# Patient Record
Sex: MALE | Race: WHITE | NOT HISPANIC OR LATINO | Employment: OTHER | URBAN - METROPOLITAN AREA
[De-identification: names, ages, dates, MRNs, and addresses within clinical notes are randomized per-mention and may not be internally consistent; named-entity substitution may affect disease eponyms.]

---

## 2017-01-03 ENCOUNTER — OFFICE VISIT (OUTPATIENT)
Dept: PLASTIC SURGERY | Facility: IMAGING CENTER | Age: 72
End: 2017-01-03
Payer: COMMERCIAL

## 2017-01-03 ENCOUNTER — TELEPHONE (OUTPATIENT)
Dept: HEMATOLOGY ONCOLOGY | Facility: MEDICAL CENTER | Age: 72
End: 2017-01-03

## 2017-01-03 DIAGNOSIS — L57.0 ACTINIC KERATOSIS: ICD-10-CM

## 2017-01-03 RX ORDER — FLUOROURACIL 50 MG/G
CREAM TOPICAL
Qty: 1 TUBE | Refills: 1 | Status: SHIPPED | OUTPATIENT
Start: 2017-01-03 | End: 2017-09-28

## 2017-01-03 NOTE — TELEPHONE ENCOUNTER
Called patient regarding missed appointment. Left voicemail to give us a call back to reschedule.

## 2017-01-03 NOTE — MR AVS SNAPSHOT
Rohit Snow   1/3/2017 3:30 PM   Office Visit   MRN: [id number]    Department:  [facility]   Dept Phone:  [phone]    Description:  Male : 1945   Provider:  DUY Hoff           Reason for Visit     Suture / Staple Removal           Allergies as of 1/3/2017     No Known Allergies      You were diagnosed with     Actinic keratosis   [702.0.ICD-9-CM]   efudex will be applied on weekdays at hs in small amounts after biopsy site heals for three weeks. it will be refilled if lesion hasn't resolved       Vital Signs     Smoking Status                   Former Smoker           Basic Information     Date Of Birth Sex Race Ethnicity Preferred Language    1945 Male White Non- English      Your appointments     2017  3:30 PM   Established Patient with Hamida Bryant PA-C   [facility]    Proficiency  [facility] [id number] [phone]           You will be receiving a confirmation call a few days before your appointment from our automated call confirmation system.              Problem List              ICD-10-CM Priority Class Noted - Resolved    KYA (generalized anxiety disorder) F41.1 Medium  Unknown - Present    ETOH abuse F10.10 High  Unknown - Present    Osteopenia M85.80 Medium  Unknown - Present    Hypoalbuminemia E88.09 Medium  11/10/2013 - Present    Alcoholism (HCC) F10.20 High  11/10/2013 - Present    Aortic insufficiency (Chronic) I35.1 Medium  11/10/2013 - Present    Major depressive disorder, single episode, severe without psychotic features (HCC) F32.2 High  2013 - Present    CHF (congestive heart failure) (HCC) I50.9 High  2013 - Present    History of atrial fibrillation Z86.79 Medium  2014 - Present    H/O: upper GI bleed Z87.19 Medium  2014 - Present    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) K70.30, I85.10 High  2014 - Present    Alcoholic hepatitis (Chronic) K70.10  High  11/21/2014 - Present    History of cervical spine trauma Z87.828 Low  4/24/2015 - Present    Cirrhosis (HCC) K74.60 High  4/24/2015 - Present    Thrombocytopenia (HCC) D69.6 Medium  4/24/2015 - Present    Insomnia G47.00 Low  8/21/2015 - Present    Left wrist pain M25.532 Low  9/17/2015 - Present    Macrocytic anemia D53.9 Low  9/20/2015 - Present    DDD (degenerative disc disease), cervical M50.30 Low  9/28/2015 - Present    Cervical spondylosis M47.812 Low  9/28/2015 - Present    Chronic neck pain M54.2, G89.29 Low  9/28/2015 - Present    Cervical fusion syndrome Q76.1 Low  9/28/2015 - Present    Cervical radiculopathy M54.12 Low  2/4/2016 - Present    Lymphocytosis D72.820 High  2/16/2016 - Present    Controlled substance agreement signed Z79.899 Low  4/28/2016 - Present    Continuous opioid dependence (HCC) F11.20   5/26/2016 - Present    Neoplasm of uncertain behavior of skin D48.5   12/28/2016 - Present    Actinic keratosis L57.0   1/3/2017 - Present      Health Maintenance        Date Due Completion Dates    IMM DTaP/Tdap/Td Vaccine (1 - Tdap) 7/17/1963 ---    IMM ZOSTER VACCINE 7/17/2004 ---    COLONOSCOPY 12/23/2019 12/23/2009            Current Immunizations     13-VALENT PCV PREVNAR 10/24/2016  3:20 PM    Influenza Vaccine Adult HD 10/24/2016  3:20 PM, 10/16/2015 11:25 AM    Influenza Vaccine Pediatric 11/10/2013  9:00 AM    Pneumococcal polysaccharide vaccine (PPSV-23) 4/24/2015  9:05 PM      Below and/or attached are the medications your provider expects you to take. Review all of your home medications and newly ordered medications with your provider and/or pharmacist. Follow medication instructions as directed by your provider and/or pharmacist. Please keep your medication list with you and share with your provider. Update the information when medications are discontinued, doses are changed, or new medications (including over-the-counter products) are added; and carry medication information at  all times in the event of emergency situations     Allergies:  No Known Allergies          Medications  Valid as of: January 03, 2017 -  4:22 PM    Generic Name Brand Name Tablet Size Instructions for use    B Complex Vitamins   Take 1 Tab by mouth every day.        Calcium Carbonate Antacid (Chew Tab) TUMS 500 MG Take 500 mg by mouth every four hours as needed.        Fluorouracil (Cream) EFUDEX 5 % Apply .5 grams qd at HS to affected area for three weeks during weekdays then stop.        Furosemide (Tab) LASIX 20 MG Take 1 Tab by mouth every day.        Multiple Vitamins-Minerals (Tab) THERAGRAN-M  Take 1 Tab by mouth every day.        Spironolactone (Tab) ALDACTONE 100 MG Take 1 Tab by mouth every day.        .                 Medicines prescribed today were sent to:     [facility] [address] [place] [address] [address] [facility] [address]    Phone: [phone] Fax: [phone]    Open 24 Hours?: No      Medication refill instructions:       If your prescription bottle indicates you have medication refills left, it is not necessary to call your provider’s office. Please contact your pharmacy and they will refill your medication.    If your prescription bottle indicates you do not have any refills left, you may request refills at any time through one of the following ways: The online [employer] system (except Urgent Care), by calling your provider’s office, or by asking your pharmacy to contact your provider’s office with a refill request. Medication refills are processed only during regular business hours and may not be available until the next business day. Your provider may request additional information or to have a follow-up visit with you prior to refilling your medication.   *Please Note: Medication refills are assigned a new Rx number when refilled electronically. Your pharmacy may indicate that no refills were authorized even though a new prescription for the same medication is available at the  pharmacy. Please request the medicine by name with the pharmacy before contacting your provider for a refill.           Live Gamer Access Code: [email or web address]  Expires: 1/6/2017  8:56 AM    Live Gamer  A secure, online tool to manage your health information     [employer]’s Live Gamer® is a secure, online tool that connects you to your personalized health information from the privacy of your home -- day or night - making it very easy for you to manage your healthcare. Once the activation process is completed, you can even access your medical information using the Live Gamer itz, which is available for free in the Apple Itz store or Google Play store.     Live Gamer provides the following levels of access (as shown below):   My Chart Features   [facility] Doctor [facility]  Specialists [facility]  Urgent  Care Non-[facility]  Primary Care  Doctor   Email your healthcare team securely and privately 24/7 X X X    Manage appointments: schedule your next appointment; view details of past/upcoming appointments X      Request prescription refills. X      View recent personal medical records, including lab and immunizations X X X X   View health record, including health history, allergies, medications X X X X   Read reports about your outpatient visits, procedures, consult and ER notes X X X X   See your discharge summary, which is a recap of your hospital and/or ER visit that includes your diagnosis, lab results, and care plan. X X       How to register for Live Gamer:  1. Go to  [email or web address].  2. Click on the Sign Up Now box, which takes you to the New Member Sign Up page. You will need to provide the following information:  a. Enter your Live Gamer Access Code exactly as it appears at the top of this page. (You will not need to use this code after you’ve completed the sign-up process. If you do not sign up before the expiration date, you must request a new code.)   b. Enter your date of birth.   c. Enter your home email  address.   d. Click Submit, and follow the next screen’s instructions.  3. Create a [employer] ID. This will be your [employer] login ID and cannot be changed, so think of one that is secure and easy to remember.  4. Create a [employer] password. You can change your password at any time.  5. Enter your Password Reset Question and Answer. This can be used at a later time if you forget your password.   6. Enter your e-mail address. This allows you to receive e-mail notifications when new information is available in [employer].  7. Click Sign Up. You can now view your health information.    For assistance activating your [employer] account, call [phone]

## 2017-01-03 NOTE — PROGRESS NOTES
Chief Complaint   Patient presents with   • Suture / Staple Removal       HISTORY OF PRESENT ILLNESS: Patient is a 71 y.o. male established patient who presents today suture removal after biopsy.       Actinic keratosis  Pathology from recent biopsy shows actinic keratosis after discussion patient chooses efudex treatment so I will prescribe this to use after healing is complete.       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) 11/21/2014     Priority: High   • Alcoholic hepatitis 11/21/2014     Priority: High   • Major depressive disorder, single episode, severe without psychotic features (HCC) 12/06/2013     Priority: High   • CHF (congestive heart failure) (HCC) 12/06/2013     Priority: High   • Alcoholism (HCC) 11/10/2013     Priority: High   • ETOH abuse      Priority: High   • Thrombocytopenia (HCC) 04/24/2015     Priority: Medium   • History of atrial fibrillation 11/19/2014     Priority: Medium   • H/O: upper GI bleed 11/19/2014     Priority: Medium   • Hypoalbuminemia 11/10/2013     Priority: Medium   • Aortic insufficiency 11/10/2013     Priority: Medium   • KYA (generalized anxiety disorder)      Priority: Medium   • Osteopenia      Priority: Medium   • Controlled substance agreement signed 04/28/2016     Priority: Low   • Cervical radiculopathy 02/04/2016     Priority: Low   • DDD (degenerative disc disease), cervical 09/28/2015     Priority: Low   • Cervical spondylosis 09/28/2015     Priority: Low   • Chronic neck pain 09/28/2015     Priority: Low   • Cervical fusion syndrome 09/28/2015     Priority: Low   • Macrocytic anemia 09/20/2015     Priority: Low   • Left wrist pain 09/17/2015     Priority: Low   • Insomnia 08/21/2015     Priority: Low   • History of cervical spine trauma 04/24/2015     Priority: Low   • Actinic keratosis 01/03/2017   •  Neoplasm of uncertain behavior of skin 2016   • Continuous opioid dependence (HCC) 2016       Allergies:Review of patient's allergies indicates no known allergies.    Current Outpatient Prescriptions   Medication Sig Dispense Refill   • furosemide (LASIX) 20 MG Tab Take 1 Tab by mouth every day. 90 Tab 1   • spironolactone (ALDACTONE) 100 MG Tab Take 1 Tab by mouth every day. 90 Tab 1   • calcium carbonate (TUMS) 500 MG Chew Tab Take 500 mg by mouth every four hours as needed.     • B Complex Vitamins (VITAMIN B COMPLEX PO) Take 1 Tab by mouth every day.     • therapeutic multivitamin-minerals (THERAGRAN-M) Tab Take 1 Tab by mouth every day.       No current facility-administered medications for this visit.       Social History   Substance Use Topics   • Smoking status: Former Smoker -- 0.25 packs/day for 4 years     Types: Cigarettes     Quit date: 1965   • Smokeless tobacco: Never Used   • Alcohol Use: 30.0 oz/week     50 Standard drinks or equivalent per week      Comment: 1/2 drinks daily (no more than a pint a day)       Family Status   Relation Status Death Age   • Mother     • Father       Family History   Problem Relation Age of Onset   • Other Mother      GERD   • Heart Disease Mother      CAD   • Diabetes Mother      DMII   • Cancer Father      liver   • Cancer Maternal Grandmother      Unknown type of cancer       ROS:  Review of Systems   Constitutional: Negative for fever, chills, weight loss and malaise/fatigue.     Skin:skin lesion left temple      Exam:  There were no vitals taken for this visit.  General:  Well nourished, well developed male in NAD  Left temple two sutures removed, area is healing well   Please note that this dictation was created using voice recognition software. I have made every reasonable attempt to correct obvious errors, but I expect that there are errors of grammar and possibly content that I did not discover before finalizing the  note.    Assessment/Plan:

## 2017-01-03 NOTE — ASSESSMENT & PLAN NOTE
Pathology from recent biopsy shows actinic keratosis after discussion patient chooses efudex treatment so I will prescribe this to use after healing is complete.

## 2017-01-23 ENCOUNTER — APPOINTMENT (OUTPATIENT)
Dept: MEDICAL GROUP | Age: 72
End: 2017-01-23
Payer: COMMERCIAL

## 2017-01-23 ENCOUNTER — APPOINTMENT (OUTPATIENT)
Dept: RADIOLOGY | Facility: MEDICAL CENTER | Age: 72
End: 2017-01-23
Attending: INTERNAL MEDICINE
Payer: COMMERCIAL

## 2017-02-06 ENCOUNTER — HOSPITAL ENCOUNTER (OUTPATIENT)
Dept: LAB | Facility: MEDICAL CENTER | Age: 72
End: 2017-02-06
Attending: INTERNAL MEDICINE
Payer: COMMERCIAL

## 2017-02-06 LAB
ALBUMIN SERPL BCP-MCNC: 4.2 G/DL (ref 3.2–4.9)
ALBUMIN/GLOB SERPL: 1.3 G/DL
ALP SERPL-CCNC: 87 U/L (ref 30–99)
ALT SERPL-CCNC: 8 U/L (ref 2–50)
ANION GAP SERPL CALC-SCNC: 8 MMOL/L (ref 0–11.9)
APTT PPP: 29.1 SEC (ref 24.7–36)
AST SERPL-CCNC: 20 U/L (ref 12–45)
BILIRUB SERPL-MCNC: 1.2 MG/DL (ref 0.1–1.5)
BUN SERPL-MCNC: 31 MG/DL (ref 8–22)
CALCIUM SERPL-MCNC: 10 MG/DL (ref 8.5–10.5)
CHLORIDE SERPL-SCNC: 100 MMOL/L (ref 96–112)
CO2 SERPL-SCNC: 27 MMOL/L (ref 20–33)
CREAT SERPL-MCNC: 1.37 MG/DL (ref 0.5–1.4)
GLOBULIN SER CALC-MCNC: 3.2 G/DL (ref 1.9–3.5)
GLUCOSE SERPL-MCNC: 109 MG/DL (ref 65–99)
INR PPP: 0.96 (ref 0.87–1.13)
POTASSIUM SERPL-SCNC: 4.7 MMOL/L (ref 3.6–5.5)
PROT SERPL-MCNC: 7.4 G/DL (ref 6–8.2)
PROTHROMBIN TIME: 13.1 SEC (ref 12–14.6)
SODIUM SERPL-SCNC: 135 MMOL/L (ref 135–145)

## 2017-02-06 PROCEDURE — 36415 COLL VENOUS BLD VENIPUNCTURE: CPT

## 2017-02-06 PROCEDURE — 85610 PROTHROMBIN TIME: CPT

## 2017-02-06 PROCEDURE — 80053 COMPREHEN METABOLIC PANEL: CPT

## 2017-02-06 PROCEDURE — 82105 ALPHA-FETOPROTEIN SERUM: CPT

## 2017-02-06 PROCEDURE — 85730 THROMBOPLASTIN TIME PARTIAL: CPT

## 2017-02-08 LAB — AFP-TM SERPL-MCNC: 4 NG/ML (ref 0–9)

## 2017-03-09 ENCOUNTER — TELEPHONE (OUTPATIENT)
Dept: HEMATOLOGY ONCOLOGY | Facility: MEDICAL CENTER | Age: 72
End: 2017-03-09

## 2017-03-09 NOTE — TELEPHONE ENCOUNTER
2nd attempt:     Left message regarding patient's missed hematology appointment with Dr. Flores. 2mo follow up.

## 2017-03-23 ENCOUNTER — OFFICE VISIT (OUTPATIENT)
Dept: HEMATOLOGY ONCOLOGY | Facility: MEDICAL CENTER | Age: 72
End: 2017-03-23
Payer: COMMERCIAL

## 2017-03-23 ENCOUNTER — NON-PROVIDER VISIT (OUTPATIENT)
Dept: HEMATOLOGY ONCOLOGY | Facility: MEDICAL CENTER | Age: 72
End: 2017-03-23
Payer: COMMERCIAL

## 2017-03-23 ENCOUNTER — HOSPITAL ENCOUNTER (OUTPATIENT)
Facility: MEDICAL CENTER | Age: 72
End: 2017-03-23
Attending: INTERNAL MEDICINE
Payer: COMMERCIAL

## 2017-03-23 VITALS
OXYGEN SATURATION: 97 % | SYSTOLIC BLOOD PRESSURE: 100 MMHG | WEIGHT: 120 LBS | HEART RATE: 67 BPM | DIASTOLIC BLOOD PRESSURE: 70 MMHG | RESPIRATION RATE: 16 BRPM | HEIGHT: 64 IN | TEMPERATURE: 99.3 F | BODY MASS INDEX: 20.49 KG/M2

## 2017-03-23 VITALS
OXYGEN SATURATION: 97 % | BODY MASS INDEX: 20.49 KG/M2 | RESPIRATION RATE: 16 BRPM | DIASTOLIC BLOOD PRESSURE: 70 MMHG | TEMPERATURE: 99.3 F | SYSTOLIC BLOOD PRESSURE: 100 MMHG | WEIGHT: 120 LBS | HEART RATE: 67 BPM | HEIGHT: 64 IN

## 2017-03-23 DIAGNOSIS — C91.10 CLL (CHRONIC LYMPHOCYTIC LEUKEMIA) (HCC): ICD-10-CM

## 2017-03-23 LAB
ANISOCYTOSIS BLD QL SMEAR: ABNORMAL
BASOPHILS # BLD AUTO: 0 K/UL (ref 0–0.12)
BASOPHILS NFR BLD AUTO: 0 % (ref 0–1.8)
EOSINOPHIL # BLD: 0 K/UL (ref 0–0.51)
EOSINOPHIL NFR BLD AUTO: 0 % (ref 0–6.9)
ERYTHROCYTE [DISTWIDTH] IN BLOOD BY AUTOMATED COUNT: 50.6 FL (ref 35.9–50)
HCT VFR BLD AUTO: 34 % (ref 42–52)
HGB BLD-MCNC: 11.8 G/DL (ref 14–18)
LYMPHOCYTES # BLD: 8.64 K/UL (ref 1–4.8)
LYMPHOCYTES NFR BLD AUTO: 80 % (ref 22–41)
MACROCYTES BLD QL SMEAR: ABNORMAL
MANUAL DIFF BLD: NORMAL
MCH RBC QN AUTO: 36.2 PG (ref 27–33)
MCHC RBC AUTO-ENTMCNC: 34.7 G/DL (ref 33.7–35.3)
MCV RBC AUTO: 104.3 FL (ref 81.4–97.8)
MONOCYTES # BLD: 0.1 K/UL (ref 0–0.85)
MONOCYTES NFR BLD AUTO: 0.9 % (ref 0–13.4)
MORPHOLOGY BLD-IMP: NORMAL
NEUTROPHILS # BLD: 2.06 K/UL (ref 1.82–7.42)
NEUTROPHILS NFR BLD AUTO: 19.1 % (ref 44–72)
NRBC # BLD AUTO: 0 K/UL
NRBC BLD-RTO: 0 /100 WBC
PLATELET # BLD AUTO: 89 K/UL (ref 164–446)
PLATELET BLD QL SMEAR: NORMAL
PMV BLD AUTO: 9.1 FL (ref 9–12.9)
RBC # BLD AUTO: 3.26 M/UL (ref 4.7–6.1)
RBC BLD AUTO: PRESENT
SMUDGE CELLS BLD QL SMEAR: NORMAL
WBC # BLD AUTO: 10.8 K/UL (ref 4.8–10.8)

## 2017-03-23 PROCEDURE — 0518F FALL PLAN OF CARE DOCD: CPT | Mod: 8P | Performed by: INTERNAL MEDICINE

## 2017-03-23 PROCEDURE — 85027 COMPLETE CBC AUTOMATED: CPT

## 2017-03-23 PROCEDURE — G8420 CALC BMI NORM PARAMETERS: HCPCS | Performed by: INTERNAL MEDICINE

## 2017-03-23 PROCEDURE — 1036F TOBACCO NON-USER: CPT | Performed by: INTERNAL MEDICINE

## 2017-03-23 PROCEDURE — G8432 DEP SCR NOT DOC, RNG: HCPCS | Performed by: INTERNAL MEDICINE

## 2017-03-23 PROCEDURE — 88291 CYTO/MOLECULAR REPORT: CPT

## 2017-03-23 PROCEDURE — 3288F FALL RISK ASSESSMENT DOCD: CPT | Performed by: INTERNAL MEDICINE

## 2017-03-23 PROCEDURE — 88275 CYTOGENETICS 100-300: CPT

## 2017-03-23 PROCEDURE — 99213 OFFICE O/P EST LOW 20 MIN: CPT | Performed by: INTERNAL MEDICINE

## 2017-03-23 PROCEDURE — 36415 COLL VENOUS BLD VENIPUNCTURE: CPT | Performed by: INTERNAL MEDICINE

## 2017-03-23 PROCEDURE — G8482 FLU IMMUNIZE ORDER/ADMIN: HCPCS | Performed by: INTERNAL MEDICINE

## 2017-03-23 PROCEDURE — 3017F COLORECTAL CA SCREEN DOC REV: CPT | Performed by: INTERNAL MEDICINE

## 2017-03-23 PROCEDURE — 1100F PTFALLS ASSESS-DOCD GE2>/YR: CPT | Performed by: INTERNAL MEDICINE

## 2017-03-23 PROCEDURE — 88271 CYTOGENETICS DNA PROBE: CPT

## 2017-03-23 PROCEDURE — 85007 BL SMEAR W/DIFF WBC COUNT: CPT

## 2017-03-23 PROCEDURE — 4040F PNEUMOC VAC/ADMIN/RCVD: CPT | Performed by: INTERNAL MEDICINE

## 2017-03-23 ASSESSMENT — PAIN SCALES - GENERAL
PAINLEVEL: 7=MODERATE-SEVERE PAIN
PAINLEVEL: NO PAIN

## 2017-03-23 NOTE — MR AVS SNAPSHOT
Rohit Snow   3/23/2017 2:30 PM   Appointment   MRN: [id number]    Department:  [facility]   Dept Phone:  [phone]    Description:  Male : 1945   Provider:  [facility] MA 1           Allergies as of 3/23/2017     No Known Allergies      Vital Signs     Smoking Status                   Former Smoker           Basic Information     Date Of Birth Sex Race Ethnicity Preferred Language    1945 Male White Non- English      Your appointments     Sep 26, 2017  1:40 PM   ONCOLOGY EST PATIENT 30 MIN with Jostin Flores M.D.   [facility] (--)    [address], [address]  [facility] [id number] [phone]              Problem List              ICD-10-CM Priority Class Noted - Resolved    KYA (generalized anxiety disorder) F41.1 Medium  Unknown - Present    ETOH abuse F10.10 High  Unknown - Present    Osteopenia M85.80 Medium  Unknown - Present    Hypoalbuminemia E88.09 Medium  11/10/2013 - Present    Alcoholism (CMS-HCC) F10.20 High  11/10/2013 - Present    Aortic insufficiency (Chronic) I35.1 Medium  11/10/2013 - Present    Major depressive disorder, single episode, severe without psychotic features (CMS-HCC) F32.2 High  2013 - Present    CHF (congestive heart failure) (CMS-HCC) I50.9 High  2013 - Present    History of atrial fibrillation Z86.79 Medium  2014 - Present    H/O: upper GI bleed Z87.19 Medium  2014 - Present    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 (CMS-HCC) K70.30, I85.10 High  2014 - Present    Alcoholic hepatitis (Chronic) K70.10 High  2014 - Present    History of cervical spine trauma Z87.828 Low  2015 - Present    Cirrhosis (CMS-HCC) K74.60 High  2015 - Present    Thrombocytopenia (CMS-HCC) D69.6 Medium  2015 - Present    Insomnia G47.00 Low  2015 - Present    Left wrist pain M25.532 Low  2015 - Present    Macrocytic anemia D53.9 Low  2015 - Present    DDD (degenerative disc disease),  cervical M50.30 Low  9/28/2015 - Present    Cervical spondylosis M47.812 Low  9/28/2015 - Present    Chronic neck pain M54.2, G89.29 Low  9/28/2015 - Present    Cervical fusion syndrome Q76.1 Low  9/28/2015 - Present    Cervical radiculopathy M54.12 Low  2/4/2016 - Present    Lymphocytosis D72.820 High  2/16/2016 - Present    Controlled substance agreement signed Z79.899 Low  4/28/2016 - Present    Continuous opioid dependence (CMS-HCC) F11.20   5/26/2016 - Present    Neoplasm of uncertain behavior of skin D48.5   12/28/2016 - Present    Actinic keratosis L57.0   1/3/2017 - Present    CLL (chronic lymphocytic leukemia) (CMS-HCC) C91.10   3/23/2017 - Present      Health Maintenance        Date Due Completion Dates    IMM DTaP/Tdap/Td Vaccine (1 - Tdap) 7/17/1963 ---    IMM ZOSTER VACCINE 7/17/2004 ---    COLONOSCOPY 12/23/2019 12/23/2009            Current Immunizations     13-VALENT PCV PREVNAR 10/24/2016  3:20 PM    Influenza Vaccine Adult HD 10/24/2016  3:20 PM, 10/16/2015 11:25 AM    Influenza Vaccine Pediatric 11/10/2013  9:00 AM    Pneumococcal polysaccharide vaccine (PPSV-23) 4/24/2015  9:05 PM      Below and/or attached are the medications your provider expects you to take. Review all of your home medications and newly ordered medications with your provider and/or pharmacist. Follow medication instructions as directed by your provider and/or pharmacist. Please keep your medication list with you and share with your provider. Update the information when medications are discontinued, doses are changed, or new medications (including over-the-counter products) are added; and carry medication information at all times in the event of emergency situations     Allergies:  No Known Allergies          Medications  Valid as of: March 23, 2017 -  2:37 PM    Generic Name Brand Name Tablet Size Instructions for use    B Complex Vitamins   Take 1 Tab by mouth every day.        Calcium Carbonate Antacid (Chew Tab) TUMS 500 MG  Take 500 mg by mouth every four hours as needed.        Fluorouracil (Cream) EFUDEX 5 % Apply .5 grams qd at HS to affected area for three weeks during weekdays then stop.        Furosemide (Tab) LASIX 20 MG Take 1 Tab by mouth every day.        Multiple Vitamins-Minerals (Tab) THERAGRAN-M  Take 1 Tab by mouth every day.        Spironolactone (Tab) ALDACTONE 100 MG Take 1 Tab by mouth every day.        .                 Medicines prescribed today were sent to:     [facility] [address] [place] [address], [place] - [address] [place] [address]    Phone: [phone] Fax: [phone]    Open 24 Hours?: No      Medication refill instructions:       If your prescription bottle indicates you have medication refills left, it is not necessary to call your provider’s office. Please contact your pharmacy and they will refill your medication.    If your prescription bottle indicates you do not have any refills left, you may request refills at any time through one of the following ways: The online TravelCLICK system (except Urgent Care), by calling your provider’s office, or by asking your pharmacy to contact your provider’s office with a refill request. Medication refills are processed only during regular business hours and may not be available until the next business day. Your provider may request additional information or to have a follow-up visit with you prior to refilling your medication.   *Please Note: Medication refills are assigned a new Rx number when refilled electronically. Your pharmacy may indicate that no refills were authorized even though a new prescription for the same medication is available at the pharmacy. Please request the medicine by name with the pharmacy before contacting your provider for a refill.           TravelCLICK Access Code: [email or web address]  Expires: 4/9/2017  2:48 PM    TravelCLICK  A secure, online tool to manage your health information     [employer]’s TravelCLICK® is a secure, online tool that  connects you to your personalized health information from the privacy of your home -- day or night - making it very easy for you to manage your healthcare. Once the activation process is completed, you can even access your medical information using the [employer] itz, which is available for free in the Apple Itz store or Google Play store.     [employer] provides the following levels of access (as shown below):   My Chart Features   [facility] Doctor [facility]  Specialists [facility]  Urgent  Care Non-[facility]  Primary Care  Doctor   Email your healthcare team securely and privately 24/7 X X X    Manage appointments: schedule your next appointment; view details of past/upcoming appointments X      Request prescription refills. X      View recent personal medical records, including lab and immunizations X X X X   View health record, including health history, allergies, medications X X X X   Read reports about your outpatient visits, procedures, consult and ER notes X X X X   See your discharge summary, which is a recap of your hospital and/or ER visit that includes your diagnosis, lab results, and care plan. X X       How to register for [employer]:  1. Go to  [email or web address].  2. Click on the Sign Up Now box, which takes you to the New Member Sign Up page. You will need to provide the following information:  a. Enter your [employer] Access Code exactly as it appears at the top of this page. (You will not need to use this code after you’ve completed the sign-up process. If you do not sign up before the expiration date, you must request a new code.)   b. Enter your date of birth.   c. Enter your home email address.   d. Click Submit, and follow the next screen’s instructions.  3. Create a [employer] ID. This will be your [employer] login ID and cannot be changed, so think of one that is secure and easy to remember.  4. Create a [employer] password. You can change your password at any time.  5. Enter your Password Reset Question  and Answer. This can be used at a later time if you forget your password.   6. Enter your e-mail address. This allows you to receive e-mail notifications when new information is available in [employer].  7. Click Sign Up. You can now view your health information.    For assistance activating your [employer] account, call [phone]

## 2017-03-23 NOTE — MR AVS SNAPSHOT
"        Rohit Snow   3/23/2017 2:00 PM   Office Visit   MRN: [id number]    Department:  [facility]   Dept Phone:  [phone]    Description:  Male : 1945   Provider:  Jostin Flores M.D.           Reason for Visit     Follow-Up           Allergies as of 3/23/2017     No Known Allergies      You were diagnosed with     CLL (chronic lymphocytic leukemia) (CMS-HCC)   [303202]         Vital Signs     Blood Pressure Pulse Temperature Respirations Height Weight    100/70 mmHg 67 37.4 °C (99.3 °F) 16 1.626 m (5' 4.02\") 54.432 kg (120 lb)    Body Mass Index Oxygen Saturation Smoking Status             20.59 kg/m2 97% Former Smoker         Basic Information     Date Of Birth Sex Race Ethnicity Preferred Language    1945 Male White Non- English      Your appointments     Mar 23, 2017  2:30 PM   Non Provider 1 with ONC MA 1   [facility] (--)    [address] [place] [address], [facility] [address]  [facility] [address] [phone]           You will be receiving a confirmation call a few days before your appointment from our automated call confirmation system.              Problem List              ICD-10-CM Priority Class Noted - Resolved    KYA (generalized anxiety disorder) F41.1 Medium  Unknown - Present    ETOH abuse F10.10 High  Unknown - Present    Osteopenia M85.80 Medium  Unknown - Present    Hypoalbuminemia E88.09 Medium  11/10/2013 - Present    Alcoholism (CMS-HCC) F10.20 High  11/10/2013 - Present    Aortic insufficiency (Chronic) I35.1 Medium  11/10/2013 - Present    Major depressive disorder, single episode, severe without psychotic features (CMS-HCC) F32.2 High  2013 - Present    CHF (congestive heart failure) (CMS-HCC) I50.9 High  2013 - Present    History of atrial fibrillation Z86.79 Medium  2014 - Present    H/O: upper GI bleed Z87.19 Medium  2014 - Present    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 (CMS-HCC) K70.30, I85.10 High  2014 - Present   " Alcoholic hepatitis (Chronic) K70.10 High  11/21/2014 - Present    History of cervical spine trauma Z87.828 Low  4/24/2015 - Present    Cirrhosis (CMS-HCC) K74.60 High  4/24/2015 - Present    Thrombocytopenia (CMS-HCC) D69.6 Medium  4/24/2015 - Present    Insomnia G47.00 Low  8/21/2015 - Present    Left wrist pain M25.532 Low  9/17/2015 - Present    Macrocytic anemia D53.9 Low  9/20/2015 - Present    DDD (degenerative disc disease), cervical M50.30 Low  9/28/2015 - Present    Cervical spondylosis M47.812 Low  9/28/2015 - Present    Chronic neck pain M54.2, G89.29 Low  9/28/2015 - Present    Cervical fusion syndrome Q76.1 Low  9/28/2015 - Present    Cervical radiculopathy M54.12 Low  2/4/2016 - Present    Lymphocytosis D72.820 High  2/16/2016 - Present    Controlled substance agreement signed Z79.899 Low  4/28/2016 - Present    Continuous opioid dependence (CMS-HCC) F11.20   5/26/2016 - Present    Neoplasm of uncertain behavior of skin D48.5   12/28/2016 - Present    Actinic keratosis L57.0   1/3/2017 - Present    CLL (chronic lymphocytic leukemia) (CMS-HCC) C91.10   3/23/2017 - Present      Health Maintenance        Date Due Completion Dates    IMM DTaP/Tdap/Td Vaccine (1 - Tdap) 7/17/1963 ---    IMM ZOSTER VACCINE 7/17/2004 ---    COLONOSCOPY 12/23/2019 12/23/2009            Current Immunizations     13-VALENT PCV PREVNAR 10/24/2016  3:20 PM    Influenza Vaccine Adult HD 10/24/2016  3:20 PM, 10/16/2015 11:25 AM    Influenza Vaccine Pediatric 11/10/2013  9:00 AM    Pneumococcal polysaccharide vaccine (PPSV-23) 4/24/2015  9:05 PM      Below and/or attached are the medications your provider expects you to take. Review all of your home medications and newly ordered medications with your provider and/or pharmacist. Follow medication instructions as directed by your provider and/or pharmacist. Please keep your medication list with you and share with your provider. Update the information when medications are discontinued,  doses are changed, or new medications (including over-the-counter products) are added; and carry medication information at all times in the event of emergency situations     Allergies:  No Known Allergies          Medications  Valid as of: March 23, 2017 -  2:25 PM    Generic Name Brand Name Tablet Size Instructions for use    B Complex Vitamins   Take 1 Tab by mouth every day.        Calcium Carbonate Antacid (Chew Tab) TUMS 500 MG Take 500 mg by mouth every four hours as needed.        Fluorouracil (Cream) EFUDEX 5 % Apply .5 grams qd at HS to affected area for three weeks during weekdays then stop.        Furosemide (Tab) LASIX 20 MG Take 1 Tab by mouth every day.        Multiple Vitamins-Minerals (Tab) THERAGRAN-M  Take 1 Tab by mouth every day.        Spironolactone (Tab) ALDACTONE 100 MG Take 1 Tab by mouth every day.        .                 Medicines prescribed today were sent to:     [facility] [address] [place] [address] - [address]     [address] [facility] [address]    Phone: [phone] Fax: [phone]    Open 24 Hours?: No      Medication refill instructions:       If your prescription bottle indicates you have medication refills left, it is not necessary to call your provider’s office. Please contact your pharmacy and they will refill your medication.    If your prescription bottle indicates you do not have any refills left, you may request refills at any time through one of the following ways: The online [employer] system (except Urgent Care), by calling your provider’s office, or by asking your pharmacy to contact your provider’s office with a refill request. Medication refills are processed only during regular business hours and may not be available until the next business day. Your provider may request additional information or to have a follow-up visit with you prior to refilling your medication.   *Please Note: Medication refills are assigned a new Rx number when refilled electronically. Your pharmacy  may indicate that no refills were authorized even though a new prescription for the same medication is available at the pharmacy. Please request the medicine by name with the pharmacy before contacting your provider for a refill.        Your To Do List     Future Labs/Procedures Complete By Expires    CBC WITH DIFFERENTIAL  As directed 3/23/2018    FISH, ONCOLOGY  As directed 3/23/2018    Comments:    CLL FISH panel    Standing Orders Interval Expires    CBC WITH DIFFERENTIAL  6 mon until 3/23/2018 3/23/2018         MiFi Access Code: [email or web address]  Expires: 4/9/2017  2:48 PM    MiFi  A secure, online tool to manage your health information     [employer]’s MiFi® is a secure, online tool that connects you to your personalized health information from the privacy of your home -- day or night - making it very easy for you to manage your healthcare. Once the activation process is completed, you can even access your medical information using the MiFi itz, which is available for free in the Apple Itz store or Google Play store.     MiFi provides the following levels of access (as shown below):   My Chart Features   [facility] Primary [facility] Doctor [facility]  Specialists [facility]  Urgent  Care Non-[facility]  Primary Care  Doctor   Email your healthcare team securely and privately 24/7 X X X    Manage appointments: schedule your next appointment; view details of past/upcoming appointments X      Request prescription refills. X      View recent personal medical records, including lab and immunizations X X X X   View health record, including health history, allergies, medications X X X X   Read reports about your outpatient visits, procedures, consult and ER notes X X X X   See your discharge summary, which is a recap of your hospital and/or ER visit that includes your diagnosis, lab results, and care plan. X X       How to register for MiFi:  1. Go to  [email or web address].  2. Click on the Sign Up Now box,  which takes you to the New Member Sign Up page. You will need to provide the following information:  a. Enter your Book[employer] Access Code exactly as it appears at the top of this page. (You will not need to use this code after you’ve completed the sign-up process. If you do not sign up before the expiration date, you must request a new code.)   b. Enter your date of birth.   c. Enter your home email address.   d. Click Submit, and follow the next screen’s instructions.  3. Create a Book[employer] ID. This will be your Booktrope login ID and cannot be changed, so think of one that is secure and easy to remember.  4. Create a Book[employer]pe password. You can change your password at any time.  5. Enter your Password Reset Question and Answer. This can be used at a later time if you forget your password.   6. Enter your e-mail address. This allows you to receive e-mail notifications when new information is available in Booktrope.  7. Click Sign Up. You can now view your health information.    For assistance activating your Book[employer] account, call [phone]

## 2017-03-24 NOTE — PROGRESS NOTES
Date of visit: 3/23/2017  5:55 PM      Chief Complaint-       History of presenting illness: Rohit Snow  is a 71 y.o. year old male with a history of alcoholic cirrhosis and ascites who is here for evaluation of his leukocytosis with lymphocytosis. The most recent CBC done on 9/1/2016 showed WBC of 24 with lymphocytes of 68%. Hemoglobin was 13, and platelet count was 156. 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 He has not noticed any lymphadenopathy. He has been requiring paracentesis. He is a significant component of depression when I saw him 4 months ago. He is here for follow-up of CLL which was confirmed in flow. The B cells did not express  CD38 indicating favorable prognosis. He is following up with GI. His liver related symptoms have improved and he has not required paracentesis.Still drinking.     LDH low( from cirrhosis), B-2 MG elevated ( also have CKD).    Past Medical History:      Past Medical History   Diagnosis Date   • Health care maintenance    • ESOPHAGITIS      distal   • ETOH abuse      x 5   • Osteopenia      per Dexa Scan   • Hypertension 02/2004   • Helicobacter pylori (H. pylori) 01/2010     Postive, Ab Tx   • Neuropathy (CMS-HCC) 05/2010     feet   • Vitamin D deficiency 09/2011   • Fall    • GERD (gastroesophageal reflux disease)    • Hepatitis    • KYA (generalized anxiety disorder)    • Back pain    • Neck pain    • Esophagitis    • Gilbert's disease    • ETOH abuse      daily drinker   • Osteopenia    • Hypertension    • Helicobacter pylori (H. pylori)    • Neuropathy (CMS-HCC)    • Vitamin D deficiency    • Ringworm 5 years old   • Neoplasm of uncertain behavior of skin 12/28/2016   • Actinic keratosis 1/3/2017   • CLL (chronic lymphocytic leukemia) (CMS-HCC) 3/23/2017       Past surgical history:       Past Surgical History   Procedure Laterality Date   • Egd w/esophageal dil. balloon  1998, 2004   • Appendectomy       small bowel obstruction,  carcinoid tumor   • Colonoscopy  1998, 2009     negative   • Egd esophagus with endoscopic us  2009     positive, stricture and distal esophagitis   • Endoscopy procedure  11/19/2014     Performed by Aurelio Delacruz M.D. at SURGERY [facility]   • Tonsillectomy     • Egd w/esophageal dil. balloon     • Other abdominal surgery       appendectomy   • Colonoscopy     • Egd esophagus with endoscopic us     • Cervical fusion posterior  5/1/2015     Performed by Andrea Thorpe III, M.D. at SURGERY [facility]   • Cervical laminectomy posterior  5/1/2015     Performed by Andrea Thorpe III, M.D. at SURGERY [facility]   • Wrist orif Left 5/7/2015     Procedure: WRIST ORIF [79.83] DISTAL RADIUS;  Surgeon: Gurvinder Baez M.D.;  Location: SURGERY [facility];  Service:        Allergies:       Review of patient's allergies indicates no known allergies.    Medications:         Current Outpatient Prescriptions   Medication Sig Dispense Refill   • fluorouracil (EFUDEX) 5 % cream Apply .5 grams qd at HS to affected area for three weeks during weekdays then stop. 1 Tube 1   • furosemide (LASIX) 20 MG Tab Take 1 Tab by mouth every day. 90 Tab 1   • spironolactone (ALDACTONE) 100 MG Tab Take 1 Tab by mouth every day. 90 Tab 1   • calcium carbonate (TUMS) 500 MG Chew Tab Take 500 mg by mouth every four hours as needed.     • B Complex Vitamins (VITAMIN B COMPLEX PO) Take 1 Tab by mouth every day.     • therapeutic multivitamin-minerals (THERAGRAN-M) Tab Take 1 Tab by mouth every day.       No current facility-administered medications for this visit.         Social History:     Social History     Social History   • Marital Status:      Spouse Name: N/A   • Number of Children: N/A   • Years of Education: N/A     Occupational History   • Not on file.     Social History Main Topics   • Smoking status: Former Smoker -- 0.25 packs/day for 4 years     Types: Cigarettes     Quit date: 01/01/1965   • Smokeless tobacco:  "Never Used   • Alcohol Use: 30.0 oz/week     50 Standard drinks or equivalent per week      Comment: 1/2 drinks daily (no more than a pint a day)   • Drug Use: Yes     Special: Marijuana, Inhaled      Comment: Marijuana once a week, helps with sleep and pain   • Sexual Activity: Not Currently     Other Topics Concern   • Not on file     Social History Narrative    ** Merged History Encounter **    No known exposure to asbestos, dyes, chemicals, or pesticides. 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 Wife  recently of cancer in . 2 children, but has no relationship with them.             Family History:      Family History   Problem Relation Age of Onset   • Other Mother      GERD   • Heart Disease Mother      CAD   • Diabetes Mother      DMII   • Cancer Father      liver   • Cancer Maternal Grandmother      Unknown type of cancer       Review of Systems:  All other review of systems are negative except what was mentioned above in the HPI.    Constitutional: Negative for fever, chills, weight loss and malaise/fatigue.    HEENT: No new auditory or visual complaints. No sore throat and neck pain.     Respiratory: Negative for cough, sputum production, shortness of breath and wheezing.    Cardiovascular: Negative for chest pain, palpitations, orthopnea and leg swelling.    Gastrointestinal: Negative for heartburn, nausea, vomiting and abdominal pain.    Genitourinary: Negative for dysuria, hematuria    Musculoskeletal: No new arthralgias or myalgias   Skin: Negative for rash and itching.    Neurological: Negative for focal weakness and headaches.    Endo/Heme/Allergies: No abnormal bleed/bruise.    Psychiatric/Behavioral: No new depression/anxiety.    Physical Exam:  Vitals: /70 mmHg  Pulse 67  Temp(Src) 37.4 °C (99.3 °F)  Resp 16  Ht 1.626 m (5' 4.02\")  Wt 54.432 kg (120 lb)  BMI 20.59 kg/m2  SpO2 97%    General: Not in acute distress, alert and oriented x 3  HEENT: No pallor, icterus. Oropharynx clear. "   Neck: Supple, no palpable masses.  Lymph nodes: No palpable cervical, supraclavicular, axillary or inguinal lymphadenopathy.    CVS: regular rate and rhythm, no rubs or gallops  RESP: Clear to auscultate bilaterally, no wheezing or crackles.   ABD: Soft, non tender, non distended, positive bowel sounds, no palpable organomegaly  EXT: No edema or cyanosis  CNS: Alert and oriented x3, No focal deficits.  Skin- No rash      Labs:   Hospital Outpatient Visit on 03/23/2017   Component Date Value Ref Range Status   • WBC 03/23/2017 10.8  4.8 - 10.8 K/uL Final   • RBC 03/23/2017 3.26* 4.70 - 6.10 M/uL Final   • Hemoglobin 03/23/2017 11.8* 14.0 - 18.0 g/dL Final   • Hematocrit 03/23/2017 34.0* 42.0 - 52.0 % Final   • MCV 03/23/2017 104.3* 81.4 - 97.8 fL Final   • MCH 03/23/2017 36.2* 27.0 - 33.0 pg Final   • MCHC 03/23/2017 34.7  33.7 - 35.3 g/dL Final   • RDW 03/23/2017 50.6* 35.9 - 50.0 fL Final   • Platelet Count 03/23/2017 89* 164 - 446 K/uL Final   • MPV 03/23/2017 9.1  9.0 - 12.9 fL Final   • Nucleated RBC 03/23/2017 0.00   Final   • NRBC (Absolute) 03/23/2017 0.00   Final   • Neutrophils-Polys 03/23/2017 19.10* 44.00 - 72.00 % Final   • Lymphocytes 03/23/2017 80.00* 22.00 - 41.00 % Final   • Monocytes 03/23/2017 0.90  0.00 - 13.40 % Final   • Eosinophils 03/23/2017 0.00  0.00 - 6.90 % Final   • Basophils 03/23/2017 0.00  0.00 - 1.80 % Final   • Neutrophils (Absolute) 03/23/2017 2.06  1.82 - 7.42 K/uL Final    Includes immature neutrophils, if present.   • Lymphs (Absolute) 03/23/2017 8.64* 1.00 - 4.80 K/uL Final   • Monos (Absolute) 03/23/2017 0.10  0.00 - 0.85 K/uL Final   • Eos (Absolute) 03/23/2017 0.00  0.00 - 0.51 K/uL Final   • Baso (Absolute) 03/23/2017 0.00  0.00 - 0.12 K/uL Final   • Anisocytosis 03/23/2017 1+   Final   • Macrocytosis 03/23/2017 1+   Final   • Manual Diff Status 03/23/2017 PERFORMED   Final   • Peripheral Smear Review 03/23/2017 see below   Final    Comment: Due to instrument suspect  flags, further review of peripheral smear is  indicated on this patient sample. This review may or may not result in  abnormal findings.     • Plt Estimation 03/23/2017 Decreased   Final   • RBC Morphology 03/23/2017 Present   Final   • Smudge Cells 03/23/2017 Few   Final   ]          Assessment and Plan:  [place] Stage 0 CLL- Favorable risk with no CD 38 expression. WBC stable to improved. Mild anemia and thrombocytopenia sec to cirrhosis. No need for intervention. Will check baseline FISH. RTC 6 mo.    He agreed and verbalized his agreement and understanding with the current plan.  I answered all questions and concerns he has at this time         Please note that this dictation was created using voice recognition software. I have made every reasonable attempt to correct obvious errors, but I expect that there are errors of grammar and possibly content that I did not discover before finalizing the note.      SIGNATURES:  Jostin Flores    CC:  Hamida Bryant PA-C  No ref. provider found

## 2017-04-01 ENCOUNTER — HOSPITAL ENCOUNTER (EMERGENCY)
Facility: MEDICAL CENTER | Age: 72
End: 2017-04-02
Attending: EMERGENCY MEDICINE
Payer: COMMERCIAL

## 2017-04-01 VITALS — HEIGHT: 64 IN | BODY MASS INDEX: 20.49 KG/M2 | WEIGHT: 120 LBS

## 2017-04-01 DIAGNOSIS — G25.71 AKATHISIA: ICD-10-CM

## 2017-04-01 PROCEDURE — 96375 TX/PRO/DX INJ NEW DRUG ADDON: CPT

## 2017-04-01 PROCEDURE — 700111 HCHG RX REV CODE 636 W/ 250 OVERRIDE (IP): Performed by: EMERGENCY MEDICINE

## 2017-04-01 PROCEDURE — 96376 TX/PRO/DX INJ SAME DRUG ADON: CPT

## 2017-04-01 PROCEDURE — 96374 THER/PROPH/DIAG INJ IV PUSH: CPT

## 2017-04-01 PROCEDURE — 99284 EMERGENCY DEPT VISIT MOD MDM: CPT

## 2017-04-01 RX ORDER — DIPHENHYDRAMINE HYDROCHLORIDE 50 MG/ML
25 INJECTION INTRAMUSCULAR; INTRAVENOUS ONCE
Status: COMPLETED | OUTPATIENT
Start: 2017-04-01 | End: 2017-04-01

## 2017-04-01 RX ORDER — LORAZEPAM 2 MG/ML
1 INJECTION INTRAMUSCULAR ONCE
Status: COMPLETED | OUTPATIENT
Start: 2017-04-01 | End: 2017-04-01

## 2017-04-01 RX ORDER — LORAZEPAM 2 MG/ML
1 INJECTION INTRAMUSCULAR ONCE
Status: COMPLETED | OUTPATIENT
Start: 2017-04-02 | End: 2017-04-01

## 2017-04-01 RX ADMIN — LORAZEPAM 1 MG: 2 INJECTION INTRAMUSCULAR at 23:30

## 2017-04-01 RX ADMIN — LORAZEPAM 1 MG: 2 INJECTION INTRAMUSCULAR at 23:59

## 2017-04-01 RX ADMIN — DIPHENHYDRAMINE HYDROCHLORIDE 25 MG: 50 INJECTION, SOLUTION INTRAMUSCULAR; INTRAVENOUS at 23:30

## 2017-04-01 ASSESSMENT — LIFESTYLE VARIABLES: DO YOU DRINK ALCOHOL: NO

## 2017-04-01 NOTE — ED AVS SNAPSHOT
COINPLUS Access Code: [email or web address]  Expires: 4/9/2017  2:48 PM    Your email address is not on file at [facility].  Email Addresses are required for you to sign up for COINPLUS, please contact [phone] to verify your personal information and to provide your email address prior to attempting to register for COINPLUS.    Rohit Snow  [address] [id number]  [place] [address]    COINPLUS  A secure, online tool to manage your health information     [facility]’s COINPLUS® is a secure, online tool that connects you to your personalized health information from the privacy of your home -- day or night - making it very easy for you to manage your healthcare. Once the activation process is completed, you can even access your medical information using the COINPLUS itz, which is available for free in the Apple Itz store or Google Play store.     To learn more about COINPLUS, visit [email or web address]    There are two levels of access available (as shown below):   My Chart Features  [facility] Primary Care Doctor [facility]  Specialists [facility]  Urgent  Care Non-[facility] Primary Care Doctor   Email your healthcare team securely and privately 24/7 X X X    Manage appointments: schedule your next appointment; view details of past/upcoming appointments X      Request prescription refills. X      View recent personal medical records, including lab and immunizations X X X X   View health record, including health history, allergies, medications X X X X   Read reports about your outpatient visits, procedures, consult and ER notes X X X X   See your discharge summary, which is a recap of your hospital and/or ER visit that includes your diagnosis, lab results, and care plan X X  X     How to register for [employer]:  Once your e-mail address has been verified, follow the following steps to sign up for COINPLUS.     1. Go to  [email or web address]  2. Click on the Sign Up Now box, which takes you to the New Member Sign Up page. You will need  to provide the following information:  a. Enter your [employer] Access Code exactly as it appears at the top of this page. (You will not need to use this code after you’ve completed the sign-up process. If you do not sign up before the expiration date, you must request a new code.)   b. Enter your date of birth.   c. Enter your home email address.   d. Click Submit, and follow the next screen’s instructions.  3. Create a [employer] ID. This will be your [employer] login ID and cannot be changed, so think of one that is secure and easy to remember.  4. Create a [employer] password. You can change your password at any time.  5. Enter your Password Reset Question and Answer. This can be used at a later time if you forget your password.   6. Enter your e-mail address. This allows you to receive e-mail notifications when new information is available in [employer].  7. Click Sign Up. You can now view your health information.    For assistance activating your [employer] account, call [phone]

## 2017-04-01 NOTE — ED AVS SNAPSHOT
Home Care Instructions                                                                                                                Rohit Snow   MRN: [id number]    Department:  [facility], Emergency Dept   Date of Visit:  4/1/2017            [facility], Emergency Dept    [address] [place] [address]    Phone:  [phone]      You were seen by     Hill Ambrocio M.D.      Your Diagnosis Was     Akathisia     G25.71       These are the medications you received during your hospitalization from 04/01/2017 2257 to 04/02/2017 0117     Date/Time Order Dose Route Action    04/01/2017 2330 diphenhydrAMINE (BENADRYL) injection 25 mg 25 mg Intravenous Given    04/01/2017 2330 lorazepam (ATIVAN) injection 1 mg 1 mg Intravenous Given    04/01/2017 2359 lorazepam (ATIVAN) injection 1 mg 1 mg Intravenous Given      Follow-up Information     1. Follow up with Hamida Bryant PA-C In 2 days.    Specialty:  Family Medicine    Contact information    [address]  [facility] [address] [phone]          2. Follow up with [facility], Emergency Dept.    Specialty:  Emergency Medicine    Why:  As needed, If symptoms worsen    Contact information    [address] [place] [address] [phone]      Medication Information     Review all of your home medications and newly ordered medications with your primary doctor and/or pharmacist as soon as possible. Follow medication instructions as directed by your doctor and/or pharmacist.     Please keep your complete medication list with you and share with your physician. Update the information when medications are discontinued, doses are changed, or new medications (including over-the-counter products) are added; and carry medication information at all times in the event of emergency situations.               Medication List      ASK your doctor about these medications        Instructions    Morning Afternoon  Evening Bedtime    calcium carbonate 500 MG Chew   Commonly known as:  TUMS        Take 500 mg by mouth every four hours as needed.   Dose:  500 mg                        fluorouracil 5 % cream   Commonly known as:  EFUDEX        Apply .5 grams qd at HS to affected area for three weeks during weekdays then stop.                        furosemide 20 MG Tabs   Commonly known as:  LASIX        Take 1 Tab by mouth every day.   Dose:  20 mg                        spironolactone 100 MG Tabs   Commonly known as:  ALDACTONE        Take 1 Tab by mouth every day.   Dose:  100 mg                        therapeutic multivitamin-minerals Tabs        Take 1 Tab by mouth every day.   Dose:  1 Tab                        VITAMIN B COMPLEX PO        Take 1 Tab by mouth every day.   Dose:  1 Tab                                  Discharge Instructions       Dystonia  Dystonia is a condition that makes your muscles contract without warning (muscle spasms). It can make doing everyday tasks hard. There are different forms of dystonia. The condition can affect just one part of your body, or it can affect larger areas of your body. Dystonia affects people in different ways. In some people, it is mild and goes away over time, while others may need treatment. Although there is no cure for dystonia, you can manage the condition with treatment.  CAUSES   Dystonia may be caused by:  · Genetics. This means you inherited the genes that cause you to be at risk for dystonia.  · An abnormality in the part of your brain that controls movement (basal ganglia).  Dystonia may also be acquired. If you have acquired dystonia, you developed the condition after:  · Brain injury.  · Infection.  · Drug reaction.  The cause of dystonia may also not be known (idiopathic dystonia).   SIGNS AND SYMPTOMS  Signs and symptoms of dystonia can depend on which type of the condition you have. Common signs and symptoms include:  · Muscle twitches or spasms around your  eyes (blepharospasm).  · Foot cramping or dragging.  · Pulling of your neck to one side (torticollis).  · Muscles spasms of the face.  · Spasms of the voice box.  · Tremors.  · Awkward and painful positions.  · Muscle cramping after muscle use.  DIAGNOSIS   Your health care provider can diagnose dystonia based on your symptoms and medical history. Your health care provider will also do a physical exam. You may also have:   · A blood test to check for genes that cause dystonia.  · Brain imaging tests to rule out other causes of your symptoms.  There are no tests that can diagnose other causes of dystonia.  TREATMENT   There are no treatments that can cure or prevent dystonia. Treatment to manage dystonia may include:   · Injecting the affected muscles with a chemical (botulinum) that blocks muscle spasms. This treatment can block spasms for a few days to a few months.  · Medicines to relax muscles.  HOME CARE INSTRUCTIONS  · Physical therapy to improve muscle strength and movement may be suggested by your health care provider. Continue your physical therapy exercises at home as instructed by your physical therapist.  · Make sure you have a good support system. Let your health care provider know if you are struggling with stress or anxiety.  · Keep all follow-up visits as directed by your health care provider. This is important.  · Take medicines only as directed by your health care provider.  SEEK MEDICAL CARE IF:  · Your condition is changing or getting worse.  · You need more support at home.     This information is not intended to replace advice given to you by your health care provider. Make sure you discuss any questions you have with your health care provider.     Document Released: 12/08/2003 Document Revised: 01/08/2016 Document Reviewed: 02/11/2015  Elsevier Interactive Patient Education ©2016 [employer] Inc.            Patient Information     Patient Information    Following emergency treatment: all patient  requiring follow-up care must return either to a private physician or a clinic if your condition worsens before you are able to obtain further medical attention, please return to the emergency room.     Billing Information    At [facility], we work to make the billing process streamlined for our patients.  Our Representatives are here to answer any questions you may have regarding your hospital bill.  If you have insurance coverage and have supplied your insurance information to us, we will submit a claim to your insurer on your behalf.  Should you have any questions regarding your bill, we can be reached online or by phone as follows:  Online: You are able pay your bills online or live chat with our representatives about any billing questions you may have. We are here to help Monday - Friday from 8:00am to 7:30pm and 9:00am - 12:00pm on Saturdays.  Please visit [email or web address]  for more information.   Phone:  [phone] or [phone]    Please note that your emergency physician, surgeon, pathologist, radiologist, anesthesiologist, and other specialists are not employed by [facility] and will therefore bill separately for their services.  Please contact them directly for any questions concerning their bills at the numbers below:     Emergency Physician Services:  [phone]  [facility]:  [phone]  [facility]:  [phone]  [facility]:  [phone]    1. Your final bill may vary from the amount quoted upon discharge if all procedures are not complete at that time, or if your doctor has additional procedures of which we are not aware. You will receive an additional bill if you return to the Emergency Department at [facility] for suture removal regardless of the facility of which the sutures were placed.     2. Please arrange for settlement of this account at the emergency registration.    3. All self-pay accounts  are due in full at the time of treatment.  If you are unable to meet this obligation then payment is expected within 4-5 days.     4. If you have had radiology studies (CT, X-ray, Ultrasound, MRI), you have received a preliminary result during your emergency department visit. Please contact the radiology department [phone] to receive a copy of your final result. Please discuss the Final result with your primary physician or with the follow up physician provided.     Crisis Hotline:  [place] Crisis Hotline:  5-963-WGKLBZB or 1-952.157.7926  [place] Crisis Hotline:    1-525.352.3891 or 022-625-8435         ED Discharge Follow Up Questions    1. In order to provide you with very good care, we would like to follow up with a phone call in the next few days.  May we have your permission to contact you?     YES /  NO    2. What is the best phone number to call you? (       )_____-__________    3. What is the best time to call you?      Morning  /  Afternoon  /  Evening                   Patient Signature:  ____________________________________________________________    Date:  ____________________________________________________________      Your appointments     Sep 26, 2017  1:40 PM   ONCOLOGY EST PATIENT 30 MIN with Jostin Flores M.D.   [facility] (--)    [address] [place] [address], [address]  [facility] [address] [phone]

## 2017-04-01 NOTE — ED AVS SNAPSHOT
4/2/2017          Rohit Snow  [address] [address]  [place] [address]    Dear Rohit:    [facility] wants to ensure your discharge home is safe and you or your loved ones have had all your questions answered regarding your care after you leave the hospital.    You may receive a telephone call within two days of your discharge.  This call is to make certain you understand your discharge instructions as well as ensure we provided you with the best care possible during your stay with us.     The call will only last approximately 3-5 minutes and will be done by a nurse.    Once again, we want to ensure your discharge home is safe and that you have a clear understanding of any next steps in your care.  If you have any questions or concerns, please do not hesitate to contact us, we are here for you.  Thank you for choosing [facility] for your healthcare needs.    Sincerely,    Jadiel Nielsen    [facility]

## 2017-04-02 NOTE — ED NOTES
"Pt. Medicated per MAR. Pt. Is no longer visibly shaking when walking past pt's room. Upon entering pt's room, pt. Will visibly throw himself around in the gurney saying \"I am in so much pain, I can't stop shaking.\"  "

## 2017-04-02 NOTE — DISCHARGE INSTRUCTIONS
Dystonia  Dystonia is a condition that makes your muscles contract without warning (muscle spasms). It can make doing everyday tasks hard. There are different forms of dystonia. The condition can affect just one part of your body, or it can affect larger areas of your body. Dystonia affects people in different ways. In some people, it is mild and goes away over time, while others may need treatment. Although there is no cure for dystonia, you can manage the condition with treatment.  CAUSES   Dystonia may be caused by:  · Genetics. This means you inherited the genes that cause you to be at risk for dystonia.  · An abnormality in the part of your brain that controls movement (basal ganglia).  Dystonia may also be acquired. If you have acquired dystonia, you developed the condition after:  · Brain injury.  · Infection.  · Drug reaction.  The cause of dystonia may also not be known (idiopathic dystonia).   SIGNS AND SYMPTOMS  Signs and symptoms of dystonia can depend on which type of the condition you have. Common signs and symptoms include:  · Muscle twitches or spasms around your eyes (blepharospasm).  · Foot cramping or dragging.  · Pulling of your neck to one side (torticollis).  · Muscles spasms of the face.  · Spasms of the voice box.  · Tremors.  · Awkward and painful positions.  · Muscle cramping after muscle use.  DIAGNOSIS   Your health care provider can diagnose dystonia based on your symptoms and medical history. Your health care provider will also do a physical exam. You may also have:   · A blood test to check for genes that cause dystonia.  · Brain imaging tests to rule out other causes of your symptoms.  There are no tests that can diagnose other causes of dystonia.  TREATMENT   There are no treatments that can cure or prevent dystonia. Treatment to manage dystonia may include:   · Injecting the affected muscles with a chemical (botulinum) that blocks muscle spasms. This treatment can block spasms for a  few days to a few months.  · Medicines to relax muscles.  HOME CARE INSTRUCTIONS  · Physical therapy to improve muscle strength and movement may be suggested by your health care provider. Continue your physical therapy exercises at home as instructed by your physical therapist.  · Make sure you have a good support system. Let your health care provider know if you are struggling with stress or anxiety.  · Keep all follow-up visits as directed by your health care provider. This is important.  · Take medicines only as directed by your health care provider.  SEEK MEDICAL CARE IF:  · Your condition is changing or getting worse.  · You need more support at home.     This information is not intended to replace advice given to you by your health care provider. Make sure you discuss any questions you have with your health care provider.     Document Released: 12/08/2003 Document Revised: 01/08/2016 Document Reviewed: 02/11/2015  "[employer]" Interactive Patient Education ©2016 "[employer]" Inc.

## 2017-04-02 NOTE — ED NOTES
Rohit Snow  71 y.o.  Chief Complaint   Patient presents with   • Tremors     Pt. states he took 4 oxycodone tonight for lower back pain and smoked some marajuana and then proceeded to become increasingly shaky and thus called EMS.

## 2017-04-02 NOTE — ED NOTES
Discharge instructions given to patient, a verbal understanding of all instructions was stated. IV removed, cathlon intact, site without s/s of infection. Pt preferred to walk out. VSS, all belongings accounted for.

## 2017-04-02 NOTE — ED PROVIDER NOTES
"CHIEF COMPLAINT  Chief Complaint   Patient presents with   • Tremors     Pt. states he took 4 oxycodone tonight for lower back pain and smoked some marajuana and then proceeded to become increasingly shaky and thus called EMS.       HPI  Rohit Snow is a 71 y.o. male who presents With concerns that he might have \"overdosed on oxycodone\". He reports chronic back pain for which he has been on opioid medications for years. He is also smokes marijuana to help control his symptoms. He reports smoking marijuana today as well. Reports that this is the same marijuana he typically smokes.    His major complaint is that he feels like he needs to keep moving and cannot sit still. No prior history of seizures. No headache. No numbness or weakness. No nausea or vomiting. No recent illness. No other recent new medications or exposures. Denies prior history of the same.    REVIEW OF SYSTEMS  See HPI for further details. All other systems are negative.     PAST MEDICAL HISTORY   has a past medical history of Health care maintenance; ESOPHAGITIS; ETOH abuse; Osteopenia; Hypertension (02/2004); Helicobacter pylori (H. pylori) (01/2010); Neuropathy (CMS-HCC) (05/2010); Vitamin D deficiency (09/2011); Fall; GERD (gastroesophageal reflux disease); Hepatitis; KYA (generalized anxiety disorder); Back pain; Neck pain; Esophagitis; Gilbert's disease; ETOH abuse; Osteopenia; Hypertension; Helicobacter pylori (H. 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    SOCIAL HISTORY  Social History     Social History Main Topics   • Smoking status: Former Smoker -- 0.25 packs/day for 4 years     Types: Cigarettes     Quit date: 01/01/1965   • Smokeless tobacco: Never Used   • Alcohol Use: 30.0 oz/week     50 Standard drinks or equivalent per week      Comment: 1/2 " "drinks daily (no more than a pint a day)   • Drug Use: Yes     Special: Marijuana, Inhaled      Comment: Marijuana once a week, helps with sleep and pain   • Sexual Activity: Not Currently       SURGICAL HISTORY   has past surgical history that includes egd w/esophageal dil. balloon (1998, 2004); appendectomy; colonoscopy (1998, 2009); egd esophagus with endoscopic us (2009); endoscopy procedure (11/19/2014); tonsillectomy; egd w/esophageal dil. balloon; other abdominal surgery; colonoscopy; egd esophagus with endoscopic us; cervical fusion posterior (5/1/2015); cervical laminectomy posterior (5/1/2015); and wrist orif (Left, 5/7/2015).    CURRENT MEDICATIONS  Home Medications     **Home medications have not yet been reviewed for this encounter**          ALLERGIES  No Known Allergies    PHYSICAL EXAM  VITAL SIGNS: Ht 1.626 m (5' 4\")  Wt 54.432 kg (120 lb)  BMI 20.59 kg/m2  Pulse ox interpretation: 96% on RA, I interpret this pulse ox as normal.  Constitutional:  Anxious in appearance  HENT: No signs of trauma, Bilateral external ears normal, Nose normal.   Eyes: Pupils are equal and reactive, Conjunctiva normal, Non-icteric.   Neck: Normal range of motion, No tenderness, Supple, No stridor.   Cardiovascular: Regular rate and rhythm, no murmurs.   Thorax & Lungs: Normal breath sounds, No respiratory distress, No wheezing, No chest tenderness.   Abdomen: Bowel sounds normal, Soft, No tenderness, No masses, No pulsatile masses. No peritoneal signs.  Skin: Warm, Dry, No erythema, No rash.   Back: No bony tenderness, No CVA tenderness.   Extremities: Intact distal pulses, No edema, No tenderness, No cyanosis  Musculoskeletal: Good range of motion in all major joints. No tenderness to palpation or major deformities noted.   Neurologic: Alert,  Diffuse repetitive motion of his upper and lower extremities however greater involvement of the lower extremities. Normal motor function and gait, Normal sensory function, No " focal deficits noted.       DIAGNOSTIC STUDIES / PROCEDURES      COURSE & MEDICAL DECISION MAKING  Pertinent Labs & Imaging studies reviewed. (See chart for details)  71 y.o.  Male presenting with a sensation that he needs to keep moving. Has constant movement of his upper and lower extremities. This was shortly after taking more oxycodone than usual and marijuana. He appears anxious. Symptoms do appear to be consistent with active PCF. He was given Benadryl and benzodiazepine treatment. He had improvement in his tremors. No longer writhing in bed. He reports feeling improved and agrees to discharge.  Movements do not appear consistent with a seizure. No focal neurologic deficits. No indications of a stroke or a first time seizure.    The patient will not drink alcohol nor drive with prescribed medications.   The patient will return for worsening symptoms or failure of improvement and is stable at the time of discharge. The patient verbalizes understanding in their own words.    [address] [id number] [phone]    In 2 days      [facility], Emergency Dept  [address] [place] [address] [phone]    As needed, If symptoms worsen      FINAL IMPRESSION  1. Akathisia    2.      Anxiety        Electronically signed by: Hill Ambrocio, 4/1/2017 11:03 PM

## 2017-04-06 LAB — TEST NAME 95000: NORMAL

## 2017-05-15 ENCOUNTER — HOSPITAL ENCOUNTER (OUTPATIENT)
Dept: LAB | Facility: MEDICAL CENTER | Age: 72
End: 2017-05-15
Attending: PHYSICIAN ASSISTANT
Payer: COMMERCIAL

## 2017-05-15 ENCOUNTER — HOSPITAL ENCOUNTER (OUTPATIENT)
Dept: LAB | Facility: MEDICAL CENTER | Age: 72
End: 2017-05-15
Attending: INTERNAL MEDICINE
Payer: COMMERCIAL

## 2017-05-15 LAB
ALBUMIN SERPL BCP-MCNC: 4 G/DL (ref 3.2–4.9)
ALBUMIN SERPL BCP-MCNC: 4 G/DL (ref 3.2–4.9)
ALBUMIN/GLOB SERPL: 1 G/DL
ALBUMIN/GLOB SERPL: 1 G/DL
ALP SERPL-CCNC: 79 U/L (ref 30–99)
ALP SERPL-CCNC: 85 U/L (ref 30–99)
ALT SERPL-CCNC: 6 U/L (ref 2–50)
ALT SERPL-CCNC: 7 U/L (ref 2–50)
ANION GAP SERPL CALC-SCNC: 10 MMOL/L (ref 0–11.9)
ANION GAP SERPL CALC-SCNC: 11 MMOL/L (ref 0–11.9)
ANISOCYTOSIS BLD QL SMEAR: ABNORMAL
AST SERPL-CCNC: 15 U/L (ref 12–45)
AST SERPL-CCNC: 15 U/L (ref 12–45)
BASOPHILS # BLD AUTO: 0 % (ref 0–1.8)
BASOPHILS # BLD: 0 K/UL (ref 0–0.12)
BILIRUB SERPL-MCNC: 1.5 MG/DL (ref 0.1–1.5)
BILIRUB SERPL-MCNC: 1.5 MG/DL (ref 0.1–1.5)
BUN SERPL-MCNC: 27 MG/DL (ref 8–22)
BUN SERPL-MCNC: 29 MG/DL (ref 8–22)
CALCIUM SERPL-MCNC: 10.3 MG/DL (ref 8.5–10.5)
CALCIUM SERPL-MCNC: 10.5 MG/DL (ref 8.5–10.5)
CHLORIDE SERPL-SCNC: 95 MMOL/L (ref 96–112)
CHLORIDE SERPL-SCNC: 96 MMOL/L (ref 96–112)
CHOLEST SERPL-MCNC: 136 MG/DL (ref 100–199)
CO2 SERPL-SCNC: 27 MMOL/L (ref 20–33)
CO2 SERPL-SCNC: 27 MMOL/L (ref 20–33)
CREAT SERPL-MCNC: 1.6 MG/DL (ref 0.5–1.4)
CREAT SERPL-MCNC: 1.66 MG/DL (ref 0.5–1.4)
EOSINOPHIL # BLD AUTO: 0 K/UL (ref 0–0.51)
EOSINOPHIL NFR BLD: 0 % (ref 0–6.9)
ERYTHROCYTE [DISTWIDTH] IN BLOOD BY AUTOMATED COUNT: 46.1 FL (ref 35.9–50)
GFR SERPL CREATININE-BSD FRML MDRD: 41 ML/MIN/1.73 M 2
GFR SERPL CREATININE-BSD FRML MDRD: 43 ML/MIN/1.73 M 2
GLOBULIN SER CALC-MCNC: 3.9 G/DL (ref 1.9–3.5)
GLOBULIN SER CALC-MCNC: 4 G/DL (ref 1.9–3.5)
GLUCOSE SERPL-MCNC: 106 MG/DL (ref 65–99)
GLUCOSE SERPL-MCNC: 110 MG/DL (ref 65–99)
HCT VFR BLD AUTO: 38.5 % (ref 42–52)
HDLC SERPL-MCNC: 71 MG/DL
HGB BLD-MCNC: 13.9 G/DL (ref 14–18)
LDLC SERPL CALC-MCNC: 54 MG/DL
LYMPHOCYTES # BLD AUTO: 9.09 K/UL (ref 1–4.8)
LYMPHOCYTES NFR BLD: 78.4 % (ref 22–41)
MACROCYTES BLD QL SMEAR: ABNORMAL
MANUAL DIFF BLD: NORMAL
MCH RBC QN AUTO: 36.2 PG (ref 27–33)
MCHC RBC AUTO-ENTMCNC: 36.1 G/DL (ref 33.7–35.3)
MCV RBC AUTO: 100.3 FL (ref 81.4–97.8)
MONOCYTES # BLD AUTO: 0.3 K/UL (ref 0–0.85)
MONOCYTES NFR BLD AUTO: 2.6 % (ref 0–13.4)
MORPHOLOGY BLD-IMP: NORMAL
MYELOCYTES NFR BLD MANUAL: 0.9 %
NEUTROPHILS # BLD AUTO: 2.1 K/UL (ref 1.82–7.42)
NEUTROPHILS NFR BLD: 18.1 % (ref 44–72)
NRBC # BLD AUTO: 0.02 K/UL
NRBC BLD AUTO-RTO: 0.2 /100 WBC
PLATELET # BLD AUTO: 94 K/UL (ref 164–446)
PLATELET BLD QL SMEAR: NORMAL
PMV BLD AUTO: 9.3 FL (ref 9–12.9)
POTASSIUM SERPL-SCNC: 4.1 MMOL/L (ref 3.6–5.5)
POTASSIUM SERPL-SCNC: 4.1 MMOL/L (ref 3.6–5.5)
PROT SERPL-MCNC: 7.9 G/DL (ref 6–8.2)
PROT SERPL-MCNC: 8 G/DL (ref 6–8.2)
PSA SERPL-MCNC: 0.05 NG/ML (ref 0–4)
RBC # BLD AUTO: 3.84 M/UL (ref 4.7–6.1)
RBC BLD AUTO: PRESENT
SMUDGE CELLS BLD QL SMEAR: NORMAL
SODIUM SERPL-SCNC: 132 MMOL/L (ref 135–145)
SODIUM SERPL-SCNC: 134 MMOL/L (ref 135–145)
T4 FREE SERPL-MCNC: 1.22 NG/DL (ref 0.53–1.43)
TRIGL SERPL-MCNC: 56 MG/DL (ref 0–149)
TSH SERPL DL<=0.005 MIU/L-ACNC: 3.71 UIU/ML (ref 0.3–3.7)
WBC # BLD AUTO: 11.6 K/UL (ref 4.8–10.8)

## 2017-05-15 PROCEDURE — 84153 ASSAY OF PSA TOTAL: CPT

## 2017-05-15 PROCEDURE — 84439 ASSAY OF FREE THYROXINE: CPT

## 2017-05-15 PROCEDURE — 85027 COMPLETE CBC AUTOMATED: CPT

## 2017-05-15 PROCEDURE — 80053 COMPREHEN METABOLIC PANEL: CPT

## 2017-05-15 PROCEDURE — 80061 LIPID PANEL: CPT

## 2017-05-15 PROCEDURE — 80053 COMPREHEN METABOLIC PANEL: CPT | Mod: 91

## 2017-05-15 PROCEDURE — 84443 ASSAY THYROID STIM HORMONE: CPT

## 2017-05-15 PROCEDURE — 85007 BL SMEAR W/DIFF WBC COUNT: CPT

## 2017-05-15 PROCEDURE — 36415 COLL VENOUS BLD VENIPUNCTURE: CPT

## 2017-05-26 ENCOUNTER — HOSPITAL ENCOUNTER (OUTPATIENT)
Dept: LAB | Facility: MEDICAL CENTER | Age: 72
End: 2017-05-26
Attending: INTERNAL MEDICINE
Payer: COMMERCIAL

## 2017-05-26 LAB
ALBUMIN SERPL BCP-MCNC: 3.6 G/DL (ref 3.2–4.9)
ALBUMIN/GLOB SERPL: 1 G/DL
ALP SERPL-CCNC: 81 U/L (ref 30–99)
ALT SERPL-CCNC: 7 U/L (ref 2–50)
ANION GAP SERPL CALC-SCNC: 8 MMOL/L (ref 0–11.9)
AST SERPL-CCNC: 17 U/L (ref 12–45)
BILIRUB SERPL-MCNC: 1 MG/DL (ref 0.1–1.5)
BUN SERPL-MCNC: 22 MG/DL (ref 8–22)
CALCIUM SERPL-MCNC: 9.8 MG/DL (ref 8.5–10.5)
CHLORIDE SERPL-SCNC: 98 MMOL/L (ref 96–112)
CO2 SERPL-SCNC: 26 MMOL/L (ref 20–33)
CREAT SERPL-MCNC: 1.34 MG/DL (ref 0.5–1.4)
GFR SERPL CREATININE-BSD FRML MDRD: 52 ML/MIN/1.73 M 2
GLOBULIN SER CALC-MCNC: 3.7 G/DL (ref 1.9–3.5)
GLUCOSE SERPL-MCNC: 95 MG/DL (ref 65–99)
POTASSIUM SERPL-SCNC: 4.3 MMOL/L (ref 3.6–5.5)
PROT SERPL-MCNC: 7.3 G/DL (ref 6–8.2)
SODIUM SERPL-SCNC: 132 MMOL/L (ref 135–145)

## 2017-05-26 PROCEDURE — 36415 COLL VENOUS BLD VENIPUNCTURE: CPT

## 2017-05-26 PROCEDURE — 80053 COMPREHEN METABOLIC PANEL: CPT

## 2017-09-09 ENCOUNTER — APPOINTMENT (OUTPATIENT)
Dept: RADIOLOGY | Facility: MEDICAL CENTER | Age: 72
End: 2017-09-09
Attending: EMERGENCY MEDICINE
Payer: COMMERCIAL

## 2017-09-09 ENCOUNTER — HOSPITAL ENCOUNTER (EMERGENCY)
Facility: MEDICAL CENTER | Age: 72
End: 2017-09-09
Attending: EMERGENCY MEDICINE
Payer: COMMERCIAL

## 2017-09-09 VITALS
OXYGEN SATURATION: 100 % | WEIGHT: 120 LBS | SYSTOLIC BLOOD PRESSURE: 111 MMHG | RESPIRATION RATE: 18 BRPM | HEIGHT: 64 IN | BODY MASS INDEX: 20.49 KG/M2 | HEART RATE: 86 BPM | TEMPERATURE: 97.2 F | DIASTOLIC BLOOD PRESSURE: 65 MMHG

## 2017-09-09 DIAGNOSIS — S29.019A STRAIN OF THORACIC SPINE, INITIAL ENCOUNTER: ICD-10-CM

## 2017-09-09 DIAGNOSIS — S39.012A STRAIN OF LUMBAR PARASPINAL MUSCLE, INITIAL ENCOUNTER: ICD-10-CM
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
## 2017-09-09 PROCEDURE — 73110 X-RAY EXAM OF WRIST: CPT | Mod: LT

## 2017-09-09 PROCEDURE — 72100 X-RAY EXAM L-S SPINE 2/3 VWS: CPT

## 2017-09-09 PROCEDURE — 700102 HCHG RX REV CODE 250 W/ 637 OVERRIDE(OP): Performed by: EMERGENCY MEDICINE

## 2017-09-09 PROCEDURE — A9270 NON-COVERED ITEM OR SERVICE: HCPCS | Performed by: EMERGENCY MEDICINE

## 2017-09-09 PROCEDURE — 99284 EMERGENCY DEPT VISIT MOD MDM: CPT

## 2017-09-09 PROCEDURE — 72070 X-RAY EXAM THORAC SPINE 2VWS: CPT

## 2017-09-09 RX ORDER — HYDROCODONE BITARTRATE AND ACETAMINOPHEN 5; 325 MG/1; MG/1
2 TABLET ORAL ONCE
Status: COMPLETED | OUTPATIENT
Start: 2017-09-09 | End: 2017-09-09

## 2017-09-09 RX ORDER — HYDROCODONE BITARTRATE AND ACETAMINOPHEN 5; 325 MG/1; MG/1
1-2 TABLET ORAL EVERY 4 HOURS PRN
Qty: 21 TAB | Refills: 0 | Status: SHIPPED | OUTPATIENT
Start: 2017-09-09 | End: 2017-09-09

## 2017-09-09 RX ADMIN — HYDROCODONE BITARTRATE AND ACETAMINOPHEN 2 TABLET: 5; 325 TABLET ORAL at 03:00

## 2017-09-09 ASSESSMENT — LIFESTYLE VARIABLES
HAVE YOU EVER FELT YOU SHOULD CUT DOWN ON YOUR DRINKING: NO
TOTAL SCORE: 0
ON A TYPICAL DAY WHEN YOU DRINK ALCOHOL HOW MANY DRINKS DO YOU HAVE: 5
EVER FELT BAD OR GUILTY ABOUT YOUR DRINKING: NO
HAVE PEOPLE ANNOYED YOU BY CRITICIZING YOUR DRINKING: NO
DO YOU DRINK ALCOHOL: YES
EVER HAD A DRINK FIRST THING IN THE MORNING TO STEADY YOUR NERVES TO GET RID OF A HANGOVER: NO
TOTAL SCORE: 0
CONSUMPTION TOTAL: POSITIVE
AVERAGE NUMBER OF DAYS PER WEEK YOU HAVE A DRINK CONTAINING ALCOHOL: 7
TOTAL SCORE: 0
HOW MANY TIMES IN THE PAST YEAR HAVE YOU HAD 5 OR MORE DRINKS IN A DAY: 365

## 2017-09-09 NOTE — DISCHARGE INSTRUCTIONS
Thoracic Strain  You have injured the muscles or tendons that attach to the upper part of your back behind your chest. This injury is called a thoracic strain, thoracic sprain, or mid-back strain.   CAUSES   The cause of thoracic strain varies. A less severe injury involves pulling a muscle or tendon without tearing it. A more severe injury involves tearing (rupturing) a muscle or tendon. With less severe injuries, there may be little loss of strength. Sometimes, there are breaks (fractures) in the bones to which the muscles are attached. These fractures are rare, unless there was a direct hit (trauma) or you have weak bones due to osteoporosis or age. Longstanding strains may be caused by overuse or improper form during certain movements. Obesity can also increase your risk for back injuries. Sudden strains may occur due to injury or not warming up properly before exercise. Often, there is no obvious cause for a thoracic strain.  SYMPTOMS   The main symptom is pain, especially with movement, such as during exercise.  DIAGNOSIS   Your caregiver can usually tell what is wrong by taking an X-ray and doing a physical exam.  TREATMENT   · Physical therapy may be helpful for recovery. Your caregiver can give you exercises to do or refer you to a physical therapist after your pain improves.  · After your pain improves, strengthening and conditioning programs appropriate for your sport or occupation may be helpful.  · Always warm up before physical activities or athletics. Stretching after physical activity may also help.  · Certain over-the-counter medicines may also help. Ask your caregiver if there are medicines that would help you.  If this is your first thoracic strain injury, proper care and proper healing time before starting activities should prevent long-term problems. Torn ligaments and tendons require as long to heal as broken bones. Average healing times may be only 1 week for a mild strain. For torn muscles  and tendons, healing time may be up to 6 weeks to 2 months.  HOME CARE INSTRUCTIONS   · Apply ice to the injured area. Ice massages may also be used as directed.  ¨ Put ice in a plastic bag.  ¨ Place a towel between your skin and the bag.  ¨ Leave the ice on for 15-20 minutes, 03-04 times a day, for the first 2 days.  · Only take over-the-counter or prescription medicines for pain, discomfort, or fever as directed by your caregiver.  · Keep your appointments for physical therapy if this was prescribed.  · Use wraps and back braces as instructed.  SEEK IMMEDIATE MEDICAL CARE IF:   · You have an increase in bruising, swelling, or pain.  · Your pain has not improved with medicines.  · You develop new shortness of breath, chest pain, or fever.  · Problems seem to be getting worse rather than better.  MAKE SURE YOU:   · Understand these instructions.  · Will watch your condition.  · Will get help right away if you are not doing well or get worse.     This information is not intended to replace advice given to you by your health care provider. Make sure you discuss any questions you have with your health care provider.     Document Released: 03/09/2005 Document Revised: 03/11/2013 Document Reviewed: 02/11/2016  [employer] Interactive Patient Education ©2016 [employer]  Back Pain, Adult  Back pain is very common in adults. The cause of back pain is rarely dangerous and the pain often gets better over time. The cause of your back pain may not be known. Some common causes of back pain include:  · Strain of the muscles or ligaments supporting the spine.  · Wear and tear (degeneration) of the spinal disks.  · Arthritis.  · Direct injury to the back.  For many people, back pain may return. Since back pain is rarely dangerous, most people can learn to manage this condition on their own.  HOME CARE INSTRUCTIONS  Watch your back pain for any changes. The following actions may help to lessen any discomfort you are  feeling:  · Remain active. It is stressful on your back to sit or  one place for long periods of time. Do not sit, drive, or  one place for more than 30 minutes at a time. Take short walks on even surfaces as soon as you are able. Try to increase the length of time you walk each day.  · Exercise regularly as directed by your health care provider. Exercise helps your back heal faster. It also helps avoid future injury by keeping your muscles strong and flexible.  · Do not stay in bed. Resting more than 1-2 days can delay your recovery.  · Pay attention to your body when you bend and lift. The most comfortable positions are those that put less stress on your recovering back. Always use proper lifting techniques, including:  ¨ Bending your knees.  ¨ Keeping the load close to your body.  ¨ Avoiding twisting.  · Find a comfortable position to sleep. Use a firm mattress and lie on your side with your knees slightly bent. If you lie on your back, put a pillow under your knees.  · Avoid feeling anxious or stressed. Stress increases muscle tension and can worsen back pain. It is important to recognize when you are anxious or stressed and learn ways to manage it, such as with exercise.  · Take medicines only as directed by your health care provider. Over-the-counter medicines to reduce pain and inflammation are often the most helpful. Your health care provider may prescribe muscle relaxant drugs. These medicines help dull your pain so you can more quickly return to your normal activities and healthy exercise.  · Apply ice to the injured area:  ¨ Put ice in a plastic bag.  ¨ Place a towel between your skin and the bag.  ¨ Leave the ice on for 20 minutes, 2-3 times a day for the first 2-3 days. After that, ice and heat may be alternated to reduce pain and spasms.  · Maintain a healthy weight. Excess weight puts extra stress on your back and makes it difficult to maintain good posture.  SEEK MEDICAL CARE  IF:  · You have pain that is not relieved with rest or medicine.  · You have increasing pain going down into the legs or buttocks.  · You have pain that does not improve in one week.  · You have night pain.  · You lose weight.  · You have a fever or chills.  SEEK IMMEDIATE MEDICAL CARE IF:   · You develop new bowel or bladder control problems.  · You have unusual weakness or numbness in your arms or legs.  · You develop nausea or vomiting.  · You develop abdominal pain.  · You feel faint.     This information is not intended to replace advice given to you by your health care provider. Make sure you discuss any questions you have with your health care provider.     Document Released: 12/18/2006 Document Revised: 01/08/2016 Document Reviewed: 04/21/2015  [employer] Interactive Patient Education ©2016 [employer]

## 2017-09-09 NOTE — ED PROVIDER NOTES
ED Provider Note    ED Provider Note      Primary care provider: KRISTIN Otero.    CHIEF COMPLAINT  Chief Complaint   Patient presents with   • GLF     pt was getting his laundry when he had GLF and was down for about an hour before EMS called. pt has intermittent tremers pt reports from withdrawl of oxycodone.  negative LOC.        HPI  Rohit Snow is a 72 y.o. male who presents to the Emergency Department Chief complaint ground-level fall. Patient was getting his laundry when he tripped and fell approximately an hour ago. He stated that he fell onto his left upper extremity and then onto his back. Denies hitting his head denies loss of consciousness has moderate pain in his upper and lower back as well as pain in his left upper extremity at the wrist. Patient states previous history of cervical fracture as well as left wrist fracture. Rates his pain as 10 out of 10 at this time worse with any movement better with rest. Patient stated that he did have difficulty getting up after the event EMS was called and transported here. No altered mental status no chest abdominal or pelvic pain. He's been otherwise well recently. States last tetanus was within the last year.    REVIEW OF SYSTEMS  10 systems reviewed and otherwise negative, pertinent positives and negatives listed in the history of present illness.    PAST MEDICAL HISTORY   has a past medical history of Actinic keratosis (1/3/2017); Back pain; CLL (chronic lymphocytic leukemia) (CMS-HCC) (3/23/2017); ESOPHAGITIS; Esophagitis; ETOH abuse; ETOH abuse; Fall; KYA (generalized anxiety disorder); GERD (gastroesophageal reflux disease); Gilbert's disease; Health care maintenance; Helicobacter pylori (H. pylori) (01/2010); Helicobacter pylori (H. pylori); Hepatitis; Hypertension (02/2004); Hypertension; Neck pain; Neoplasm of uncertain behavior of skin (12/28/2016); Neuropathy (CMS-HCC) (05/2010); Neuropathy (CMS-HCC); Osteopenia; Osteopenia; Ringworm (5  "years old); Vitamin D deficiency (09/2011); and Vitamin D deficiency.    SURGICAL HISTORY   has a past surgical history that includes egd w/esophageal dil. balloon (1998, 2004); appendectomy; colonoscopy (1998, 2009); egd esophagus with endoscopic us (2009); endoscopy procedure (11/19/2014); tonsillectomy; egd w/esophageal dil. balloon; other abdominal surgery; colonoscopy; egd esophagus with endoscopic us; cervical fusion posterior (5/1/2015); cervical laminectomy posterior (5/1/2015); and wrist orif (Left, 5/7/2015).    SOCIAL HISTORY  Social History   Substance Use Topics   • Smoking status: Former Smoker     Packs/day: 0.25     Years: 4.00     Types: Cigarettes     Quit date: 1/1/1965   • Smokeless tobacco: Never Used   • Alcohol use 30.0 oz/week     50 Standard drinks or equivalent per week      Comment: 1/2 drinks daily (no more than a pint a day)      History   Drug Use   • Types: Marijuana, Inhaled     Comment: Marijuana once a week, helps with sleep and pain       FAMILY HISTORY  Non-Contributory      ALLERGIES  No Known Allergies    PHYSICAL EXAM  VITAL SIGNS: /75   Pulse (!) 101   Temp 36.2 °C (97.2 °F)   Resp 18   Ht 1.626 m (5' 4\")   Wt 54.4 kg (120 lb)   BMI 20.60 kg/m²   Pulse ox interpretation: I interpret this pulse ox as normal.  Constitutional: Alert and oriented x 3, minimalDistress  HEENT: Atraumatic normocephalic, pupils are equal round reactive to light extraocular movements are intact. The nares is clear, external ears are normal, mouth shows moist mucous membranes  Neck: Supple, no JVD no tracheal deviation  Cardiovascular: Regular rate and rhythm no murmur rub or gallop 2+ pulses peripherally x4  Thorax & Lungs: No respiratory distress, no wheezes rales or rhonchi, No chest tenderness.   GI: Soft nontender nondistended positive bowel sounds, no peritoneal signs    Skin:Minor abrasion over the left scapula no laceration amenable to repair  Musculoskeletal: No step-offs throughout " thoracic and lumbar distribution minimal tenderness at the mid thoracic as well as the lower lumbar area, the bilateral lower extremities right upper extremity are unremarkable moving full range strengthening. Patient has some tenderness at the left wrist at previous surgical site there is no anatomic snuffbox tenderness there is no pain with axial loading or abduction of thumb normal  strength and sensation in the hand no pain at the elbow or shoulder moving elbow and shoulder full range and strength.  Neurologic: Cranial nerves III through XII are grossly intact, no sensory deficit, no cerebellar dysfunction   Psychiatric: Appropriate affect for situation at this time          RADIOLOGY  DX-WRIST-COMPLETE 3+ LEFT   Final Result      1.  No radiographic evidence of acute traumatic injury.   2.  Healed surgically transfixed fracture of the distal radius   3.  Osteopenia      DX-LUMBAR SPINE-2 OR 3 VIEWS   Final Result      1.  No evidence of acute fracture.   2.  Multilevel multifactorial degenerative changes      DX-THORACIC SPINE-2 VIEWS   Final Result      1.  Lucency through the superior endplate of L2 on the second image, discordant with the appearance on the dedicated lumbar spine radiographs. I suspect this is artifactual however a fracture is not excluded. Further assessment is recommended with CT of    the lumbar spine.   2.  Thoracic spondylosis        The radiologist's interpretation of all radiological studies have been reviewed by me.    COURSE & MEDICAL DECISION MAKING  Pertinent Labs & Imaging studies reviewed. (See chart for details)    2:52 AM - Patient seen and examined at bedside.     Prescription monitoring program queried and Very concerning for large amount of oxycodone prescriptions..    Patient noted to have slightly elevated blood pressure likely circumstantial secondary to presenting complaint. Referred to primary care physician for further evaluation.      Medical Decision Making:  "Patient was given Norco on presentation with resolution of symptoms. Images as above are fairly unremarkable there was a slight lucency at the superior endplate of L2 isolated one image and not demonstrated on dedicated lumbar film. Patient reexamined to particular vertebral levels and has no tenderness at the lower thoracic upper lumbar region his pain is now only focus in the upper thoracic area. I don't feel appropriate to further evaluate with CAT scan at this point as this is likely artifactual. Prescription monitoring program was created concerning for large amounts of opiate prescriptions patient be given no further opiate pain medicine here can take his home oxycodone for pain relief. Return for any worsening back pain any numbness tingling weakness in extremities any other acute symptoms of concern otherwise follow-up with primary care.  /65   Pulse 86   Temp 36.2 °C (97.2 °F)   Resp 18   Ht 1.626 m (5' 4\")   Wt 54.4 kg (120 lb)   SpO2 100%   BMI 20.60 kg/m²     Kellen Galindo, P.A.  [address] [place] [id number]  [phone]            FINAL IMPRESSION  1. Fall, initial encounter    2. Strain of lumbar paraspinal muscle, initial encounter    3. Strain of thoracic spine, initial encounter     4. Alcohol abuse  5. Marijuana abuse      This dictation has been created using voice recognition software and/or scribes. The accuracy of the dictation is limited by the abilities of the software and the expertise of the scribes. I expect there may be some errors of grammar and possibly content. I made every attempt to manually correct the errors within my dictation. However, errors related to voice recognition software and/or scribes may still exist and should be interpreted within the appropriate context.            "

## 2017-09-09 NOTE — ED NOTES
BIB EMS    Chief Complaint   Patient presents with   • GLF     pt was getting his laundry when he had GLF and was down for about an hour before EMS called. pt has intermittent tremers pt reports from withdrawl of oxycodone.  negative LOC.        Pt in gown, on monitor, chart up for ERP.

## 2017-09-10 ENCOUNTER — PATIENT OUTREACH (OUTPATIENT)
Dept: HEALTH INFORMATION MANAGEMENT | Facility: OTHER | Age: 72
End: 2017-09-10

## 2017-09-10 NOTE — PROGRESS NOTES
Placed discharge outreach phone call to patient s/p ER discharge 9/9/17.  Left voicemail providing my contact information and instructions to call with any questions or concerns.

## 2017-09-26 ENCOUNTER — APPOINTMENT (OUTPATIENT)
Dept: HEMATOLOGY ONCOLOGY | Facility: MEDICAL CENTER | Age: 72
End: 2017-09-26
Payer: COMMERCIAL

## 2017-09-28 ENCOUNTER — HOSPITAL ENCOUNTER (INPATIENT)
Facility: MEDICAL CENTER | Age: 72
LOS: 21 days | DRG: 871 | End: 2017-10-19
Attending: EMERGENCY MEDICINE | Admitting: HOSPITALIST
Payer: COMMERCIAL

## 2017-09-28 ENCOUNTER — APPOINTMENT (OUTPATIENT)
Dept: RADIOLOGY | Facility: MEDICAL CENTER | Age: 72
DRG: 871 | End: 2017-09-28
Attending: EMERGENCY MEDICINE
Payer: COMMERCIAL

## 2017-09-28 ENCOUNTER — RESOLUTE PROFESSIONAL BILLING HOSPITAL PROF FEE (OUTPATIENT)
Dept: HOSPITALIST | Facility: MEDICAL CENTER | Age: 72
End: 2017-09-28
Payer: COMMERCIAL

## 2017-09-28 DIAGNOSIS — L03.116 CELLULITIS OF LEFT LOWER EXTREMITY: ICD-10-CM

## 2017-09-28 DIAGNOSIS — F10.930 ALCOHOL WITHDRAWAL SYNDROME WITHOUT COMPLICATION (HCC): ICD-10-CM

## 2017-09-28 PROBLEM — L03.90 CELLULITIS: Status: ACTIVE | Noted: 2017-09-28

## 2017-09-28 PROBLEM — E87.1 HYPONATREMIA: Status: ACTIVE | Noted: 2017-09-28

## 2017-09-28 LAB
ALBUMIN SERPL BCP-MCNC: 3 G/DL (ref 3.2–4.9)
ALBUMIN/GLOB SERPL: 0.8 G/DL
ALP SERPL-CCNC: 99 U/L (ref 30–99)
ALT SERPL-CCNC: 6 U/L (ref 2–50)
ANION GAP SERPL CALC-SCNC: 14 MMOL/L (ref 0–11.9)
ANISOCYTOSIS BLD QL SMEAR: ABNORMAL
APPEARANCE UR: CLEAR
APTT PPP: 38.9 SEC (ref 24.7–36)
AST SERPL-CCNC: 17 U/L (ref 12–45)
BASOPHILS # BLD AUTO: 0 % (ref 0–1.8)
BASOPHILS # BLD: 0 K/UL (ref 0–0.12)
BILIRUB SERPL-MCNC: 0.9 MG/DL (ref 0.1–1.5)
BILIRUB UR QL STRIP.AUTO: NEGATIVE
BNP SERPL-MCNC: 409 PG/ML (ref 0–100)
BUN SERPL-MCNC: 33 MG/DL (ref 8–22)
CALCIUM SERPL-MCNC: 9 MG/DL (ref 8.5–10.5)
CHLORIDE SERPL-SCNC: 90 MMOL/L (ref 96–112)
CO2 SERPL-SCNC: 20 MMOL/L (ref 20–33)
COLOR UR: YELLOW
CREAT SERPL-MCNC: 1.31 MG/DL (ref 0.5–1.4)
EOSINOPHIL # BLD AUTO: 0 K/UL (ref 0–0.51)
EOSINOPHIL NFR BLD: 0 % (ref 0–6.9)
ERYTHROCYTE [DISTWIDTH] IN BLOOD BY AUTOMATED COUNT: 49.4 FL (ref 35.9–50)
GFR SERPL CREATININE-BSD FRML MDRD: 54 ML/MIN/1.73 M 2
GLOBULIN SER CALC-MCNC: 3.7 G/DL (ref 1.9–3.5)
GLUCOSE SERPL-MCNC: 82 MG/DL (ref 65–99)
GLUCOSE UR STRIP.AUTO-MCNC: NEGATIVE MG/DL
HCT VFR BLD AUTO: 31.7 % (ref 42–52)
HGB BLD-MCNC: 11.2 G/DL (ref 14–18)
KETONES UR STRIP.AUTO-MCNC: ABNORMAL MG/DL
LACTATE BLD-SCNC: 1.2 MMOL/L (ref 0.5–2)
LACTATE BLD-SCNC: 2.4 MMOL/L (ref 0.5–2)
LEUKOCYTE ESTERASE UR QL STRIP.AUTO: NEGATIVE
LYMPHOCYTES # BLD AUTO: 13.98 K/UL (ref 1–4.8)
LYMPHOCYTES NFR BLD: 48.7 % (ref 22–41)
MACROCYTES BLD QL SMEAR: ABNORMAL
MANUAL DIFF BLD: NORMAL
MCH RBC QN AUTO: 37 PG (ref 27–33)
MCHC RBC AUTO-ENTMCNC: 35.3 G/DL (ref 33.7–35.3)
MCV RBC AUTO: 104.6 FL (ref 81.4–97.8)
MICRO URNS: ABNORMAL
MONOCYTES # BLD AUTO: 0.26 K/UL (ref 0–0.85)
MONOCYTES NFR BLD AUTO: 0.9 % (ref 0–13.4)
MORPHOLOGY BLD-IMP: NORMAL
NEUTROPHILS # BLD AUTO: 14.46 K/UL (ref 1.82–7.42)
NEUTROPHILS NFR BLD: 46.1 % (ref 44–72)
NEUTS BAND NFR BLD MANUAL: 4.3 % (ref 0–10)
NITRITE UR QL STRIP.AUTO: NEGATIVE
NRBC # BLD AUTO: 0 K/UL
NRBC BLD AUTO-RTO: 0 /100 WBC
PH UR STRIP.AUTO: 5 [PH]
PLATELET # BLD AUTO: 91 K/UL (ref 164–446)
PLATELET BLD QL SMEAR: NORMAL
PMV BLD AUTO: 9.2 FL (ref 9–12.9)
POTASSIUM SERPL-SCNC: 4.9 MMOL/L (ref 3.6–5.5)
PROT SERPL-MCNC: 6.7 G/DL (ref 6–8.2)
PROT UR QL STRIP: NEGATIVE MG/DL
RBC # BLD AUTO: 3.03 M/UL (ref 4.7–6.1)
RBC BLD AUTO: PRESENT
RBC UR QL AUTO: NEGATIVE
SMUDGE CELLS BLD QL SMEAR: NORMAL
SODIUM SERPL-SCNC: 124 MMOL/L (ref 135–145)
SP GR UR STRIP.AUTO: 1.02
TOXIC GRANULES BLD QL SMEAR: SLIGHT
TROPONIN I SERPL-MCNC: <0.01 NG/ML (ref 0–0.04)
UROBILINOGEN UR STRIP.AUTO-MCNC: 0.2 MG/DL
WBC # BLD AUTO: 28.7 K/UL (ref 4.8–10.8)

## 2017-09-28 PROCEDURE — 700105 HCHG RX REV CODE 258: Performed by: PHARMACIST

## 2017-09-28 PROCEDURE — 87077 CULTURE AEROBIC IDENTIFY: CPT | Mod: 91

## 2017-09-28 PROCEDURE — 87070 CULTURE OTHR SPECIMN AEROBIC: CPT

## 2017-09-28 PROCEDURE — 87040 BLOOD CULTURE FOR BACTERIA: CPT | Mod: 91

## 2017-09-28 PROCEDURE — 71010 DX-CHEST-PORTABLE (1 VIEW): CPT

## 2017-09-28 PROCEDURE — 87205 SMEAR GRAM STAIN: CPT

## 2017-09-28 PROCEDURE — 83880 ASSAY OF NATRIURETIC PEPTIDE: CPT

## 2017-09-28 PROCEDURE — 81003 URINALYSIS AUTO W/O SCOPE: CPT

## 2017-09-28 PROCEDURE — 700111 HCHG RX REV CODE 636 W/ 250 OVERRIDE (IP): Performed by: EMERGENCY MEDICINE

## 2017-09-28 PROCEDURE — 700105 HCHG RX REV CODE 258: Performed by: HOSPITALIST

## 2017-09-28 PROCEDURE — 93971 EXTREMITY STUDY: CPT

## 2017-09-28 PROCEDURE — 84484 ASSAY OF TROPONIN QUANT: CPT

## 2017-09-28 PROCEDURE — 36415 COLL VENOUS BLD VENIPUNCTURE: CPT

## 2017-09-28 PROCEDURE — 87086 URINE CULTURE/COLONY COUNT: CPT

## 2017-09-28 PROCEDURE — 700105 HCHG RX REV CODE 258: Performed by: EMERGENCY MEDICINE

## 2017-09-28 PROCEDURE — 96375 TX/PRO/DX INJ NEW DRUG ADDON: CPT

## 2017-09-28 PROCEDURE — 770006 HCHG ROOM/CARE - MED/SURG/GYN SEMI*

## 2017-09-28 PROCEDURE — 73590 X-RAY EXAM OF LOWER LEG: CPT | Mod: LT

## 2017-09-28 PROCEDURE — 83605 ASSAY OF LACTIC ACID: CPT | Mod: 91

## 2017-09-28 PROCEDURE — 700111 HCHG RX REV CODE 636 W/ 250 OVERRIDE (IP): Performed by: HOSPITALIST

## 2017-09-28 PROCEDURE — 96376 TX/PRO/DX INJ SAME DRUG ADON: CPT

## 2017-09-28 PROCEDURE — 99291 CRITICAL CARE FIRST HOUR: CPT

## 2017-09-28 PROCEDURE — 96365 THER/PROPH/DIAG IV INF INIT: CPT

## 2017-09-28 PROCEDURE — 99223 1ST HOSP IP/OBS HIGH 75: CPT | Mod: AI | Performed by: HOSPITALIST

## 2017-09-28 PROCEDURE — 87186 SC STD MICRODIL/AGAR DIL: CPT

## 2017-09-28 PROCEDURE — 700111 HCHG RX REV CODE 636 W/ 250 OVERRIDE (IP): Performed by: PHARMACIST

## 2017-09-28 PROCEDURE — 96361 HYDRATE IV INFUSION ADD-ON: CPT

## 2017-09-28 PROCEDURE — 85730 THROMBOPLASTIN TIME PARTIAL: CPT

## 2017-09-28 PROCEDURE — 85007 BL SMEAR W/DIFF WBC COUNT: CPT

## 2017-09-28 PROCEDURE — 85027 COMPLETE CBC AUTOMATED: CPT

## 2017-09-28 PROCEDURE — 80053 COMPREHEN METABOLIC PANEL: CPT

## 2017-09-28 RX ORDER — DULOXETIN HYDROCHLORIDE 30 MG/1
60 CAPSULE, DELAYED RELEASE ORAL DAILY
Status: DISCONTINUED | OUTPATIENT
Start: 2017-09-29 | End: 2017-10-19 | Stop reason: HOSPADM

## 2017-09-28 RX ORDER — SODIUM CHLORIDE 9 MG/ML
1000 INJECTION, SOLUTION INTRAVENOUS ONCE
Status: COMPLETED | OUTPATIENT
Start: 2017-09-28 | End: 2017-09-28

## 2017-09-28 RX ORDER — DULOXETIN HYDROCHLORIDE 30 MG/1
60 CAPSULE, DELAYED RELEASE ORAL DAILY
COMMUNITY
End: 2021-03-12

## 2017-09-28 RX ORDER — ONDANSETRON 2 MG/ML
4 INJECTION INTRAMUSCULAR; INTRAVENOUS ONCE
Status: COMPLETED | OUTPATIENT
Start: 2017-09-28 | End: 2017-09-28

## 2017-09-28 RX ORDER — ONDANSETRON 4 MG/1
4 TABLET, ORALLY DISINTEGRATING ORAL EVERY 4 HOURS PRN
Status: DISCONTINUED | OUTPATIENT
Start: 2017-09-28 | End: 2017-10-19 | Stop reason: HOSPADM

## 2017-09-28 RX ORDER — LEVOTHYROXINE SODIUM 0.03 MG/1
25 TABLET ORAL
Status: DISCONTINUED | OUTPATIENT
Start: 2017-09-29 | End: 2017-10-19 | Stop reason: HOSPADM

## 2017-09-28 RX ORDER — LEVOTHYROXINE SODIUM 0.03 MG/1
25 TABLET ORAL
COMMUNITY
End: 2021-03-12

## 2017-09-28 RX ORDER — POLYETHYLENE GLYCOL 3350 17 G/17G
1 POWDER, FOR SOLUTION ORAL
Status: DISCONTINUED | OUTPATIENT
Start: 2017-09-28 | End: 2017-10-05

## 2017-09-28 RX ORDER — OXYCODONE HYDROCHLORIDE 5 MG/1
5 TABLET ORAL
Status: DISCONTINUED | OUTPATIENT
Start: 2017-09-28 | End: 2017-10-05

## 2017-09-28 RX ORDER — LORAZEPAM 2 MG/ML
0.5 INJECTION INTRAMUSCULAR EVERY 6 HOURS PRN
Status: DISCONTINUED | OUTPATIENT
Start: 2017-09-28 | End: 2017-10-01

## 2017-09-28 RX ORDER — SPIRONOLACTONE 25 MG/1
100 TABLET ORAL DAILY
Status: DISCONTINUED | OUTPATIENT
Start: 2017-09-29 | End: 2017-09-30

## 2017-09-28 RX ORDER — BISACODYL 10 MG
10 SUPPOSITORY, RECTAL RECTAL
Status: DISCONTINUED | OUTPATIENT
Start: 2017-09-28 | End: 2017-10-05

## 2017-09-28 RX ORDER — LORAZEPAM 1 MG/1
0.5 TABLET ORAL EVERY 6 HOURS PRN
Status: DISCONTINUED | OUTPATIENT
Start: 2017-09-28 | End: 2017-10-01

## 2017-09-28 RX ORDER — ONDANSETRON 2 MG/ML
4 INJECTION INTRAMUSCULAR; INTRAVENOUS EVERY 4 HOURS PRN
Status: DISCONTINUED | OUTPATIENT
Start: 2017-09-28 | End: 2017-10-19 | Stop reason: HOSPADM

## 2017-09-28 RX ORDER — OXYCODONE HYDROCHLORIDE 10 MG/1
10 TABLET ORAL
Status: DISCONTINUED | OUTPATIENT
Start: 2017-09-28 | End: 2017-10-05

## 2017-09-28 RX ORDER — SODIUM CHLORIDE 9 MG/ML
30 INJECTION, SOLUTION INTRAVENOUS
Status: COMPLETED | OUTPATIENT
Start: 2017-09-28 | End: 2017-09-29

## 2017-09-28 RX ORDER — TRAZODONE HYDROCHLORIDE 100 MG/1
100 TABLET ORAL NIGHTLY
COMMUNITY
End: 2021-03-12

## 2017-09-28 RX ORDER — TRAZODONE HYDROCHLORIDE 50 MG/1
100 TABLET ORAL NIGHTLY
Status: DISCONTINUED | OUTPATIENT
Start: 2017-09-28 | End: 2017-10-19 | Stop reason: HOSPADM

## 2017-09-28 RX ORDER — AMOXICILLIN 250 MG
2 CAPSULE ORAL 2 TIMES DAILY
Status: DISCONTINUED | OUTPATIENT
Start: 2017-09-28 | End: 2017-10-05

## 2017-09-28 RX ORDER — SODIUM CHLORIDE 9 MG/ML
500 INJECTION, SOLUTION INTRAVENOUS
Status: COMPLETED | OUTPATIENT
Start: 2017-09-28 | End: 2017-09-30

## 2017-09-28 RX ORDER — HEPARIN SODIUM 5000 [USP'U]/ML
5000 INJECTION, SOLUTION INTRAVENOUS; SUBCUTANEOUS EVERY 8 HOURS
Status: DISCONTINUED | OUTPATIENT
Start: 2017-09-28 | End: 2017-10-02

## 2017-09-28 RX ORDER — OXYCODONE HYDROCHLORIDE 10 MG/1
10 TABLET ORAL 4 TIMES DAILY PRN
Status: ON HOLD | COMMUNITY
End: 2017-10-16

## 2017-09-28 RX ADMIN — SODIUM CHLORIDE 1000 ML: 9 INJECTION, SOLUTION INTRAVENOUS at 17:21

## 2017-09-28 RX ADMIN — HYDROMORPHONE HYDROCHLORIDE 0.5 MG: 1 INJECTION, SOLUTION INTRAMUSCULAR; INTRAVENOUS; SUBCUTANEOUS at 22:52

## 2017-09-28 RX ADMIN — VANCOMYCIN HYDROCHLORIDE 1500 MG: 100 INJECTION, POWDER, LYOPHILIZED, FOR SOLUTION INTRAVENOUS at 21:18

## 2017-09-28 RX ADMIN — HYDROMORPHONE HYDROCHLORIDE 0.5 MG: 1 INJECTION, SOLUTION INTRAMUSCULAR; INTRAVENOUS; SUBCUTANEOUS at 17:21

## 2017-09-28 RX ADMIN — AMPICILLIN SODIUM AND SULBACTAM SODIUM 3 G: 2; 1 INJECTION, POWDER, FOR SOLUTION INTRAMUSCULAR; INTRAVENOUS at 20:52

## 2017-09-28 RX ADMIN — HYDROMORPHONE HYDROCHLORIDE 0.5 MG: 1 INJECTION, SOLUTION INTRAMUSCULAR; INTRAVENOUS; SUBCUTANEOUS at 19:43

## 2017-09-28 RX ADMIN — ONDANSETRON 4 MG: 2 INJECTION INTRAMUSCULAR; INTRAVENOUS at 17:20

## 2017-09-28 ASSESSMENT — ENCOUNTER SYMPTOMS
FALLS: 0
NEUROLOGICAL NEGATIVE: 1
GASTROINTESTINAL NEGATIVE: 1
PND: 0
DEPRESSION: 0
CHILLS: 0
BRUISES/BLEEDS EASILY: 0
FEVER: 0
COUGH: 0
NAUSEA: 0
BACK PAIN: 1
RESPIRATORY NEGATIVE: 1
NECK PAIN: 1
FLANK PAIN: 0
HEADACHES: 0
CLAUDICATION: 0
VOMITING: 0
WEAKNESS: 0
ABDOMINAL PAIN: 0
LOSS OF CONSCIOUSNESS: 0
PALPITATIONS: 0
WEIGHT LOSS: 0
ORTHOPNEA: 0
SHORTNESS OF BREATH: 0
PSYCHIATRIC NEGATIVE: 1
NERVOUS/ANXIOUS: 0
MYALGIAS: 0
DIZZINESS: 0

## 2017-09-28 ASSESSMENT — PAIN SCALES - GENERAL: PAINLEVEL_OUTOF10: 10

## 2017-09-28 NOTE — ED PROVIDER NOTES
"ED Provider Note    Scribed for Itz Vasquez M.D. by Hugh Ogden. 9/28/2017  4:59 PM    Primary care provider: Pcp Pt States None  Means of arrival: walk-in  History obtained from: patient  History limited by: none    CHIEF COMPLAINT  Chief Complaint   Patient presents with   • Leg Swelling     pt reports that he went to bed last night and woke up with a sore red leg. left leg red, tight, edematous. wound noted and ankle. pt reports that it has \"been there for years.\"        HPI  Rohit Snow is a 72 y.o. male who presents to the Emergency Department for evaluation of severe redness located to his left leg, which begins at the base of his knee cap and extends down to his ankle, with associated swelling onset last night. Patient denies any falls. His last drink was 7:00PM last night.     Patient also complains of what he believes to be a spider bite located to his right inner thigh. He denies fever.     REVIEW OF SYSTEMS  Pertinent positives include left leg erythema and swelling, bite to his right inner thigh. Pertinent negatives include no fever.  All other systems reviewed and negative. C.     PAST MEDICAL HISTORY   has a past medical history of Actinic keratosis (1/3/2017); Back pain; CLL (chronic lymphocytic leukemia) (CMS-HCC) (3/23/2017); ESOPHAGITIS; Esophagitis; ETOH abuse; ETOH abuse; Fall; KYA (generalized anxiety disorder); GERD (gastroesophageal reflux disease); Gilbert's disease; Health care maintenance; Helicobacter pylori (H. pylori) (01/2010); Helicobacter pylori (H. pylori); Hepatitis; Hypertension (02/2004); Hypertension; Neck pain; Neoplasm of uncertain behavior of skin (12/28/2016); Neuropathy (CMS-HCC) (05/2010); Neuropathy (CMS-HCC); Osteopenia; Osteopenia; Ringworm (5 years old); Vitamin D deficiency (09/2011); and Vitamin D deficiency.    SURGICAL HISTORY   has a past surgical history that includes egd w/esophageal dil. balloon (1998, 2004); appendectomy; colonoscopy (1998, " "2009); egd esophagus with endoscopic us (2009); endoscopy procedure (11/19/2014); tonsillectomy; egd w/esophageal dil. balloon; other abdominal surgery; colonoscopy; egd esophagus with endoscopic us; cervical fusion posterior (5/1/2015); cervical laminectomy posterior (5/1/2015); and wrist orif (Left, 5/7/2015).    SOCIAL HISTORY  Social History   Substance Use Topics   • Smoking status: Former Smoker     Packs/day: 0.25     Years: 4.00     Types: Cigarettes     Quit date: 1/1/1965   • Smokeless tobacco: Never Used   • Alcohol use 30.0 oz/week     50 Standard drinks or equivalent per week      Comment: 1/2 drinks daily (no more than a pint a day)      History   Drug Use   • Types: Marijuana, Inhaled     Comment: Marijuana once a week, helps with sleep and pain       FAMILY HISTORY  Family History   Problem Relation Age of Onset   • Other Mother      GERD   • Heart Disease Mother      CAD   • Diabetes Mother      DMII   • Cancer Father      liver   • Cancer Maternal Grandmother      Unknown type of cancer       CURRENT MEDICATIONS  Home Medications    **Home medications have not yet been reviewed for this encounter**         ALLERGIES  No Known Allergies    PHYSICAL EXAM  VITAL SIGNS: /49   Pulse (!) 113   Temp 37.2 °C (99 °F)   Resp 16   Ht 1.651 m (5' 5\")   SpO2 98%     Constitutional: Disheveled. mild distress,   HENT: Normocephalic, Atraumatic, Bilateral external ears normal, Oropharynx moist, No oral exudates.   Eyes: PERRLA, EOMI, Conjunctiva normal, No discharge.   Neck: No tenderness, Supple, No stridor.   Lymphatic: No lymphadenopathy noted.   Cardiovascular: Normal heart rate, Normal rhythm.   Thorax & Lungs: Clear to auscultation bilaterally, No respiratory distress, No wheezing, No crackles.   Abdomen: Soft, No tenderness, No masses, No pulsatile masses.   Skin: Extensive cellulitis throughout left lower leg from the groin to the ankle most extensivly from the knee down. Diffuse edema. Erythema " in posterior thigh region. No crepitance, but erythematous and weeping. Warm, Dry, No rash.   Extremities:, See skin section above. No cyanosis.   Musculoskeletal: No tenderness to palpation or major deformities noted.  Intact distal pulses  Neurologic: Awake, alert. Moves all extremities spontaneously.  Psychiatric: Affect normal, Judgment normal, Mood normal.     LABS  Labs Reviewed   LACTIC ACID - Abnormal; Notable for the following:        Result Value    Lactic Acid 2.4 (*)     All other components within normal limits    Narrative:     Indicate which anticoagulants the patient is on:->NONE   CBC WITH DIFFERENTIAL - Abnormal; Notable for the following:     WBC 28.7 (*)     RBC 3.03 (*)     Hemoglobin 11.2 (*)     Hematocrit 31.7 (*)     .6 (*)     MCH 37.0 (*)     Platelet Count 91 (*)     Lymphocytes 48.70 (*)     Neutrophils (Absolute) 14.46 (*)     Lymphs (Absolute) 13.98 (*)     All other components within normal limits    Narrative:     Indicate which anticoagulants the patient is on:->NONE   COMP METABOLIC PANEL - Abnormal; Notable for the following:     Sodium 124 (*)     Chloride 90 (*)     Anion Gap 14.0 (*)     Bun 33 (*)     Albumin 3.0 (*)     Globulin 3.7 (*)     All other components within normal limits    Narrative:     Indicate which anticoagulants the patient is on:->NONE   BTYPE NATRIURETIC PEPTIDE - Abnormal; Notable for the following:     B Natriuretic Peptide 409 (*)     All other components within normal limits    Narrative:     Indicate which anticoagulants the patient is on:->NONE   APTT - Abnormal; Notable for the following:     APTT 38.9 (*)     All other components within normal limits    Narrative:     Indicate which anticoagulants the patient is on:->NONE   ESTIMATED GFR - Abnormal; Notable for the following:     GFR If Non  54 (*)     All other components within normal limits    Narrative:     Indicate which anticoagulants the patient is on:->NONE   BLOOD  "CULTURE    Narrative:     Per Hospital Policy: Only change Specimen Src: to \"Line\" if  specified by physician order.   BLOOD CULTURE    Narrative:     Per Hospital Policy: Only change Specimen Src: to \"Line\" if  specified by physician order.   TROPONIN    Narrative:     Indicate which anticoagulants the patient is on:->NONE   DIFFERENTIAL MANUAL    Narrative:     Indicate which anticoagulants the patient is on:->NONE   PERIPHERAL SMEAR REVIEW    Narrative:     Indicate which anticoagulants the patient is on:->NONE   PLATELET ESTIMATE    Narrative:     Indicate which anticoagulants the patient is on:->NONE   MORPHOLOGY    Narrative:     Indicate which anticoagulants the patient is on:->NONE   LACTIC ACID   URINALYSIS   URINE CULTURE(NEW)     All labs reviewed by me.      RADIOLOGY  LE VENOUS DUPLEX (Specify in Comments Left, Right Or Bilateral)         DX-TIBIA AND FIBULA LEFT   Final Result      1.  There is mild diffuse swelling in the soft tissues of the distal left lower leg and ankle. There is no gas in the soft tissues or foreign body.   2.  There is no plain film evidence of osteomyelitis.      DX-CHEST-PORTABLE (1 VIEW)   Final Result      1.  There is no acute cardiopulmonary process.        The radiologist's interpretation of all radiological studies have been reviewed by me.    COURSE & MEDICAL DECISION MAKING  Pertinent Labs & Imaging studies reviewed. (See chart for details)    I reviewed the patient's medical records which showed history of alcohol abuse.     4:59 PM - Patient seen and examined at bedside. Patient will be treated with Iv fluids forPossible sepsis and tachycardia. Ordered Dx tibia, Le Venous Duplex, Lactic acid, DX chest and other labs to evaluate his symptoms. The differential diagnoses include but are not limited to: Cellulitis, necrotizing fasciitis    Decision Making:  Patient with extensive left leg cellulitis, given the patient IV antibiotics, x-ray does not show any free gas, " we'll monitor the patient closely, discussed the case with the hospitalist for admission to hospital.    DISPOSITION:  Patient will be admitted to Dr. Blackwell in guarded condition.      FINAL IMPRESSION  1. Cellulitis of left lower extremity          IHugh (Scribe), am scribing for, and in the presence of, Itz Vasquez M.D..    Electronically signed by: Hugh Ogden (Scribriley), 9/28/2017    IItz M.D. personally performed the services described in this documentation, as scribed by Hugh Ogden in my presence, and it is both accurate and complete.    The note accurately reflects work and decisions made by me.  Itz Vasquez  9/28/2017  6:40 PM

## 2017-09-28 NOTE — ED NOTES
"Chief Complaint   Patient presents with   • Leg Swelling     pt reports that he went to bed last night and woke up with a sore red leg. left leg red, tight, edematous. wound noted and ankle. pt reports that it has \"been there for years.\"      Pt rates leg pain 10/10. Wheeled to triage, unable to bare weight.  Blood pressure 104/49, pulse (!) 113, temperature 37.2 °C (99 °F), resp. rate 16, height 1.651 m (5' 5\"), SpO2 98 %.    Pt informed of wait times. Educated on triage process.  Asked to return to triage RN for any new or worsening of symptoms. Thanked for patience.        "

## 2017-09-29 ENCOUNTER — APPOINTMENT (OUTPATIENT)
Dept: RADIOLOGY | Facility: MEDICAL CENTER | Age: 72
DRG: 871 | End: 2017-09-29
Attending: HOSPITALIST
Payer: COMMERCIAL

## 2017-09-29 ENCOUNTER — APPOINTMENT (OUTPATIENT)
Dept: RADIOLOGY | Facility: MEDICAL CENTER | Age: 72
DRG: 871 | End: 2017-09-29
Attending: INTERNAL MEDICINE
Payer: COMMERCIAL

## 2017-09-29 PROBLEM — C91.10 CLL (CHRONIC LYMPHOCYTIC LEUKEMIA) (HCC): Chronic | Status: ACTIVE | Noted: 2017-03-23

## 2017-09-29 PROBLEM — R00.0 SINUS TACHYCARDIA: Status: ACTIVE | Noted: 2017-09-29

## 2017-09-29 LAB
ALBUMIN SERPL BCP-MCNC: 2.3 G/DL (ref 3.2–4.9)
ALBUMIN SERPL BCP-MCNC: 2.6 G/DL (ref 3.2–4.9)
ALBUMIN/GLOB SERPL: 0.8 G/DL
ALBUMIN/GLOB SERPL: 0.8 G/DL
ALP SERPL-CCNC: 115 U/L (ref 30–99)
ALP SERPL-CCNC: 71 U/L (ref 30–99)
ALT SERPL-CCNC: 6 U/L (ref 2–50)
ALT SERPL-CCNC: <5 U/L (ref 2–50)
ANION GAP SERPL CALC-SCNC: 11 MMOL/L (ref 0–11.9)
ANION GAP SERPL CALC-SCNC: 8 MMOL/L (ref 0–11.9)
ANISOCYTOSIS BLD QL SMEAR: ABNORMAL
APTT PPP: 44.5 SEC (ref 24.7–36)
AST SERPL-CCNC: 17 U/L (ref 12–45)
AST SERPL-CCNC: 17 U/L (ref 12–45)
BASOPHILS # BLD AUTO: 0 % (ref 0–1.8)
BASOPHILS # BLD: 0 K/UL (ref 0–0.12)
BILIRUB SERPL-MCNC: 0.9 MG/DL (ref 0.1–1.5)
BILIRUB SERPL-MCNC: 0.9 MG/DL (ref 0.1–1.5)
BUN SERPL-MCNC: 25 MG/DL (ref 8–22)
BUN SERPL-MCNC: 26 MG/DL (ref 8–22)
CA-I SERPL-SCNC: 1.1 MMOL/L (ref 1.1–1.3)
CALCIUM SERPL-MCNC: 7.9 MG/DL (ref 8.5–10.5)
CALCIUM SERPL-MCNC: 8.6 MG/DL (ref 8.5–10.5)
CHLORIDE SERPL-SCNC: 100 MMOL/L (ref 96–112)
CHLORIDE SERPL-SCNC: 95 MMOL/L (ref 96–112)
CO2 SERPL-SCNC: 22 MMOL/L (ref 20–33)
CO2 SERPL-SCNC: 23 MMOL/L (ref 20–33)
CORTIS SERPL-MCNC: 30.1 UG/DL (ref 0–23)
CREAT SERPL-MCNC: 1.14 MG/DL (ref 0.5–1.4)
CREAT SERPL-MCNC: 1.29 MG/DL (ref 0.5–1.4)
EKG IMPRESSION: NORMAL
EOSINOPHIL # BLD AUTO: 0 K/UL (ref 0–0.51)
EOSINOPHIL NFR BLD: 0 % (ref 0–6.9)
ERYTHROCYTE [DISTWIDTH] IN BLOOD BY AUTOMATED COUNT: 50.4 FL (ref 35.9–50)
ERYTHROCYTE [DISTWIDTH] IN BLOOD BY AUTOMATED COUNT: 51.6 FL (ref 35.9–50)
GFR SERPL CREATININE-BSD FRML MDRD: 55 ML/MIN/1.73 M 2
GFR SERPL CREATININE-BSD FRML MDRD: >60 ML/MIN/1.73 M 2
GLOBULIN SER CALC-MCNC: 2.8 G/DL (ref 1.9–3.5)
GLOBULIN SER CALC-MCNC: 3.1 G/DL (ref 1.9–3.5)
GLUCOSE SERPL-MCNC: 67 MG/DL (ref 65–99)
GLUCOSE SERPL-MCNC: 72 MG/DL (ref 65–99)
GRAM STN SPEC: NORMAL
HCT VFR BLD AUTO: 27.9 % (ref 42–52)
HCT VFR BLD AUTO: 29.9 % (ref 42–52)
HGB BLD-MCNC: 10.2 G/DL (ref 14–18)
HGB BLD-MCNC: 9.6 G/DL (ref 14–18)
INR PPP: 1.2 (ref 0.87–1.13)
LACTATE BLD-SCNC: 1.2 MMOL/L (ref 0.5–2)
LACTATE BLD-SCNC: 1.5 MMOL/L (ref 0.5–2)
LV EJECT FRACT  99904: 60
LV EJECT FRACT MOD 2C 99903: 58.38
LV EJECT FRACT MOD 4C 99902: 58.4
LV EJECT FRACT MOD BP 99901: 60.82
LYMPHOCYTES # BLD AUTO: 7.91 K/UL (ref 1–4.8)
LYMPHOCYTES NFR BLD: 41.4 % (ref 22–41)
MACROCYTES BLD QL SMEAR: ABNORMAL
MAGNESIUM SERPL-MCNC: 2 MG/DL (ref 1.5–2.5)
MANUAL DIFF BLD: ABNORMAL
MCH RBC QN AUTO: 35.9 PG (ref 27–33)
MCH RBC QN AUTO: 36 PG (ref 27–33)
MCHC RBC AUTO-ENTMCNC: 34.1 G/DL (ref 33.7–35.3)
MCHC RBC AUTO-ENTMCNC: 34.4 G/DL (ref 33.7–35.3)
MCV RBC AUTO: 104.5 FL (ref 81.4–97.8)
MCV RBC AUTO: 105.3 FL (ref 81.4–97.8)
MONOCYTES # BLD AUTO: 0.15 K/UL (ref 0–0.85)
MONOCYTES NFR BLD AUTO: 0.8 % (ref 0–13.4)
MORPHOLOGY BLD-IMP: NORMAL
NEUTROPHILS # BLD AUTO: 11.04 K/UL (ref 1.82–7.42)
NEUTROPHILS NFR BLD: 41.4 % (ref 44–72)
NEUTS BAND NFR BLD MANUAL: 16.4 % (ref 0–10)
NRBC # BLD AUTO: 0 K/UL
NRBC BLD AUTO-RTO: 0 /100 WBC
PHOSPHATE SERPL-MCNC: 2.7 MG/DL (ref 2.5–4.5)
PLATELET # BLD AUTO: 70 K/UL (ref 164–446)
PLATELET # BLD AUTO: 76 K/UL (ref 164–446)
PLATELET BLD QL SMEAR: NORMAL
PMV BLD AUTO: 9.1 FL (ref 9–12.9)
PMV BLD AUTO: 9.4 FL (ref 9–12.9)
POTASSIUM SERPL-SCNC: 4 MMOL/L (ref 3.6–5.5)
POTASSIUM SERPL-SCNC: 4.3 MMOL/L (ref 3.6–5.5)
PROT SERPL-MCNC: 5.1 G/DL (ref 6–8.2)
PROT SERPL-MCNC: 5.7 G/DL (ref 6–8.2)
PROTHROMBIN TIME: 15.6 SEC (ref 12–14.6)
RBC # BLD AUTO: 2.67 M/UL (ref 4.7–6.1)
RBC # BLD AUTO: 2.84 M/UL (ref 4.7–6.1)
RBC BLD AUTO: PRESENT
SIGNIFICANT IND 70042: NORMAL
SITE SITE: NORMAL
SODIUM SERPL-SCNC: 129 MMOL/L (ref 135–145)
SODIUM SERPL-SCNC: 130 MMOL/L (ref 135–145)
SOURCE SOURCE: NORMAL
TOXIC GRANULES BLD QL SMEAR: NORMAL
TROPONIN I SERPL-MCNC: <0.01 NG/ML (ref 0–0.04)
TSH SERPL DL<=0.005 MIU/L-ACNC: 1.58 UIU/ML (ref 0.3–3.7)
WBC # BLD AUTO: 18.8 K/UL (ref 4.8–10.8)
WBC # BLD AUTO: 19.1 K/UL (ref 4.8–10.8)

## 2017-09-29 PROCEDURE — 99291 CRITICAL CARE FIRST HOUR: CPT | Mod: 25 | Performed by: HOSPITALIST

## 2017-09-29 PROCEDURE — 93005 ELECTROCARDIOGRAM TRACING: CPT | Performed by: INTERNAL MEDICINE

## 2017-09-29 PROCEDURE — 83605 ASSAY OF LACTIC ACID: CPT

## 2017-09-29 PROCEDURE — 93005 ELECTROCARDIOGRAM TRACING: CPT | Performed by: HOSPITALIST

## 2017-09-29 PROCEDURE — 36556 INSERT NON-TUNNEL CV CATH: CPT

## 2017-09-29 PROCEDURE — 700105 HCHG RX REV CODE 258: Performed by: HOSPITALIST

## 2017-09-29 PROCEDURE — A9270 NON-COVERED ITEM OR SERVICE: HCPCS | Performed by: NURSE PRACTITIONER

## 2017-09-29 PROCEDURE — 700105 HCHG RX REV CODE 258: Performed by: PHARMACIST

## 2017-09-29 PROCEDURE — B543ZZA ULTRASONOGRAPHY OF RIGHT JUGULAR VEINS, GUIDANCE: ICD-10-PCS | Performed by: HOSPITALIST

## 2017-09-29 PROCEDURE — 85007 BL SMEAR W/DIFF WBC COUNT: CPT

## 2017-09-29 PROCEDURE — 93005 ELECTROCARDIOGRAM TRACING: CPT | Performed by: NURSE PRACTITIONER

## 2017-09-29 PROCEDURE — 700111 HCHG RX REV CODE 636 W/ 250 OVERRIDE (IP): Performed by: INTERNAL MEDICINE

## 2017-09-29 PROCEDURE — 99233 SBSQ HOSP IP/OBS HIGH 50: CPT | Performed by: HOSPITALIST

## 2017-09-29 PROCEDURE — 84100 ASSAY OF PHOSPHORUS: CPT

## 2017-09-29 PROCEDURE — 85730 THROMBOPLASTIN TIME PARTIAL: CPT

## 2017-09-29 PROCEDURE — 93306 TTE W/DOPPLER COMPLETE: CPT | Mod: 26 | Performed by: INTERNAL MEDICINE

## 2017-09-29 PROCEDURE — 85027 COMPLETE CBC AUTOMATED: CPT

## 2017-09-29 PROCEDURE — 700101 HCHG RX REV CODE 250: Performed by: HOSPITALIST

## 2017-09-29 PROCEDURE — 93306 TTE W/DOPPLER COMPLETE: CPT

## 2017-09-29 PROCEDURE — 84484 ASSAY OF TROPONIN QUANT: CPT

## 2017-09-29 PROCEDURE — 700111 HCHG RX REV CODE 636 W/ 250 OVERRIDE (IP): Performed by: PHARMACIST

## 2017-09-29 PROCEDURE — 71010 DX-CHEST-PORTABLE (1 VIEW): CPT

## 2017-09-29 PROCEDURE — C1751 CATH, INF, PER/CENT/MIDLINE: HCPCS

## 2017-09-29 PROCEDURE — 700105 HCHG RX REV CODE 258: Performed by: NURSE PRACTITIONER

## 2017-09-29 PROCEDURE — 36556 INSERT NON-TUNNEL CV CATH: CPT | Performed by: HOSPITALIST

## 2017-09-29 PROCEDURE — 700111 HCHG RX REV CODE 636 W/ 250 OVERRIDE (IP): Performed by: HOSPITALIST

## 2017-09-29 PROCEDURE — 85610 PROTHROMBIN TIME: CPT

## 2017-09-29 PROCEDURE — 82330 ASSAY OF CALCIUM: CPT

## 2017-09-29 PROCEDURE — 700111 HCHG RX REV CODE 636 W/ 250 OVERRIDE (IP)

## 2017-09-29 PROCEDURE — 84443 ASSAY THYROID STIM HORMONE: CPT

## 2017-09-29 PROCEDURE — 73620 X-RAY EXAM OF FOOT: CPT | Mod: LT

## 2017-09-29 PROCEDURE — 700102 HCHG RX REV CODE 250 W/ 637 OVERRIDE(OP): Performed by: HOSPITALIST

## 2017-09-29 PROCEDURE — 80053 COMPREHEN METABOLIC PANEL: CPT

## 2017-09-29 PROCEDURE — 83735 ASSAY OF MAGNESIUM: CPT

## 2017-09-29 PROCEDURE — A9270 NON-COVERED ITEM OR SERVICE: HCPCS | Performed by: HOSPITALIST

## 2017-09-29 PROCEDURE — 82533 TOTAL CORTISOL: CPT

## 2017-09-29 PROCEDURE — 700102 HCHG RX REV CODE 250 W/ 637 OVERRIDE(OP): Performed by: NURSE PRACTITIONER

## 2017-09-29 PROCEDURE — 700105 HCHG RX REV CODE 258: Performed by: INTERNAL MEDICINE

## 2017-09-29 PROCEDURE — 02HV33Z INSERTION OF INFUSION DEVICE INTO SUPERIOR VENA CAVA, PERCUTANEOUS APPROACH: ICD-10-PCS | Performed by: HOSPITALIST

## 2017-09-29 PROCEDURE — 93010 ELECTROCARDIOGRAM REPORT: CPT | Mod: 76 | Performed by: INTERNAL MEDICINE

## 2017-09-29 PROCEDURE — 770022 HCHG ROOM/CARE - ICU (200)

## 2017-09-29 RX ORDER — SODIUM CHLORIDE 9 MG/ML
INJECTION, SOLUTION INTRAVENOUS
Status: ACTIVE
Start: 2017-09-29 | End: 2017-09-29

## 2017-09-29 RX ORDER — SODIUM CHLORIDE 9 MG/ML
500 INJECTION, SOLUTION INTRAVENOUS ONCE
Status: COMPLETED | OUTPATIENT
Start: 2017-09-29 | End: 2017-09-29

## 2017-09-29 RX ORDER — MORPHINE SULFATE 4 MG/ML
4 INJECTION, SOLUTION INTRAMUSCULAR; INTRAVENOUS ONCE
Status: COMPLETED | OUTPATIENT
Start: 2017-09-29 | End: 2017-09-29

## 2017-09-29 RX ORDER — SODIUM CHLORIDE 9 MG/ML
30 INJECTION, SOLUTION INTRAVENOUS ONCE
Status: COMPLETED | OUTPATIENT
Start: 2017-09-29 | End: 2017-09-29

## 2017-09-29 RX ORDER — ADENOSINE 3 MG/ML
INJECTION, SOLUTION INTRAVENOUS
Status: COMPLETED
Start: 2017-09-29 | End: 2017-09-29

## 2017-09-29 RX ORDER — M-VIT,TX,IRON,MINS/CALC/FOLIC 27MG-0.4MG
1 TABLET ORAL DAILY
Status: DISCONTINUED | OUTPATIENT
Start: 2017-09-29 | End: 2017-10-19 | Stop reason: HOSPADM

## 2017-09-29 RX ORDER — SODIUM CHLORIDE 9 MG/ML
INJECTION, SOLUTION INTRAVENOUS CONTINUOUS
Status: DISCONTINUED | OUTPATIENT
Start: 2017-09-29 | End: 2017-10-07

## 2017-09-29 RX ORDER — FOLIC ACID 1 MG/1
1 TABLET ORAL DAILY
Status: DISCONTINUED | OUTPATIENT
Start: 2017-09-29 | End: 2017-10-19 | Stop reason: HOSPADM

## 2017-09-29 RX ORDER — DILTIAZEM HYDROCHLORIDE 5 MG/ML
5 INJECTION INTRAVENOUS ONCE
Status: COMPLETED | OUTPATIENT
Start: 2017-09-29 | End: 2017-09-29

## 2017-09-29 RX ORDER — THIAMINE MONONITRATE (VIT B1) 100 MG
100 TABLET ORAL DAILY
Status: DISCONTINUED | OUTPATIENT
Start: 2017-09-29 | End: 2017-10-19 | Stop reason: HOSPADM

## 2017-09-29 RX ORDER — ADENOSINE 3 MG/ML
INJECTION, SOLUTION INTRAVENOUS
Status: ACTIVE
Start: 2017-09-29 | End: 2017-09-29

## 2017-09-29 RX ORDER — ADENOSINE 3 MG/ML
6 INJECTION, SOLUTION INTRAVENOUS ONCE
Status: COMPLETED | OUTPATIENT
Start: 2017-09-29 | End: 2017-09-29

## 2017-09-29 RX ORDER — DILTIAZEM HYDROCHLORIDE 5 MG/ML
INJECTION INTRAVENOUS
Status: COMPLETED
Start: 2017-09-29 | End: 2017-09-29

## 2017-09-29 RX ADMIN — MORPHINE SULFATE 4 MG: 4 INJECTION INTRAVENOUS at 09:59

## 2017-09-29 RX ADMIN — AMPICILLIN SODIUM AND SULBACTAM SODIUM 3 G: 2; 1 INJECTION, POWDER, FOR SOLUTION INTRAMUSCULAR; INTRAVENOUS at 23:32

## 2017-09-29 RX ADMIN — DULOXETINE HYDROCHLORIDE 60 MG: 60 CAPSULE, DELAYED RELEASE ORAL at 08:46

## 2017-09-29 RX ADMIN — OXYCODONE HYDROCHLORIDE 10 MG: 10 TABLET ORAL at 20:10

## 2017-09-29 RX ADMIN — THIAMINE HCL TAB 100 MG 100 MG: 100 TAB at 14:15

## 2017-09-29 RX ADMIN — OXYCODONE HYDROCHLORIDE 10 MG: 10 TABLET ORAL at 08:50

## 2017-09-29 RX ADMIN — FOLIC ACID 1 MG: 1 TABLET ORAL at 14:14

## 2017-09-29 RX ADMIN — AMPICILLIN SODIUM AND SULBACTAM SODIUM 3 G: 2; 1 INJECTION, POWDER, FOR SOLUTION INTRAMUSCULAR; INTRAVENOUS at 02:00

## 2017-09-29 RX ADMIN — STANDARDIZED SENNA CONCENTRATE AND DOCUSATE SODIUM 2 TABLET: 8.6; 5 TABLET, FILM COATED ORAL at 20:11

## 2017-09-29 RX ADMIN — VANCOMYCIN HYDROCHLORIDE 1000 MG: 100 INJECTION, POWDER, LYOPHILIZED, FOR SOLUTION INTRAVENOUS at 20:12

## 2017-09-29 RX ADMIN — DILTIAZEM HYDROCHLORIDE 5 MG: 5 INJECTION INTRAVENOUS at 10:15

## 2017-09-29 RX ADMIN — SODIUM CHLORIDE 2000 ML: 9 INJECTION, SOLUTION INTRAVENOUS at 09:00

## 2017-09-29 RX ADMIN — AMPICILLIN SODIUM AND SULBACTAM SODIUM 3 G: 2; 1 INJECTION, POWDER, FOR SOLUTION INTRAMUSCULAR; INTRAVENOUS at 05:32

## 2017-09-29 RX ADMIN — VANCOMYCIN HYDROCHLORIDE 1000 MG: 100 INJECTION, POWDER, LYOPHILIZED, FOR SOLUTION INTRAVENOUS at 10:48

## 2017-09-29 RX ADMIN — HEPARIN SODIUM 5000 UNITS: 5000 INJECTION, SOLUTION INTRAVENOUS; SUBCUTANEOUS at 14:14

## 2017-09-29 RX ADMIN — TRAZODONE HYDROCHLORIDE 100 MG: 50 TABLET ORAL at 20:11

## 2017-09-29 RX ADMIN — OXYCODONE HYDROCHLORIDE 10 MG: 10 TABLET ORAL at 23:31

## 2017-09-29 RX ADMIN — HEPARIN SODIUM 5000 UNITS: 5000 INJECTION, SOLUTION INTRAVENOUS; SUBCUTANEOUS at 20:10

## 2017-09-29 RX ADMIN — SPIRONOLACTONE 100 MG: 100 TABLET, FILM COATED ORAL at 08:47

## 2017-09-29 RX ADMIN — NOREPINEPHRINE BITARTRATE 0.5 MCG/MIN: 1 INJECTION INTRAVENOUS at 13:52

## 2017-09-29 RX ADMIN — AMPICILLIN SODIUM AND SULBACTAM SODIUM 3 G: 2; 1 INJECTION, POWDER, FOR SOLUTION INTRAMUSCULAR; INTRAVENOUS at 11:51

## 2017-09-29 RX ADMIN — DILTIAZEM HYDROCHLORIDE 5 MG/HR: 5 INJECTION INTRAVENOUS at 10:22

## 2017-09-29 RX ADMIN — MULTIPLE VITAMINS W/ MINERALS TAB 1 TABLET: TAB at 10:56

## 2017-09-29 RX ADMIN — SODIUM CHLORIDE: 9 INJECTION, SOLUTION INTRAVENOUS at 17:05

## 2017-09-29 RX ADMIN — AMPICILLIN SODIUM AND SULBACTAM SODIUM 3 G: 2; 1 INJECTION, POWDER, FOR SOLUTION INTRAMUSCULAR; INTRAVENOUS at 20:12

## 2017-09-29 RX ADMIN — OXYCODONE HYDROCHLORIDE 10 MG: 10 TABLET ORAL at 02:05

## 2017-09-29 RX ADMIN — SODIUM CHLORIDE 500 ML: 9 INJECTION, SOLUTION INTRAVENOUS at 11:52

## 2017-09-29 RX ADMIN — STANDARDIZED SENNA CONCENTRATE AND DOCUSATE SODIUM 2 TABLET: 8.6; 5 TABLET, FILM COATED ORAL at 08:47

## 2017-09-29 RX ADMIN — OXYCODONE HYDROCHLORIDE 10 MG: 10 TABLET ORAL at 13:45

## 2017-09-29 RX ADMIN — ADENOSINE 6 MG: 3 INJECTION, SOLUTION INTRAVENOUS at 09:20

## 2017-09-29 RX ADMIN — SODIUM CHLORIDE: 9 INJECTION, SOLUTION INTRAVENOUS at 10:38

## 2017-09-29 RX ADMIN — ADENOSINE 12 MG: 3 INJECTION, SOLUTION INTRAVENOUS at 09:35

## 2017-09-29 RX ADMIN — HEPARIN SODIUM 5000 UNITS: 5000 INJECTION, SOLUTION INTRAVENOUS; SUBCUTANEOUS at 05:33

## 2017-09-29 RX ADMIN — LEVOTHYROXINE SODIUM 25 MCG: 50 TABLET ORAL at 05:32

## 2017-09-29 RX ADMIN — SODIUM CHLORIDE 1977 ML: 9 INJECTION, SOLUTION INTRAVENOUS at 08:12

## 2017-09-29 RX ADMIN — OXYCODONE HYDROCHLORIDE 10 MG: 10 TABLET ORAL at 05:32

## 2017-09-29 ASSESSMENT — ENCOUNTER SYMPTOMS
ABDOMINAL PAIN: 0
DIZZINESS: 0
DIARRHEA: 0
BACK PAIN: 0
CONSTIPATION: 0
SHORTNESS OF BREATH: 0
FEVER: 0
SPUTUM PRODUCTION: 0
WEAKNESS: 0
FOCAL WEAKNESS: 0
HEADACHES: 0
NAUSEA: 0
COUGH: 0
BACK PAIN: 1
LOSS OF CONSCIOUSNESS: 0
CHILLS: 0
VOMITING: 0
NECK PAIN: 1
PALPITATIONS: 0

## 2017-09-29 ASSESSMENT — LIFESTYLE VARIABLES
EVER HAD A DRINK FIRST THING IN THE MORNING TO STEADY YOUR NERVES TO GET RID OF A HANGOVER: YES
CONSUMPTION TOTAL: POSITIVE
TOTAL SCORE: 2
HAVE YOU EVER FELT YOU SHOULD CUT DOWN ON YOUR DRINKING: NO
DOES PATIENT WANT TO STOP DRINKING: NO
HOW MANY TIMES IN THE PAST YEAR HAVE YOU HAD 5 OR MORE DRINKS IN A DAY: 365
TOTAL SCORE: 2
HAVE PEOPLE ANNOYED YOU BY CRITICIZING YOUR DRINKING: YES
ALCOHOL_USE: YES
TOTAL SCORE: 2
EVER FELT BAD OR GUILTY ABOUT YOUR DRINKING: NO
AVERAGE NUMBER OF DAYS PER WEEK YOU HAVE A DRINK CONTAINING ALCOHOL: 7
ON A TYPICAL DAY WHEN YOU DRINK ALCOHOL HOW MANY DRINKS DO YOU HAVE: 6

## 2017-09-29 ASSESSMENT — PAIN SCALES - GENERAL
PAINLEVEL_OUTOF10: 7
PAINLEVEL_OUTOF10: 7
PAINLEVEL_OUTOF10: 8
PAINLEVEL_OUTOF10: 0
PAINLEVEL_OUTOF10: 8
PAINLEVEL_OUTOF10: 10
PAINLEVEL_OUTOF10: 8
PAINLEVEL_OUTOF10: 9
PAINLEVEL_OUTOF10: 7
PAINLEVEL_OUTOF10: 8

## 2017-09-29 ASSESSMENT — PATIENT HEALTH QUESTIONNAIRE - PHQ9
9. THOUGHTS THAT YOU WOULD BE BETTER OFF DEAD, OR OF HURTING YOURSELF: NEARLY EVERY DAY
3. TROUBLE FALLING OR STAYING ASLEEP OR SLEEPING TOO MUCH: NEARLY EVERY DAY
4. FEELING TIRED OR HAVING LITTLE ENERGY: MORE THAN HALF THE DAYS
1. LITTLE INTEREST OR PLEASURE IN DOING THINGS: NEARLY EVERY DAY
2. FEELING DOWN, DEPRESSED, IRRITABLE, OR HOPELESS: NEARLY EVERY DAY
6. FEELING BAD ABOUT YOURSELF - OR THAT YOU ARE A FAILURE OR HAVE LET YOURSELF OR YOUR FAMILY DOWN: SEVERAL DAYS
8. MOVING OR SPEAKING SO SLOWLY THAT OTHER PEOPLE COULD HAVE NOTICED. OR THE OPPOSITE, BEING SO FIGETY OR RESTLESS THAT YOU HAVE BEEN MOVING AROUND A LOT MORE THAN USUAL: MORE THAN HALF THE DAYS
SUM OF ALL RESPONSES TO PHQ9 QUESTIONS 1 AND 2: 6
SUM OF ALL RESPONSES TO PHQ QUESTIONS 1-9: 21
5. POOR APPETITE OR OVEREATING: NEARLY EVERY DAY
7. TROUBLE CONCENTRATING ON THINGS, SUCH AS READING THE NEWSPAPER OR WATCHING TELEVISION: SEVERAL DAYS

## 2017-09-29 NOTE — PROGRESS NOTES
2 RN skin check done with Penny MIXON.     Patient skin is generally intact, fragile, dry.    Scab on left temple noted.  Scattered bruising on chest noted.  Scattered scabs and bruising on both arms.  Redness noted on inner left groin area and thigh.  Redness noted on sacral area, blanching.  Scab noted on right inner knee area.  Redness, swelling, tightness, scab on left lower leg noted.

## 2017-09-29 NOTE — H&P
" Hospital Medicine History and Physical    Date of Service  9/28/2017    Chief Complaint  Chief Complaint   Patient presents with   • Leg Swelling     pt reports that he went to bed last night and woke up with a sore red leg. left leg red, tight, edematous. wound noted and ankle. pt reports that it has \"been there for years.\"        History of Presenting Illness  72 y.o. male who presented 9/28/2017 with pain and swelling of the left leg for the past four days. He thinks he got a \"spider bite\" on his left ankle followed by worsening pain, redness and swelling of the leg. He didn't come in until today because he had to go to court for some reason today. After his court appointment he came to the hospital for evaluation. He is weak and tired, experiencing severe pain in his left leg, but not fever or chills. He has had chronic swelling in the leg before but never to this degree nor with redness.    Primary Care Physician  Pcp Pt States None    Consultants  None.    Code Status  Full code.    Review of Systems  Review of Systems   Constitutional: Positive for malaise/fatigue. Negative for chills, fever and weight loss.   HENT: Negative.    Respiratory: Negative.  Negative for cough and shortness of breath.    Cardiovascular: Positive for leg swelling. Negative for chest pain, palpitations, orthopnea, claudication and PND.   Gastrointestinal: Negative.  Negative for abdominal pain, nausea and vomiting.   Genitourinary: Negative.  Negative for dysuria and flank pain.   Musculoskeletal: Positive for back pain, joint pain and neck pain. Negative for falls and myalgias.   Neurological: Negative.  Negative for dizziness, loss of consciousness, weakness and headaches.   Endo/Heme/Allergies: Negative.  Does not bruise/bleed easily.   Psychiatric/Behavioral: Negative.  Negative for depression. The patient is not nervous/anxious.    All other systems reviewed and are negative.       Past Medical History  Past Medical History: "   Diagnosis Date   • CLL (chronic lymphocytic leukemia) (CMS-HCC) 3/23/2017   • Actinic keratosis 1/3/2017   • Neoplasm of uncertain behavior of skin 12/28/2016   • Vitamin D deficiency 09/2011   • Neuropathy (CMS-HCC) 05/2010    feet   • Helicobacter pylori (H. pylori) 01/2010    Postive, Ab Tx   • Hypertension 02/2004   • Back pain    • ESOPHAGITIS     distal   • Esophagitis    • ETOH abuse     x 5   • ETOH abuse     daily drinker   • Fall    • KYA (generalized anxiety disorder)    • GERD (gastroesophageal reflux disease)    • Gilbert's disease    • Health care maintenance    • Helicobacter pylori (H. pylori)    • Hepatitis    • Hypertension    • Neck pain    • Neuropathy (CMS-HCC)    • Osteopenia     per Dexa Scan   • Osteopenia    • Ringworm 5 years old   • Vitamin D deficiency        Surgical History  Past Surgical History:   Procedure Laterality Date   • WRIST ORIF Left 5/7/2015    Procedure: WRIST ORIF [79.83] DISTAL RADIUS;  Surgeon: Gurvinder Baez M.D.;  Location: SURGERY [facility];  Service:    • CERVICAL FUSION POSTERIOR  5/1/2015    Performed by Andrea Thorpe III, M.D. at SURGERY [facility]   • CERVICAL LAMINECTOMY POSTERIOR  5/1/2015    Performed by Andrea Thorpe III, M.D. at SURGERY [facility]   • ENDOSCOPY PROCEDURE  11/19/2014    Performed by Aurelio Delacruz M.D. at SURGERY [facility] ORS   • EGD ESOPHAGUS WITH ENDOSCOPIC US  2009    positive, stricture and distal esophagitis   • APPENDECTOMY      small bowel obstruction, carcinoid tumor   • COLONOSCOPY  1998, 2009    negative   • COLONOSCOPY     • EGD ESOPHAGUS WITH ENDOSCOPIC US     • EGD W/ESOPHAGEAL DIL. BALLOON  1998, 2004   • EGD W/ESOPHAGEAL DIL. BALLOON     • OTHER ABDOMINAL SURGERY      appendectomy   • TONSILLECTOMY         Medications  No current facility-administered medications on file prior to encounter.      Current Outpatient Prescriptions on File Prior to Encounter   Medication Sig Dispense Refill   •  spironolactone (ALDACTONE) 100 MG Tab Take 1 Tab by mouth every day. 90 Tab 1       Family History  Family History   Problem Relation Age of Onset   • Other Mother      GERD   • Heart Disease Mother      CAD   • Diabetes Mother      DMII   • Cancer Father      liver   • Cancer Maternal Grandmother      Unknown type of cancer       Social History  Social History   Substance Use Topics   • Smoking status: Former Smoker     Packs/day: 0.25     Years: 4.00     Types: Cigarettes     Quit date: 1965   • Smokeless tobacco: Never Used   • Alcohol use 30.0 oz/week     50 Standard drinks or equivalent per week      Comment: 1/2 drinks daily (no more than a pint a day)       Allergies  No Known Allergies     Physical Exam  Laboratory   Hemodynamics  Temp (24hrs), Av.2 °C (99 °F), Min:37.2 °C (99 °F), Max:37.2 °C (99 °F)   Temperature: 37.2 °C (99 °F)  Pulse  Av.3  Min: 97  Max: 113 Heart Rate (Monitored): (!) 106  Blood Pressure : 104/49, NIBP: 107/50      Respiratory      Respiration: 18, Pulse Oximetry: 92 %             Physical Exam   Constitutional: He appears well-developed and well-nourished. No distress.   HENT:   Nose: Nose normal.   Mouth/Throat: Oropharynx is clear and moist. No oropharyngeal exudate.   Eyes: Conjunctivae are normal. Right eye exhibits no discharge. Left eye exhibits no discharge. No scleral icterus.   Neck: No JVD present. No tracheal deviation present.   Cardiovascular: Normal rate, regular rhythm and normal heart sounds.    Pulmonary/Chest: Effort normal and breath sounds normal. No stridor. No respiratory distress. He has no wheezes. He has no rales. He exhibits no tenderness.   Abdominal: Soft. Bowel sounds are normal. He exhibits distension. There is no tenderness.   Musculoskeletal: He exhibits no edema or tenderness.   Massive swelling of left leg from ankle to thigh. Calf is tight and very erythematous; there is red streaking up the thigh. Pulse and capillary refill of the left  leg are normal.  Open wound present medial left ankle, no drainage.   Neurological: He is alert. No cranial nerve deficit. He exhibits normal muscle tone.   Skin: Skin is warm and dry. No rash noted. He is not diaphoretic. No pallor.   Psychiatric: He has a normal mood and affect. His behavior is normal.   Nursing note and vitals reviewed.      Recent Labs      17   1703   WBC  28.7*   RBC  3.03*   HEMOGLOBIN  11.2*   HEMATOCRIT  31.7*   MCV  104.6*   MCH  37.0*   MCHC  35.3   RDW  49.4   PLATELETCT  91*   MPV  9.2     Recent Labs      17   1703   SODIUM  124*   POTASSIUM  4.9   CHLORIDE  90*   CO2  20   GLUCOSE  82   BUN  33*   CREATININE  1.31   CALCIUM  9.0     Recent Labs      17   1703   ALTSGPT  6   ASTSGOT  17   ALKPHOSPHAT  99   TBILIRUBIN  0.9   GLUCOSE  82     Recent Labs      17   1703   APTT  38.9*     Recent Labs      17   1703   BNPBTYPENAT  409*         Lab Results   Component Value Date    TROPONINI <0.01 2017     Urinalysis:    Lab Results  Component Value Date/Time   SPECGRAVITY 1.016 2017   GLUCOSEUR Negative 2017   KETONES Trace (A) 2017   NITRITE Negative 2017   WBCURINE 0-2 (A) 2016   RBCURINE 10-20 (A) 2016        Imaging  Plain film left le.  There is mild diffuse swelling in the soft tissues of the distal left lower leg and ankle. There is no gas in the soft tissues or foreign body.  2.  There is no plain film evidence of osteomyelitis.    Duplex study of left leg: no DVT   Assessment/Plan     I anticipate this patient will require at least two midnights for appropriate medical management, necessitating inpatient admission.    Cirrhosis (CMS-HCC)- (present on admission)   Assessment & Plan    Chronic. Continue spironolactone, minimize fluids, no maintenance, sepsis boluses if needed.        Alcoholism (CMS-HCC)- (present on admission)   Assessment & Plan    Long history of heavy  drinking with cirrhosis.  Continues to drink.        Hyponatremia   Assessment & Plan    Worsening from baseline around 132; secondary to cirrhosis with sepsis and volume overload.  Monitor bmp daily.  Continue aldactone, fluid restrict and 1.5 gram sodium restricted diet, may need some lasix.        Cellulitis- (present on admission)   Assessment & Plan    Severe cellulitis of the left leg. Entry point appears to be at the left ankle. Sepsis protocol initiated. Cultures of the wound and blood are ordered. Start IV Unasyn and vancomycin. Low threshold for MRI/surgical consult if he doesn't begin to improve quickly. Negative ultrasound for DVT.        CLL (chronic lymphocytic leukemia) (CMS-HCC)- (present on admission)   Assessment & Plan    Followed by Dr. Flores outpatient, chronically elevated WBC count.        Continuous opioid dependence (CMS-HCC)- (present on admission)   Assessment & Plan    Due to history of cervical spine injury and chronic neck pain is on oxycodone at home for several years.        Sepsis (CMS-HCC)- (present on admission)   Assessment & Plan    This is sepsis (without associated acute organ dysfunction).   Sepsis orders set completed.  Cultures sent; unasyn and vancomycin initiated.            VTE prophylaxis: heparin.

## 2017-09-29 NOTE — ED NOTES
Repeat Lactic drawn and sent to lab.  Dr Blackwell at bedside for admission orders.  Wound culture swab completed.

## 2017-09-29 NOTE — ASSESSMENT & PLAN NOTE
Wound culture positive for GAS and MRSA  completed Bactrim, Clindamycin on 10/15  US doppler left leg 10/18 negative for dvt.

## 2017-09-29 NOTE — DIETARY
Nutrition Services: Poor PO intake and weight loss per nutritional admit screen    Pt is a 72 y.o. Male with Dx: Cellulitis    PMH: CLL, ETOH, cirrhosis  BMI= 24.18    Admit day 1. Pt on a 1.5 gram sodium diet. Nutrition Representative spoke with pt earlier today for menu selections and added snacks per RD request. RD attempted to speak with pt this afternoon, but he was asleep (although eyes were not completely closed). No details in chart about poor PO intake or weight loss.     RD will monitor for adequate intake and additional needs.   Consider nutrition supplements.

## 2017-09-29 NOTE — PROGRESS NOTES
Hospital Medicine Interval Note  Date of Service:  9/29/2017    Chief Complaint  72 y.o.-year-old male PMH CLL, HTN, ETOH cirrhosis, GERD admitted 9/28/2017 with LLE swelling, found to have cellulitis and associated sepsis.     Interval Problem Update  Today, the patients HR is 180 and BP is low. Pt denies CP, SOB, palpitations, mentation unchanged. Complains of chronic neck, left wrist and left ankle pain. Denies fever/chills. States leg is about the same. No new complaints.     Consultants/Specialty  None    Disposition  TBD  Transfer to ICU for higher LOC     Review of Systems   Constitutional: Positive for malaise/fatigue. Negative for chills and fever.   Respiratory: Negative for cough, sputum production and shortness of breath.    Cardiovascular: Negative for chest pain and palpitations.   Gastrointestinal: Negative for abdominal pain, constipation, nausea and vomiting.   Genitourinary: Negative for dysuria.   Musculoskeletal: Positive for joint pain (left wrist and ankle pain- chronic for 3 years) and neck pain (Chronic for 3 years). Negative for back pain.   Skin: Positive for rash (redness LLE).   Neurological: Negative for dizziness, focal weakness, weakness and headaches.   All other systems reviewed and are negative.     Physical Exam Laboratory/Imaging   Vitals:    09/28/17 2230 09/28/17 2300 09/29/17 0135 09/29/17 0718   BP:   114/63 (!) 89/57   Pulse:   (!) 110 (!) 188   Resp: 19 20 18 16   Temp:   37.6 °C (99.6 °F) 36.9 °C (98.4 °F)   SpO2:   98% 98%   Weight:   65.9 kg (145 lb 4.5 oz)    Height:         Physical Exam   Constitutional: He is oriented to person, place, and time. He appears well-developed and well-nourished. No distress.   HENT:   Head: Normocephalic and atraumatic.   Eyes: Conjunctivae are normal. No scleral icterus.   Neck: Normal range of motion. Neck supple.   Cardiovascular: Normal rate and regular rhythm.    No murmur heard.  Pulmonary/Chest: Effort normal and breath sounds  normal. No respiratory distress.   Abdominal: Soft. Bowel sounds are normal. He exhibits no distension. There is no tenderness.   Musculoskeletal: Normal range of motion. He exhibits edema.   Neurological: He is alert and oriented to person, place, and time.   Skin: Skin is warm and dry. Rash noted. There is erythema (erythema to LLE with edema, no drainage noted).   Vitals reviewed.   Lab Results   Component Value Date/Time    WBC 18.8 (H) 09/29/2017 08:05 AM    HEMOGLOBIN 10.2 (L) 09/29/2017 08:05 AM    HEMATOCRIT 29.9 (L) 09/29/2017 08:05 AM    PLATELETCT 76 (L) 09/29/2017 08:05 AM     Lab Results   Component Value Date/Time    SODIUM 124 (L) 09/28/2017 05:03 PM    POTASSIUM 4.9 09/28/2017 05:03 PM    CHLORIDE 90 (L) 09/28/2017 05:03 PM    CO2 20 09/28/2017 05:03 PM    GLUCOSE 82 09/28/2017 05:03 PM    BUN 33 (H) 09/28/2017 05:03 PM    CREATININE 1.31 09/28/2017 05:03 PM      Assessment/Plan    Sinus tachycardia   Assessment & Plan    With mild hypotension  Give 30ml/kg bolus now  Monitor, may need rate control medication if not responsive to fluids  Transfer to ICU for higher LOC        Cirrhosis (CMS-HCC)- (present on admission)   Assessment & Plan    Chronic, stable with no e/o decompensation  Continue spironolactone        Alcoholism (CMS-HCC)- (present on admission)   Assessment & Plan    No E/O active WD at this time- monitor, may need CIWA   Folate, MVI, thiamine supplementation  Encourage treatment for alcoholism        Sepsis (CMS-HCC)- (present on admission)   Assessment & Plan    This is sepsis (without associated acute organ dysfunction).   Sepsis orders set completed.  BC pend, wound culture pend  Bolus 30ml/kg now, PRN bolus ordered        Hyponatremia   Assessment & Plan    Worsening from baseline around 132; secondary to cirrhosis with sepsis and volume overload.  Monitor bmp daily.  Continue aldactone, fluid restrict and 1.5 gram sodium restricted diet, may need some lasix.        Cellulitis-  (present on admission)   Assessment & Plan    Cont Unasyn, Vanc (day 2)  XR- neg for OM  US LLE neg for DVT  Wound care consult for wound to L ankle        CLL (chronic lymphocytic leukemia) (CMS-HCC)- (present on admission)   Assessment & Plan    Followed by Dr. Flores outpatient, chronically elevated WBC count.        Continuous opioid dependence (CMS-HCC)- (present on admission)   Assessment & Plan    Cont home oxycodone dose             Reviewed items::  Labs reviewed and Medications reviewed  Maradiaga catheter::  No Maradiaga  DVT prophylaxis pharmacological::  Enoxaparin (Lovenox)  Ulcer Prophylaxis::  Not indicated

## 2017-09-29 NOTE — CARE PLAN
Problem: Safety  Goal: Will remain free from injury  Outcome: PROGRESSING AS EXPECTED  Safety precautions in place. Non skid socks on. Bed alarm in place and functioning. Call light within reach. Bi hourly rounding in place.    Problem: Pain Management  Goal: Pain level will decrease to patient's comfort goal  Patient complained of pain. PRN pain medication given. Patient on bed resting comfortably. Will continue to monitor pain q 2 hrs.

## 2017-09-29 NOTE — PROGRESS NOTES
"Pharmacy Kinetics 72 y.o. male on vancomycin day # 1 2017    Indication for Treatment: SSTI    Pertinent history per medical record: Admitted on 2017 for leg swelling. Diagnosed as cellulitis, empiric antibiotic therapy initiated.    Other antibiotics: Ampicillin/sulbactam 3 g IV q6h    Allergies: Review of patient's allergies indicates no known allergies.     List concerns for renal function: Age, elevated BUN/SCr ratio, elevated lactic acid on admission    Pertinent cultures to date:     None    Recent Labs      17   1703   WBC  28.7*   NEUTSPOLYS  46.10   BANDSSTABS  4.30     Recent Labs      17   1703   BUN  33*   CREATININE  1.31   ALBUMIN  3.0*     Blood pressure 104/49, pulse 97, temperature 37.2 °C (99 °F), resp. rate 15, height 1.651 m (5' 5\"), weight 59 kg (130 lb), SpO2 92 %. Temp (24hrs), Av.2 °C (99 °F), Min:37.2 °C (99 °F), Max:37.2 °C (99 °F)      A/P   1. Vancomycin dose change: Give 1500 mg IV loading dose followed by 1000 mg IV q12h scheduled dosing  2. Next vancomycin level: Trough in ~2 days (not ordered)  3. Goal trough: 12-16 mcg/mL  4. Comments: Therapy with vancomycin initiated empirically for SSTI. With come concerns for accumulation, will provide loading dose and start scheduled dosing thereafter as outlined above. Will plan to check trough level when patient closer to steady state in order to ensure appropriate clearance and drug levels. Recommend de-escalation if MRSA can be ruled out.  Pharmacy will continue to follow.     Silvestre Sharma PharmD, BCPS    "

## 2017-09-29 NOTE — CONSULTS
Pulmonary & Critical Care Consult Note    DATE OF CONSULTATION:  9/29/2017     REFERRING PHYSICIAN:  Evelyn Blackwell M.D.     CONSULTANT:  Zain Kelly DO     REASON FOR CONSULTATION: SVT, hypotension     HISTORY OF PRESENT ILLNESS: 72-year-old male previous medical history of CLL, hypertension, alcohol abuse Gilbert's disease, neuropathy, hepatitis, GERD who presented on 9/28/17 for left lower extremity cellulitis transferred from the floor for severe tachycardia with ventricular rates greater than 180. Per the patient he feels chest pain associated with this heart rate which resolved after cardioversion.      PAST MEDICAL HISTORY:  Past Medical History:   Diagnosis Date   • CLL (chronic lymphocytic leukemia) (CMS-HCC) 3/23/2017   • Actinic keratosis 1/3/2017   • Neoplasm of uncertain behavior of skin 12/28/2016   • Vitamin D deficiency 09/2011   • Neuropathy (CMS-HCC) 05/2010    feet   • Helicobacter pylori (H. pylori) 01/2010    Postive, Ab Tx   • Hypertension 02/2004   • Back pain    • ESOPHAGITIS     distal   • Esophagitis    • ETOH abuse     x 5   • ETOH abuse     daily drinker   • Fall    • KYA (generalized anxiety disorder)    • GERD (gastroesophageal reflux disease)    • Gilbert's disease    • Health care maintenance    • Helicobacter pylori (H. pylori)    • Hepatitis    • Hypertension    • Neck pain    • Neuropathy (CMS-HCC)    • Osteopenia     per Dexa Scan   • Osteopenia    • Ringworm 5 years old   • Vitamin D deficiency         PAST SURGICAL HISTORY:   Past Surgical History:   Procedure Laterality Date   • WRIST ORIF Left 5/7/2015    Procedure: WRIST ORIF [79.83] DISTAL RADIUS;  Surgeon: Gurvinder Baez M.D.;  Location: [facility];  Service:    • CERVICAL FUSION POSTERIOR  5/1/2015    Performed by Andrea Thorpe III, M.D. at [facility]   • CERVICAL LAMINECTOMY POSTERIOR  5/1/2015    Performed by Andrea Thorpe III, M.D. at [facility]   • ENDOSCOPY PROCEDURE   2014    Performed by Aurelio Delacruz M.D. at [facility]   • EGD ESOPHAGUS WITH ENDOSCOPIC US      positive, stricture and distal esophagitis   • APPENDECTOMY      small bowel obstruction, carcinoid tumor   • COLONOSCOPY  ,     negative   • COLONOSCOPY     • EGD ESOPHAGUS WITH ENDOSCOPIC US     • EGD W/ESOPHAGEAL DIL. BALLOON  ,    • EGD W/ESOPHAGEAL DIL. BALLOON     • OTHER ABDOMINAL SURGERY      appendectomy   • TONSILLECTOMY          ALLERGIES:   Review of patient's allergies indicates no known allergies.     MEDICATIONS PRIOR TO ADMISSION:  No current facility-administered medications on file prior to encounter.      Current Outpatient Prescriptions on File Prior to Encounter   Medication Sig Dispense Refill   • spironolactone (ALDACTONE) 100 MG Tab Take 1 Tab by mouth every day. 90 Tab 1       SOCIAL HISTORY:   Social History     Social History   • Marital status:      Spouse name: N/A   • Number of children: N/A   • Years of education: N/A     Occupational History   • Not on file.     Social History Main Topics   • Smoking status: Former Smoker     Packs/day: 0.25     Years: 4.00     Types: Cigarettes     Quit date: 1965   • Smokeless tobacco: Never Used   • Alcohol use 30.0 oz/week     50 Standard drinks or equivalent per week      Comment: 1/2 drinks daily (no more than a pint a day)   • Drug use:      Types: Marijuana, Inhaled      Comment: Marijuana once a week, helps with sleep and pain   • Sexual activity: Not Currently     Other Topics Concern   • Not on file     Social History Narrative    ** Merged History Encounter **    No known exposure to asbestos, dyes, chemicals, or pesticides. 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 Wife  recently of cancer in . 2 children, but has no relationship with them.             FAMILY HISTORY:  Family History   Problem Relation Age of Onset   • Other Mother      GERD   • Heart Disease Mother      CAD   • Diabetes Mother   "    DMII   • Cancer Father      liver   • Cancer Maternal Grandmother      Unknown type of cancer        REVIEW OF SYSTEMS:   Constitutional: + fever, + chills,  Respiratory: No cough, no hemoptysis, no dyspnea  Cardiovascular: + chest pain, no palpitations, no orthopnea, no PND, no lower extremity swelling  GI: No nausea, no vomiting, no abdominal pain, no diarrhea, no constipation, no hematochezia, no melena  : no dysuria, no frequency, no urgency  Endocrine: No polyuria, no polydipsia, no hair loss  Hematology: No easy bruising, no bleeding  Musculoskeletal: Back pain, left lower extremity cellulitis  Neuro: No seizures, no numbness, no tingling sensation, no extremity weakness, no change in vision.  Psychiatry: no depression, no suicidal ideation     PHYSICAL EXAMINATION:  BP (!) 89/57   Pulse (!) 188   Temp 36.9 °C (98.4 °F)   Resp 16   Ht 1.651 m (5' 5\")   Wt 65.9 kg (145 lb 4.5 oz)   SpO2 98%   BMI 24.18 kg/m²   GENERAL: Well-nourished, well-developed age-appropriate appearing male in moderate distress.  HEENT: Normocephalic, atraumatic, An, external ears normal, extraocular muscles, dry mucous membranes.  NECK: Supple, trachea midline, + JVD  PULM: Clear to station bilaterally  CVS: Severe tachycardia, regular rhythm  ABDOMEN: Soft, nontender, nondistended  EXTREMITIES: Left lower extremity with erythema extending from ankle to mid/proximal thigh, no signs of crepitus on exam. Sensation and pulses intact distally  SKIN: Rash as above  NEURO: Alert and oriented, grossly intact, normal cranial nerve exam    LABORATORY DATA:    Lab Results   Component Value Date/Time    WBC 18.8 (H) 09/29/2017 08:05 AM    RBC 2.84 (L) 09/29/2017 08:05 AM    HEMOGLOBIN 10.2 (L) 09/29/2017 08:05 AM    HEMATOCRIT 29.9 (L) 09/29/2017 08:05 AM    .3 (H) 09/29/2017 08:05 AM    MCH 35.9 (H) 09/29/2017 08:05 AM    MCHC 34.1 09/29/2017 08:05 AM    MPV 9.4 09/29/2017 08:05 AM    NEUTSPOLYS 46.10 09/28/2017 05:03 PM    " LYMPHOCYTES 48.70 (H) 09/28/2017 05:03 PM    MONOCYTES 0.90 09/28/2017 05:03 PM    EOSINOPHILS 0.00 09/28/2017 05:03 PM    BASOPHILS 0.00 09/28/2017 05:03 PM    HYPOCHROMIA 1+ 12/06/2013 11:40 AM    ANISOCYTOSIS 1+ 09/28/2017 05:03 PM     Lab Results   Component Value Date/Time    SODIUM 129 (L) 09/29/2017 08:04 AM    POTASSIUM 4.3 09/29/2017 08:04 AM    CHLORIDE 95 (L) 09/29/2017 08:04 AM    CO2 23 09/29/2017 08:04 AM    GLUCOSE 72 09/29/2017 08:04 AM    BUN 26 (H) 09/29/2017 08:04 AM    CREATININE 1.29 09/29/2017 08:04 AM      Lab Results   Component Value Date/Time    PROTHROMBTM 13.1 02/06/2017 11:27 AM    INR 0.96 02/06/2017 11:27 AM         IMAGING:   CXR (personally reviewed)   ECHOCARDIOGRAM COMP W/O CONT         DX-FOOT-2- LEFT   Final Result      Soft tissue swelling about the ankle. No evidence of acute osseous abnormality or soft tissue gas.      LE VENOUS DUPLEX (Specify in Comments Left, Right Or Bilateral)   Final Result      DX-CHEST-PORTABLE (1 VIEW)   Final Result      1.  Interval insertion of a central venous catheter which terminates with the tip projecting over the expected region of the mid to distal superior vena cava.   2.  Right basilar atelectasis.      DX-TIBIA AND FIBULA LEFT   Final Result      1.  There is mild diffuse swelling in the soft tissues of the distal left lower leg and ankle. There is no gas in the soft tissues or foreign body.   2.  There is no plain film evidence of osteomyelitis.      DX-CHEST-PORTABLE (1 VIEW)   Final Result      1.  There is no acute cardiopulmonary process.      LE VENOUS DUPLEX - DVT ([facility] Only)    (Results Pending)        ASSESSMENT/PLAN:  Supraventricular tachycardia   - Converted with adenosine ×2 and Cardizem   - Continue to monitor, may require additional anti-dysrhythmic if this becomes recurrent   - EKG, troponin, electrolytes, echocardiogram    Sepsis with septic shock: Cellulitis source   - Wound culture demonstrating  group A strep   - Hypotensive, central venous catheter placed   - Goal MAP greater than 65 mmHg   - Norepinephrine as needed to maintain goal map   - Blood cultures pending    Cellulitis of lower extremity   - Source of sepsis, continue Unasyn and vancomycin   - We'll order x-ray to evaluate for subcutaneous air   - Ultrasound to rule out underlying deep venous thrombosis    Acute on chronic Hyponatremia   - Likely hypervolemic related to cirrhosis   - Fluid restrict as possible with hypotension, continue Aldactone, continue the sodium restriction    Cirrhosis  CLL    Prophylaxis with heparin    Patient is critically ill at this time.  I have spent 38 minutes examining this    patient, all lab data, x-ray, and discussion with RN, RT, hospitalist, pharmacy. Critical care time: 38 min. No time overlap. Procedures not included in time. Thank you for asking me to consult on the patient.  I appreciate the opportunity to assist in their care and will follow along closely with you.    Zain Kelly, DO  Critical Care Medicine

## 2017-09-29 NOTE — PROGRESS NOTES
Pt arrived via tele bed on tele monitor with 2 CIC RN. Pt arrived with shirt, shorts, shoes, wallet, and cell phone in pink bag. Pt noted to have HR of 188 and BP of 90/50. Dr. Kelly bedside.

## 2017-09-29 NOTE — CARE PLAN
Problem: Nutritional:  Goal: Achieve adequate nutritional intake  Patient will consume >50% of meals and snacks.   Outcome: NOT MET

## 2017-09-29 NOTE — ED NOTES
Med rec completed per pt at bedside and a call to [facility] @ [phone]  Allergies reviewed - NKDA  No ABX in last 30 days

## 2017-09-29 NOTE — ED NOTES
Received report from APURVA Mars.  Pt updated on POC, call light in place, mirza in low/locked position.

## 2017-09-29 NOTE — PROGRESS NOTES
"Pharmacy Kinetics 72 y.o. male on vancomycin day # 2 2017    Currently on Vancomycin 1000 mg iv q12hr    Indication for Treatment: SSTI    Pertinent history per medical record: Admitted on 2017 for leg swelling x 4 days with associated pain and redness.  Patient has a wound above his L ankle with swelling of the L leg from ankle to thigh with red streaking up the thigh. No drainage noted from the wound. Empiric antibiotics initiated.     Other antibiotics: Unasyn 3 g IV q6h    Allergies: Review of patient's allergies indicates no known allergies.     List concerns for renal function: CKD, age    Pertinent cultures to date:    PBC x 2: NGTD   R foot wound culture: in process    Recent Labs      17   1703  17   0805   WBC  28.7*  18.8*   NEUTSPOLYS  46.10   --    BANDSSTABS  4.30   --      Recent Labs      17   1703  17   0804   BUN  33*  26*   CREATININE  1.31  1.29   ALBUMIN  3.0*  2.6*      Blood pressure (!) 89/57, pulse (!) 188, temperature 36.9 °C (98.4 °F), resp. rate 16, height 1.651 m (5' 5\"), weight 65.9 kg (145 lb 4.5 oz), SpO2 98 %. Temp (24hrs), Av.2 °C (99 °F), Min:36.9 °C (98.4 °F), Max:37.6 °C (99.6 °F)      A/P   1. Vancomycin dose change: not indicated at this time  2. Next vancomycin level:  @ 0830  3. Goal trough: 12-16 mcg/mL  4. Comments: patient transferred to [facility] this AM with elevated heart rate.  Received adenosine and started on a diltiazem drip. WBCs are trending down and renal indices appear to be at baseline. Afebrile. Will order a level for tomorrow prior to the 4th total dose. Will continue to follow.    Cary Mario, PharmD    "

## 2017-09-29 NOTE — PROCEDURES
DATE OF SERVICE:  09/29/2017    PROCEDURE:  Right IJ triple lumen.    INDICATION:  Obtain central venous access for management of simple sepsis.    CONSENT:  Verbal consent obtained from the patient after reviewing risks and   benefits including, but not limited to stroke, arterial damage, pneumothorax,   introduction of infection.    PROCEDURE IN DETAIL:  The patient in supine position with his head rotated to   the left side.  He was sterilely prepped and draped using chlorhexidine scrubs   in the usual fashion.  With an ultrasound probe, the common carotid and right   IJ were then identified, and an 18-gauge guide needle was advanced into the   lumen of the right IJ.  A guidewire was then advanced through the needle, the   needle itself was retracted.  The ultrasound was again used to identify the   guidewire within the lumen of the IJ.  At this point, a triple lumen central   line was placed in the usual fashion over the guidewire.  All 3 ports were   then aspirated of nonpulsatile venous blood, and then flushed with sterile   saline.  The line was sutured in place at the base of the neck using 3-0   Vicryl.  Local anesthesia for the procedure was obtained using lidocaine 1%   plain, approximately 1.5 mL.  Estimated blood loss was less than 1 mL.    Patient tolerated the procedure well.    COMPLICATIONS:  None.    The time of this dictation, a stat postprocedure chest x-ray is pending.       ____________________________________     DO CHARY Blair / VIELKA    DD:  09/29/2017 12:52:30  DT:  09/29/2017 13:41:27    D#:  [id number]  Job#:  [id number]

## 2017-09-29 NOTE — PROGRESS NOTES
Patient a new admit for leg swelling. Patient is alert and oriented x 4. Able to make needs known. Assessment completed. On O2 at 2 LPM via nasal cannula, tolerating well.  in place and functioning, O2 sat above 90% at 98%. Not in respiratory distress. Complained of pain, PRN pain medication given. Fall precautions in place. Bed alarm in place and functioning. Bed locked, in lowest position. Patient educated regarding plan of care. Call light within reach.    Admit profile done. Skin check done. Will endorse.

## 2017-09-29 NOTE — DISCHARGE PLANNING
Leg swelling.  Left leg. Cellulitis. Cirrhosis,  ETOH.     71 y/o  male. [place] resident.  [facility].

## 2017-09-29 NOTE — PROGRESS NOTES
"[facility] Hospitalist Progress Note    Date of Service: 2017    Chief Complaint  72 y.o. male admitted 2017 with L leg cellulitis and simple sepsis.    transfered to CICU on  with hypotension/shock and episodes of SVT to 160's    Interval Problem Update  [facility]   Pt states \"I'm sore all over\".  No CP, no SOB.      Consultants/Specialty  Pulmonology    Disposition  Critically ill in ICU on pressors        Review of Systems   Constitutional: Negative for chills and fever.   Respiratory: Negative for cough and shortness of breath.    Cardiovascular: Negative for chest pain.   Gastrointestinal: Negative for abdominal pain, diarrhea, nausea and vomiting.   Musculoskeletal: Positive for back pain and neck pain.   Skin: Negative for rash.   Neurological: Negative for dizziness, loss of consciousness and headaches.      Physical Exam  Laboratory/Imaging   Hemodynamics  Temp (24hrs), Av.2 °C (99 °F), Min:36.9 °C (98.4 °F), Max:37.6 °C (99.6 °F)   Temperature: 36.9 °C (98.4 °F)  Pulse  Av.1  Min: 88  Max: 188 Heart Rate (Monitored): 88  Blood Pressure : (!) 89/57, NIBP: (!) 84/47      Respiratory      Respiration: 14, Pulse Oximetry: 99 %             Fluids    Intake/Output Summary (Last 24 hours) at 17 1255  Last data filed at 17 0600   Gross per 24 hour   Intake              240 ml   Output              850 ml   Net             -610 ml       Nutrition  Orders Placed This Encounter   Procedures   • Diet Order     Standing Status:   Standing     Number of Occurrences:   1     Order Specific Question:   Diet:     Answer:   Regular [1]     Order Specific Question:   Electrolyte modifications:     Answer:   1.5 g Sodium [1]     Physical Exam   Constitutional: He is oriented to person, place, and time. He appears well-developed and well-nourished. No distress.   HENT:   Head: Normocephalic and atraumatic.   Eyes: Conjunctivae are normal.   Neck: JVD present.   Cardiovascular: Normal rate.  Exam " reveals no gallop.    No murmur heard.  Pulmonary/Chest: Effort normal. No stridor. No respiratory distress. He has no wheezes. He has no rales.   Abdominal: Soft. There is no tenderness. There is no rebound and no guarding.   Musculoskeletal: He exhibits edema.   Neurological: He is oriented to person, place, and time.   Skin: Skin is warm and dry. Rash noted. He is not diaphoretic.   errythema R ankle to tibial tubercle.  No crepitus.  No bullae   Psychiatric: He has a normal mood and affect. Thought content normal.   Nursing note and vitals reviewed.      Recent Labs      09/28/17 1703 09/29/17   0805  09/29/17   1020   WBC  28.7*  18.8*  19.1*   RBC  3.03*  2.84*  2.67*   HEMOGLOBIN  11.2*  10.2*  9.6*   HEMATOCRIT  31.7*  29.9*  27.9*   MCV  104.6*  105.3*  104.5*   MCH  37.0*  35.9*  36.0*   MCHC  35.3  34.1  34.4   RDW  49.4  51.6*  50.4*   PLATELETCT  91*  76*  70*   MPV  9.2  9.4  9.1     Recent Labs      09/28/17 1703 09/29/17   0804  09/29/17   1020   SODIUM  124*  129*  130*   POTASSIUM  4.9  4.3  4.0   CHLORIDE  90*  95*  100   CO2  20  23  22   GLUCOSE  82  72  67   BUN  33*  26*  25*   CREATININE  1.31  1.29  1.14   CALCIUM  9.0  8.6  7.9*     Recent Labs      09/28/17 1703 09/29/17   1020   APTT  38.9*  44.5*   INR   --   1.20*     Recent Labs      09/28/17 1703   BNPBTYPENAT  409*              Assessment/Plan     Sinus tachycardia   Assessment & Plan    SVT to 160's pt ASx'c  Cont Tele  May need Amio if recurrent        Cirrhosis (CMS-HCC)- (present on admission)   Assessment & Plan    Chronic, stable with no e/o decompensation  Continue spironolactone        Alcoholism (CMS-HCC)- (present on admission)   Assessment & Plan    No E/O active WD at this time- monitor, may need CIWA   Folate, MVI, thiamine supplementation  Encourage treatment for alcoholism        Sepsis (CMS-HCC)- (present on admission)   Assessment & Plan    This is sepsis (without associated acute organ dysfunction).    Sepsis orders set completed.  BC pend, wound culture pend  Bolus 30ml/kg now, PRN bolus completed however pt still with MAP of 55  On US of RIJ significant JVD  Central line placed  Follow CVP and MAP  Start and titrate Levo        Hyponatremia   Assessment & Plan    Worsening from baseline around 132; secondary to cirrhosis with sepsis and volume overload.  Monitor bmp daily.  Continue aldactone, fluid restrict and 1.5 gram sodium restricted diet, may need some lasix.        Cellulitis- (present on admission)   Assessment & Plan    Cont Unasyn, Vanc (day 2)  XR- neg for OM  US LLE neg for DVT  Wound care consult for wound to L ankle        CLL (chronic lymphocytic leukemia) (CMS-HCC)- (present on admission)   Assessment & Plan    Followed by Dr. Flores outpatient, chronically elevated WBC count.        Continuous opioid dependence (CMS-HCC)- (present on admission)   Assessment & Plan    Cont home oxycodone dose            Reviewed items::  EKG reviewed, Radiology images reviewed, Labs reviewed and Medications reviewed  DVT prophylaxis pharmacological::  Heparin  DVT prophylaxis - mechanical:  SCDs  Ulcer Prophylaxis::  Not indicated  Antibiotics:  Treating active infection/contamination beyond 24 hours perioperative coverage   critical care time 40mins managing sepsis, pressors, unstable cardiac dysrythmia

## 2017-09-30 LAB
ALBUMIN SERPL BCP-MCNC: 2.3 G/DL (ref 3.2–4.9)
ALBUMIN/GLOB SERPL: 0.7 G/DL
ALP SERPL-CCNC: 78 U/L (ref 30–99)
ALT SERPL-CCNC: 7 U/L (ref 2–50)
ANION GAP SERPL CALC-SCNC: 8 MMOL/L (ref 0–11.9)
ANISOCYTOSIS BLD QL SMEAR: ABNORMAL
AST SERPL-CCNC: 17 U/L (ref 12–45)
BACTERIA UR CULT: NORMAL
BASOPHILS # BLD AUTO: 0 % (ref 0–1.8)
BASOPHILS # BLD: 0 K/UL (ref 0–0.12)
BILIRUB SERPL-MCNC: 0.8 MG/DL (ref 0.1–1.5)
BUN SERPL-MCNC: 23 MG/DL (ref 8–22)
BURR CELLS BLD QL SMEAR: NORMAL
CALCIUM SERPL-MCNC: 8.4 MG/DL (ref 8.5–10.5)
CHLORIDE SERPL-SCNC: 100 MMOL/L (ref 96–112)
CO2 SERPL-SCNC: 22 MMOL/L (ref 20–33)
CREAT SERPL-MCNC: 1.08 MG/DL (ref 0.5–1.4)
EKG IMPRESSION: NORMAL
EOSINOPHIL # BLD AUTO: 0 K/UL (ref 0–0.51)
EOSINOPHIL NFR BLD: 0 % (ref 0–6.9)
ERYTHROCYTE [DISTWIDTH] IN BLOOD BY AUTOMATED COUNT: 52.4 FL (ref 35.9–50)
GFR SERPL CREATININE-BSD FRML MDRD: >60 ML/MIN/1.73 M 2
GLOBULIN SER CALC-MCNC: 3.1 G/DL (ref 1.9–3.5)
GLUCOSE SERPL-MCNC: 102 MG/DL (ref 65–99)
HCT VFR BLD AUTO: 28.8 % (ref 42–52)
HGB BLD-MCNC: 9.9 G/DL (ref 14–18)
LYMPHOCYTES # BLD AUTO: 16.81 K/UL (ref 1–4.8)
LYMPHOCYTES NFR BLD: 60.9 % (ref 22–41)
MACROCYTES BLD QL SMEAR: ABNORMAL
MANUAL DIFF BLD: NORMAL
MCH RBC QN AUTO: 36.8 PG (ref 27–33)
MCHC RBC AUTO-ENTMCNC: 34.4 G/DL (ref 33.7–35.3)
MCV RBC AUTO: 107.1 FL (ref 81.4–97.8)
MONOCYTES # BLD AUTO: 0.25 K/UL (ref 0–0.85)
MONOCYTES NFR BLD AUTO: 0.9 % (ref 0–13.4)
MORPHOLOGY BLD-IMP: NORMAL
NEUTROPHILS # BLD AUTO: 10.54 K/UL (ref 1.82–7.42)
NEUTROPHILS NFR BLD: 29.5 % (ref 44–72)
NEUTS BAND NFR BLD MANUAL: 8.7 % (ref 0–10)
NRBC # BLD AUTO: 0 K/UL
NRBC BLD AUTO-RTO: 0 /100 WBC
OVALOCYTES BLD QL SMEAR: NORMAL
PLATELET # BLD AUTO: 86 K/UL (ref 164–446)
PLATELET BLD QL SMEAR: NORMAL
PMV BLD AUTO: 9.1 FL (ref 9–12.9)
POIKILOCYTOSIS BLD QL SMEAR: NORMAL
POLYCHROMASIA BLD QL SMEAR: NORMAL
POTASSIUM SERPL-SCNC: 3.9 MMOL/L (ref 3.6–5.5)
PROT SERPL-MCNC: 5.4 G/DL (ref 6–8.2)
RBC # BLD AUTO: 2.69 M/UL (ref 4.7–6.1)
RBC BLD AUTO: PRESENT
SIGNIFICANT IND 70042: NORMAL
SITE SITE: NORMAL
SMUDGE CELLS BLD QL SMEAR: NORMAL
SODIUM SERPL-SCNC: 130 MMOL/L (ref 135–145)
SOURCE SOURCE: NORMAL
TOXIC GRANULES BLD QL SMEAR: SLIGHT
VANCOMYCIN TROUGH SERPL-MCNC: 28.6 UG/ML (ref 10–20)
WBC # BLD AUTO: 27.6 K/UL (ref 4.8–10.8)

## 2017-09-30 PROCEDURE — A9270 NON-COVERED ITEM OR SERVICE: HCPCS | Performed by: INTERNAL MEDICINE

## 2017-09-30 PROCEDURE — 51798 US URINE CAPACITY MEASURE: CPT

## 2017-09-30 PROCEDURE — 700102 HCHG RX REV CODE 250 W/ 637 OVERRIDE(OP): Performed by: NURSE PRACTITIONER

## 2017-09-30 PROCEDURE — HZ2ZZZZ DETOXIFICATION SERVICES FOR SUBSTANCE ABUSE TREATMENT: ICD-10-PCS | Performed by: HOSPITALIST

## 2017-09-30 PROCEDURE — 700111 HCHG RX REV CODE 636 W/ 250 OVERRIDE (IP): Performed by: PHARMACIST

## 2017-09-30 PROCEDURE — A9270 NON-COVERED ITEM OR SERVICE: HCPCS | Performed by: NURSE PRACTITIONER

## 2017-09-30 PROCEDURE — 700102 HCHG RX REV CODE 250 W/ 637 OVERRIDE(OP): Performed by: HOSPITALIST

## 2017-09-30 PROCEDURE — 80202 ASSAY OF VANCOMYCIN: CPT

## 2017-09-30 PROCEDURE — 700105 HCHG RX REV CODE 258: Performed by: PHARMACIST

## 2017-09-30 PROCEDURE — 700111 HCHG RX REV CODE 636 W/ 250 OVERRIDE (IP): Performed by: HOSPITALIST

## 2017-09-30 PROCEDURE — 700105 HCHG RX REV CODE 258: Performed by: INTERNAL MEDICINE

## 2017-09-30 PROCEDURE — 85027 COMPLETE CBC AUTOMATED: CPT

## 2017-09-30 PROCEDURE — 700105 HCHG RX REV CODE 258: Performed by: HOSPITALIST

## 2017-09-30 PROCEDURE — 99291 CRITICAL CARE FIRST HOUR: CPT | Performed by: HOSPITALIST

## 2017-09-30 PROCEDURE — 93010 ELECTROCARDIOGRAM REPORT: CPT | Performed by: INTERNAL MEDICINE

## 2017-09-30 PROCEDURE — 93005 ELECTROCARDIOGRAM TRACING: CPT | Performed by: INTERNAL MEDICINE

## 2017-09-30 PROCEDURE — 80053 COMPREHEN METABOLIC PANEL: CPT

## 2017-09-30 PROCEDURE — 700101 HCHG RX REV CODE 250: Performed by: HOSPITALIST

## 2017-09-30 PROCEDURE — A9270 NON-COVERED ITEM OR SERVICE: HCPCS | Performed by: HOSPITALIST

## 2017-09-30 PROCEDURE — 770022 HCHG ROOM/CARE - ICU (200)

## 2017-09-30 PROCEDURE — 700102 HCHG RX REV CODE 250 W/ 637 OVERRIDE(OP): Performed by: INTERNAL MEDICINE

## 2017-09-30 PROCEDURE — 85007 BL SMEAR W/DIFF WBC COUNT: CPT

## 2017-09-30 RX ORDER — LORAZEPAM 2 MG/ML
1 INJECTION INTRAMUSCULAR
Status: DISCONTINUED | OUTPATIENT
Start: 2017-09-30 | End: 2017-10-01

## 2017-09-30 RX ORDER — LORAZEPAM 1 MG/1
0.5 TABLET ORAL EVERY 4 HOURS PRN
Status: DISCONTINUED | OUTPATIENT
Start: 2017-09-30 | End: 2017-10-01

## 2017-09-30 RX ORDER — LORAZEPAM 1 MG/1
1 TABLET ORAL EVERY 4 HOURS PRN
Status: DISCONTINUED | OUTPATIENT
Start: 2017-09-30 | End: 2017-10-01

## 2017-09-30 RX ORDER — LORAZEPAM 2 MG/ML
0.5 INJECTION INTRAMUSCULAR EVERY 4 HOURS PRN
Status: DISCONTINUED | OUTPATIENT
Start: 2017-09-30 | End: 2017-10-01

## 2017-09-30 RX ORDER — LORAZEPAM 1 MG/1
3 TABLET ORAL
Status: DISCONTINUED | OUTPATIENT
Start: 2017-09-30 | End: 2017-10-01

## 2017-09-30 RX ORDER — LORAZEPAM 1 MG/1
2 TABLET ORAL
Status: DISCONTINUED | OUTPATIENT
Start: 2017-09-30 | End: 2017-10-01

## 2017-09-30 RX ORDER — LORAZEPAM 2 MG/ML
1.5 INJECTION INTRAMUSCULAR
Status: DISCONTINUED | OUTPATIENT
Start: 2017-09-30 | End: 2017-10-01

## 2017-09-30 RX ORDER — LORAZEPAM 1 MG/1
4 TABLET ORAL
Status: DISCONTINUED | OUTPATIENT
Start: 2017-09-30 | End: 2017-10-01

## 2017-09-30 RX ORDER — LORAZEPAM 2 MG/ML
2 INJECTION INTRAMUSCULAR
Status: DISCONTINUED | OUTPATIENT
Start: 2017-09-30 | End: 2017-10-01

## 2017-09-30 RX ADMIN — LORAZEPAM 0.5 MG: 1 TABLET ORAL at 10:39

## 2017-09-30 RX ADMIN — OXYCODONE HYDROCHLORIDE 10 MG: 10 TABLET ORAL at 08:09

## 2017-09-30 RX ADMIN — HEPARIN SODIUM 5000 UNITS: 5000 INJECTION, SOLUTION INTRAVENOUS; SUBCUTANEOUS at 20:34

## 2017-09-30 RX ADMIN — SODIUM CHLORIDE: 9 INJECTION, SOLUTION INTRAVENOUS at 04:25

## 2017-09-30 RX ADMIN — STANDARDIZED SENNA CONCENTRATE AND DOCUSATE SODIUM 2 TABLET: 8.6; 5 TABLET, FILM COATED ORAL at 20:34

## 2017-09-30 RX ADMIN — HYDROMORPHONE HYDROCHLORIDE 0.5 MG: 1 INJECTION, SOLUTION INTRAMUSCULAR; INTRAVENOUS; SUBCUTANEOUS at 01:00

## 2017-09-30 RX ADMIN — DULOXETINE HYDROCHLORIDE 60 MG: 60 CAPSULE, DELAYED RELEASE ORAL at 07:57

## 2017-09-30 RX ADMIN — AMPICILLIN SODIUM AND SULBACTAM SODIUM 3 G: 2; 1 INJECTION, POWDER, FOR SOLUTION INTRAMUSCULAR; INTRAVENOUS at 05:20

## 2017-09-30 RX ADMIN — FOLIC ACID 1 MG: 1 TABLET ORAL at 07:57

## 2017-09-30 RX ADMIN — LEVOTHYROXINE SODIUM 25 MCG: 50 TABLET ORAL at 07:57

## 2017-09-30 RX ADMIN — HEPARIN SODIUM 5000 UNITS: 5000 INJECTION, SOLUTION INTRAVENOUS; SUBCUTANEOUS at 15:01

## 2017-09-30 RX ADMIN — OXYCODONE HYDROCHLORIDE 10 MG: 10 TABLET ORAL at 04:27

## 2017-09-30 RX ADMIN — SODIUM CHLORIDE 500 ML: 9 INJECTION, SOLUTION INTRAVENOUS at 08:02

## 2017-09-30 RX ADMIN — MAGNESIUM HYDROXIDE 30 ML: 400 SUSPENSION ORAL at 10:39

## 2017-09-30 RX ADMIN — AMPICILLIN SODIUM AND SULBACTAM SODIUM 3 G: 2; 1 INJECTION, POWDER, FOR SOLUTION INTRAMUSCULAR; INTRAVENOUS at 20:22

## 2017-09-30 RX ADMIN — NOREPINEPHRINE BITARTRATE 5 MCG/MIN: 1 INJECTION INTRAVENOUS at 18:26

## 2017-09-30 RX ADMIN — LORAZEPAM 0.5 MG: 2 INJECTION INTRAMUSCULAR; INTRAVENOUS at 23:12

## 2017-09-30 RX ADMIN — MULTIPLE VITAMINS W/ MINERALS TAB 1 TABLET: TAB at 07:57

## 2017-09-30 RX ADMIN — AMPICILLIN SODIUM AND SULBACTAM SODIUM 3 G: 2; 1 INJECTION, POWDER, FOR SOLUTION INTRAMUSCULAR; INTRAVENOUS at 23:25

## 2017-09-30 RX ADMIN — VANCOMYCIN HYDROCHLORIDE 1000 MG: 100 INJECTION, POWDER, LYOPHILIZED, FOR SOLUTION INTRAVENOUS at 08:11

## 2017-09-30 RX ADMIN — OXYCODONE HYDROCHLORIDE 10 MG: 10 TABLET ORAL at 18:05

## 2017-09-30 RX ADMIN — AMPICILLIN SODIUM AND SULBACTAM SODIUM 3 G: 2; 1 INJECTION, POWDER, FOR SOLUTION INTRAMUSCULAR; INTRAVENOUS at 11:59

## 2017-09-30 RX ADMIN — ONDANSETRON 4 MG: 2 INJECTION INTRAMUSCULAR; INTRAVENOUS at 23:02

## 2017-09-30 RX ADMIN — THIAMINE HCL TAB 100 MG 100 MG: 100 TAB at 07:57

## 2017-09-30 RX ADMIN — SODIUM CHLORIDE: 9 INJECTION, SOLUTION INTRAVENOUS at 17:45

## 2017-09-30 ASSESSMENT — LIFESTYLE VARIABLES
HEADACHE, FULLNESS IN HEAD: MILD
AGITATION: NORMAL ACTIVITY
TOTAL SCORE: 2
HEADACHE, FULLNESS IN HEAD: MODERATE
NAUSEA AND VOMITING: NO NAUSEA AND NO VOMITING
TREMOR: *
ORIENTATION AND CLOUDING OF SENSORIUM: ORIENTED AND CAN DO SERIAL ADDITIONS
AUDITORY DISTURBANCES: NOT PRESENT
ORIENTATION AND CLOUDING OF SENSORIUM: ORIENTED AND CAN DO SERIAL ADDITIONS
ORIENTATION AND CLOUDING OF SENSORIUM: ORIENTED AND CAN DO SERIAL ADDITIONS
AGITATION: NORMAL ACTIVITY
TREMOR: *
HAVE PEOPLE ANNOYED YOU BY CRITICIZING YOUR DRINKING: YES
ANXIETY: NO ANXIETY (AT EASE)
TREMOR: *
AGITATION: NORMAL ACTIVITY
NAUSEA AND VOMITING: NO NAUSEA AND NO VOMITING
ANXIETY: MILDLY ANXIOUS
ORIENTATION AND CLOUDING OF SENSORIUM: ORIENTED AND CAN DO SERIAL ADDITIONS
HAVE YOU EVER FELT YOU SHOULD CUT DOWN ON YOUR DRINKING: NO
CONSUMPTION TOTAL: POSITIVE
TOTAL SCORE: 3
AUDITORY DISTURBANCES: NOT PRESENT
VISUAL DISTURBANCES: NOT PRESENT
PAROXYSMAL SWEATS: NO SWEAT VISIBLE
TOTAL SCORE: 2
AUDITORY DISTURBANCES: NOT PRESENT
NAUSEA AND VOMITING: NO NAUSEA AND NO VOMITING
ANXIETY: NO ANXIETY (AT EASE)
HEADACHE, FULLNESS IN HEAD: VERY MILD
AGITATION: NORMAL ACTIVITY
EVER FELT BAD OR GUILTY ABOUT YOUR DRINKING: NO
PAROXYSMAL SWEATS: NO SWEAT VISIBLE
ANXIETY: NO ANXIETY (AT EASE)
TOTAL SCORE: 2
NAUSEA AND VOMITING: NO NAUSEA AND NO VOMITING
HOW MANY TIMES IN THE PAST YEAR HAVE YOU HAD 5 OR MORE DRINKS IN A DAY: 365
ORIENTATION AND CLOUDING OF SENSORIUM: ORIENTED AND CAN DO SERIAL ADDITIONS
TOTAL SCORE: 8
TOTAL SCORE: 4
AGITATION: NORMAL ACTIVITY
VISUAL DISTURBANCES: NOT PRESENT
DOES PATIENT WANT TO STOP DRINKING: NO
AUDITORY DISTURBANCES: NOT PRESENT
VISUAL DISTURBANCES: NOT PRESENT
TOTAL SCORE: 2
TREMOR: TREMOR NOT VISIBLE BUT CAN BE FELT, FINGERTIP TO FINGERTIP
VISUAL DISTURBANCES: NOT PRESENT
HEADACHE, FULLNESS IN HEAD: VERY MILD
ANXIETY: NO ANXIETY (AT EASE)
VISUAL DISTURBANCES: NOT PRESENT
PAROXYSMAL SWEATS: NO SWEAT VISIBLE
HEADACHE, FULLNESS IN HEAD: MILD
PAROXYSMAL SWEATS: NO SWEAT VISIBLE
ON A TYPICAL DAY WHEN YOU DRINK ALCOHOL HOW MANY DRINKS DO YOU HAVE: 4
EVER HAD A DRINK FIRST THING IN THE MORNING TO STEADY YOUR NERVES TO GET RID OF A HANGOVER: YES
NAUSEA AND VOMITING: *
AVERAGE NUMBER OF DAYS PER WEEK YOU HAVE A DRINK CONTAINING ALCOHOL: 7
TOTAL SCORE: 6
AUDITORY DISTURBANCES: NOT PRESENT
PAROXYSMAL SWEATS: NO SWEAT VISIBLE
DO YOU DRINK ALCOHOL: YES
TREMOR: *

## 2017-09-30 ASSESSMENT — ENCOUNTER SYMPTOMS
FEVER: 0
CHILLS: 0
ABDOMINAL PAIN: 1
SHORTNESS OF BREATH: 0
DIARRHEA: 0
CONSTIPATION: 1
DIZZINESS: 0
COUGH: 0
NAUSEA: 0
VOMITING: 0
LOSS OF CONSCIOUSNESS: 0
NECK PAIN: 0
BACK PAIN: 0
HEADACHES: 0

## 2017-09-30 ASSESSMENT — PAIN SCALES - GENERAL
PAINLEVEL_OUTOF10: 10
PAINLEVEL_OUTOF10: 8
PAINLEVEL_OUTOF10: 7
PAINLEVEL_OUTOF10: 10
PAINLEVEL_OUTOF10: 7
PAINLEVEL_OUTOF10: 7
PAINLEVEL_OUTOF10: 9
PAINLEVEL_OUTOF10: 10
PAINLEVEL_OUTOF10: 9

## 2017-09-30 NOTE — PROGRESS NOTES
[facility] Hospitalist Progress Note    Date of Service: 2017    Chief Complaint  72 y.o. male admitted 2017 with L leg cellulitis and simple sepsis.    transfered to CICU on  with hypotension/shock and episodes of SVT to 160's    Interval Problem Update  RIJ   C/o some abd pain  Levo 5  AFebrile  UOP 400ml/12  CVP 6-10    Consultants/Specialty  Pulmonology    Disposition  Critically ill in ICU on pressors        Review of Systems   Constitutional: Negative for chills and fever.   Respiratory: Negative for cough and shortness of breath.    Cardiovascular: Negative for chest pain.   Gastrointestinal: Positive for abdominal pain and constipation. Negative for diarrhea, nausea and vomiting.   Musculoskeletal: Negative for back pain and neck pain.   Skin: Negative for rash.   Neurological: Negative for dizziness, loss of consciousness and headaches.      Physical Exam  Laboratory/Imaging   Hemodynamics  Temp (24hrs), Av °C (98.6 °F), Min:36.9 °C (98.4 °F), Max:37.1 °C (98.8 °F)   Temperature: 37.1 °C (98.8 °F)  Pulse  Av.4  Min: 63  Max: 188 Heart Rate (Monitored): 88  NIBP: 109/56 CVP (mm Hg): (!) 11 MM HG    Respiratory      Respiration: 17, Pulse Oximetry: 98 %             Fluids    Intake/Output Summary (Last 24 hours) at 17 0926  Last data filed at 17 0400   Gross per 24 hour   Intake           119.21 ml   Output             1350 ml   Net         -1230.79 ml       Nutrition  Orders Placed This Encounter   Procedures   • Diet Order     Standing Status:   Standing     Number of Occurrences:   1     Order Specific Question:   Diet:     Answer:   Regular [1]     Order Specific Question:   Electrolyte modifications:     Answer:   1.5 g Sodium [1]     Physical Exam   Constitutional: He is oriented to person, place, and time. He appears well-developed and well-nourished. No distress.   HENT:   Head: Normocephalic and atraumatic.   Eyes: Conjunctivae are normal.   Neck: JVD present.    Cardiovascular: Normal rate.  Exam reveals no gallop.    No murmur heard.  Pulmonary/Chest: Effort normal. No stridor. No respiratory distress. He has no wheezes. He has no rales.   Abdominal: Soft. There is no tenderness. There is no rebound and no guarding.   Musculoskeletal: He exhibits edema.   Neurological: He is oriented to person, place, and time.   Somnolent rouses to touch, oriented   Skin: Skin is warm and dry. Rash noted. He is not diaphoretic.   errythema R ankle to tibial tubercle.  No crepitus.  No bullae   Psychiatric: He has a normal mood and affect. Thought content normal.   Nursing note and vitals reviewed.      Recent Labs      09/29/17   0805  09/29/17   1020  09/30/17   0440   WBC  18.8*  19.1*  27.6*   RBC  2.84*  2.67*  2.69*   HEMOGLOBIN  10.2*  9.6*  9.9*   HEMATOCRIT  29.9*  27.9*  28.8*   MCV  105.3*  104.5*  107.1*   MCH  35.9*  36.0*  36.8*   MCHC  34.1  34.4  34.4   RDW  51.6*  50.4*  52.4*   PLATELETCT  76*  70*  86*   MPV  9.4  9.1  9.1     Recent Labs      09/29/17   0804  09/29/17   1020  09/30/17   0440   SODIUM  129*  130*  130*   POTASSIUM  4.3  4.0  3.9   CHLORIDE  95*  100  100   CO2  23  22  22   GLUCOSE  72  67  102*   BUN  26*  25*  23*   CREATININE  1.29  1.14  1.08   CALCIUM  8.6  7.9*  8.4*     Recent Labs      09/28/17   1703  09/29/17   1020   APTT  38.9*  44.5*   INR   --   1.20*     Recent Labs      09/28/17   1703   BNPBTYPENAT  409*              Assessment/Plan     Sinus tachycardia   Assessment & Plan    SVT to 160's pt ASx'c  Cont Tele  May need Amio if recurrent        Cirrhosis (CMS-HCC)- (present on admission)   Assessment & Plan    Chronic, stable with no e/o decompensation  Continue spironolactone        Alcoholism (CMS-HCC)- (present on admission)   Assessment & Plan    No E/O active WD at this time- monitor, may need CIWA   Folate, MVI, thiamine supplementation  Encourage treatment for alcoholism  CIWA protocol        Sepsis (CMS-HCC)- (present on  admission)   Assessment & Plan    This is sepsis (without associated acute organ dysfunction).   Sepsis orders set completed.  Skin + strep speciation pending  Central line placed  Follow CVP and MAP  titrate Levo        Hyponatremia   Assessment & Plan    Worsening from baseline around 132; secondary to cirrhosis with sepsis and volume overload.  Monitor bmp daily.  Continue aldactone, fluid restrict and 1.5 gram sodium restricted diet, may need some lasix.        Cellulitis- (present on admission)   Assessment & Plan    Cont Unasyn, Vanc (day 2)  XR- neg for OM  US LLE neg for DVT  Wound care consult for wound to L ankle        CLL (chronic lymphocytic leukemia) (CMS-HCC)- (present on admission)   Assessment & Plan    Followed by Dr. Flores outpatient, chronically elevated WBC count.        Continuous opioid dependence (CMS-HCC)- (present on admission)   Assessment & Plan    Cont home oxycodone dose            Reviewed items::  EKG reviewed, Radiology images reviewed, Labs reviewed and Medications reviewed  DVT prophylaxis pharmacological::  Heparin  DVT prophylaxis - mechanical:  SCDs  Ulcer Prophylaxis::  Not indicated  Antibiotics:  Treating active infection/contamination beyond 24 hours perioperative coverage   critical care time 33mins managing sepsis, pressors, unstable cardiac dysrythmia

## 2017-09-30 NOTE — PROGRESS NOTES
Monitor summary:    Sinus rhythm with a First degree heartblock with HR between 72-90.     .26/.08/.38

## 2017-09-30 NOTE — PROGRESS NOTES
"Pharmacy Kinetics 72 y.o. male on vancomycin day # 3 2017    Currently on Vancomycin 1000 mg iv q12hr (0900, 2100)  Other antibiotics: Unasyn 3 g IV q6h    Indication for Treatment: SSTI    Pertinent history per medical record: Admitted on 2017 for leg swelling x 4 days with associated pain and redness.  Patient has a wound above his L ankle with swelling of the L leg from ankle to thigh with red streaking up the thigh. No drainage noted from the wound. Empiric antibiotics initiated.     Allergies: Review of patient's allergies indicates no known allergies.     List concerns for renal function: CKD, age, hypotension requiring vasopressor    Pertinent cultures to date:   17 - Right foot wound: Beta Hemolytic Streptococcus group A (heavy growth), Staph aureus (moderate growth)  17 - Peripheral BC x 2: NGTD    Recent Labs      17   1703  17   0805  17   1020  17   0440   WBC  28.7*  18.8*  19.1*  27.6*   NEUTSPOLYS  46.10   --   41.40*  29.50*   BANDSSTABS  4.30   --   16.40*  8.70     Recent Labs      17   1703  17   0804  17   1020  17   0440   BUN  33*  26*  25*  23*   CREATININE  1.31  1.29  1.14  1.08   ALBUMIN  3.0*  2.6*  2.3*  2.3*     Recent Labs      17   0805   VANCOTROUGH  28.6*     Intake/Output Summary (Last 24 hours) at 17 1209  Last data filed at 17 0400   Gross per 24 hour   Intake           119.21 ml   Output              800 ml   Net          -680.79 ml      Blood pressure (!) 89/57, pulse 86, temperature 37.1 °C (98.8 °F), resp. rate 15, height 1.651 m (5' 5\"), weight 65.9 kg (145 lb 4.5 oz), SpO2 100 %. Temp (24hrs), Av.1 °C (98.7 °F), Min:37 °C (98.6 °F), Max:37.1 °C (98.8 °F)      A/P   1. Vancomycin dose change: hold vancomycin pending repeat trough   2. Next vancomycin level: 10/1 with AM labs   3. Goal trough: 12-16 mcg/mL    4. Comments: Supra-therapeutic trough level drawn ~ 1 hour prior to 4th " scheduled dose.  Dose was given.  SCr stable and UOP today ~ 0.8 mL/kg/hr.  Due to concerns for accumulation noted above, do not feel comfortable scheduling additional vancomycin dose until patient demonstrates clearance of vancomycin.  Scheduled dosing has been discontinued and clinical pharmacist to follow-up with random level with AM labs (~20 hours after last dose of vanco) and re-dose/initiate pulse dosing as indicated.     Desirae Medrano, MaiD, BCPS

## 2017-09-30 NOTE — CARE PLAN
Problem: Pain Management  Goal: Pain level will decrease to patient's comfort goal  Pt receiving pain meds per MAR. PRN Dilaudid and PRN Oxycodone given.    Problem: Mobility  Goal: Risk for activity intolerance will decrease    Intervention: Assess and monitor signs of activity intolerance  Pt refusing mobilization due to pain. Pt has red, edematous, painful LLE.

## 2017-09-30 NOTE — PROGRESS NOTES
"Pulmonary Critical Care Progress Note        Chief Complaint: sepsis    History of Present Illness: \"72-year-old male previous medical history of CLL, hypertension, alcohol abuse Gilbert's disease, neuropathy, hepatitis, GERD who presented on 9/28/17 for left lower extremity cellulitis transferred from the floor for severe tachycardia with ventricular rates greater than 180. Per the patient he feels chest pain associated with this heart rate which resolved after cardioversion.\"    ROS:  Respiratory: negative cough, negative shortness of breath, negative sputum production and negative wheezing, Cardiac: negative chest pain and negative palpitations, GI: negative heartburn, negative nausea, negative vomiting and negative abdominal pain.  All other systems negative.    Interval Events:  24 hour interval history reviewed and reviewed during hot rounds with charge nurse and RT   -No acute events overnight, erythema improving   - Neuro: AO   - HR: 70s-90s   - BP: on levo at 5   - GI: tolerating diet   - UOP: low ON   - Maradiaga: no   - Tm: 37.1   - Lines: Central venous catheter, peripheral IV   - PPx: GI not indicated, DVT heparin   - On room air, doing IS      PFSH:  No change.    Respiratory:     Pulse Oximetry: 100 %  Chest Tube Drains:          Exam: unlabored respirations, no intercostal retractions or accessory muscle use and clear to auscultation without rales or wheezes  ImagingAvailable data reviewed         Invalid input(s): [id number]    HemoDynamics:  Pulse: 79, Heart Rate (Monitored): 81  NIBP: 101/48       Exam: regular rate and rhythm, regular rhythm (Sinus)  Imaging: echo Reviewed   ECHO 9/29/17:  Normal left ventricular size, thickness, systolic function, and diastolic function. Mitral annular calcification. Mild mitral regurgitation. Aortic sclerosis without stenosis. Mild aortic insufficiency. Normal estimated right-sided pressures    Recent Labs      09/28/17   1703  09/29/17   1020   TROPONINI  " <0.01  <0.01   BNPBTYPENAT  409*   --        Neuro:  GCS         Exam: no focal deficits noted  Imaging: None - Reviewed    Fluids:  Intake/Output       09/28/17 0700 - 09/29/17 0659 09/29/17 0700 - 09/30/17 0659 09/30/17 0700 - 10/01/17 0659      9078-7648 9559-8728 Total 6870-4975 5022-9855 Total 4604-7370 3545-0669 Total       Intake    P.O.  --  240 240  --  -- --  --  -- --    P.O. -- 240 240 -- -- -- -- -- --    I.V.  --  -- --  19.6  99.6 119.2  --  -- --    Norepinephrine Volume -- -- -- 19.6 99.6 119.2 -- -- --    Total Intake -- 240 240 19.6 99.6 119.2 -- -- --       Output    Urine  --  850 850  1000  350 1350  --  -- --    Number of Times Voided -- -- -- -- 2 x 2 x -- -- --    Void (ml) --  350 1350 -- -- --    Stool  --  -- --  --  -- --  --  -- --    Number of Times Stooled -- -- -- -- 0 x 0 x -- -- --    Total Output --  350 1350 -- -- --       Net I/O     -- -610 -610 -980.4 -250.4 -1230.8 -- -- --           Recent Labs      09/29/17   0804 09/29/17   1020  09/30/17   0440   SODIUM  129*  130*  130*   POTASSIUM  4.3  4.0  3.9   CHLORIDE  95*  100  100   CO2  23  22  22   BUN  26*  25*  23*   CREATININE  1.29  1.14  1.08   MAGNESIUM  2.0   --    --    PHOSPHORUS   --   2.7   --    CALCIUM  8.6  7.9*  8.4*       GI/Nutrition:  Exam: tenderness lower abdomen, normal active bowel sounds  Imaging: None - Reviewed  taking PO  Liver Function  Recent Labs      09/29/17   0804 09/29/17   1020  09/30/17   0440   ALTSGPT  <5  6  7   ASTSGOT  17 17  17   ALKPHOSPHAT  71  115*  78   TBILIRUBIN  0.9  0.9  0.8   GLUCOSE  72  67  102*       Heme:  Recent Labs      09/28/17   1703  09/29/17   0805  09/29/17   1020  09/30/17   0440   RBC  3.03*  2.84*  2.67*  2.69*   HEMOGLOBIN  11.2*  10.2*  9.6*  9.9*   HEMATOCRIT  31.7*  29.9*  27.9*  28.8*   PLATELETCT  91*  76*  70*  86*   PROTHROMBTM   --    --   15.6*   --    APTT  38.9*   --   44.5*   --    INR   --    --   1.20*   --         Infectious Disease:  Temp  Av °C (98.6 °F)  Min: 36.9 °C (98.4 °F)  Max: 37.1 °C (98.8 °F)  Micro: antibiotics reviewed and cultures reviewed  Recent Labs      17   1703  17   0804  17   0805  17   1020  17   0440   WBC  28.7*   --   18.8*  19.1*  27.6*   NEUTSPOLYS  46.10   --    --   41.40*  29.50*   LYMPHOCYTES  48.70*   --    --   41.40*  60.90*   MONOCYTES  0.90   --    --   0.80  0.90   EOSINOPHILS  0.00   --    --   0.00  0.00   BASOPHILS  0.00   --    --   0.00  0.00   ASTSGOT  17  17   --   17  17   ALTSGPT  6  <5   --   6  7   ALKPHOSPHAT  99  71   --   115*  78   TBILIRUBIN  0.9  0.9   --   0.9  0.8     Current Facility-Administered Medications   Medication Dose Frequency Provider Last Rate Last Dose   • vancomycin 1,000 mg in  mL IVPB  15 mg/kg Q12HR Carlos Sharma, PharmD   Stopped at 17 2142   • influenza vaccine high-dose injection 0.5 mL  0.5 mL Once Evelyn Blackwell M.D.       • folic acid (FOLVITE) tablet 1 mg  1 mg DAILY Brit Chavez, A.P.R.N.   1 mg at 17 1414   • therapeutic multivitamin-minerals (THERAGRAN-M) tablet 1 Tab  1 Tab DAILY Brit Chavez, A.P.R.N.   1 Tab at 17 1056   • thiamine (THIAMINE) tablet 100 mg  100 mg DAILY Brit Chavez, A.P.R.N.   100 mg at 17 1415   • diltiazem (CARDIZEM) 100 mg in D5W 100 mL Infusion  0-15 mg/hr Continuous Zain Kelly Jr., D.O.   Stopped at 17 1700   • NS infusion   Continuous Zain Kelly Jr., D.O. 150 mL/hr at 17 0425     • norepinephrine (LEVOPHED) 8 mg in  mL Infusion  0.5-30 mcg/min Continuous Mode Mireles D.O. 11.3 mL/hr at 17 0030 6 mcg/min at 17 0030   • duloxetine (CYMBALTA) capsule 60 mg  60 mg DAILY Evelyn Blackwell M.D.   60 mg at 17 0846   • levothyroxine (SYNTHROID) tablet 25 mcg  25 mcg AM ES Evelyn Blackwell M.D.   25 mcg at 17 0532   • spironolactone (ALDACTONE) tablet 100 mg  100 mg DAILY Evelyn Blackwell M.D.    100 mg at 09/29/17 0847   • trazodone (DESYREL) tablet 100 mg  100 mg Nightly Evelyn Blackwell M.D.   100 mg at 09/29/17 2011   • senna-docusate (PERICOLACE or SENOKOT S) 8.6-50 MG per tablet 2 Tab  2 Tab BID Evelyn Blackwell M.D.   2 Tab at 09/29/17 2011    And   • polyethylene glycol/lytes (MIRALAX) PACKET 1 Packet  1 Packet QDAY PRN Evelyn Blackwell M.D.        And   • magnesium hydroxide (MILK OF MAGNESIA) suspension 30 mL  30 mL QDAY PRN Evelyn Blackwell M.D.        And   • bisacodyl (DULCOLAX) suppository 10 mg  10 mg QDAY PRN Evelyn Blackwell M.D.       • NS (BOLUS) infusion 500 mL  500 mL Once PRN Evelyn Blackwell M.D.       • heparin injection 5,000 Units  5,000 Units Q8HRS Evelyn Blackwell M.D.   Stopped at 09/30/17 0600   • ampicillin/sulbactam (UNASYN) 3 g in  mL IVPB  3 g Q6HRS Evelyn Blackwell M.D.   Stopped at 09/30/17 0550   • MD ALERT... vancomycin per pharmacy protocol 1 Each  1 Each pharmacy to dose Evelyn Blackwell M.D.       • ondansetron (ZOFRAN) syringe/vial injection 4 mg  4 mg Q4HRS PRN Evelyn Blackwell M.D.       • ondansetron (ZOFRAN ODT) dispertab 4 mg  4 mg Q4HRS PRN Evelyn Blackwell M.D.       • lorazepam (ATIVAN) tablet 0.5 mg  0.5 mg Q6HRS PRCHUY Blackwell M.D.        Or   • lorazepam (ATIVAN) injection 0.5 mg  0.5 mg Q6HRS PRCHUY Blackwell M.D.       • Pharmacy Consult Request ...Pain Management Review   PRN Evelyn Blackwell M.D.        And   • oxycodone immediate-release (ROXICODONE) tablet 5 mg  5 mg Q3HRS PRN Evelyn Blackwell M.D.        And   • oxycodone immediate-release (ROXICODONE) tablet 10 mg  10 mg Q3HRS PRN Evelyn Blackwell M.D.   10 mg at 09/30/17 0427    And   • HYDROmorphone (DILAUDID) injection 0.5 mg  0.5 mg Q3HRS PRN Evelyn Blackwell M.D.   0.5 mg at 09/30/17 0100     Last reviewed on 9/29/2017  2:17 AM by Kelsi Layton, R.NColin    Quality  Measures:  Labs reviewed, Medications reviewed and Radiology images reviewed  Maradiaga catheter: No Maradiaga  Central line in place: Sepsis    DVT  Prophylaxis: Heparin  DVT prophylaxis - mechanical: SCDs  Ulcer prophylaxis: Not indicated          Problems/Plan:  Supraventricular tachycardia   - Converted with adenosine ×2 and Cardizem   - Continue to monitor, may require additional anti-dysrhythmic if this becomes recurrent   - EKG, troponin, electrolytes,    - echocardiogram: Normal left ventricular size, thickness, systolic function, and diastolic function. Mitral annular calcification. Mild mitral regurgitation. Aortic sclerosis without stenosis. Mild aortic insufficiency. Normal estimated right-sided pressures     Septic shock: Cellulitis source   - Hypotensive, central venous catheter placed   - Goal MAP greater than 65 mmHg   - Norepinephrine as needed to maintain goal map,   - Blood cultures pending     Cellulitis of lower extremity   - Staph and Group A Strep, sensitivities pending   - Source of sepsis, continue Unasyn and vancomycin   - LE x-ray negative   - Ultrasound to rule out underlying deep venous thrombosis     Acute on chronic Hyponatremia   - Likely hypervolemic related to cirrhosis   - Fluid restrict as possible with hypotension, continue Aldactone as able with blood pressure, continue the sodium restriction    Decreased urine output   - Bladder scan shows 600 mL, will place Maradiaga for accurate I's and O's given sepsis and shock   - Monitor renal function   - 500 mL fluid bolus     Cirrhosis  CLL     Prophylaxis with heparin      Discussed patient condition and risk of morbidity and/or mortality with RN, RT, Pharmacy, Charge nurse / hot rounds and hospitalist.    The patient remains critically ill.  Critical care time = 35 minutes in directly providing and coordinating critical care and extensive data review.  No time overlap and excludes procedures.

## 2017-10-01 ENCOUNTER — APPOINTMENT (OUTPATIENT)
Dept: RADIOLOGY | Facility: MEDICAL CENTER | Age: 72
DRG: 871 | End: 2017-10-01
Attending: HOSPITALIST
Payer: COMMERCIAL

## 2017-10-01 ENCOUNTER — APPOINTMENT (OUTPATIENT)
Dept: RADIOLOGY | Facility: MEDICAL CENTER | Age: 72
DRG: 871 | End: 2017-10-01
Attending: INTERNAL MEDICINE
Payer: COMMERCIAL

## 2017-10-01 LAB
ALBUMIN SERPL BCP-MCNC: 2.3 G/DL (ref 3.2–4.9)
ALBUMIN SERPL BCP-MCNC: 2.4 G/DL (ref 3.2–4.9)
ALBUMIN/GLOB SERPL: 0.7 G/DL
ALBUMIN/GLOB SERPL: 0.8 G/DL
ALP SERPL-CCNC: 103 U/L (ref 30–99)
ALP SERPL-CCNC: 109 U/L (ref 30–99)
ALT SERPL-CCNC: 7 U/L (ref 2–50)
ALT SERPL-CCNC: 7 U/L (ref 2–50)
ANION GAP SERPL CALC-SCNC: 8 MMOL/L (ref 0–11.9)
ANION GAP SERPL CALC-SCNC: 9 MMOL/L (ref 0–11.9)
ANISOCYTOSIS BLD QL SMEAR: ABNORMAL
APTT PPP: 37.5 SEC (ref 24.7–36)
AST SERPL-CCNC: 16 U/L (ref 12–45)
AST SERPL-CCNC: 17 U/L (ref 12–45)
BACTERIA WND AEROBE CULT: ABNORMAL
BASOPHILS # BLD AUTO: 0 % (ref 0–1.8)
BASOPHILS # BLD: 0 K/UL (ref 0–0.12)
BILIRUB SERPL-MCNC: 0.7 MG/DL (ref 0.1–1.5)
BILIRUB SERPL-MCNC: 0.7 MG/DL (ref 0.1–1.5)
BUN SERPL-MCNC: 20 MG/DL (ref 8–22)
BUN SERPL-MCNC: 20 MG/DL (ref 8–22)
BURR CELLS BLD QL SMEAR: NORMAL
CALCIUM SERPL-MCNC: 8.5 MG/DL (ref 8.5–10.5)
CALCIUM SERPL-MCNC: 8.6 MG/DL (ref 8.5–10.5)
CHLORIDE SERPL-SCNC: 103 MMOL/L (ref 96–112)
CHLORIDE SERPL-SCNC: 104 MMOL/L (ref 96–112)
CO2 SERPL-SCNC: 20 MMOL/L (ref 20–33)
CO2 SERPL-SCNC: 21 MMOL/L (ref 20–33)
CREAT SERPL-MCNC: 0.92 MG/DL (ref 0.5–1.4)
CREAT SERPL-MCNC: 0.96 MG/DL (ref 0.5–1.4)
EKG IMPRESSION: NORMAL
EOSINOPHIL # BLD AUTO: 0 K/UL (ref 0–0.51)
EOSINOPHIL NFR BLD: 0 % (ref 0–6.9)
ERYTHROCYTE [DISTWIDTH] IN BLOOD BY AUTOMATED COUNT: 52.7 FL (ref 35.9–50)
ERYTHROCYTE [DISTWIDTH] IN BLOOD BY AUTOMATED COUNT: 53.1 FL (ref 35.9–50)
GFR SERPL CREATININE-BSD FRML MDRD: >60 ML/MIN/1.73 M 2
GFR SERPL CREATININE-BSD FRML MDRD: >60 ML/MIN/1.73 M 2
GLOBULIN SER CALC-MCNC: 3.2 G/DL (ref 1.9–3.5)
GLOBULIN SER CALC-MCNC: 3.3 G/DL (ref 1.9–3.5)
GLUCOSE BLD-MCNC: 106 MG/DL (ref 65–99)
GLUCOSE SERPL-MCNC: 112 MG/DL (ref 65–99)
GLUCOSE SERPL-MCNC: 118 MG/DL (ref 65–99)
GRAM STN SPEC: ABNORMAL
HCT VFR BLD AUTO: 30.4 % (ref 42–52)
HCT VFR BLD AUTO: 30.7 % (ref 42–52)
HGB BLD-MCNC: 10.3 G/DL (ref 14–18)
HGB BLD-MCNC: 10.3 G/DL (ref 14–18)
INR PPP: 1.09 (ref 0.87–1.13)
LACTATE BLD-SCNC: 1.4 MMOL/L (ref 0.5–2)
LYMPHOCYTES # BLD AUTO: 11.94 K/UL (ref 1–4.8)
LYMPHOCYTES NFR BLD: 47.4 % (ref 22–41)
MACROCYTES BLD QL SMEAR: ABNORMAL
MANUAL DIFF BLD: NORMAL
MCH RBC QN AUTO: 36.1 PG (ref 27–33)
MCH RBC QN AUTO: 36.3 PG (ref 27–33)
MCHC RBC AUTO-ENTMCNC: 33.6 G/DL (ref 33.7–35.3)
MCHC RBC AUTO-ENTMCNC: 33.9 G/DL (ref 33.7–35.3)
MCV RBC AUTO: 107 FL (ref 81.4–97.8)
MCV RBC AUTO: 107.7 FL (ref 81.4–97.8)
MONOCYTES # BLD AUTO: 0 K/UL (ref 0–0.85)
MONOCYTES NFR BLD AUTO: 0 % (ref 0–13.4)
MORPHOLOGY BLD-IMP: NORMAL
NEUTROPHILS # BLD AUTO: 13.26 K/UL (ref 1.82–7.42)
NEUTROPHILS NFR BLD: 43.8 % (ref 44–72)
NEUTS BAND NFR BLD MANUAL: 8.8 % (ref 0–10)
NRBC # BLD AUTO: 0 K/UL
NRBC BLD AUTO-RTO: 0 /100 WBC
PLATELET # BLD AUTO: 70 K/UL (ref 164–446)
PLATELET # BLD AUTO: 73 K/UL (ref 164–446)
PLATELET BLD QL SMEAR: NORMAL
PMV BLD AUTO: 9.4 FL (ref 9–12.9)
PMV BLD AUTO: 9.6 FL (ref 9–12.9)
POIKILOCYTOSIS BLD QL SMEAR: NORMAL
POTASSIUM SERPL-SCNC: 3.8 MMOL/L (ref 3.6–5.5)
POTASSIUM SERPL-SCNC: 3.9 MMOL/L (ref 3.6–5.5)
PROT SERPL-MCNC: 5.6 G/DL (ref 6–8.2)
PROT SERPL-MCNC: 5.6 G/DL (ref 6–8.2)
PROTHROMBIN TIME: 14.4 SEC (ref 12–14.6)
RBC # BLD AUTO: 2.84 M/UL (ref 4.7–6.1)
RBC # BLD AUTO: 2.85 M/UL (ref 4.7–6.1)
RBC BLD AUTO: PRESENT
SIGNIFICANT IND 70042: ABNORMAL
SITE SITE: ABNORMAL
SMUDGE CELLS BLD QL SMEAR: NORMAL
SODIUM SERPL-SCNC: 132 MMOL/L (ref 135–145)
SODIUM SERPL-SCNC: 133 MMOL/L (ref 135–145)
SOURCE SOURCE: ABNORMAL
TOXIC GRANULES BLD QL SMEAR: NORMAL
VANCOMYCIN SERPL-MCNC: 24.7 UG/ML
WBC # BLD AUTO: 20.9 K/UL (ref 4.8–10.8)
WBC # BLD AUTO: 25.2 K/UL (ref 4.8–10.8)

## 2017-10-01 PROCEDURE — 83605 ASSAY OF LACTIC ACID: CPT

## 2017-10-01 PROCEDURE — 770022 HCHG ROOM/CARE - ICU (200)

## 2017-10-01 PROCEDURE — 93010 ELECTROCARDIOGRAM REPORT: CPT | Performed by: INTERNAL MEDICINE

## 2017-10-01 PROCEDURE — 700105 HCHG RX REV CODE 258: Performed by: HOSPITALIST

## 2017-10-01 PROCEDURE — 700105 HCHG RX REV CODE 258: Performed by: INTERNAL MEDICINE

## 2017-10-01 PROCEDURE — 82962 GLUCOSE BLOOD TEST: CPT

## 2017-10-01 PROCEDURE — 700117 HCHG RX CONTRAST REV CODE 255: Performed by: HOSPITALIST

## 2017-10-01 PROCEDURE — 700101 HCHG RX REV CODE 250: Performed by: INTERNAL MEDICINE

## 2017-10-01 PROCEDURE — 302255 BARRIER CREAM MOISTURE BAZA PROTECT (ZINC) 5OZ: Performed by: HOSPITALIST

## 2017-10-01 PROCEDURE — 85007 BL SMEAR W/DIFF WBC COUNT: CPT

## 2017-10-01 PROCEDURE — 700111 HCHG RX REV CODE 636 W/ 250 OVERRIDE (IP): Performed by: HOSPITALIST

## 2017-10-01 PROCEDURE — 74177 CT ABD & PELVIS W/CONTRAST: CPT

## 2017-10-01 PROCEDURE — A9270 NON-COVERED ITEM OR SERVICE: HCPCS | Performed by: HOSPITALIST

## 2017-10-01 PROCEDURE — 80202 ASSAY OF VANCOMYCIN: CPT

## 2017-10-01 PROCEDURE — 85610 PROTHROMBIN TIME: CPT

## 2017-10-01 PROCEDURE — 70450 CT HEAD/BRAIN W/O DYE: CPT

## 2017-10-01 PROCEDURE — 700101 HCHG RX REV CODE 250: Performed by: HOSPITALIST

## 2017-10-01 PROCEDURE — 700111 HCHG RX REV CODE 636 W/ 250 OVERRIDE (IP): Performed by: INTERNAL MEDICINE

## 2017-10-01 PROCEDURE — 51798 US URINE CAPACITY MEASURE: CPT

## 2017-10-01 PROCEDURE — 80053 COMPREHEN METABOLIC PANEL: CPT | Mod: 91

## 2017-10-01 PROCEDURE — 85027 COMPLETE CBC AUTOMATED: CPT | Mod: 91

## 2017-10-01 PROCEDURE — 99233 SBSQ HOSP IP/OBS HIGH 50: CPT | Performed by: HOSPITALIST

## 2017-10-01 PROCEDURE — 73701 CT LOWER EXTREMITY W/DYE: CPT | Mod: LT

## 2017-10-01 PROCEDURE — 700102 HCHG RX REV CODE 250 W/ 637 OVERRIDE(OP): Performed by: HOSPITALIST

## 2017-10-01 PROCEDURE — 93005 ELECTROCARDIOGRAM TRACING: CPT | Performed by: INTERNAL MEDICINE

## 2017-10-01 PROCEDURE — 85730 THROMBOPLASTIN TIME PARTIAL: CPT

## 2017-10-01 RX ORDER — PHENOBARBITAL SODIUM 130 MG/ML
260 INJECTION INTRAMUSCULAR; INTRAVENOUS
Status: DISCONTINUED | OUTPATIENT
Start: 2017-10-01 | End: 2017-10-06

## 2017-10-01 RX ORDER — HALOPERIDOL 5 MG/ML
INJECTION INTRAMUSCULAR
Status: ACTIVE
Start: 2017-10-01 | End: 2017-10-02

## 2017-10-01 RX ORDER — ENEMA 19; 7 G/133ML; G/133ML
1 ENEMA RECTAL ONCE
Status: COMPLETED | OUTPATIENT
Start: 2017-10-01 | End: 2017-10-01

## 2017-10-01 RX ORDER — HALOPERIDOL 5 MG/ML
2.5 INJECTION INTRAMUSCULAR
Status: ACTIVE | OUTPATIENT
Start: 2017-10-01 | End: 2017-10-01

## 2017-10-01 RX ORDER — CLINDAMYCIN PHOSPHATE 900 MG/50ML
900 INJECTION, SOLUTION INTRAVENOUS EVERY 8 HOURS
Status: DISCONTINUED | OUTPATIENT
Start: 2017-10-01 | End: 2017-10-09

## 2017-10-01 RX ORDER — PHENOBARBITAL SODIUM 130 MG/ML
130 INJECTION INTRAMUSCULAR; INTRAVENOUS
Status: DISCONTINUED | OUTPATIENT
Start: 2017-10-01 | End: 2017-10-06

## 2017-10-01 RX ORDER — POTASSIUM CHLORIDE 20 MEQ/1
20 TABLET, EXTENDED RELEASE ORAL ONCE
Status: DISCONTINUED | OUTPATIENT
Start: 2017-10-01 | End: 2017-10-01

## 2017-10-01 RX ORDER — NALOXONE HYDROCHLORIDE 0.4 MG/ML
0.4 INJECTION, SOLUTION INTRAMUSCULAR; INTRAVENOUS; SUBCUTANEOUS ONCE
Status: COMPLETED | OUTPATIENT
Start: 2017-10-01 | End: 2017-10-01

## 2017-10-01 RX ORDER — LINEZOLID 2 MG/ML
600 INJECTION, SOLUTION INTRAVENOUS EVERY 12 HOURS
Status: DISCONTINUED | OUTPATIENT
Start: 2017-10-01 | End: 2017-10-01

## 2017-10-01 RX ORDER — PHENOBARBITAL SODIUM 130 MG/ML
130 INJECTION, SOLUTION INTRAMUSCULAR; INTRAVENOUS ONCE
Status: COMPLETED | OUTPATIENT
Start: 2017-10-01 | End: 2017-10-01

## 2017-10-01 RX ADMIN — PHENOBARBITAL SODIUM 260 MG: 130 INJECTION INTRAMUSCULAR; INTRAVENOUS at 17:02

## 2017-10-01 RX ADMIN — PHENOBARBITAL SODIUM 260 MG: 130 INJECTION INTRAMUSCULAR; INTRAVENOUS at 14:46

## 2017-10-01 RX ADMIN — PHENOBARBITAL SODIUM 130 MG: 130 INJECTION INTRAMUSCULAR; INTRAVENOUS at 13:14

## 2017-10-01 RX ADMIN — SODIUM CHLORIDE: 9 INJECTION, SOLUTION INTRAVENOUS at 01:45

## 2017-10-01 RX ADMIN — HEPARIN SODIUM 5000 UNITS: 5000 INJECTION, SOLUTION INTRAVENOUS; SUBCUTANEOUS at 13:56

## 2017-10-01 RX ADMIN — LINEZOLID 600 MG: 600 INJECTION, SOLUTION INTRAVENOUS at 09:18

## 2017-10-01 RX ADMIN — SODIUM CHLORIDE: 9 INJECTION, SOLUTION INTRAVENOUS at 09:15

## 2017-10-01 RX ADMIN — NALOXONE HYDROCHLORIDE 0.4 MG: 0.4 INJECTION, SOLUTION INTRAMUSCULAR; INTRAVENOUS; SUBCUTANEOUS at 11:43

## 2017-10-01 RX ADMIN — LORAZEPAM 0.5 MG: 2 INJECTION INTRAMUSCULAR; INTRAVENOUS at 02:29

## 2017-10-01 RX ADMIN — POLYETHYLENE GLYCOL (3350) 1 PACKET: 17 POWDER, FOR SOLUTION ORAL at 02:33

## 2017-10-01 RX ADMIN — CLINDAMYCIN IN 5 PERCENT DEXTROSE 900 MG: 18 INJECTION, SOLUTION INTRAVENOUS at 09:16

## 2017-10-01 RX ADMIN — PIPERACILLIN SODIUM AND TAZOBACTAM SODIUM 4.5 G: 4; .5 INJECTION, POWDER, FOR SOLUTION INTRAVENOUS at 09:18

## 2017-10-01 RX ADMIN — AMPICILLIN SODIUM AND SULBACTAM SODIUM 3 G: 2; 1 INJECTION, POWDER, FOR SOLUTION INTRAMUSCULAR; INTRAVENOUS at 05:17

## 2017-10-01 RX ADMIN — NOREPINEPHRINE BITARTRATE 5 MCG/MIN: 1 INJECTION INTRAVENOUS at 16:40

## 2017-10-01 RX ADMIN — CLINDAMYCIN IN 5 PERCENT DEXTROSE 900 MG: 18 INJECTION, SOLUTION INTRAVENOUS at 13:56

## 2017-10-01 RX ADMIN — ONDANSETRON 4 MG: 2 INJECTION INTRAMUSCULAR; INTRAVENOUS at 09:00

## 2017-10-01 RX ADMIN — LORAZEPAM 0.5 MG: 2 INJECTION INTRAMUSCULAR; INTRAVENOUS at 12:14

## 2017-10-01 RX ADMIN — IOHEXOL 100 ML: 350 INJECTION, SOLUTION INTRAVENOUS at 08:59

## 2017-10-01 RX ADMIN — CLINDAMYCIN IN 5 PERCENT DEXTROSE 900 MG: 18 INJECTION, SOLUTION INTRAVENOUS at 23:02

## 2017-10-01 RX ADMIN — ONDANSETRON 4 MG: 2 INJECTION INTRAMUSCULAR; INTRAVENOUS at 05:09

## 2017-10-01 RX ADMIN — CEFTAROLINE FOSAMIL 600 MG: 600 POWDER, FOR SOLUTION INTRAVENOUS at 11:41

## 2017-10-01 RX ADMIN — STANDARDIZED SENNA CONCENTRATE AND DOCUSATE SODIUM 2 TABLET: 8.6; 5 TABLET, FILM COATED ORAL at 21:18

## 2017-10-01 RX ADMIN — CEFTAROLINE FOSAMIL 600 MG: 600 POWDER, FOR SOLUTION INTRAVENOUS at 21:18

## 2017-10-01 RX ADMIN — HEPARIN SODIUM 5000 UNITS: 5000 INJECTION, SOLUTION INTRAVENOUS; SUBCUTANEOUS at 05:16

## 2017-10-01 RX ADMIN — HALOPERIDOL LACTATE 2.5 MG: 5 INJECTION, SOLUTION INTRAMUSCULAR at 12:57

## 2017-10-01 RX ADMIN — HEPARIN SODIUM 5000 UNITS: 5000 INJECTION, SOLUTION INTRAVENOUS; SUBCUTANEOUS at 21:17

## 2017-10-01 RX ADMIN — HALOPERIDOL LACTATE 2.5 MG: 5 INJECTION, SOLUTION INTRAMUSCULAR at 12:44

## 2017-10-01 RX ADMIN — SODIUM PHOSPHATE 133 ML: 7; 19 ENEMA RECTAL at 11:28

## 2017-10-01 RX ADMIN — BISACODYL 10 MG: 10 SUPPOSITORY RECTAL at 11:34

## 2017-10-01 RX ADMIN — PHENOBARBITAL SODIUM 260 MG: 130 INJECTION INTRAMUSCULAR; INTRAVENOUS at 15:33

## 2017-10-01 ASSESSMENT — ENCOUNTER SYMPTOMS
BACK PAIN: 0
SHORTNESS OF BREATH: 0
HEADACHES: 0
DIARRHEA: 0
FEVER: 0
CONSTIPATION: 1
NECK PAIN: 0
LOSS OF CONSCIOUSNESS: 0
NAUSEA: 0
DIZZINESS: 0
ABDOMINAL PAIN: 1
COUGH: 0
CHILLS: 0
VOMITING: 0

## 2017-10-01 ASSESSMENT — LIFESTYLE VARIABLES
TOTAL SCORE: 9
VISUAL DISTURBANCES: NOT PRESENT
NAUSEA AND VOMITING: NO NAUSEA AND NO VOMITING
HEADACHE, FULLNESS IN HEAD: VERY MILD
TREMOR: TREMOR NOT VISIBLE BUT CAN BE FELT, FINGERTIP TO FINGERTIP
ORIENTATION AND CLOUDING OF SENSORIUM: DATE DISORIENTATION BY NO MORE THAN TWO CALENDAR DAYS
AUDITORY DISTURBANCES: NOT PRESENT
ORIENTATION AND CLOUDING OF SENSORIUM: DATE DISORIENTATION BY NO MORE THAN TWO CALENDAR DAYS
VISUAL DISTURBANCES: NOT PRESENT
PAROXYSMAL SWEATS: NO SWEAT VISIBLE
NAUSEA AND VOMITING: NO NAUSEA AND NO VOMITING
HEADACHE, FULLNESS IN HEAD: NOT PRESENT
TREMOR: NO TREMOR
HEADACHE, FULLNESS IN HEAD: MILD
AGITATION: SOMEWHAT MORE THAN NORMAL ACTIVITY
ORIENTATION AND CLOUDING OF SENSORIUM: CANNOT DO SERIAL ADDITIONS OR IS UNCERTAIN ABOUT DATE
ANXIETY: NO ANXIETY (AT EASE)
ANXIETY: NO ANXIETY (AT EASE)
AUDITORY DISTURBANCES: NOT PRESENT
PAROXYSMAL SWEATS: NO SWEAT VISIBLE
AUDITORY DISTURBANCES: NOT PRESENT
TREMOR: MODERATE TREMOR WITH ARMS EXTENDED
PAROXYSMAL SWEATS: NO SWEAT VISIBLE
TOTAL SCORE: 4
REASON UNABLE TO ASSESS: LETHARGIC
NAUSEA AND VOMITING: MILD NAUSEA WITH NO VOMITING
VISUAL DISTURBANCES: NOT PRESENT
AGITATION: NORMAL ACTIVITY
TOTAL SCORE: 4
ANXIETY: *
AGITATION: NORMAL ACTIVITY

## 2017-10-01 NOTE — PROGRESS NOTES
At 2300, pt had large emesis movement, pt oropharyngeal suctioned as precautionary measure to prevent aspiration and emesis bag. At same time, Pt converted from a sinus rhythm to afib with rate between 120-140s. Pt in afib from 2300 to 2312. About 40 minutes later, Pt then experienced SVT with HR as high as 180 for one minute. Pt then converted back to sinus rhythm with HR in 90s. Pt had been AOx4 with transient confusion but followed commands at start of shift. After emesis movement and cardiac dysrhythmias, pt now AOx3 and disoriented to place. Pt very slow to follow commands.      At beginning of shift, pt's pupils 1-2+ and sluggish and became fixed and constricted at 1+.    Dr. Russell paged and stat head CT without contrast ordered and placed by RN. Dr. Stewart also updated and stat CBC, coags, and CMP ordered for pt.    Pt taken to CT by RN and back on floor.

## 2017-10-01 NOTE — PROGRESS NOTES
Deepika from Lab called with critical result of +MRSA in lower extremity at 0545. Critical lab result read back to Deepika.   Dr. Grimaldo notified of critical lab result at 0550.  Critical lab result read back by Dr. Grimaldo.

## 2017-10-01 NOTE — PROGRESS NOTES
"Pulmonary Critical Care Progress Note        Chief Complaint: sepsis    History of Present Illness: \"72-year-old male previous medical history of CLL, hypertension, alcohol abuse Gilbert's disease, neuropathy, hepatitis, GERD who presented on 9/28/17 for left lower extremity cellulitis transferred from the floor for severe tachycardia with ventricular rates greater than 180. Per the patient he feels chest pain associated with this heart rate which resolved after cardioversion.\"    ROS:  Respiratory: negative cough, negative shortness of breath, negative sputum production and negative wheezing, Cardiac: negative chest pain and negative palpitations, GI: negative heartburn, negative nausea, negative vomiting and negative abdominal pain.  All other systems negative.    Interval Events:  24 hour interval history reviewed and reviewed during hot rounds with charge nurse and RT   - less responsive, abdomen firm   - Neuro: oriented to self   - HR: a-fib, now SR80s   - BP: 90s-110s off levo   - GI: too encephalopathic to eat at this time   - UOP: 400 mL last 12 hours   - Maradiaga: Yes   - Tm: Afebrile   - Lines: CVC   - PPx: GI not indicated, DVT heparin   - IS    YESTERDAY   -No acute events overnight, erythema improving   - Neuro: AO   - HR: 70s-90s   - BP: on levo at 5   - GI: tolerating diet   - UOP: low ON   - Maradiaga: no   - Tm: 37.1   - Lines: Central venous catheter, peripheral IV   - PPx: GI not indicated, DVT heparin   - On room air, doing IS      PFSH:  No change.    Respiratory:     Pulse Oximetry: 99 %  Chest Tube Drains:          Exam: unlabored respirations, no intercostal retractions or accessory muscle use and clear to auscultation without rales or wheezes  ImagingAvailable data reviewed         Invalid input(s): [id number]    HemoDynamics:  Pulse: 88, Heart Rate (Monitored): 88  NIBP: (!) 94/60  CVP (mm Hg): 4 MM HG    Exam: sinus tachycardia  Imaging: echo Reviewed   ECHO 9/29/17:  Normal left ventricular " size, thickness, systolic function, and diastolic function. Mitral annular calcification. Mild mitral regurgitation. Aortic sclerosis without stenosis. Mild aortic insufficiency. Normal estimated right-sided pressures    Recent Labs      09/28/17   1703  09/29/17   1020   TROPONINI  <0.01  <0.01   BNPBTYPENAT  409*   --        Neuro:  GCS Total Clarence Coma Score: 12       Exam: very encephalopathic only moans to pain at this time, however does move purposefully but does not follow commands, the patient is thrashing around in bed and appears to be in withdrawal  Imaging: None - Reviewed    Fluids:  Intake/Output       09/29/17 0700 - 09/30/17 0659 09/30/17 0700 - 10/01/17 0659 10/01/17 0700 - 10/02/17 0659      9370-7581 2766-5875 Total 5181-2861 9925-0296 Total 6393-8007 4971-5657 Total       Intake    I.V.  19.6  99.6 119.2  137.3  1883.9 2021.2  --  -- --    Norepinephrine Volume 19.6 99.6 119.2 137.3 83.9 221.2 -- -- --    IV Volume (NS) -- -- -- -- 1800 1800 -- -- --    Total Intake 19.6 99.6 119.2 137.3 1883.9 2021.2 -- -- --       Output    Urine  1000  350 1350  350  650 1000  --  -- --    Number of Times Voided -- 2 x 2 x 1 x 1 x 2 x -- -- --    Indwelling Cathether -- -- -- -- 350 350 -- -- --    Void (ml) 1723 720 7212 350 300 650 -- -- --    Stool  --  -- --  --  -- --  --  -- --    Number of Times Stooled -- 0 x 0 x -- 0 x 0 x -- -- --    Total Output 0141 061 7013  -- -- --       Net I/O     -980.4 -250.4 -1230.8 -212.7 1233.9 1021.2 -- -- --           Recent Labs      09/29/17   0804  09/29/17   1020  09/30/17   0440  10/01/17   0020  10/01/17   0520   SODIUM  129*  130*  130*  132*  133*   POTASSIUM  4.3  4.0  3.9  3.9  3.8   CHLORIDE  95*  100  100  103  104   CO2  23  22  22  21  20   BUN  26*  25*  23*  20  20   CREATININE  1.29  1.14  1.08  0.96  0.92   MAGNESIUM  2.0   --    --    --    --    PHOSPHORUS   --   2.7   --    --    --    CALCIUM  8.6  7.9*  8.4*  8.5  8.6        GI/Nutrition:  Exam: tenderness lower abdomen, normal active bowel sounds  Imaging: None - Reviewed  taking PO  Liver Function  Recent Labs      17   0440  10/01/17   0020  10/01/17   0520   ALTSGPT  7  7  7   ASTSGOT  17    16   ALKPHOSPHAT  78  103*  109*   TBILIRUBIN  0.8  0.7  0.7   GLUCOSE  102*  112*  118*       Heme:  Recent Labs      17   1703   17   1020  170  10/01/17   0020  10/01/17   0520   RBC  3.03*   < >  2.67*  2.69*  2.85*  2.84*   HEMOGLOBIN  11.2*   < >  9.6*  9.9*  10.3*  10.3*   HEMATOCRIT  31.7*   < >  27.9*  28.8*  30.7*  30.4*   PLATELETCT  91*   < >  70*  86*  73*  70*   PROTHROMBTM   --    --   15.6*   --   14.4   --    APTT  38.9*   --   44.5*   --   37.5*   --    INR   --    --   1.20*   --   1.09   --     < > = values in this interval not displayed.       Infectious Disease:  Temp  Av.6 °C (97.9 °F)  Min: 36.2 °C (97.1 °F)  Max: 37 °C (98.6 °F)  Micro: antibiotics reviewed and cultures reviewed  Recent Labs      17   1020  170  10/01/17   0020  10/01/17   0520   WBC  19.1*  27.6*  20.9*  25.2*   NEUTSPOLYS  41.40*  29.50*   --   43.80*   LYMPHOCYTES  41.40*  60.90*   --   47.40*   MONOCYTES  0.80  0.90   --   0.00   EOSINOPHILS  0.00  0.00   --   0.00   BASOPHILS  0.00  0.00   --   0.00   ASTSGOT  17  17  17  16   ALTSGPT  6  7  7  7   ALKPHOSPHAT  115*  78  103*  109*   TBILIRUBIN  0.9  0.8  0.7  0.7     Current Facility-Administered Medications   Medication Dose Frequency Provider Last Rate Last Dose   • piperacillin-tazobactam (ZOSYN) 3.375 g in  mL IVPB  3.375 g Q6HRS Mode Mireles D.O.       • clindamycin (CLEOCIN) IVPB premix 900 mg  900 mg Q8HRS LIBAN Jimenez.O.   Stopped at 10/01/17 1016   • potassium chloride SA (Kdur) tablet 20 mEq  20 mEq Once Zani Kelly Jr., D.O.   Stopped at 10/01/17 0815   • MD ALERT...Adult ICU Electrolyte Replacement per Pharmacy Protocol   PRN Zain Kelly  XIANG OleaO.       • Linezolid (ZYVOX) premix 600 mg  600 mg Q12HRS Mode Mireles D.O.   Stopped at 10/01/17 1018   • folic acid (FOLVITE) tablet 1 mg  1 mg DAILY Brit Chavez A.P.R.N.   Stopped at 10/01/17 0900   • therapeutic multivitamin-minerals (THERAGRAN-M) tablet 1 Tab  1 Tab DAILY Brit Chavez A.P.R.N.   Stopped at 10/01/17 0900   • thiamine (THIAMINE) tablet 100 mg  100 mg DAILY Brit Chavez A.P.R.N.   Stopped at 10/01/17 0900   • NS infusion   Continuous Zain Kelly Jr. D.O. 150 mL/hr at 10/01/17 0915     • norepinephrine (LEVOPHED) 8 mg in  mL Infusion  0.5-30 mcg/min Continuous Mode Mireles D.O.   Stopped at 10/01/17 0555   • duloxetine (CYMBALTA) capsule 60 mg  60 mg DAILY Evelyn Blackwell M.D.   Stopped at 10/01/17 0900   • levothyroxine (SYNTHROID) tablet 25 mcg  25 mcg AM ES Evelyn Blackwell M.D.   Stopped at 10/01/17 0700   • trazodone (DESYREL) tablet 100 mg  100 mg Nightly Evelyn Blackwell M.D.   100 mg at 09/29/17 2011   • senna-docusate (PERICOLACE or SENOKOT S) 8.6-50 MG per tablet 2 Tab  2 Tab BID Evelyn Blackwell M.D.   Stopped at 10/01/17 0900    And   • polyethylene glycol/lytes (MIRALAX) PACKET 1 Packet  1 Packet QDAY PRCHUY Blackwell M.D.   1 Packet at 10/01/17 0233    And   • magnesium hydroxide (MILK OF MAGNESIA) suspension 30 mL  30 mL QDAY PRCHUY Blackwell M.D.   30 mL at 09/30/17 1039    And   • bisacodyl (DULCOLAX) suppository 10 mg  10 mg QDAY PRCHUY Blackwell M.D.       • heparin injection 5,000 Units  5,000 Units Q8HRS Evelyn Blackwell M.D.   5,000 Units at 10/01/17 0516   • ondansetron (ZOFRAN) syringe/vial injection 4 mg  4 mg Q4HRS PRCHUY Blackwell M.D.   4 mg at 10/01/17 0900   • ondansetron (ZOFRAN ODT) dispertab 4 mg  4 mg Q4HRS PRCHUY Blackwell M.D.       • lorazepam (ATIVAN) tablet 0.5 mg  0.5 mg Q6HRS PRCHUY Blackwell M.D.        Or   • lorazepam (ATIVAN) injection 0.5 mg  0.5 mg Q6HRS PRCHUY Blackwell M.D.   0.5 mg at 09/30/17 6247    • Pharmacy Consult Request ...Pain Management Review   JOSHUA Blackwell M.D.        And   • oxycodone immediate-release (ROXICODONE) tablet 5 mg  5 mg Q3HRS PRCHUY Blackwell M.D.        And   • oxycodone immediate-release (ROXICODONE) tablet 10 mg  10 mg Q3HRS PRCHUY Blackwell M.D.   10 mg at 09/30/17 1805    And   • HYDROmorphone (DILAUDID) injection 0.5 mg  0.5 mg Q3HRS PRCHUY Blackwell M.D.   0.5 mg at 09/30/17 0100     Last reviewed on 9/29/2017  2:17 AM by Kelsi Layton R.N.    Quality  Measures:   Labs reviewed, Medications reviewed and Radiology images reviewed   Maradiaga catheter: No Maradiaga   Central line in place: Sepsis     DVT Prophylaxis: Heparin   DVT prophylaxis - mechanical: SCDs   Ulcer prophylaxis: Not indicated          Problems/Plan:  Supraventricular tachycardia   - Converted with adenosine ×2 and Cardizem   - Continue to monitor, may require additional anti-dysrhythmic if this becomes recurrent   - EKG, troponin, electrolytes,    - echocardiogram: Normal left ventricular size, thickness, systolic function, and diastolic function. Mitral annular calcification. Mild mitral regurgitation. Aortic sclerosis without stenosis. Mild aortic insufficiency. Normal estimated right-sided pressures     Septic shock: Cellulitis source   - Hypotensive, central venous catheter placed   - Goal MAP greater than 65 mmHg   - Norepinephrine as needed to maintain goal map,   - Blood cultures pending   - Stat CT of left lower extremity and abdomen given worsening condition this morning to rule out necrotizing infection     Cellulitis of lower extremity   - Staph and Group A Strep, sensitivities pending   - LE x-ray negative   - Ultrasound negative for DVT   - Infectious disease consultation     Acute on chronic Hyponatremia   - Likely hypervolemic related to cirrhosis   - Fluid restrict as possible with hypotension, continue Aldactone as able with blood pressure, continue the sodium  restriction    Decreased urine output   - Bladder scan shows 600 mL, will place Maradiaga for accurate I's and O's given sepsis and shock   - Monitor renal function   - 500 mL fluid bolus     Alcohol withdrawal   - Placed on phenobarbital protocol    Cirrhosis  CLL     Prophylaxis with heparin    Discussed patient condition and risk of morbidity and/or mortality with RN, RT, Pharmacy, Charge nurse / hot rounds and hospitalist.    The patient remains critically ill.  Critical care time = 45 minutes in directly providing and coordinating critical care and extensive data review.  No time overlap and excludes procedures.

## 2017-10-01 NOTE — CONSULTS
INFECTIOUS DISEASES INPATIENT CONSULT NOTE     Date of Service: 10/01/17    Consult Requested By: Mode Mireles D.O.    Reason for Consultation: Septic shock    History of Present Illness:   Rohit Snow is a 72 y.o. Man with a history of CLL , alcohol abuse and HTN admitted 9/28/2017 for worsening left leg erythema and pain. Patient is very confused at the moment so history is obtained from review of the medical records. He recently woke up one night and found what he thought was spider bite on right knee. He thought he also got a spider bite on his left ankle. He developed increasing swelling, pain and erythema of the left leg for which he presented to the ED for further evaluation. On presentation, he was afebrile with a leukocytosis of 28.7. CT of the left leg revealed diffuse subcutaneous edema throughout the left hemipelvis and left lower extremity with a small amount of fluid adjacent to the right medial gastrocnemius at the level the proximal calf.  Doppler was negative for DVT. He was started on broad spectrum abx and was admitted to the floor. However he was transferred to the ICU on 9/29 with hypotension and septic shock and started on pressors. He was also noted to be in SVT but was converted with adenosine. Wound cultures are growing MRSA and group B strep. ID service consulted for further abx recommendations.     Review Of Systems:  ROS are unobtainable given encephalopathy    PMH:   Past Medical History:   Diagnosis Date   • CLL (chronic lymphocytic leukemia) (CMS-HCC) 3/23/2017   • Actinic keratosis 1/3/2017   • Neoplasm of uncertain behavior of skin 12/28/2016   • Vitamin D deficiency 09/2011   • Neuropathy (CMS-HCC) 05/2010    feet   • Helicobacter pylori (H. pylori) 01/2010    Postive, Ab Tx   • Hypertension 02/2004   • Back pain    • ESOPHAGITIS     distal   • Esophagitis    • ETOH abuse     x 5   • ETOH abuse     daily drinker   • Fall    • KYA (generalized anxiety disorder)     • GERD (gastroesophageal reflux disease)    • Gilbert's disease    • Health care maintenance    • Helicobacter pylori (H. pylori)    • Hepatitis    • Hypertension    • Neck pain    • Neuropathy (CMS-HCC)    • Osteopenia     per Dexa Scan   • Osteopenia    • Ringworm 5 years old   • Vitamin D deficiency          PSH:  Past Surgical History:   Procedure Laterality Date   • WRIST ORIF Left 5/7/2015    Procedure: WRIST ORIF [79.83] DISTAL RADIUS;  Surgeon: Gurvinder Baez M.D.;  Location: SURGERY [facility];  Service:    • CERVICAL FUSION POSTERIOR  5/1/2015    Performed by Andrea Thorpe III, M.D. at SURGERY [facility]   • CERVICAL LAMINECTOMY POSTERIOR  5/1/2015    Performed by Andrea Thorpe III, M.D. at SURGERY [facility]   • ENDOSCOPY PROCEDURE  11/19/2014    Performed by Aurelio Delacruz M.D. at SURGERY [facility]   • EGD ESOPHAGUS WITH ENDOSCOPIC US  2009    positive, stricture and distal esophagitis   • APPENDECTOMY      small bowel obstruction, carcinoid tumor   • COLONOSCOPY  1998, 2009    negative   • COLONOSCOPY     • EGD ESOPHAGUS WITH ENDOSCOPIC US     • EGD W/ESOPHAGEAL DIL. BALLOON  1998, 2004   • EGD W/ESOPHAGEAL DIL. BALLOON     • OTHER ABDOMINAL SURGERY      appendectomy   • TONSILLECTOMY         FAMILY HX:  Family History   Problem Relation Age of Onset   • Other Mother      GERD   • Heart Disease Mother      CAD   • Diabetes Mother      DMII   • Cancer Father      liver   • Cancer Maternal Grandmother      Unknown type of cancer       SOCIAL HX:  Social History     Social History   • Marital status:      Spouse name: N/A   • Number of children: N/A   • Years of education: N/A     Occupational History   • Not on file.     Social History Main Topics   • Smoking status: Former Smoker     Packs/day: 0.25     Years: 4.00     Types: Cigarettes     Quit date: 1/1/1965   • Smokeless tobacco: Never Used   • Alcohol use 30.0 oz/week     50 Standard drinks or equivalent per week       Comment: 1/2 drinks daily (no more than a pint a day)   • Drug use:      Types: Marijuana, Inhaled      Comment: Marijuana once a week, helps with sleep and pain   • Sexual activity: Not Currently     Other Topics Concern   • Not on file     Social History Narrative    ** Merged History Encounter **    No known exposure to asbestos, dyes, chemicals, or pesticides. 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 Wife  recently of cancer in . 2 children, but has no relationship with them.           History   Smoking Status   • Former Smoker   • Packs/day: 0.25   • Years: 4.00   • Types: Cigarettes   • Quit date: 1965   Smokeless Tobacco   • Never Used     History   Alcohol Use   • 30.0 oz/week   • 50 Standard drinks or equivalent per week     Comment: 1/2 drinks daily (no more than a pint a day)       Allergies/Intolerances:  No Known Allergies      Other Current Medications:    Current Facility-Administered Medications:   •  clindamycin (CLEOCIN) IVPB premix 900 mg, 900 mg, Intravenous, Q8HRS, XIANG JimenezOColin, Stopped at 10/01/17 1016  •  MD ALERT...Adult ICU Electrolyte Replacement per Pharmacy Protocol, , Other, PRN, Zain Kelly Jr. D.O.  •  ceftaroline (TEFLARO) 600 mg in  mL IVPB, 600 mg, Intravenous, Q12HRS, Ana Benavidez M.D.  •  folic acid (FOLVITE) tablet 1 mg, 1 mg, Oral, DAILY, Brit Chavez, A.P.R.N., Stopped at 10/01/17 0900  •  therapeutic multivitamin-minerals (THERAGRAN-M) tablet 1 Tab, 1 Tab, Oral, DAILY, Brit Chavez, A.P.R.N., Stopped at 10/01/17 0900  •  thiamine (THIAMINE) tablet 100 mg, 100 mg, Oral, DAILY, Brit Chavez, A.P.R.N., Stopped at 10/01/17 0900  •  NS infusion, , Intravenous, Continuous, Zain Kelly Jr., D.O., Last Rate: 150 mL/hr at 10/01/17 0915  •  norepinephrine (LEVOPHED) 8 mg in  mL Infusion, 0.5-30 mcg/min, Intravenous, Continuous, Mode Mireles D.O., Stopped at 10/01/17 0555  •  duloxetine (CYMBALTA) capsule 60 mg, 60 mg, Oral,  DAILY, Evelyn Blackwell M.D., Stopped at 10/01/17 0900  •  levothyroxine (SYNTHROID) tablet 25 mcg, 25 mcg, Oral, AM ES, Evelyn Blackwell M.D., Stopped at 10/01/17 0700  •  trazodone (DESYREL) tablet 100 mg, 100 mg, Oral, Nightly, Evelyn Blackwell M.D., 100 mg at 09/29/17 2011  •  senna-docusate (PERICOLACE or SENOKOT S) 8.6-50 MG per tablet 2 Tab, 2 Tab, Oral, BID, Stopped at 10/01/17 0900 **AND** polyethylene glycol/lytes (MIRALAX) PACKET 1 Packet, 1 Packet, Oral, QDAY PRN, 1 Packet at 10/01/17 0233 **AND** magnesium hydroxide (MILK OF MAGNESIA) suspension 30 mL, 30 mL, Oral, QDAY PRN, 30 mL at 09/30/17 1039 **AND** bisacodyl (DULCOLAX) suppository 10 mg, 10 mg, Rectal, QDAY PRN, Evelyn Blackwell M.D.  •  heparin injection 5,000 Units, 5,000 Units, Subcutaneous, Q8HRS, Evelyn Blackwell M.D., 5,000 Units at 10/01/17 0516  •  ondansetron (ZOFRAN) syringe/vial injection 4 mg, 4 mg, Intravenous, Q4HRS PRN, Evelyn Blackwell M.D., 4 mg at 10/01/17 0900  •  ondansetron (ZOFRAN ODT) dispertab 4 mg, 4 mg, Oral, Q4HRS PRN, Evelyn Blackwell M.D.  •  lorazepam (ATIVAN) tablet 0.5 mg, 0.5 mg, Oral, Q6HRS PRN **OR** lorazepam (ATIVAN) injection 0.5 mg, 0.5 mg, Intravenous, Q6HRS PRN, Evelyn Blackwell M.D., 0.5 mg at 09/30/17 5322  •  Notify provider if pain remains uncontrolled, , , CONTINUOUS **AND** Use the numeric rating scale (NRS-11) on regular floors and Critical-Care Pain Observation Tool (CPOT) on ICUs/Trauma to assess pain, , , CONTINUOUS **AND** Pulse Ox (Oximetry), , , CONTINUOUS **AND** Pharmacy Consult Request ...Pain Management Review, , Other, PRN **AND** If patient difficult to arouse and/or has respiratory depression, stop any opiates that are currently infusing and call a Rapid Response., , , CONTINUOUS **AND** oxycodone immediate-release (ROXICODONE) tablet 5 mg, 5 mg, Oral, Q3HRS PRN **AND** oxycodone immediate-release (ROXICODONE) tablet 10 mg, 10 mg, Oral, Q3HRS PRN, 10 mg at 09/30/17 1805 **AND** HYDROmorphone (DILAUDID)  "injection 0.5 mg, 0.5 mg, Intravenous, Q3HRS PRN, Evelyn Blackwell M.D., 0.5 mg at 17 0100  [unfilled]    Most Recent Vital Signs:  BP (!) 89/57   Pulse 88   Temp 36.4 °C (97.5 °F)   Resp 14   Ht 1.651 m (5' 5\")   Wt 71 kg (156 lb 8.4 oz)   SpO2 99%   BMI 26.05 kg/m²   Temp  Av.9 °C (98.4 °F)  Min: 36.2 °C (97.1 °F)  Max: 37.6 °C (99.6 °F)    Physical Exam:  General: elderly, thin, no acute distress  HEENT: sclera anicteric, PERRL, EOMI, MMM, no oral lesions  Neck: supple, no lymphadenopathy, R IJ catheter  Chest: CTAB, no r/r/w, normal work of breathing.  Cardiac: irregular, normal S1 S2, no m/r/g   Abdomen: + bowel sounds, soft, non-tender, non-distended, no HSM  Extremities: significant erythema of the LLE radiating to the left hip. Left medial malleolar wound. There is a eschar on the medial right knee with surrounding erythema  Skin: see above  Neuro: Alert to name only, follows simple commands    Pertinent Lab Results:  Recent Labs      170  10/01/17   0020  10/01/17   0520   WBC  27.6*  20.9*  25.2*      Recent Labs      17   1020  170  10/01/17   0020  10/01/17   0520   HEMOGLOBIN  9.6*  9.9*  10.3*  10.3*   HEMATOCRIT  27.9*  28.8*  30.7*  30.4*   MCV  104.5*  107.1*  107.7*  107.0*   MCH  36.0*  36.8*  36.1*  36.3*   MACROCYTOSIS  1+  1+   --   1+   ANISOCYTOSIS  1+  1+   --   1+   PLATELETCT  70*  86*  73*  70*         Recent Labs      17   0440  10/01/17   0020  10/01/17   0520   SODIUM  130*  132*  133*   POTASSIUM  3.9  3.9  3.8   CHLORIDE  100  103  104   CO2  22  21  20   CREATININE  1.08  0.96  0.92        Recent Labs      17   0440  10/01/17   0020  10/01/17   0520   ALBUMIN  2.3*  2.4*  2.3*        Pertinent Micro:  Results     Procedure Component Value Units Date/Time    CULTURE WOUND W/ GRAM STAIN [[id number]]  (Abnormal)  (Susceptibility) Collected:  17 192    Order Status:  Completed Specimen:  Wound from Right Foot " Updated:  10/01/17 0541     Gram Stain Result --     Few WBCs.  Many Gram positive cocci.       Significant Indicator POS (POS)     Source WND     Site RIGHT FOOT     Culture Result Wound -- (A)     Culture Result Wound -- (A)     Beta Hemolytic Streptococcus group A  Heavy growth       Culture Result Wound -- (A)     Methicillin Resistant Staphylococcus aureus  Moderate growth  This isolate is presumed to be clindamycin resistant based on  detection of inducible resistance.  Clindamycin may still  be effective in some patients.      Narrative:       CALL  [place]  161 tel. [phone],  CALLED  161 tel. [phone] 10/01/2017, 05:40, RB PERF. RESULTS CALLED TO:APURVA  [id number] Kasper    Culture & Susceptibility     METHICILLIN RESISTANT STAPHYLOCOCCUS AUREUS     Antibiotic Sensitivity Microscan Unit Status    Ampicillin/sulbactam Resistant 16/8 mcg/mL Final    Clindamycin Resistant <=0.5 mcg/mL Final    Daptomycin Sensitive <=0.5 mcg/mL Final    Erythromycin Resistant >4 mcg/mL Final    Moxifloxacin Resistant >4 mcg/mL Final    Oxacillin Resistant >2 mcg/mL Final    Penicillin Resistant >8 mcg/mL Final    Tetracycline Sensitive <=4 mcg/mL Final    Trimeth/Sulfa Sensitive <=0.5/9.5 mcg/mL Final    Vancomycin Sensitive 2 mcg/mL Final                       URINE CULTURE(NEW) [[id number]] Collected:  09/28/17 2020    Order Status:  Completed Specimen:  Urine Updated:  09/30/17 0644     Significant Indicator NEG     Source UR     Site --     Urine Culture No growth at 48 hours    Narrative:       Indication for culture:->Emergency Room Patient    BLOOD CULTURE [[id number]] Collected:  09/28/17 1703    Order Status:  Completed Specimen:  Blood from Peripheral Updated:  09/29/17 0847     Significant Indicator NEG     Source BLD     Site PERIPHERAL     Blood Culture --     No Growth    Note: Blood cultures are incubated for 5 days and  are monitored continuously.Positive blood cultures  are called to the RN and reported as soon as  they  "are identified.      Narrative:       Per Hospital Policy: Only change Specimen Src: to \"Line\" if  specified by physician order.    BLOOD CULTURE [[id number]] Collected:  09/28/17 1715    Order Status:  Completed Specimen:  Blood from Peripheral Updated:  09/29/17 0847     Significant Indicator NEG     Source BLD     Site PERIPHERAL     Blood Culture --     No Growth    Note: Blood cultures are incubated for 5 days and  are monitored continuously.Positive blood cultures  are called to the RN and reported as soon as  they are identified.      Narrative:       Per Hospital Policy: Only change Specimen Src: to \"Line\" if  specified by physician order.    GRAM STAIN [[id number]] Collected:  09/28/17 1928    Order Status:  Completed Specimen:  Wound Updated:  09/29/17 0749     Significant Indicator .     Source WND     Site RIGHT FOOT     Gram Stain Result --     Few WBCs.  Many Gram positive cocci.      URINALYSIS [[id number]]  (Abnormal) Collected:  09/28/17 2020    Order Status:  Completed Specimen:  Urine Updated:  09/28/17 2038     Color Yellow     Character Clear     Specific Gravity 1.016     Ph 5.0     Glucose Negative mg/dL      Ketones Trace (A) mg/dL      Protein Negative mg/dL      Bilirubin Negative     Urobilinogen, Urine 0.2     Nitrite Negative     Leukocyte Esterase Negative     Occult Blood Negative     Micro Urine Req see below     Comment: Microscopic examination not performed when specimen is clear  and chemically negative for protein, blood, leukocyte esterase  and nitrite.         Narrative:       Indication for culture:->Emergency Room Patient        Blood Culture   Date Value Ref Range Status   09/28/2017   Preliminary    No Growth    Note: Blood cultures are incubated for 5 days and  are monitored continuously.Positive blood cultures  are called to the RN and reported as soon as  they are identified.          Studies:  CT left leg  Impression       1.  There is diffuse subcutaneous edema throughout " the left hemipelvis and left lower extremity with a small amount of fluid adjacent to the right medial gastrocnemius at the level the proximal calf.  2.  There is no focal drainable abscess.  3.  There is no evidence of osteomyelitis.  4.  There is mild atherosclerosis with grossly patent vasculature.  5.  There are probably inflammatory lymph nodes in the pelvis and left inguinal region with the largest measuring 1.5 cm short axis.  6.  There is free fluid in the visualized pelvis.       IMPRESSION:   1. Septic shock   2. Left lower extremity cellulitis with possible abscess  3. Encephalopathy  4. Leukocytosis    PLAN:   Rohit Snow is a 72 y.o. man with a history of CLL and HTN admitted for worsening erythema, swelling and pain of the left leg found to have septic shock secondary to severe left lower extremity cellulitis. Given appearance, I am concern of a deeper abscess and the infection appear to be worsening. Recommend surgical evaluation if not improving. Wound cultures are growing Group B strep and MRSA (vanco STEPHANIE 2). DC linezolid given thrombocytopenia and DC zosyn as no evidence of PSAR. Will transition to ceftaroline and continue clindamycin for now. Continue with supportive care. Further recommendations per clinical course.       Plan of care discussed with GERALD Mireles D.O.. Will continue to follow    Ana Benavidez M.D.

## 2017-10-01 NOTE — PROGRESS NOTES
About 20 minutes after bladder scan, pt had large incontinence episode. Pt cleaned by APURVA Perea and APURVA Meredith.

## 2017-10-01 NOTE — PROGRESS NOTES
[facility] Hospitalist Progress Note    Date of Service: 10/1/2017    Chief Complaint  72 y.o. male admitted 2017 with L leg cellulitis and simple sepsis.    transfered to CICU on  with hypotension/shock and episodes of SVT to 160's    Interval Problem Update  RI   Altered this am  Levo off this am  AFebrile  AFIb x 10mins then SVT x 10 mins  UOP 400ml/12  CVP 6-10  Worsening redness per RN    Consultants/Specialty  Pulmonology    Disposition  Critically ill in ICU on pressors        Review of Systems   Constitutional: Negative for chills and fever.   Respiratory: Negative for cough and shortness of breath.    Cardiovascular: Negative for chest pain.   Gastrointestinal: Positive for abdominal pain and constipation. Negative for diarrhea, nausea and vomiting.   Musculoskeletal: Negative for back pain and neck pain.   Skin: Negative for rash.   Neurological: Negative for dizziness, loss of consciousness and headaches.      Physical Exam  Laboratory/Imaging   Hemodynamics  Temp (24hrs), Av.6 °C (97.9 °F), Min:36.2 °C (97.1 °F), Max:37 °C (98.6 °F)   Temperature: 36.4 °C (97.5 °F)  Pulse  Av.2  Min: 63  Max: 188 Heart Rate (Monitored): 96  NIBP: 114/58 CVP (mm Hg): 4 MM HG    Respiratory      Respiration: (!) 24, Pulse Oximetry: 100 %             Fluids    Intake/Output Summary (Last 24 hours) at 10/01/17 0848  Last data filed at 10/01/17 0600   Gross per 24 hour   Intake          2021.24 ml   Output              650 ml   Net          1371.24 ml       Nutrition  Orders Placed This Encounter   Procedures   • Diet Order     Standing Status:   Standing     Number of Occurrences:   1     Order Specific Question:   Diet:     Answer:   Regular [1]     Order Specific Question:   Electrolyte modifications:     Answer:   1.5 g Sodium [1]     Physical Exam   Constitutional: He is oriented to person, place, and time. He appears well-developed and well-nourished. No distress.   HENT:   Head: Normocephalic and  atraumatic.   Eyes: Conjunctivae are normal.   Neck: JVD present.   Cardiovascular: Normal rate.  Exam reveals no gallop.    No murmur heard.  Pulmonary/Chest: Effort normal. No stridor. No respiratory distress. He has no wheezes. He has no rales.   Abdominal: Soft. There is no tenderness. There is no rebound and no guarding.   Musculoskeletal: He exhibits edema.   errythema extending to prox thigh.  No subcu air   Neurological: He is oriented to person, place, and time.   Somnolent rouses to touch, oriented   Skin: Skin is warm and dry. Rash noted. He is not diaphoretic.   errythema R ankle to tibial tubercle.  No crepitus.  No bullae   Psychiatric: He has a normal mood and affect. Thought content normal.   Nursing note and vitals reviewed.      Recent Labs      09/30/17   0440  10/01/17   0020  10/01/17   0520   WBC  27.6*  20.9*  25.2*   RBC  2.69*  2.85*  2.84*   HEMOGLOBIN  9.9*  10.3*  10.3*   HEMATOCRIT  28.8*  30.7*  30.4*   MCV  107.1*  107.7*  107.0*   MCH  36.8*  36.1*  36.3*   MCHC  34.4  33.6*  33.9   RDW  52.4*  53.1*  52.7*   PLATELETCT  86*  73*  70*   MPV  9.1  9.6  9.4     Recent Labs      09/30/17   0440  10/01/17   0020  10/01/17   0520   SODIUM  130*  132*  133*   POTASSIUM  3.9  3.9  3.8   CHLORIDE  100  103  104   CO2  22  21  20   GLUCOSE  102*  112*  118*   BUN  23*  20  20   CREATININE  1.08  0.96  0.92   CALCIUM  8.4*  8.5  8.6     Recent Labs      09/28/17   1703  09/29/17   1020  10/01/17   0020   APTT  38.9*  44.5*  37.5*   INR   --   1.20*  1.09     Recent Labs      09/28/17   1703   BNPBTYPENAT  409*              Assessment/Plan     Sinus tachycardia   Assessment & Plan    SVT to 160's pt ASx'c  Cont Tele  May need Amio if recurrent        Cirrhosis (CMS-HCC)- (present on admission)   Assessment & Plan    Chronic, stable with no e/o decompensation  Continue spironolactone        Alcoholism (CMS-HCC)- (present on admission)   Assessment & Plan    No E/O active WD at this time- monitor,  may need CIWA   Folate, MVI, thiamine supplementation  Phenobarb        Severe sepsis (CMS-HCC)- (present on admission)   Assessment & Plan    This is sepsis (without associated acute organ dysfunction).   Sepsis orders set completed.  Skin + strep and MRSA   Central line placed  Follow CVP and MAP  titrate Levo  ?nec fasc as worsened today: escalate Abx's, consult ID, STAT CT        Hyponatremia   Assessment & Plan    Worsening from baseline around 132; secondary to cirrhosis with sepsis and volume overload.  Monitor bmp daily.  Continue aldactone, fluid restrict and 1.5 gram sodium restricted diet, may need some lasix.        Cellulitis- (present on admission)   Assessment & Plan    Treat as noted above        CLL (chronic lymphocytic leukemia) (CMS-HCC)- (present on admission)   Assessment & Plan    Followed by Dr. Flores outpatient, chronically elevated WBC count.        Continuous opioid dependence (CMS-HCC)- (present on admission)   Assessment & Plan    Cont home oxycodone dose            Reviewed items::  EKG reviewed, Radiology images reviewed, Labs reviewed and Medications reviewed  DVT prophylaxis pharmacological::  Heparin  DVT prophylaxis - mechanical:  SCDs  Ulcer Prophylaxis::  Not indicated  Antibiotics:  Treating active infection/contamination beyond 24 hours perioperative coverage   critical care time 33mins managing sepsis, pressors, unstable cardiac dysrythmia

## 2017-10-01 NOTE — CARE PLAN
Problem: Fluid Volume:  Goal: Will maintain balanced intake and output  CVP monitoring in place. NS running at 150 per order.    Problem: Urinary Elimination:  Goal: Ability to reestablish a normal urinary elimination pattern will improve  Pt dysuric and incontinent.

## 2017-10-02 ENCOUNTER — APPOINTMENT (OUTPATIENT)
Dept: RADIOLOGY | Facility: MEDICAL CENTER | Age: 72
DRG: 871 | End: 2017-10-02
Attending: HOSPITALIST
Payer: COMMERCIAL

## 2017-10-02 LAB
ALBUMIN SERPL BCP-MCNC: 2 G/DL (ref 3.2–4.9)
ALBUMIN/GLOB SERPL: 0.7 G/DL
ALP SERPL-CCNC: 107 U/L (ref 30–99)
ALT SERPL-CCNC: 8 U/L (ref 2–50)
AMMONIA PLAS-SCNC: 38 UMOL/L (ref 11–45)
ANION GAP SERPL CALC-SCNC: 7 MMOL/L (ref 0–11.9)
ANISOCYTOSIS BLD QL SMEAR: ABNORMAL
AST SERPL-CCNC: 18 U/L (ref 12–45)
BASOPHILS # BLD AUTO: 0 % (ref 0–1.8)
BASOPHILS # BLD: 0 K/UL (ref 0–0.12)
BILIRUB SERPL-MCNC: 0.7 MG/DL (ref 0.1–1.5)
BUN SERPL-MCNC: 17 MG/DL (ref 8–22)
BURR CELLS BLD QL SMEAR: NORMAL
CALCIUM SERPL-MCNC: 8.1 MG/DL (ref 8.5–10.5)
CHLORIDE SERPL-SCNC: 108 MMOL/L (ref 96–112)
CO2 SERPL-SCNC: 20 MMOL/L (ref 20–33)
CREAT SERPL-MCNC: 0.88 MG/DL (ref 0.5–1.4)
EKG IMPRESSION: NORMAL
EOSINOPHIL # BLD AUTO: 0 K/UL (ref 0–0.51)
EOSINOPHIL NFR BLD: 0 % (ref 0–6.9)
ERYTHROCYTE [DISTWIDTH] IN BLOOD BY AUTOMATED COUNT: 51.9 FL (ref 35.9–50)
GFR SERPL CREATININE-BSD FRML MDRD: >60 ML/MIN/1.73 M 2
GLOBULIN SER CALC-MCNC: 2.8 G/DL (ref 1.9–3.5)
GLUCOSE SERPL-MCNC: 75 MG/DL (ref 65–99)
HCT VFR BLD AUTO: 27.2 % (ref 42–52)
HGB BLD-MCNC: 9.2 G/DL (ref 14–18)
LYMPHOCYTES # BLD AUTO: 14.69 K/UL (ref 1–4.8)
LYMPHOCYTES NFR BLD: 53.4 % (ref 22–41)
MACROCYTES BLD QL SMEAR: ABNORMAL
MAGNESIUM SERPL-MCNC: 1.9 MG/DL (ref 1.5–2.5)
MANUAL DIFF BLD: NORMAL
MCH RBC QN AUTO: 35.7 PG (ref 27–33)
MCHC RBC AUTO-ENTMCNC: 33.8 G/DL (ref 33.7–35.3)
MCV RBC AUTO: 105.4 FL (ref 81.4–97.8)
METAMYELOCYTES NFR BLD MANUAL: 0.8 %
MONOCYTES # BLD AUTO: 0.25 K/UL (ref 0–0.85)
MONOCYTES NFR BLD AUTO: 0.9 % (ref 0–13.4)
MORPHOLOGY BLD-IMP: NORMAL
NEUTROPHILS # BLD AUTO: 12.35 K/UL (ref 1.82–7.42)
NEUTROPHILS NFR BLD: 40.7 % (ref 44–72)
NEUTS BAND NFR BLD MANUAL: 4.2 % (ref 0–10)
NRBC # BLD AUTO: 0 K/UL
NRBC BLD AUTO-RTO: 0 /100 WBC
PLATELET # BLD AUTO: 89 K/UL (ref 164–446)
PLATELET BLD QL SMEAR: NORMAL
PMV BLD AUTO: 9.5 FL (ref 9–12.9)
POIKILOCYTOSIS BLD QL SMEAR: NORMAL
POTASSIUM SERPL-SCNC: 3.5 MMOL/L (ref 3.6–5.5)
PROT SERPL-MCNC: 4.8 G/DL (ref 6–8.2)
RBC # BLD AUTO: 2.58 M/UL (ref 4.7–6.1)
RBC BLD AUTO: PRESENT
SMUDGE CELLS BLD QL SMEAR: NORMAL
SODIUM SERPL-SCNC: 135 MMOL/L (ref 135–145)
WBC # BLD AUTO: 27.5 K/UL (ref 4.8–10.8)

## 2017-10-02 PROCEDURE — 700111 HCHG RX REV CODE 636 W/ 250 OVERRIDE (IP): Performed by: HOSPITALIST

## 2017-10-02 PROCEDURE — 80053 COMPREHEN METABOLIC PANEL: CPT

## 2017-10-02 PROCEDURE — 700111 HCHG RX REV CODE 636 W/ 250 OVERRIDE (IP): Performed by: INTERNAL MEDICINE

## 2017-10-02 PROCEDURE — 700102 HCHG RX REV CODE 250 W/ 637 OVERRIDE(OP): Performed by: NURSE PRACTITIONER

## 2017-10-02 PROCEDURE — 700105 HCHG RX REV CODE 258: Performed by: INTERNAL MEDICINE

## 2017-10-02 PROCEDURE — 700102 HCHG RX REV CODE 250 W/ 637 OVERRIDE(OP): Performed by: HOSPITALIST

## 2017-10-02 PROCEDURE — A9270 NON-COVERED ITEM OR SERVICE: HCPCS | Performed by: HOSPITALIST

## 2017-10-02 PROCEDURE — 700101 HCHG RX REV CODE 250: Performed by: HOSPITALIST

## 2017-10-02 PROCEDURE — 83735 ASSAY OF MAGNESIUM: CPT

## 2017-10-02 PROCEDURE — 93005 ELECTROCARDIOGRAM TRACING: CPT | Performed by: INTERNAL MEDICINE

## 2017-10-02 PROCEDURE — A9270 NON-COVERED ITEM OR SERVICE: HCPCS | Performed by: NURSE PRACTITIONER

## 2017-10-02 PROCEDURE — 770022 HCHG ROOM/CARE - ICU (200)

## 2017-10-02 PROCEDURE — 99291 CRITICAL CARE FIRST HOUR: CPT | Performed by: HOSPITALIST

## 2017-10-02 PROCEDURE — 85027 COMPLETE CBC AUTOMATED: CPT

## 2017-10-02 PROCEDURE — 82140 ASSAY OF AMMONIA: CPT

## 2017-10-02 PROCEDURE — 85007 BL SMEAR W/DIFF WBC COUNT: CPT

## 2017-10-02 PROCEDURE — 93010 ELECTROCARDIOGRAM REPORT: CPT | Performed by: INTERNAL MEDICINE

## 2017-10-02 RX ORDER — POTASSIUM CHLORIDE 14.9 MG/ML
20 INJECTION INTRAVENOUS ONCE
Status: COMPLETED | OUTPATIENT
Start: 2017-10-02 | End: 2017-10-02

## 2017-10-02 RX ORDER — DIGOXIN 0.25 MG/ML
500 INJECTION INTRAMUSCULAR; INTRAVENOUS ONCE
Status: COMPLETED | OUTPATIENT
Start: 2017-10-02 | End: 2017-10-02

## 2017-10-02 RX ORDER — DIGOXIN 125 MCG
125 TABLET ORAL DAILY
Status: DISCONTINUED | OUTPATIENT
Start: 2017-10-03 | End: 2017-10-19 | Stop reason: HOSPADM

## 2017-10-02 RX ORDER — DIGOXIN 0.25 MG/ML
250 INJECTION INTRAMUSCULAR; INTRAVENOUS EVERY 8 HOURS
Status: COMPLETED | OUTPATIENT
Start: 2017-10-02 | End: 2017-10-03

## 2017-10-02 RX ORDER — MAGNESIUM SULFATE HEPTAHYDRATE 40 MG/ML
2 INJECTION, SOLUTION INTRAVENOUS ONCE
Status: COMPLETED | OUTPATIENT
Start: 2017-10-02 | End: 2017-10-02

## 2017-10-02 RX ADMIN — SODIUM CHLORIDE: 9 INJECTION, SOLUTION INTRAVENOUS at 05:59

## 2017-10-02 RX ADMIN — LEVOTHYROXINE SODIUM 25 MCG: 50 TABLET ORAL at 05:59

## 2017-10-02 RX ADMIN — POLYETHYLENE GLYCOL (3350) 1 PACKET: 17 POWDER, FOR SOLUTION ORAL at 14:51

## 2017-10-02 RX ADMIN — DIGOXIN 250 MCG: 0.25 INJECTION INTRAMUSCULAR; INTRAVENOUS at 19:00

## 2017-10-02 RX ADMIN — POTASSIUM CHLORIDE 20 MEQ: 200 INJECTION, SOLUTION INTRAVENOUS at 08:50

## 2017-10-02 RX ADMIN — MAGNESIUM SULFATE IN WATER 2 G: 40 INJECTION, SOLUTION INTRAVENOUS at 14:51

## 2017-10-02 RX ADMIN — ENOXAPARIN SODIUM 40 MG: 100 INJECTION SUBCUTANEOUS at 11:17

## 2017-10-02 RX ADMIN — DULOXETINE HYDROCHLORIDE 60 MG: 60 CAPSULE, DELAYED RELEASE ORAL at 08:53

## 2017-10-02 RX ADMIN — PHENYLEPHRINE HYDROCHLORIDE 60 MCG/MIN: 10 INJECTION INTRAVENOUS at 11:32

## 2017-10-02 RX ADMIN — STANDARDIZED SENNA CONCENTRATE AND DOCUSATE SODIUM 2 TABLET: 8.6; 5 TABLET, FILM COATED ORAL at 20:26

## 2017-10-02 RX ADMIN — FOLIC ACID 1 MG: 1 TABLET ORAL at 08:53

## 2017-10-02 RX ADMIN — THIAMINE HCL TAB 100 MG 100 MG: 100 TAB at 08:53

## 2017-10-02 RX ADMIN — STANDARDIZED SENNA CONCENTRATE AND DOCUSATE SODIUM 2 TABLET: 8.6; 5 TABLET, FILM COATED ORAL at 08:53

## 2017-10-02 RX ADMIN — CEFTAROLINE FOSAMIL 600 MG: 600 POWDER, FOR SOLUTION INTRAVENOUS at 08:52

## 2017-10-02 RX ADMIN — HYDROMORPHONE HYDROCHLORIDE 0.5 MG: 1 INJECTION, SOLUTION INTRAMUSCULAR; INTRAVENOUS; SUBCUTANEOUS at 20:05

## 2017-10-02 RX ADMIN — CLINDAMYCIN IN 5 PERCENT DEXTROSE 900 MG: 18 INJECTION, SOLUTION INTRAVENOUS at 05:58

## 2017-10-02 RX ADMIN — CLINDAMYCIN IN 5 PERCENT DEXTROSE 900 MG: 18 INJECTION, SOLUTION INTRAVENOUS at 13:11

## 2017-10-02 RX ADMIN — HEPARIN SODIUM 5000 UNITS: 5000 INJECTION, SOLUTION INTRAVENOUS; SUBCUTANEOUS at 05:59

## 2017-10-02 RX ADMIN — TRAZODONE HYDROCHLORIDE 100 MG: 50 TABLET ORAL at 20:25

## 2017-10-02 RX ADMIN — PHENOBARBITAL SODIUM 130 MG: 130 INJECTION INTRAMUSCULAR; INTRAVENOUS at 19:04

## 2017-10-02 RX ADMIN — CEFTAROLINE FOSAMIL 600 MG: 600 POWDER, FOR SOLUTION INTRAVENOUS at 20:32

## 2017-10-02 RX ADMIN — MULTIPLE VITAMINS W/ MINERALS TAB 1 TABLET: TAB at 08:53

## 2017-10-02 RX ADMIN — DIGOXIN 500 MCG: 0.25 INJECTION INTRAMUSCULAR; INTRAVENOUS at 11:10

## 2017-10-02 RX ADMIN — CLINDAMYCIN IN 5 PERCENT DEXTROSE 900 MG: 18 INJECTION, SOLUTION INTRAVENOUS at 22:09

## 2017-10-02 ASSESSMENT — LIFESTYLE VARIABLES
TREMOR: NO TREMOR
VISUAL DISTURBANCES: NOT PRESENT
TREMOR: NO TREMOR
TREMOR: NO TREMOR
AUDITORY DISTURBANCES: NOT PRESENT
NAUSEA AND VOMITING: NO NAUSEA AND NO VOMITING
ANXIETY: *
NAUSEA AND VOMITING: NO NAUSEA AND NO VOMITING
ANXIETY: NO ANXIETY (AT EASE)
VISUAL DISTURBANCES: NOT PRESENT
TOTAL SCORE: 2
NAUSEA AND VOMITING: NO NAUSEA AND NO VOMITING
TREMOR: NO TREMOR
TOTAL SCORE: 2
VISUAL DISTURBANCES: NOT PRESENT
ORIENTATION AND CLOUDING OF SENSORIUM: CANNOT DO SERIAL ADDITIONS OR IS UNCERTAIN ABOUT DATE
ORIENTATION AND CLOUDING OF SENSORIUM: CANNOT DO SERIAL ADDITIONS OR IS UNCERTAIN ABOUT DATE
AGITATION: SOMEWHAT MORE THAN NORMAL ACTIVITY
HEADACHE, FULLNESS IN HEAD: NOT PRESENT
AUDITORY DISTURBANCES: NOT PRESENT
ORIENTATION AND CLOUDING OF SENSORIUM: CANNOT DO SERIAL ADDITIONS OR IS UNCERTAIN ABOUT DATE
TOTAL SCORE: 4
PAROXYSMAL SWEATS: NO SWEAT VISIBLE
HEADACHE, FULLNESS IN HEAD: NOT PRESENT
TREMOR: NO TREMOR
AGITATION: SOMEWHAT MORE THAN NORMAL ACTIVITY
ANXIETY: NO ANXIETY (AT EASE)
ANXIETY: NO ANXIETY (AT EASE)
PAROXYSMAL SWEATS: NO SWEAT VISIBLE
NAUSEA AND VOMITING: NO NAUSEA AND NO VOMITING
ORIENTATION AND CLOUDING OF SENSORIUM: CANNOT DO SERIAL ADDITIONS OR IS UNCERTAIN ABOUT DATE
AGITATION: SOMEWHAT MORE THAN NORMAL ACTIVITY
AGITATION: SOMEWHAT MORE THAN NORMAL ACTIVITY
TOTAL SCORE: 2
HEADACHE, FULLNESS IN HEAD: NOT PRESENT
HEADACHE, FULLNESS IN HEAD: NOT PRESENT
PAROXYSMAL SWEATS: NO SWEAT VISIBLE
PAROXYSMAL SWEATS: NO SWEAT VISIBLE
NAUSEA AND VOMITING: NO NAUSEA AND NO VOMITING
ORIENTATION AND CLOUDING OF SENSORIUM: CANNOT DO SERIAL ADDITIONS OR IS UNCERTAIN ABOUT DATE
AUDITORY DISTURBANCES: NOT PRESENT
AUDITORY DISTURBANCES: NOT PRESENT
VISUAL DISTURBANCES: NOT PRESENT
ANXIETY: NO ANXIETY (AT EASE)
TOTAL SCORE: 2
HEADACHE, FULLNESS IN HEAD: NOT PRESENT
AGITATION: SOMEWHAT MORE THAN NORMAL ACTIVITY
AUDITORY DISTURBANCES: NOT PRESENT
VISUAL DISTURBANCES: NOT PRESENT
PAROXYSMAL SWEATS: NO SWEAT VISIBLE

## 2017-10-02 ASSESSMENT — ENCOUNTER SYMPTOMS: LOSS OF CONSCIOUSNESS: 1

## 2017-10-02 NOTE — WOUND TEAM
[facility]  Inpatient Services  Initial Wound and Skin Care Evaluation    Admission Date:  09/28/17     HPI, PMH, SH: Reviewed  Unit where seen by Wound Team:  [facility]    WOUND CONSULT RELATED TO:  LLE      SUBJECTIVE:  Confused       Self Report / Pain Level:  Tolerated      OBJECTIVE:  Patient in bed, yellow chucks wrapped around leg due to drainage, patient withdrawing and agitated with movements.  Per nursing, chucks saturating every 1-2 hours.      WOUND TYPE, LOCATION, CHARACTERISTICS (Pressure ulcers: location, stage, POA or date identified)    Location and type of wound:  LLE severe edema with drained bullae and cellulitis           Periwound:  Edematous       Drainage:   Heavy serous    Tissue Type and %:  100% red    Wound Edges:  Peeling    Odor:    None    Exposed structure(s): None   S&S of Infection:  Edema, erythema, drainage    INTERVENTIONS BY WOUND TEAM:  Previous chucks removed, leg gently cleansed with warm wash cloth soap and water dabbing over open/peeling areas.  Covered intact areas with moisture barrier and placed over trauma pads.     Interdisciplinary consultation:  RN      EVALUATION:  Patient with edema, erythema and heavy serous drainage to LLE from unknown etiology.  1x1 dry intact scab to right medial knee with no s/s of infection, OK to leave open to air.  Wound to LLE medial ankle, also scabbed over and dry and intact, question this as source of infection.  Supplies ordered for RN, ordered adult diapers to apply under and over LLE to absorb and wick moisture away from skin, spandage or roll gauze to secure in place as patient is withdrawing and constantly moving.       ID following, imaging negative.     Factors affecting wound healing:  Infection - cultures are growing MRSA and group B strep, drainage, agitation   Goals:  Drainage to resolve in 1 week.     NURSING PLAN OF CARE ORDERS (X):    Dressing changes: See Dressing Maintenance orders: X  Skin care: See Skin Care  orders: X  Rectal tube care: See Rectal Tube Care orders:   Other orders:    RSKIN: CURRENT (X) ORDERED (O)  Q shift Glen:  X  Q shift pressure point assessments:  X  Pressure redistribution mattress        WILLIAM    X  Bariatric WILLIAM      Bariatric foam        Heel float boots       Heels floated on pillows      Barrier wipes      Barrier Cream      Barrier paste      Sacral silicone dressing      Silicone O2 tubing      Anchorfast      Trach with Optifoam split foam       Waffle cushion      Rectal tube or BMS      Antifungal tx    Turn q 2 hours   X  Up to chair     Ambulate   PT/OT     Dietician      PO     TF X  TPN     PVN    NPO   # days   Other       WOUND TEAM PLAN OF CARE (X):   NPWT change 3 x week:        Dressing changes by wound team:       Follow up as needed:     X wound team to follow up  Other (explain):    Anticipated discharge plans (X):  SNF:         X  Home Care:           Outpatient Wound Center:            Self Care:            Other:

## 2017-10-02 NOTE — CARE PLAN
Problem: Venous Thromboembolism (VTW)/Deep Vein Thrombosis (DVT) Prevention:  Goal: Patient will participate in Venous Thrombosis (VTE)/Deep Vein Thrombosis (DVT)Prevention Measures  Outcome: PROGRESSING AS EXPECTED      Problem: Fluid Volume:  Goal: Will maintain balanced intake and output  Outcome: PROGRESSING AS EXPECTED

## 2017-10-02 NOTE — CARE PLAN
Problem: Safety  Goal: Will remain free from falls  Outcome: PROGRESSING AS EXPECTED  Educated patient of using restraints to prevent him from pulling catheters and his cortrack.    Problem: Skin Integrity  Goal: Risk for impaired skin integrity will decrease  Outcome: PROGRESSING AS EXPECTED  Turn Q2H to prevent skin breakdown.

## 2017-10-02 NOTE — PROGRESS NOTES
Informed Dr. Ibrahim that pt has been in and out of Afib for the last 12 hours but now sustaining with HR of 's. Patient has a history of afib. Continue monitoring patient per MD. No new orders received at this time.

## 2017-10-02 NOTE — PROGRESS NOTES
Infectious Disease Progress Note    Author: Ana Benavidez M.D. Date & Time of service: 10/2/2017  10:12 AM    Chief Complaint:  FU sepsis/LLE cellulitis    Interval History:  10/2 AF, WBC 27.5, confused and in pain  Labs Reviewed, Medications Reviewed, Radiology Reviewed and Wound Reviewed.    Review of Systems:  Review of Systems   Unable to perform ROS: Mental acuity       Hemodynamics:  Temp (24hrs), Av.9 °C (96.7 °F), Min:35.9 °C (96.7 °F), Max:35.9 °C (96.7 °F)  Temperature: 35.9 °C (96.7 °F), Monitored Temp: 36.5 °C (97.7 °F)  Pulse  Av.7  Min: 63  Max: 188Heart Rate (Monitored): (!) 107  NIBP: 113/74  CVP (mm Hg): (!) 11 MM HG    Physical Exam:  Physical Exam   Constitutional: He appears well-developed.   Thin  Older than stated age  Chronically ill   HENT:   Head: Normocephalic and atraumatic.   Eyes: EOM are normal. Pupils are equal, round, and reactive to light.   Neck: Neck supple.   R IJ catheter   Cardiovascular:   tachycardic   Pulmonary/Chest: Effort normal and breath sounds normal.   Abdominal: Soft. There is no tenderness.   Musculoskeletal: He exhibits edema and tenderness.   significant erythema and edema of the LLE radiating to the left hip and medial thigh.   Left medial malleolar wound.   There is a eschar on the medial right knee with surrounding erythema   Neurological:   Awake  Confused   Skin: There is erythema.       Meds:    Current Facility-Administered Medications:   •  potassium chloride (KCL-CENTRAL) IV *Administer in ICU only*  •  clindamycin (CLEOCIN) IV  •  MD ALERT...Adult ICU Electrolyte Replacement per Pharmacy Protocol  •  ceftaroline (TEFLARO) ivpb  •  MD ALERT...Phenobarbital Alcohol Withdrawal Protocol Pharmacist to Implement (ICU Only)  •  Pharmacy **AND** If RASS 1 to 4: assess RASS every 30 minutes until RASS returns to goal sedation (RASS -2 to 0) **AND** If RASS -2 to 0: assess RASS every 30 minutes for 2 occurrences and then every 4 hours **AND** If RASS  -5 to -3: assess RASS, BP, and RR every 15 minutes with Continuous Pulse Oximetry until RASS returns to goal sedation (RASS -2 to 0) **AND** Pulse ox (oximetry) **AND** PHENObarbital **AND** PHENObarbital  •  folic acid  •  therapeutic multivitamin-minerals  •  thiamine  •  NS  •  NORepinephrine (LEVOPHED) infusion  •  duloxetine  •  levothyroxine  •  trazodone  •  senna-docusate **AND** polyethylene glycol/lytes **AND** magnesium hydroxide **AND** bisacodyl  •  heparin  •  ondansetron  •  ondansetron  •  Notify provider if pain remains uncontrolled **AND** Use the numeric rating scale (NRS-11) on regular floors and Critical-Care Pain Observation Tool (CPOT) on ICUs/Trauma to assess pain **AND** Pulse Ox (Oximetry) **AND** Pharmacy Consult Request **AND** If patient difficult to arouse and/or has respiratory depression, stop any opiates that are currently infusing and call a Rapid Response. **AND** oxycodone immediate-release **AND** oxycodone immediate-release **AND** HYDROmorphone    Labs:  Recent Labs      09/30/17   0440  10/01/17   0020  10/01/17   0520  10/02/17   0449   WBC  27.6*  20.9*  25.2*  27.5*   RBC  2.69*  2.85*  2.84*  2.58*   HEMOGLOBIN  9.9*  10.3*  10.3*  9.2*   HEMATOCRIT  28.8*  30.7*  30.4*  27.2*   MCV  107.1*  107.7*  107.0*  105.4*   MCH  36.8*  36.1*  36.3*  35.7*   RDW  52.4*  53.1*  52.7*  51.9*   PLATELETCT  86*  73*  70*  89*   MPV  9.1  9.6  9.4  9.5   NEUTSPOLYS  29.50*   --   43.80*  40.70*   LYMPHOCYTES  60.90*   --   47.40*  53.40*   MONOCYTES  0.90   --   0.00  0.90   EOSINOPHILS  0.00   --   0.00  0.00   BASOPHILS  0.00   --   0.00  0.00   RBCMORPHOLO  Present   --   Present  Present     Recent Labs      10/01/17   0020  10/01/17   0520  10/02/17   0449   SODIUM  132*  133*  135   POTASSIUM  3.9  3.8  3.5*   CHLORIDE  103  104  108   CO2  21  20  20   GLUCOSE  112*  118*  75   BUN  20  20  17     Recent Labs      10/01/17   0020  10/01/17   0520  10/02/17   0449   ALBUMIN  2.4*   2.3*  2.0*   TBILIRUBIN  0.7  0.7  0.7   ALKPHOSPHAT  103*  109*  107*   TOTPROTEIN  5.6*  5.6*  4.8*   ALTSGPT  7  7  8   ASTSGOT  17  16  18   CREATININE  0.96  0.92  0.88       Imaging:  Ct-abdomen-pelvis With    Result Date: 10/1/2017  10/1/2017 8:07 AM HISTORY/REASON FOR EXAM:  Diffuse abdominal pain. History of sepsis. Left lower extremity cellulitis. TECHNIQUE/EXAM DESCRIPTION: CT scan of the abdomen and pelvis with contrast. Contrast-enhanced helical scanning was obtained from the diaphragmatic domes through the pubic symphysis following the bolus administration of 100 mL of Omnipaque 350 nonionic contrast without complication. Low dose optimization technique was utilized for this CT exam including automated exposure control and adjustment of the mA and/or kV according to patient size. COMPARISON: CT 6/5/2015 FINDINGS: The visualized lung bases demonstrate small amounts of dependent pleural effusion with bibasilar dependent airspace disease, most likely atelectasis. There is no acute bony process. There is degenerative change in the lower lumbar spine and pelvis. CT Abdomen: Liver is unchanged in appearance with slight nodularity. There are no definite focal masses. Spleen is mildly enlarged measuring 13.1 cm in diameter without change. Hepatic veins are somewhat attenuated. Portal veins, splenic vein and SMV are patent. Pancreas is somewhat atrophic but otherwise unremarkable. The gallbladder demonstrates some density dependently which could be related to tumefactive sludge or noncalcified stones. There is no biliary dilatation. The adrenal glands are normal in size. Kidneys demonstrate no hydronephrosis. There are probable small cortical cysts. The abdominal aorta is normal in caliber. There is atherosclerosis of the aorta. There is no lymphadenopathy. The bowel demonstrates no evidence of bowel obstruction. There is moderate stool throughout the colon. There is a hiatal hernia. There is a small amount of  fluid around the liver although this is decreased over the prior study. There is minimal fluid in both paracolic gutters. There is diffuse subcutaneous edema. CT Pelvis: There is no acute inflammatory process in the pelvis. There is free fluid in the pelvis with diffuse subcutaneous edema. There are mildly prominent lymph nodes in the inguinal regions, left greater than right with some nodes in the external iliac region as well more so on the left. The largest inguinal node is 1.5 cm short axis on the left side.. There is postoperative change involving the colon. Bladder is decompressed with a Maradiaga catheter.     1.  There is mild colonic distention with large amount of stool throughout the colon. There is no bowel obstruction. 2.  There is a hiatal hernia. 3.  Changes of the liver with mild splenomegaly and a small amount of ascites in the abdomen and pelvis suggestive of underlying hepatocellular disease. 4.  Dependent density in the gallbladder which could be tumefactive sludge or noncalcified stones. 5.  There are small dependent bilateral pleural effusions with dependent airspace disease, likely atelectasis. 6.  There is mild lymphadenopathy in the pelvis more so on the left likely inflammatory. 7.  There is diffuse subcutaneous edema.    Ct-extremity, Lower With Left    Result Date: 10/1/2017  10/1/2017 8:07 AM HISTORY/REASON FOR EXAM:  Progression of strep/staph cellulitis with sepsis. Possible necrotizing fasciitis.. TECHNIQUE/EXAM DESCRIPTION AND NUMBER OF VIEWS:  CT scan of the LEFT lower extremity with contrast, with reconstructions. Thin helical 3 mm sections were obtained from the distal femur through the proximal tibia/fibula. Sagittal and coronal multiplanar reconstructions were generated from the axial images. A total of 100 mL of Omnipaque 350 nonionic contrast was administered  IV without complication. Up to date radiation dose reduction adjustments have been utilized to meet ALARA standards for  radiation dose reduction. COMPARISON: X-rays of the foot and tibia fibula 9/29/2017, 9/28/2017 respectively. FINDINGS: Imaging of the visualized portion of the pelvis demonstrates fluid in the pelvis. Bladder is decompressed with a Maradiaga catheter. There is postoperative change involving the colon. There are mildly prominent bilateral external iliac and common femoral lymph nodes. Largest node in the left inguinal region is 1.5 cm short axis diameter. There is diffuse subcutaneous edema throughout the pelvis. Imaging throughout the left lower extremity demonstrates diffuse subcutaneous edema. There is a small amount of fluid in the intermuscular planes along the medial aspect of the proximal lower leg adjacent to the medial gastrocnemius musculature. There is  no focal abscess collection. There is a small joint effusion of the knee. There is some mild fatty infiltration of the musculature. There is atherosclerosis but basilar structures appear grossly patent. Limited partial visualization of portions of the right lower extremity also demonstrates some mild subcutaneous edema. Bones demonstrate mild degenerative changes in the hip and left knee. There is no evidence of osteomyelitis.     1.  There is diffuse subcutaneous edema throughout the left hemipelvis and left lower extremity with a small amount of fluid adjacent to the right medial gastrocnemius at the level the proximal calf. 2.  There is no focal drainable abscess. 3.  There is no evidence of osteomyelitis. 4.  There is mild atherosclerosis with grossly patent vasculature. 5.  There are probably inflammatory lymph nodes in the pelvis and left inguinal region with the largest measuring 1.5 cm short axis. 6.  There is free fluid in the visualized pelvis.    Ct-head W/o    Result Date: 10/1/2017  HISTORY/REASON FOR EXAM:  Altered Mental Status. TECHNIQUE/EXAM DESCRIPTION: CT scan of the head without contrast, 10/1/2017 12:28 AM. Contiguous 5 mm axial sections  were obtained from the skull base through the vertex. Up to date radiation dose reduction adjustments have been utilized to meet ALARA standards for radiation dose reduction. COMPARISON:  4/23/2015 FINDINGS:   The ventricular system and cortical sulci are prominent, consistent with the patient's age.  There is no midline shift or other mass effect.  A 5 mm circumscribed lucency is present in the right temporal lobe, consistent with old lacunar infarct. This is unchanged. There is no acute intra-axial abnormality or extra-axial fluid collection.  There is no intracranial hemorrhage.  The calvaria are intact. The visualized paranasal sinuses show no unusual opacity.     1.  No acute intracranial abnormality. 2.  Moderate atrophy, age-consistent. 3.  Probable old lacunar infarct in the right temporal lobe, unchanged from the prior scan. INTERPRETING LOCATION:  [facility], [id number]    Dx-chest-portable (1 View)    Result Date: 9/29/2017 9/29/2017 1:25 PM HISTORY/REASON FOR EXAM:  Line placement. TECHNIQUE/EXAM DESCRIPTION AND NUMBER OF VIEWS: Single portable view of the chest. COMPARISON: 9/28/2017. FINDINGS: The soft tissues and bony structures are unremarkable. The heart and mediastinal structures are within normal limits. Pulmonary vascularity is normal. There is right basilar atelectasis. There is no effusion or pneumothorax. There has been interval insertion of a central venous catheter which terminates with the tip projecting over the expected region of the mid to distal superior vena cava.     1.  Interval insertion of a central venous catheter which terminates with the tip projecting over the expected region of the mid to distal superior vena cava. 2.  Right basilar atelectasis.    Dx-chest-portable (1 View)    Result Date: 9/28/2017 9/28/2017 4:32 PM HISTORY/REASON FOR EXAM:  Sepsis. Left lower extremity wound. TECHNIQUE/EXAM DESCRIPTION AND NUMBER OF VIEWS: Single portable view of the chest.  COMPARISON: 9/1/2016 FINDINGS: The mediastinal and cardiac silhouette is unremarkable. The pulmonary vascularity is within normal limits. Lungs demonstrate no evidence of pneumonia. Slight hazy density seen adjacent to the right anterior 1st rib is similar to the prior studies and likely due to degenerative change. There is no significant pleural effusion. There is no visible pneumothorax. There are no acute bony abnormalities.     1.  There is no acute cardiopulmonary process.    Dx-foot-2- Left    Result Date: 9/29/2017 9/29/2017 4:10 PM HISTORY/REASON FOR EXAM:  Left foot swelling and weeping TECHNIQUE/EXAM DESCRIPTION AND NUMBER OF VIEWS: 2 views of the LEFT foot. COMPARISON:  None. FINDINGS:  Bone mineralization is age appropriate. Bony alignment is anatomic. There is no evidence of acute fracture or dislocation. There is no soft tissue gas. There is soft tissue swelling about the ankle.     Soft tissue swelling about the ankle. No evidence of acute osseous abnormality or soft tissue gas.    Dx-lumbar Spine-2 Or 3 Views    Result Date: 9/9/2017 9/9/2017 3:24 AM HISTORY/REASON FOR EXAM:  Pain Following Trauma. TECHNIQUE/ EXAM DESCRIPTION AND NUMBER OF VIEWS:  3 views of the lumbar spine. COMPARISON: None. FINDINGS: The lowest formed intervertebral disc will be designated L5-S1 for the purposes of this report and vertebral levels numbered accordingly. No acute fracture is evident. No gross malalignment is seen. There is mild loss of intervertebral disc height in the upper lumbar spine. There is moderate loss of intervertebral disc height at L4-L5. There are anterior osteophytes at most levels. There is no evidence of spondylolisthesis or osseous lesion. There are degenerative changes of the mid to lower lumbar facet joints. There is calcific atherosclerotic plaque.     1.  No evidence of acute fracture. 2.  Multilevel multifactorial degenerative changes    Dx-thoracic Spine-2 Views    Result Date:  9/9/2017 9/9/2017 3:21 AM HISTORY/REASON FOR EXAM:  Pain Following Trauma. TECHNIQUE/EXAM DESCRIPTION AND NUMBER OF VIEWS:  Thoracic spine, 2 views. COMPARISON:  None. FINDINGS: Glands unremarkable. There is lucency extending to the superior endplate of L2 on the second image. This appearance is not present on the dedicated lumbar spine radiographs. No other finding suspicious for acute fracture are seen. There is mild loss of intervertebral disc height throughout the thoracic spine. There are anterior osteophytes at most levels.     1.  Lucency through the superior endplate of L2 on the second image, discordant with the appearance on the dedicated lumbar spine radiographs. I suspect this is artifactual however a fracture is not excluded. Further assessment is recommended with CT of the lumbar spine. 2.  Thoracic spondylosis    Dx-tibia And Fibula Left    Result Date: 9/28/2017 9/28/2017 4:32 PM HISTORY/REASON FOR EXAM:  Left tibia and fibular redness and swelling with open wound in the region of the medial malleolus. TECHNIQUE/EXAM DESCRIPTION AND NUMBER OF VIEWS:  2 views of the LEFT tibia and fibula. COMPARISON: Left knee 4/24/2015 FINDINGS: There is no evidence of acute fracture involving the tibia or fibula. Sclerotic focus posterior left tibial metadiaphyseal region is without change and may represent a sclerotic NOF or fibrous cortical defect. There is mild diffuse soft tissue swelling most prominent at the level the ankle. There is no gas in the soft tissues and there is no foreign body. There is no periostitis.     1.  There is mild diffuse swelling in the soft tissues of the distal left lower leg and ankle. There is no gas in the soft tissues or foreign body. 2.  There is no plain film evidence of osteomyelitis.    Dx-wrist-complete 3+ Left    Result Date: 9/9/2017 9/9/2017 3:24 AM HISTORY/REASON FOR EXAM:  Pain/Deformity Following Trauma. TECHNIQUE/EXAM DESCRIPTION AND NUMBER OF VIEWS:  3 views of the   LEFT wrist. COMPARISON: Radiographs 2015 FINDINGS: MINERALIZATION: Decreased. INJURY: There has been interval placement of a volar plate and screws transfixing the previously demonstrated intra-articular distal radial fracture. The hardware appears intact. There is underlying curvilinear deformity of the distal radius. There is ulnar positive variance, as before. No acute fracture is seen. JOINTS: No erosive arthropathy is evident.     1.  No radiographic evidence of acute traumatic injury. 2.  Healed surgically transfixed fracture of the distal radius 3.  Osteopenia    Echocardiogram Comp W/o Cont    Result Date: 2017  Transthoracic Echo Report Echocardiography Laboratory CONCLUSIONS Normal left ventricular size, thickness, systolic function, and diastolic function. Mitral annular calcification. Mild mitral regurgitation. Aortic sclerosis without stenosis. Mild aortic insufficiency. Normal estimated right-sided pressures. No prior study is available for comparison. MAGALI TODD Exam Date:         2017                    14:07 Exam Location:     Inpatient Priority:          Routine Ordering Physician:        CIRILO HSU JR Referring Physician:       [id number] Sonographer:               Lo Lundberg RDCS Age:    72     Gender:    M MRN:    [id number] :    1945 BSA:    1.73   Ht (in):    65     Wt (lb):    145 Exam Type:     Complete Indications:     Cardiac arrhythmia, unspecified ICD Codes:       I499 CPT Codes:       95610 BP:   89     /   57     HR:   80 Technical Quality:       Fair MEASUREMENTS  (Male / Female) Normal Values 2D ECHO LV Diastolic Diameter PLAX        4.6 cm                4.2 - 5.9 / 3.9 - 5.3 cm LV Systolic Diameter PLAX         2.1 cm                2.1 - 4.0 cm IVS Diastolic Thickness           1 cm                  LVPW Diastolic Thickness          1 cm                  RV Diameter 4C                    2.7 cm                2.5 - 2.9 cm LVOT Diameter                      1.8 cm                RA Diameter                       2.7 cm                Estimated LV Ejection Fraction    60 %                  LV Ejection Fraction MOD BP       60.8 %                >= 55  % LV Ejection Fraction MOD 4C       58.4 %                LV Ejection Fraction MOD 2C       58.4 %                LA Volume Index                   13.5 cm³/m²           16 - 28 cm³/m² IVC Diameter                      1.5 cm                M-MODE Aortic Root Diameter MM           3.9 cm                DOPPLER AV Peak Velocity                  1.3 m/s               AV Peak Gradient                  6.7 mmHg              AV Mean Gradient                  3.3 mmHg              AI Pressure Half Time             463 ms                LVOT Peak Velocity                0.89 m/s              AV Area Cont Eq vti               1.9 cm²               Mitral E Point Velocity           0.96 m/s              Mitral E to A Ratio               0.99                  MV Pressure Half Time             60.6 ms               MV Area PHT                       3.6 cm²               MV Deceleration Time              209 ms                TR Peak Velocity                  222 cm/s              PV Peak Velocity                  0.7 m/s               PV Peak Gradient                  1.9 mmHg              RVOT Peak Velocity                0.52 m/s              * Indicates values subject to auto-interpretation LV EF:  60    % FINDINGS Left Ventricle Normal left ventricular size, thickness, systolic function, and diastolic function.  Left ventricular ejection fraction is visually estimated to be 60%. Normal regional wall motion. Right Ventricle Normal right ventricular size and systolic function. Right Atrium Normal right atrial size. Normal inferior vena cava size and inspiratory collapse. Left Atrium Normal left atrial size. Mitral Valve Mitral annular calcification. Thickened mitral valve leaflets. Mild mitral regurgitation. Aortic  Valve Aortic sclerosis without stenosis. Mild aortic insufficiency. Tricuspid Valve Structurally normal tricuspid valve. Mild tricuspid regurgitation. Estimated right ventricular systolic pressure  is 25 mmHg. Pulmonic Valve Structurally normal pulmonic valve. Mild pulmonic insufficiency. Pericardium Normal pericardium without effusion. Aorta Ascending aorta is normal for body surface area, and measured at a diameter of 3.7 cm above the sinotubular junction. Suleman Nguyen MD (Electronically Signed) Final Date:     2017                 23:50    Le Venous Duplex (specify In Comments Left, Right Or Bilateral)    Result Date: 2017   Vascular Laboratory  CONCLUSIONS  No evidence of acute DVT or SVT in the left lower extremity.  Edema is noted.  MAGALI TODD  Exam Date:     2017 16:43  Room #:     Inpatient  Priority:     Stat  Ht (in):             Wt (lb):  Ordering Physician:        REYNALDO LUU  Referring Physician:       [id number] Galeano  Sonographer:               Samina Galloway RVT  Study Type:                Complete Unilateral  Technical Quality:         Adequate  Age:    72    Gender:     M  MRN:    [id number]  :    1945      BSA:  Indications:     Localized swelling, mass and lump, left lower limb  CPT Codes:       38984  ICD Codes:         History:         Swelling and redness of left leg for less than 24 hours.  Limitations:     Severe pain.  PROCEDURES:  Left lower extremity venous duplex imaging.  The following venous structures were evaluated: common femoral, profunda  femoral, greater saphenous, femoral, popliteal, peroneal and posterior  tibial veins.  Serial compression, augmentation maneuvers, color and spectral Doppler flow  evaluations were performed.  FINDINGS:  Left lower extremity -  Complete color filling and compressibility with normal venous flow dynamics  including spontaneous flow, response to augmentation maneuvers, and  respiratory phasicity.  The  peroneal and posterior tibial veins are difficult to assess for  compressibility due to patient pain, but flow response to augmentation is  demonstrated.  Interstitial fluid consistent with edema is observed in the thigh and below  the knee.  Flow was evaluated in the contralateral common femoral vein and normal  venous flow dynamics including spontaneous flow, respiratory phasic  variation and augmentation were demonstrated.  Kevin Lester MD  (Electronically Signed)  Final Date:      29 September 2017                   15:52    Dx-abdomen For Tube Placement    Result Date: 10/1/2017  10/1/2017 7:28 PM HISTORY/REASON FOR EXAM:  Line evaluation. TECHNIQUE/EXAM DESCRIPTION AND NUMBER OF VIEWS:  1 view(s) of the abdomen. COMPARISON:  9/1/2016. FINDINGS: Enteric tube projects over the second portion of the duodenum. There is mild colonic distention. Degenerative changes are seen in the spine.     Enteric tube projects over the second portion of the duodenum. Mild colonic distention.      Micro:  Results     Procedure Component Value Units Date/Time    CULTURE WOUND W/ GRAM STAIN [[id number]]  (Abnormal)  (Susceptibility) Collected:  09/28/17 1928    Order Status:  Completed Specimen:  Wound from Right Foot Updated:  10/01/17 0541     Gram Stain Result --     Few WBCs.  Many Gram positive cocci.       Significant Indicator POS (POS)     Source WND     Site RIGHT FOOT     Culture Result Wound -- (A)     Culture Result Wound -- (A)     Beta Hemolytic Streptococcus group A  Heavy growth       Culture Result Wound -- (A)     Methicillin Resistant Staphylococcus aureus  Moderate growth  This isolate is presumed to be clindamycin resistant based on  detection of inducible resistance.  Clindamycin may still  be effective in some patients.      Narrative:       CALL  [place]  161 tel. [phone],  CALLED  161 tel. [phone] 10/01/2017, 05:40, RB PERF. RESULTS CALLED TO:APURVA  [id number] Kasper    Culture & Susceptibility      "METHICILLIN RESISTANT STAPHYLOCOCCUS AUREUS     Antibiotic Sensitivity Microscan Unit Status    Ampicillin/sulbactam Resistant 16/8 mcg/mL Final    Clindamycin Resistant <=0.5 mcg/mL Final    Daptomycin Sensitive <=0.5 mcg/mL Final    Erythromycin Resistant >4 mcg/mL Final    Moxifloxacin Resistant >4 mcg/mL Final    Oxacillin Resistant >2 mcg/mL Final    Penicillin Resistant >8 mcg/mL Final    Tetracycline Sensitive <=4 mcg/mL Final    Trimeth/Sulfa Sensitive <=0.5/9.5 mcg/mL Final    Vancomycin Sensitive 2 mcg/mL Final                       URINE CULTURE(NEW) [[id number]] Collected:  09/28/17 2020    Order Status:  Completed Specimen:  Urine Updated:  09/30/17 0644     Significant Indicator NEG     Source UR     Site --     Urine Culture No growth at 48 hours    Narrative:       Indication for culture:->Emergency Room Patient    BLOOD CULTURE [[id number]] Collected:  09/28/17 1703    Order Status:  Completed Specimen:  Blood from Peripheral Updated:  09/29/17 0847     Significant Indicator NEG     Source BLD     Site PERIPHERAL     Blood Culture --     No Growth    Note: Blood cultures are incubated for 5 days and  are monitored continuously.Positive blood cultures  are called to the RN and reported as soon as  they are identified.      Narrative:       Per Hospital Policy: Only change Specimen Src: to \"Line\" if  specified by physician order.    BLOOD CULTURE [[id number]] Collected:  09/28/17 1715    Order Status:  Completed Specimen:  Blood from Peripheral Updated:  09/29/17 0847     Significant Indicator NEG     Source BLD     Site PERIPHERAL     Blood Culture --     No Growth    Note: Blood cultures are incubated for 5 days and  are monitored continuously.Positive blood cultures  are called to the RN and reported as soon as  they are identified.      Narrative:       Per Hospital Policy: Only change Specimen Src: to \"Line\" if  specified by physician order.    GRAM STAIN [[id number]] Collected:  09/28/17 1928    " Order Status:  Completed Specimen:  Wound Updated:  09/29/17 0749     Significant Indicator .     Source WND     Site RIGHT FOOT     Gram Stain Result --     Few WBCs.  Many Gram positive cocci.      URINALYSIS [[id number]]  (Abnormal) Collected:  09/28/17 2020    Order Status:  Completed Specimen:  Urine Updated:  09/28/17 2038     Color Yellow     Character Clear     Specific Gravity 1.016     Ph 5.0     Glucose Negative mg/dL      Ketones Trace (A) mg/dL      Protein Negative mg/dL      Bilirubin Negative     Urobilinogen, Urine 0.2     Nitrite Negative     Leukocyte Esterase Negative     Occult Blood Negative     Micro Urine Req see below     Comment: Microscopic examination not performed when specimen is clear  and chemically negative for protein, blood, leukocyte esterase  and nitrite.         Narrative:       Indication for culture:->Emergency Room Patient          Assessment:  Active Hospital Problems    Diagnosis   • Sinus tachycardia [R00.0]   • Cirrhosis (CMS-HCC) [K74.60]   • Alcoholism (CMS-HCC) [F10.20]   • Severe sepsis (CMS-HCC) [A41.9, R65.20]   • Hyponatremia [E87.1]   • Cellulitis [L03.90]   • CLL (chronic lymphocytic leukemia) (CMS-HCC) [C91.10]   • Continuous opioid dependence (CMS-HCC) [F11.20]       Plan:  Septic shock   2/2 LLE cellulitis  Afebrile  Significant leukocytosis  On pressors    LLE cellulitis  Cx - MRSA (vanco STEPHANIE 2), Group A strep  Continue ceftaroline and clindamycin  Bcx 9/28 - NGTD  Doppler neg for DVT    Encephalopathy  2/2 above    CLL    Episodes of SVT  S/p adenosine    Alcohol abuse/cirrhosis    Discussed with internal medicine/Dr Da Silva.

## 2017-10-02 NOTE — DIETARY
"Nutrition Support Assessment - Male    Rohit Snow is a 72 y.o. male with admitting DX of Cellulitis  Pertinent History: CLL, neoplasm of uncertain behavior of skin, vitamin D deficiency, neuropathy of feet, H. Pylori, hypertension, distal esophagitis, ETOH abuse, GERD, hepatitis, osteopenia.   Allergies:  Review of patient's allergies indicates no known allergies.    Height: 165.1 cm (5' 5\")  Weight: 43.9 kg (96 lb 12.5 oz)  Weight to Use in Calculations: 60 kg (132 lb 4.4 oz) (pt appears closer to 60 kg/ 132 lbs, per this RD obs)  Ideal Body Weight: 61.8 kg (136 lb 3.9 oz)  Percent Ideal Body Weight: 71  BMI based on wt observed today: 22    Pertinent Labs:   Recent Labs      10/01/17   0019   10/01/17   0520  10/02/17   0449  10/02/17   1125   SODIUM 101   --    < >  133*  135   --    POTASSIUM 102   --    < >  3.8  3.5*   --       --    < >  20  17   --    CREATININE 109   --    < >  0.92  0.88   --    GLUCOSE 112   --    < >  118*  75   --    CALCIUM 105   --    < >  8.6  8.1*   --       --    < >  16  18   --       --    < >  7  8   --    ALBUMIN 111   --    < >  2.3*  2.0*   --    TOTAL BILIRUBIN 113   --    < >  0.7  0.7   --    MAGNESIUM 561   --    --    --    --   1.9   WBC 1501   --    < >  25.2*  27.5*   --    HGB 1503   --    < >  10.3*  9.2*   --    HCT 1504   --    < >  30.4*  27.2*   --    RBC 1502   --    < >  2.84*  2.58*   --    ACCU CHECK GLUCOSE 788  106*   --    --    --    --     < > = values in this interval not displayed.   adjusted calcium = 9.7, normal     Last BM:  (Miralax given to pt)  Pertinent Medications: Folvite, Synthroid, magnesium sulfate premix, senna-docusate, Theragran-M, thiamine, Raj-Synephrine (<300 mcg/min).  Pertinent Fluids: NS IVF @ 10 ml/hr, free water from TF  Surgery / Procedures: small bowel Cortrak placed on 10/1  Skin: LLE severe edema with drained bullae and cellulitis, per WT evaluation 10/1.     Estimated Needs per MSJ x1.2:  Total " Calories / day: 1428 - 1628  (Calories / kg wt used in ca - 27)  Total Grams Protein / day: 72 - 84  (Grams Protein / k.2 - 1.4)  Total Fluids ml / day: 1502.7 ml         Assessment / Evaluation: Enteral nutrition support Ok to begin per MD verbal orders in round; pt unsafe to tolerate PO diet at this time.  Standard, high protein TF formula most appropriate to meet protein needs d/t severe edema.     Plan / Recommendation:   1. Please start Fibersource HN at 25 ml/hr and advance to goal rate of 60 ml/hr continuously per protocol = Fibersource HN, goal rate 60 ml/hr ml/hr, providing 1728 kcals, 78 grams protein, 1166 mL free water.  2. Monitor for refeeding: Order BMP w/ Mg and Phos x 7 days. Replete K, Phos and Mg prn. Supplement 100 mg Thiamine x 7 days to reduce risk of refeeding.  3. Fluids per MD.

## 2017-10-02 NOTE — PROGRESS NOTES
Pulmonary Critical Care Progress Note      Date of service:  10/2/2017    Interval Events:  24 hour interval history reviewed  Reason for visit:  Severe sepsis with shock, AF with RVR, hypotension, ETOH withdrawal delirium  Unable to provide CC or ROS due to medical condition        - AF with RVR   - NE 2-3   - ETOH withdrawal   - start TF   - change NE to maurisio   - load with dig      PFSH:  No change.    Respiratory:     Pulse Oximetry: 98 %  CXR personally reviewed  CXR clear  Few coarse crackles at the bases  2 L NC    HemoDynamics:  Pulse: (!) 103, Heart Rate (Monitored): (!) 107  NIBP: 113/74  CVP (mm Hg): (!) 11 MM HG  AF with RVR  NE 3    Recent Labs      09/29/17   1020   TROPONINI  <0.01       Neuro:  Arouses, confused    Fluids:  Intake/Output       09/30/17 0700 - 10/01/17 0659 10/01/17 0700 - 10/02/17 0659 10/02/17 0700 - 10/03/17 0659      3550-9518 7861-5522 Total 5445-2601 9213-7146 Total 7972-1887 9231-1742 Total       Intake    I.V.  137.3  1883.9 2021.2  --  2120.6 2120.6  --  -- --    Norepinephrine Volume 137.3 83.9 221.2 -- 120.6 120.6 -- -- --    IV Piggyback Volume -- -- -- -- 200 200 -- -- --    IV Volume (NS) -- 1800 1800 -- 1800 1800 -- -- --    Other  --  -- --  --  30 30  --  -- --    Medications (P.O./ Enteral Liquids) -- -- -- -- 30 30 -- -- --    Enteral  --  -- --  --  60 60  --  -- --    Free Water / Tube Flush -- -- -- -- 60 60 -- -- --    Total Intake 137.3 1883.9 2021.2 -- 2210.6 2210.6 -- -- --       Output    Urine  350  300 650  900  445 1345  --  -- --    Number of Times Voided 1 x 1 x 2 x -- -- -- -- -- --    Indwelling Cathether -- -- --  -- -- --    Void (ml) 350 300 650 -- -- -- -- -- --    Stool  --  -- --  --  -- --  --  -- --    Number of Times Stooled -- 0 x 0 x -- -- -- -- -- --    Total Output 350 300 650  -- -- --       Net I/O     -212.7 1583.9 1371.2 -900 1765.6 865.6 -- -- --        Weight: 43.9 kg (96 lb 12.5 oz)  Recent Labs       17   0804  17   1020   10/01/17   0020  10/01/17   0520  10/02/17   0449   SODIUM  129*  130*   < >  132*  133*  135   POTASSIUM  4.3  4.0   < >  3.9  3.8  3.5*   CHLORIDE  95*  100   < >  103  104  108   CO2  23  22   < >  21  20  20   BUN  26*  25*   < >  20  20  17   CREATININE  1.29  1.14   < >  0.96  0.92  0.88   MAGNESIUM  2.0   --    --    --    --    --    PHOSPHORUS   --   2.7   --    --    --    --    CALCIUM  8.6  7.9*   < >  8.5  8.6  8.1*    < > = values in this interval not displayed.       GI/Nutrition:  Abd distended and mildly diffusely tender.  No peritoneal signs.  CT reviewed    Liver Function  Recent Labs      10/01/17   0020  10/01/17   0520  10/02/17   0449   ALTSGPT  7  7  8   ASTSGOT  17  16  18   ALKPHOSPHAT  103*  109*  107*   TBILIRUBIN  0.7  0.7  0.7   GLUCOSE  112*  118*  75       Heme:  Recent Labs      17   1020   10/01/17   0020  10/01/17   0520  10/02/17   0449   RBC  2.67*   < >  2.85*  2.84*  2.58*   HEMOGLOBIN  9.6*   < >  10.3*  10.3*  9.2*   HEMATOCRIT  27.9*   < >  30.7*  30.4*  27.2*   PLATELETCT  70*   < >  73*  70*  89*   PROTHROMBTM  15.6*   --   14.4   --    --    APTT  44.5*   --   37.5*   --    --    INR  1.20*   --   1.09   --    --     < > = values in this interval not displayed.       Infectious Disease:  Monitored Temp  Av.9 °C (96.7 °F)  Min: 35.7 °C (96.3 °F)  Max: 36.3 °C (97.3 °F)  Temp  Av.9 °C (96.7 °F)  Min: 35.9 °C (96.7 °F)  Max: 35.9 °C (96.7 °F)    Recent Labs      17   1020  17   0440  10/01/17   0020  10/01/17   0520  10/02/17   0449   WBC  19.1*  27.6*  20.9*  25.2*  27.5*   NEUTSPOLYS  41.40*  29.50*   --   43.80*   --    LYMPHOCYTES  41.40*  60.90*   --   47.40*   --    MONOCYTES  0.80  0.90   --   0.00   --    EOSINOPHILS  0.00  0.00   --   0.00   --    BASOPHILS  0.00  0.00   --   0.00   --    ASTSGOT  17  17  17  16  18   ALTSGPT  6  7  7  7  8   ALKPHOSPHAT  115*  78  103*  109*  107*   TBILIRUBIN  0.9  0.8   0.7  0.7  0.7     Current Facility-Administered Medications   Medication Dose Frequency Provider Last Rate Last Dose   • clindamycin (CLEOCIN) IVPB premix 900 mg  900 mg Q8HRS Mode Mireles D.O.   Stopped at 10/02/17 0658   • MD ALERT...Adult ICU Electrolyte Replacement per Pharmacy Protocol   PRN Zain Kelly Jr., D.O.       • ceftaroline (TEFLARO) 600 mg in  mL IVPB  600 mg Q12HRS Ana Benavidez M.D.   Stopped at 10/01/17 2218   • MD ALERT...Phenobarbital Alcohol Withdrawal Protocol Pharmacist to Implement (ICU Only)   PRN Zain Kelly Jr., D.O.       • Pharmacy Consult Request 1 Each  1 Each PRN Zain Kelly Jr., D.O.        And   • PHENObarbital injection 130 mg  130 mg Q30 MIN PRN Zain Kelly Jr., D.O.        And   • PHENObarbital injection 260 mg  260 mg Q30 MIN PRN LIBAN Dale Jr..O.   260 mg at 10/01/17 1702   • folic acid (FOLVITE) tablet 1 mg  1 mg DAILY Brit Chavez A.P.R.N.   Stopped at 10/01/17 0900   • therapeutic multivitamin-minerals (THERAGRAN-M) tablet 1 Tab  1 Tab DAILY Brit Chavez A.P.R.N.   Stopped at 10/01/17 0900   • thiamine (THIAMINE) tablet 100 mg  100 mg DAILY Brit Chavez A.P.R.N.   Stopped at 10/01/17 0900   • NS infusion   Continuous LIBAN Dale Jr..O. 150 mL/hr at 10/02/17 0559     • norepinephrine (LEVOPHED) 8 mg in  mL Infusion  0.5-30 mcg/min Continuous Mode Mireles D.O. 9.4 mL/hr at 10/01/17 2118 5 mcg/min at 10/01/17 2118   • duloxetine (CYMBALTA) capsule 60 mg  60 mg DAILY Evelyn Blackwell M.D.   Stopped at 10/01/17 0900   • levothyroxine (SYNTHROID) tablet 25 mcg  25 mcg AM ES Evelyn Blackwell M.D.   25 mcg at 10/02/17 0559   • trazodone (DESYREL) tablet 100 mg  100 mg Nightly Evelyn Blackwell M.D.   Stopped at 10/01/17 2100   • senna-docusate (PERICOLACE or SENOKOT S) 8.6-50 MG per tablet 2 Tab  2 Tab BID Evelyn Blackwell M.D.   2 Tab at 10/01/17 2118    And   • polyethylene glycol/lytes (MIRALAX) PACKET 1 Packet  1  Packet QDAY PRCHUY Blackwell M.D.   1 Packet at 10/01/17 0233    And   • magnesium hydroxide (MILK OF MAGNESIA) suspension 30 mL  30 mL QDAY PRCHUY Blackwell M.D.   30 mL at 09/30/17 1039    And   • bisacodyl (DULCOLAX) suppository 10 mg  10 mg QDAY PRCHUY Blackwell M.D.   10 mg at 10/01/17 1134   • heparin injection 5,000 Units  5,000 Units Q8HRS Evelyn Blackwell M.D.   5,000 Units at 10/02/17 0559   • ondansetron (ZOFRAN) syringe/vial injection 4 mg  4 mg Q4HRS PRCHUY Blackwell M.D.   4 mg at 10/01/17 0900   • ondansetron (ZOFRAN ODT) dispertab 4 mg  4 mg Q4HRS PRCHUY Blackwell M.D.       • Pharmacy Consult Request ...Pain Management Review   JOSHUA Blackwell M.D.        And   • oxycodone immediate-release (ROXICODONE) tablet 5 mg  5 mg Q3HRS PRCHUY Blackwell M.D.        And   • oxycodone immediate-release (ROXICODONE) tablet 10 mg  10 mg Q3HRS PRCHUY Blackwell M.D.   10 mg at 09/30/17 1805    And   • HYDROmorphone (DILAUDID) injection 0.5 mg  0.5 mg Q3HRS PRCHUY Blackwell M.D.   0.5 mg at 09/30/17 0100     Last reviewed on 9/29/2017  2:17 AM by Kelsi Layton R.N.    Quality  Measures:  Labs reviewed, Medications reviewed and Radiology images reviewed                        Assessment and Plan:    Acute hypoxemic respiratory failure   - cont oxygen  Severe sepsis with shock - skin and soft tissue source   - cont vasopressor support as necessary   - change norepinephrine to phenylephrine  LE cellulitis   - MRSA and Strep species   - cont ceftaroline and clindamycin per ID   - wound care  AF with RVR   - load with digoxin   - optimize K and Mg  Alcohol withdrawal delirium   - phenobarbital protocol\  ETOH abuse   - vitamins  Cirrhosis   - check ammonia level  CLL    Critically ill with unstable cardiovascular status.  Active titration of vasopressor.  AF with RVR.    High risk of deterioration and worsening vital organ dysfunction and death without critical care interventions.    Critical Care  Time:  35 minutes  [id number]  No time overlap  Time excludes procedures  Discussed with RN, RT, Team

## 2017-10-02 NOTE — PROGRESS NOTES
Cortrak Placement    Tube Team verified patient name and medical record number prior to tube placement.  Cortrak tube (43 inches, 10 [place]an) placed at 75 cm in right nare.  Per Cortrak picture, tube appears to be in the stomach.  Nursing Instructions: Awaiting KUB to confirm placement before use for medications or feeding. Once placement confirmed, flush tube with 30 ml of water, and then remove and save stylet, in patient medication drawer.

## 2017-10-02 NOTE — PROGRESS NOTES
[facility] Hospitalist Progress Note    Date of Service: 10/2/2017    Chief Complaint  72 y.o. male admitted 2017 with L leg cellulitis and simple sepsis.    transfered to CICU on  with hypotension/shock and episodes of SVT to 160's    Interval Problem Update  RI   Altered this am  Levo 2-3  AFebrile  AFIb x 10mins then SVT x 10 mins  UOP 400ml/12  CVP 6-10  Worsening redness per RN    Consultants/Specialty  Pulmonology    Disposition  Critically ill in ICU on pressors        Review of Systems   Unable to perform ROS: Mental status change   Neurological: Positive for loss of consciousness.      Physical Exam  Laboratory/Imaging   Hemodynamics  Temp (24hrs), Av.9 °C (96.7 °F), Min:35.9 °C (96.7 °F), Max:35.9 °C (96.7 °F)   Temperature: 35.9 °C (96.7 °F), Monitored Temp: 36.5 °C (97.7 °F)  Pulse  Av.7  Min: 63  Max: 188 Heart Rate (Monitored): (!) 107  NIBP: 113/74 CVP (mm Hg): (!) 11 MM HG    Respiratory      Respiration: 19, Pulse Oximetry: 98 %             Fluids    Intake/Output Summary (Last 24 hours) at 10/02/17 1012  Last data filed at 10/02/17 0800   Gross per 24 hour   Intake          2229.35 ml   Output              995 ml   Net          1234.35 ml       Nutrition  No orders of the defined types were placed in this encounter.    Physical Exam   Constitutional: He appears well-developed and well-nourished. No distress.   HENT:   Head: Normocephalic and atraumatic.   Eyes: Conjunctivae are normal.   Neck: No JVD present.   Cardiovascular: Normal rate.  Exam reveals no gallop.    No murmur heard.  Pulmonary/Chest: Effort normal. No stridor. No respiratory distress. He has no wheezes. He has no rales.   Abdominal: Soft. There is no tenderness. There is no rebound and no guarding.   Musculoskeletal: He exhibits edema.   errythema extending to prox thigh.  No subcu air   Neurological: He is alert.   Somnolent rouses to touch  Ox0   Skin: Skin is warm and dry. Rash noted. He is not diaphoretic.    errythema R ankle to tibial tubercle.  No crepitus.  No bullae   Nursing note and vitals reviewed.      Recent Labs      10/01/17   0020  10/01/17   0520  10/02/17   0449   WBC  20.9*  25.2*  27.5*   RBC  2.85*  2.84*  2.58*   HEMOGLOBIN  10.3*  10.3*  9.2*   HEMATOCRIT  30.7*  30.4*  27.2*   MCV  107.7*  107.0*  105.4*   MCH  36.1*  36.3*  35.7*   MCHC  33.6*  33.9  33.8   RDW  53.1*  52.7*  51.9*   PLATELETCT  73*  70*  89*   MPV  9.6  9.4  9.5     Recent Labs      10/01/17   0020  10/01/17   0520  10/02/17   0449   SODIUM  132*  133*  135   POTASSIUM  3.9  3.8  3.5*   CHLORIDE  103  104  108   CO2  21  20  20   GLUCOSE  112*  118*  75   BUN  20  20  17   CREATININE  0.96  0.92  0.88   CALCIUM  8.5  8.6  8.1*     Recent Labs      09/29/17   1020  10/01/17   0020   APTT  44.5*  37.5*   INR  1.20*  1.09                  Assessment/Plan     Sinus tachycardia   Assessment & Plan    SVT to 160's pt ASx'c  Cont Tele  May need Amio if recurrent        Cirrhosis (CMS-HCC)- (present on admission)   Assessment & Plan    Chronic, stable with no e/o decompensation  Continue spironolactone        Alcoholism (CMS-HCC)- (present on admission)   Assessment & Plan    No E/O active WD at this time- monitor, may need CIWA   Folate, MVI, thiamine supplementation  Phenobarb        Severe sepsis (CMS-HCC)- (present on admission)   Assessment & Plan    This is sepsis (without associated acute organ dysfunction).   Sepsis orders set completed.  Skin + strep and MRSA   Central line placed  Follow CVP and MAP  titrate Levo  ?nec fasc as worsened today: escalate Abx's, consult ID, STAT CT        Hyponatremia   Assessment & Plan    Worsening from baseline around 132; secondary to cirrhosis with sepsis and volume overload.  Monitor bmp daily.  Continue aldactone, fluid restrict and 1.5 gram sodium restricted diet, may need some lasix.        Cellulitis- (present on admission)   Assessment & Plan    Treat as noted above        CLL (chronic  lymphocytic leukemia) (CMS-[facility])- (present on admission)   Assessment & Plan    Followed by Dr. Flores outpatient, chronically elevated WBC count.        Continuous opioid dependence (CMS-HCC)- (present on admission)   Assessment & Plan    Cont home oxycodone dose            Reviewed items::  EKG reviewed, Radiology images reviewed, Labs reviewed and Medications reviewed  DVT prophylaxis pharmacological::  Heparin  DVT prophylaxis - mechanical:  SCDs  Ulcer Prophylaxis::  Not indicated  Antibiotics:  Treating active infection/contamination beyond 24 hours perioperative coverage   critical care time 39mins managing sepsis, pressors, unstable cardiac dysrythmia

## 2017-10-03 ENCOUNTER — APPOINTMENT (OUTPATIENT)
Dept: RADIOLOGY | Facility: MEDICAL CENTER | Age: 72
DRG: 871 | End: 2017-10-03
Attending: INTERNAL MEDICINE
Payer: COMMERCIAL

## 2017-10-03 LAB
ALBUMIN SERPL BCP-MCNC: 2 G/DL (ref 3.2–4.9)
ALBUMIN/GLOB SERPL: 0.6 G/DL
ALP SERPL-CCNC: 120 U/L (ref 30–99)
ALT SERPL-CCNC: 8 U/L (ref 2–50)
ANION GAP SERPL CALC-SCNC: 8 MMOL/L (ref 0–11.9)
ANISOCYTOSIS BLD QL SMEAR: ABNORMAL
AST SERPL-CCNC: 18 U/L (ref 12–45)
BACTERIA BLD CULT: NORMAL
BACTERIA BLD CULT: NORMAL
BASOPHILS # BLD AUTO: 0 % (ref 0–1.8)
BASOPHILS # BLD: 0 K/UL (ref 0–0.12)
BILIRUB SERPL-MCNC: 0.6 MG/DL (ref 0.1–1.5)
BUN SERPL-MCNC: 17 MG/DL (ref 8–22)
CALCIUM SERPL-MCNC: 8 MG/DL (ref 8.5–10.5)
CHLORIDE SERPL-SCNC: 109 MMOL/L (ref 96–112)
CO2 SERPL-SCNC: 19 MMOL/L (ref 20–33)
CREAT SERPL-MCNC: 0.97 MG/DL (ref 0.5–1.4)
EOSINOPHIL # BLD AUTO: 0 K/UL (ref 0–0.51)
EOSINOPHIL NFR BLD: 0 % (ref 0–6.9)
ERYTHROCYTE [DISTWIDTH] IN BLOOD BY AUTOMATED COUNT: 52.6 FL (ref 35.9–50)
GFR SERPL CREATININE-BSD FRML MDRD: >60 ML/MIN/1.73 M 2
GLOBULIN SER CALC-MCNC: 3.1 G/DL (ref 1.9–3.5)
GLUCOSE SERPL-MCNC: 95 MG/DL (ref 65–99)
HCT VFR BLD AUTO: 26.5 % (ref 42–52)
HGB BLD-MCNC: 9.3 G/DL (ref 14–18)
LYMPHOCYTES # BLD AUTO: 12.55 K/UL (ref 1–4.8)
LYMPHOCYTES NFR BLD: 54.8 % (ref 22–41)
MACROCYTES BLD QL SMEAR: ABNORMAL
MAGNESIUM SERPL-MCNC: 2.3 MG/DL (ref 1.5–2.5)
MANUAL DIFF BLD: NORMAL
MCH RBC QN AUTO: 36.6 PG (ref 27–33)
MCHC RBC AUTO-ENTMCNC: 35.1 G/DL (ref 33.7–35.3)
MCV RBC AUTO: 104.3 FL (ref 81.4–97.8)
MONOCYTES # BLD AUTO: 0 K/UL (ref 0–0.85)
MONOCYTES NFR BLD AUTO: 0 % (ref 0–13.4)
MORPHOLOGY BLD-IMP: NORMAL
NEUTROPHILS # BLD AUTO: 10.35 K/UL (ref 1.82–7.42)
NEUTROPHILS NFR BLD: 44.3 % (ref 44–72)
NEUTS BAND NFR BLD MANUAL: 0.9 % (ref 0–10)
NRBC # BLD AUTO: 0 K/UL
NRBC BLD AUTO-RTO: 0 /100 WBC
PLATELET # BLD AUTO: 105 K/UL (ref 164–446)
PLATELET BLD QL SMEAR: NORMAL
PMV BLD AUTO: 9.2 FL (ref 9–12.9)
POTASSIUM SERPL-SCNC: 4 MMOL/L (ref 3.6–5.5)
PROT SERPL-MCNC: 5.1 G/DL (ref 6–8.2)
RBC # BLD AUTO: 2.54 M/UL (ref 4.7–6.1)
RBC BLD AUTO: PRESENT
SIGNIFICANT IND 70042: NORMAL
SIGNIFICANT IND 70042: NORMAL
SITE SITE: NORMAL
SITE SITE: NORMAL
SMUDGE CELLS BLD QL SMEAR: NORMAL
SODIUM SERPL-SCNC: 136 MMOL/L (ref 135–145)
SOURCE SOURCE: NORMAL
SOURCE SOURCE: NORMAL
WBC # BLD AUTO: 22.9 K/UL (ref 4.8–10.8)

## 2017-10-03 PROCEDURE — 700102 HCHG RX REV CODE 250 W/ 637 OVERRIDE(OP): Performed by: INTERNAL MEDICINE

## 2017-10-03 PROCEDURE — 700101 HCHG RX REV CODE 250: Performed by: HOSPITALIST

## 2017-10-03 PROCEDURE — A9270 NON-COVERED ITEM OR SERVICE: HCPCS | Performed by: INTERNAL MEDICINE

## 2017-10-03 PROCEDURE — 85007 BL SMEAR W/DIFF WBC COUNT: CPT

## 2017-10-03 PROCEDURE — 700102 HCHG RX REV CODE 250 W/ 637 OVERRIDE(OP): Performed by: HOSPITALIST

## 2017-10-03 PROCEDURE — 700111 HCHG RX REV CODE 636 W/ 250 OVERRIDE (IP): Performed by: INTERNAL MEDICINE

## 2017-10-03 PROCEDURE — 83735 ASSAY OF MAGNESIUM: CPT

## 2017-10-03 PROCEDURE — 700105 HCHG RX REV CODE 258: Performed by: INTERNAL MEDICINE

## 2017-10-03 PROCEDURE — 770022 HCHG ROOM/CARE - ICU (200)

## 2017-10-03 PROCEDURE — 700102 HCHG RX REV CODE 250 W/ 637 OVERRIDE(OP): Performed by: NURSE PRACTITIONER

## 2017-10-03 PROCEDURE — 71010 DX-CHEST-PORTABLE (1 VIEW): CPT

## 2017-10-03 PROCEDURE — 85027 COMPLETE CBC AUTOMATED: CPT

## 2017-10-03 PROCEDURE — A9270 NON-COVERED ITEM OR SERVICE: HCPCS | Performed by: HOSPITALIST

## 2017-10-03 PROCEDURE — A9270 NON-COVERED ITEM OR SERVICE: HCPCS | Performed by: NURSE PRACTITIONER

## 2017-10-03 PROCEDURE — P9047 ALBUMIN (HUMAN), 25%, 50ML: HCPCS | Performed by: INTERNAL MEDICINE

## 2017-10-03 PROCEDURE — 80053 COMPREHEN METABOLIC PANEL: CPT

## 2017-10-03 PROCEDURE — 700111 HCHG RX REV CODE 636 W/ 250 OVERRIDE (IP): Performed by: HOSPITALIST

## 2017-10-03 PROCEDURE — 99291 CRITICAL CARE FIRST HOUR: CPT | Performed by: HOSPITALIST

## 2017-10-03 PROCEDURE — 93005 ELECTROCARDIOGRAM TRACING: CPT | Performed by: HOSPITALIST

## 2017-10-03 RX ORDER — ALBUMIN (HUMAN) 12.5 G/50ML
25 SOLUTION INTRAVENOUS EVERY 6 HOURS
Status: COMPLETED | OUTPATIENT
Start: 2017-10-03 | End: 2017-10-04

## 2017-10-03 RX ORDER — HALOPERIDOL 5 MG/ML
1-3 INJECTION INTRAMUSCULAR
Status: DISPENSED | OUTPATIENT
Start: 2017-10-03 | End: 2017-10-05

## 2017-10-03 RX ORDER — FUROSEMIDE 10 MG/ML
40 INJECTION INTRAMUSCULAR; INTRAVENOUS EVERY 6 HOURS
Status: COMPLETED | OUTPATIENT
Start: 2017-10-03 | End: 2017-10-03

## 2017-10-03 RX ADMIN — OXYCODONE HYDROCHLORIDE 5 MG: 5 TABLET ORAL at 08:10

## 2017-10-03 RX ADMIN — POLYETHYLENE GLYCOL (3350) 1 PACKET: 17 POWDER, FOR SOLUTION ORAL at 08:21

## 2017-10-03 RX ADMIN — ALBUMIN (HUMAN) 25 G: 0.25 INJECTION, SOLUTION INTRAVENOUS at 12:20

## 2017-10-03 RX ADMIN — PHENOBARBITAL SODIUM 130 MG: 130 INJECTION INTRAMUSCULAR; INTRAVENOUS at 15:13

## 2017-10-03 RX ADMIN — FUROSEMIDE 40 MG: 10 INJECTION, SOLUTION INTRAMUSCULAR; INTRAVENOUS at 08:21

## 2017-10-03 RX ADMIN — CEFTAROLINE FOSAMIL 600 MG: 600 POWDER, FOR SOLUTION INTRAVENOUS at 20:50

## 2017-10-03 RX ADMIN — DIGOXIN 250 MCG: 0.25 INJECTION INTRAMUSCULAR; INTRAVENOUS at 03:41

## 2017-10-03 RX ADMIN — PHENOBARBITAL SODIUM 130 MG: 130 INJECTION INTRAMUSCULAR; INTRAVENOUS at 17:38

## 2017-10-03 RX ADMIN — PHENYLEPHRINE HYDROCHLORIDE 20 MCG/MIN: 10 INJECTION INTRAVENOUS at 19:41

## 2017-10-03 RX ADMIN — HALOPERIDOL LACTATE 2 MG: 5 INJECTION, SOLUTION INTRAMUSCULAR at 12:21

## 2017-10-03 RX ADMIN — FUROSEMIDE 40 MG: 10 INJECTION, SOLUTION INTRAMUSCULAR; INTRAVENOUS at 12:21

## 2017-10-03 RX ADMIN — ALBUMIN (HUMAN) 25 G: 0.25 INJECTION, SOLUTION INTRAVENOUS at 08:21

## 2017-10-03 RX ADMIN — ALBUMIN (HUMAN) 25 G: 0.25 INJECTION, SOLUTION INTRAVENOUS at 23:50

## 2017-10-03 RX ADMIN — DIGOXIN 125 MCG: 125 TABLET ORAL at 17:03

## 2017-10-03 RX ADMIN — POTASSIUM BICARBONATE 50 MEQ: 25 TABLET, EFFERVESCENT ORAL at 08:21

## 2017-10-03 RX ADMIN — CLINDAMYCIN IN 5 PERCENT DEXTROSE 900 MG: 18 INJECTION, SOLUTION INTRAVENOUS at 05:54

## 2017-10-03 RX ADMIN — DULOXETINE HYDROCHLORIDE 60 MG: 60 CAPSULE, DELAYED RELEASE ORAL at 08:21

## 2017-10-03 RX ADMIN — TRAZODONE HYDROCHLORIDE 100 MG: 50 TABLET ORAL at 21:56

## 2017-10-03 RX ADMIN — CLINDAMYCIN IN 5 PERCENT DEXTROSE 900 MG: 18 INJECTION, SOLUTION INTRAVENOUS at 12:20

## 2017-10-03 RX ADMIN — OXYCODONE HYDROCHLORIDE 10 MG: 10 TABLET ORAL at 19:34

## 2017-10-03 RX ADMIN — STANDARDIZED SENNA CONCENTRATE AND DOCUSATE SODIUM 2 TABLET: 8.6; 5 TABLET, FILM COATED ORAL at 08:21

## 2017-10-03 RX ADMIN — ENOXAPARIN SODIUM 40 MG: 100 INJECTION SUBCUTANEOUS at 08:23

## 2017-10-03 RX ADMIN — THIAMINE HCL TAB 100 MG 100 MG: 100 TAB at 08:20

## 2017-10-03 RX ADMIN — BISACODYL 10 MG: 10 SUPPOSITORY RECTAL at 15:18

## 2017-10-03 RX ADMIN — CEFTAROLINE FOSAMIL 600 MG: 600 POWDER, FOR SOLUTION INTRAVENOUS at 08:23

## 2017-10-03 RX ADMIN — PHENOBARBITAL SODIUM 130 MG: 130 INJECTION INTRAMUSCULAR; INTRAVENOUS at 17:02

## 2017-10-03 RX ADMIN — STANDARDIZED SENNA CONCENTRATE AND DOCUSATE SODIUM 2 TABLET: 8.6; 5 TABLET, FILM COATED ORAL at 21:56

## 2017-10-03 RX ADMIN — ALBUMIN (HUMAN) 25 G: 0.25 INJECTION, SOLUTION INTRAVENOUS at 17:03

## 2017-10-03 RX ADMIN — FOLIC ACID 1 MG: 1 TABLET ORAL at 08:20

## 2017-10-03 RX ADMIN — OXYCODONE HYDROCHLORIDE 5 MG: 5 TABLET ORAL at 05:53

## 2017-10-03 RX ADMIN — LEVOTHYROXINE SODIUM 25 MCG: 50 TABLET ORAL at 05:53

## 2017-10-03 RX ADMIN — CLINDAMYCIN IN 5 PERCENT DEXTROSE 900 MG: 18 INJECTION, SOLUTION INTRAVENOUS at 21:56

## 2017-10-03 RX ADMIN — POTASSIUM BICARBONATE 50 MEQ: 25 TABLET, EFFERVESCENT ORAL at 21:55

## 2017-10-03 NOTE — CARE PLAN
Problem: Safety  Goal: Will remain free from injury  Outcome: PROGRESSING AS EXPECTED  Educated patient on safety precautions. No evidence of learning. Will continue to provide education to not get of bed with out using call light and bed alarm usage. Bed alarm on. Call light within reach.     Problem: Pain Management  Goal: Pain level will decrease to patient's comfort goal    Intervention: Follow pain managment plan developed in collaboration with patient and Interdisciplinary Team  Provided pain management for patient when CPOT score was acceptable. Patient to stay in a state of comfort during hospitalization. Continually assessed patient pain status and provided Q2 turns and repositioning with pillows to aid comfort level. Call light within reach for further assistance.

## 2017-10-03 NOTE — CARE PLAN
Problem: Venous Thromboembolism (VTW)/Deep Vein Thrombosis (DVT) Prevention:  Goal: Patient will participate in Venous Thrombosis (VTE)/Deep Vein Thrombosis (DVT)Prevention Measures  Outcome: PROGRESSING AS EXPECTED      Problem: Pain Management  Goal: Pain level will decrease to patient's comfort goal  Outcome: PROGRESSING AS EXPECTED

## 2017-10-03 NOTE — PROGRESS NOTES
Cortrak Placement    Tube Team verified patient name and medical record number prior to tube placement.  Cortrak tube (55 inches, 10 Nigerian) placed at 65 cm in left nare.  Per Cortrak picture, tube appears to be in the stomach.  Nursing Instructions: Awaiting KUB to confirm placement before use for medications or feeding. Once placement confirmed, flush tube with 30 ml of water, and then remove and save stylet, in patient medication drawer.

## 2017-10-03 NOTE — PROGRESS NOTES
Infectious Disease Progress Note    Author: Ana Benavidez M.D. Date & Time of service: 10/3/2017  9:16 AM    Chief Complaint:  FU sepsis/LLE cellulitis    Interval History:  10/2 AF, WBC 27.5, confused and in pain  10/3 AF, WBC 22.9, remains confused and mumbling  Labs Reviewed, Medications Reviewed, Radiology Reviewed and Wound Reviewed.    Review of Systems:  Review of Systems   Unable to perform ROS: Mental acuity   Pt confused    Hemodynamics:  Temp (24hrs), Av.2 °C (98.9 °F), Min:37.1 °C (98.8 °F), Max:37.2 °C (99 °F)  Temperature: 37.2 °C (99 °F), Monitored Temp: 36.5 °C (97.7 °F)  Pulse  Av.2  Min: 63  Max: 188Heart Rate (Monitored): 78  NIBP: 106/52  CVP (mm Hg): 5 MM HG    Physical Exam:  Physical Exam   Constitutional: He appears well-developed.   Thin  Older than stated age  Chronically ill   HENT:   Head: Normocephalic and atraumatic.   Eyes: EOM are normal. Pupils are equal, round, and reactive to light.   Neck: Neck supple.   R IJ catheter   Cardiovascular:   tachycardic   Pulmonary/Chest: Effort normal and breath sounds normal.   Abdominal: Soft. There is no tenderness.   Musculoskeletal: He exhibits edema and tenderness.   significant erythema and edema of the LLE radiating to the left hip and medial thigh.   Left medial malleolar wound.   There is a eschar on the medial right knee with surrounding erythema   Neurological:     Confused   Skin: There is erythema.       Meds:    Current Facility-Administered Medications:   •  albumin human 25%  •  furosemide  •  potassium bicarbonate  •  enoxaparin (LOVENOX) injection  •  Phenylephrine infusion  •  digoxin  •  clindamycin (CLEOCIN) IV  •  MD ALERT...Adult ICU Electrolyte Replacement per Pharmacy Protocol  •  ceftaroline (TEFLARO) ivpb  •  MD ALERT...Phenobarbital Alcohol Withdrawal Protocol Pharmacist to Implement (ICU Only)  •  Pharmacy **AND** If RASS 1 to 4: assess RASS every 30 minutes until RASS returns to goal sedation (RASS -2 to  0) **AND** If RASS -2 to 0: assess RASS every 30 minutes for 2 occurrences and then every 4 hours **AND** If RASS -5 to -3: assess RASS, BP, and RR every 15 minutes with Continuous Pulse Oximetry until RASS returns to goal sedation (RASS -2 to 0) **AND** Pulse ox (oximetry) **AND** PHENObarbital **AND** PHENObarbital  •  folic acid  •  therapeutic multivitamin-minerals  •  thiamine  •  NS  •  duloxetine  •  levothyroxine  •  trazodone  •  senna-docusate **AND** polyethylene glycol/lytes **AND** magnesium hydroxide **AND** bisacodyl  •  ondansetron  •  ondansetron  •  Notify provider if pain remains uncontrolled **AND** Use the numeric rating scale (NRS-11) on regular floors and Critical-Care Pain Observation Tool (CPOT) on ICUs/Trauma to assess pain **AND** Pulse Ox (Oximetry) **AND** Pharmacy Consult Request **AND** If patient difficult to arouse and/or has respiratory depression, stop any opiates that are currently infusing and call a Rapid Response. **AND** oxycodone immediate-release **AND** oxycodone immediate-release **AND** HYDROmorphone    Labs:  Recent Labs      10/01/17   0520  10/02/17   0449  10/03/17   0350   WBC  25.2*  27.5*  22.9*   RBC  2.84*  2.58*  2.54*   HEMOGLOBIN  10.3*  9.2*  9.3*   HEMATOCRIT  30.4*  27.2*  26.5*   MCV  107.0*  105.4*  104.3*   MCH  36.3*  35.7*  36.6*   RDW  52.7*  51.9*  52.6*   PLATELETCT  70*  89*  105*   MPV  9.4  9.5  9.2   NEUTSPOLYS  43.80*  40.70*  44.30   LYMPHOCYTES  47.40*  53.40*  54.80*   MONOCYTES  0.00  0.90  0.00   EOSINOPHILS  0.00  0.00  0.00   BASOPHILS  0.00  0.00  0.00   RBCMORPHOLO  Present  Present  Present     Recent Labs      10/01/17   0520  10/02/17   0449  10/03/17   0350   SODIUM  133*  135  136   POTASSIUM  3.8  3.5*  4.0   CHLORIDE  104  108  109   CO2  20  20  19*   GLUCOSE  118*  75  95   BUN  20  17  17     Recent Labs      10/01/17   0520  10/02/17   0449  10/03/17   0350   ALBUMIN  2.3*  2.0*  2.0*   TBILIRUBIN  0.7  0.7  0.6    ALKPHOSPHAT  109*  107*  120*   TOTPROTEIN  5.6*  4.8*  5.1*   ALTSGPT  7  8  8   ASTSGOT  16  18  18   CREATININE  0.92  0.88  0.97       Imaging:  Ct-abdomen-pelvis With    Result Date: 10/1/2017  10/1/2017 8:07 AM HISTORY/REASON FOR EXAM:  Diffuse abdominal pain. History of sepsis. Left lower extremity cellulitis. TECHNIQUE/EXAM DESCRIPTION: CT scan of the abdomen and pelvis with contrast. Contrast-enhanced helical scanning was obtained from the diaphragmatic domes through the pubic symphysis following the bolus administration of 100 mL of Omnipaque 350 nonionic contrast without complication. Low dose optimization technique was utilized for this CT exam including automated exposure control and adjustment of the mA and/or kV according to patient size. COMPARISON: CT 6/5/2015 FINDINGS: The visualized lung bases demonstrate small amounts of dependent pleural effusion with bibasilar dependent airspace disease, most likely atelectasis. There is no acute bony process. There is degenerative change in the lower lumbar spine and pelvis. CT Abdomen: Liver is unchanged in appearance with slight nodularity. There are no definite focal masses. Spleen is mildly enlarged measuring 13.1 cm in diameter without change. Hepatic veins are somewhat attenuated. Portal veins, splenic vein and SMV are patent. Pancreas is somewhat atrophic but otherwise unremarkable. The gallbladder demonstrates some density dependently which could be related to tumefactive sludge or noncalcified stones. There is no biliary dilatation. The adrenal glands are normal in size. Kidneys demonstrate no hydronephrosis. There are probable small cortical cysts. The abdominal aorta is normal in caliber. There is atherosclerosis of the aorta. There is no lymphadenopathy. The bowel demonstrates no evidence of bowel obstruction. There is moderate stool throughout the colon. There is a hiatal hernia. There is a small amount of fluid around the liver although this is  decreased over the prior study. There is minimal fluid in both paracolic gutters. There is diffuse subcutaneous edema. CT Pelvis: There is no acute inflammatory process in the pelvis. There is free fluid in the pelvis with diffuse subcutaneous edema. There are mildly prominent lymph nodes in the inguinal regions, left greater than right with some nodes in the external iliac region as well more so on the left. The largest inguinal node is 1.5 cm short axis on the left side.. There is postoperative change involving the colon. Bladder is decompressed with a Maradiaga catheter.     1.  There is mild colonic distention with large amount of stool throughout the colon. There is no bowel obstruction. 2.  There is a hiatal hernia. 3.  Changes of the liver with mild splenomegaly and a small amount of ascites in the abdomen and pelvis suggestive of underlying hepatocellular disease. 4.  Dependent density in the gallbladder which could be tumefactive sludge or noncalcified stones. 5.  There are small dependent bilateral pleural effusions with dependent airspace disease, likely atelectasis. 6.  There is mild lymphadenopathy in the pelvis more so on the left likely inflammatory. 7.  There is diffuse subcutaneous edema.    Ct-extremity, Lower With Left    Result Date: 10/1/2017  10/1/2017 8:07 AM HISTORY/REASON FOR EXAM:  Progression of strep/staph cellulitis with sepsis. Possible necrotizing fasciitis.. TECHNIQUE/EXAM DESCRIPTION AND NUMBER OF VIEWS:  CT scan of the LEFT lower extremity with contrast, with reconstructions. Thin helical 3 mm sections were obtained from the distal femur through the proximal tibia/fibula. Sagittal and coronal multiplanar reconstructions were generated from the axial images. A total of 100 mL of Omnipaque 350 nonionic contrast was administered  IV without complication. Up to date radiation dose reduction adjustments have been utilized to meet ALARA standards for radiation dose reduction. COMPARISON:  X-rays of the foot and tibia fibula 9/29/2017, 9/28/2017 respectively. FINDINGS: Imaging of the visualized portion of the pelvis demonstrates fluid in the pelvis. Bladder is decompressed with a Maradiaga catheter. There is postoperative change involving the colon. There are mildly prominent bilateral external iliac and common femoral lymph nodes. Largest node in the left inguinal region is 1.5 cm short axis diameter. There is diffuse subcutaneous edema throughout the pelvis. Imaging throughout the left lower extremity demonstrates diffuse subcutaneous edema. There is a small amount of fluid in the intermuscular planes along the medial aspect of the proximal lower leg adjacent to the medial gastrocnemius musculature. There is  no focal abscess collection. There is a small joint effusion of the knee. There is some mild fatty infiltration of the musculature. There is atherosclerosis but basilar structures appear grossly patent. Limited partial visualization of portions of the right lower extremity also demonstrates some mild subcutaneous edema. Bones demonstrate mild degenerative changes in the hip and left knee. There is no evidence of osteomyelitis.     1.  There is diffuse subcutaneous edema throughout the left hemipelvis and left lower extremity with a small amount of fluid adjacent to the right medial gastrocnemius at the level the proximal calf. 2.  There is no focal drainable abscess. 3.  There is no evidence of osteomyelitis. 4.  There is mild atherosclerosis with grossly patent vasculature. 5.  There are probably inflammatory lymph nodes in the pelvis and left inguinal region with the largest measuring 1.5 cm short axis. 6.  There is free fluid in the visualized pelvis.    Ct-head W/o    Result Date: 10/1/2017  HISTORY/REASON FOR EXAM:  Altered Mental Status. TECHNIQUE/EXAM DESCRIPTION: CT scan of the head without contrast, 10/1/2017 12:28 AM. Contiguous 5 mm axial sections were obtained from the skull base  through the vertex. Up to date radiation dose reduction adjustments have been utilized to meet ALARA standards for radiation dose reduction. COMPARISON:  4/23/2015 FINDINGS:   The ventricular system and cortical sulci are prominent, consistent with the patient's age.  There is no midline shift or other mass effect.  A 5 mm circumscribed lucency is present in the right temporal lobe, consistent with old lacunar infarct. This is unchanged. There is no acute intra-axial abnormality or extra-axial fluid collection.  There is no intracranial hemorrhage.  The calvaria are intact. The visualized paranasal sinuses show no unusual opacity.     1.  No acute intracranial abnormality. 2.  Moderate atrophy, age-consistent. 3.  Probable old lacunar infarct in the right temporal lobe, unchanged from the prior scan. INTERPRETING LOCATION:  [address] [facility], [facility], [address]    Dx-chest-portable (1 View)    Result Date: 9/29/2017 9/29/2017 1:25 PM HISTORY/REASON FOR EXAM:  Line placement. TECHNIQUE/EXAM DESCRIPTION AND NUMBER OF VIEWS: Single portable view of the chest. COMPARISON: 9/28/2017. FINDINGS: The soft tissues and bony structures are unremarkable. The heart and mediastinal structures are within normal limits. Pulmonary vascularity is normal. There is right basilar atelectasis. There is no effusion or pneumothorax. There has been interval insertion of a central venous catheter which terminates with the tip projecting over the expected region of the mid to distal superior vena cava.     1.  Interval insertion of a central venous catheter which terminates with the tip projecting over the expected region of the mid to distal superior vena cava. 2.  Right basilar atelectasis.    Dx-chest-portable (1 View)    Result Date: 9/28/2017 9/28/2017 4:32 PM HISTORY/REASON FOR EXAM:  Sepsis. Left lower extremity wound. TECHNIQUE/EXAM DESCRIPTION AND NUMBER OF VIEWS: Single portable view of the chest. COMPARISON: 9/1/2016 FINDINGS: The  mediastinal and cardiac silhouette is unremarkable. The pulmonary vascularity is within normal limits. Lungs demonstrate no evidence of pneumonia. Slight hazy density seen adjacent to the right anterior 1st rib is similar to the prior studies and likely due to degenerative change. There is no significant pleural effusion. There is no visible pneumothorax. There are no acute bony abnormalities.     1.  There is no acute cardiopulmonary process.    Dx-foot-2- Left    Result Date: 9/29/2017 9/29/2017 4:10 PM HISTORY/REASON FOR EXAM:  Left foot swelling and weeping TECHNIQUE/EXAM DESCRIPTION AND NUMBER OF VIEWS: 2 views of the LEFT foot. COMPARISON:  None. FINDINGS:  Bone mineralization is age appropriate. Bony alignment is anatomic. There is no evidence of acute fracture or dislocation. There is no soft tissue gas. There is soft tissue swelling about the ankle.     Soft tissue swelling about the ankle. No evidence of acute osseous abnormality or soft tissue gas.    Dx-lumbar Spine-2 Or 3 Views    Result Date: 9/9/2017 9/9/2017 3:24 AM HISTORY/REASON FOR EXAM:  Pain Following Trauma. TECHNIQUE/ EXAM DESCRIPTION AND NUMBER OF VIEWS:  3 views of the lumbar spine. COMPARISON: None. FINDINGS: The lowest formed intervertebral disc will be designated L5-S1 for the purposes of this report and vertebral levels numbered accordingly. No acute fracture is evident. No gross malalignment is seen. There is mild loss of intervertebral disc height in the upper lumbar spine. There is moderate loss of intervertebral disc height at L4-L5. There are anterior osteophytes at most levels. There is no evidence of spondylolisthesis or osseous lesion. There are degenerative changes of the mid to lower lumbar facet joints. There is calcific atherosclerotic plaque.     1.  No evidence of acute fracture. 2.  Multilevel multifactorial degenerative changes    Dx-thoracic Spine-2 Views    Result Date: 9/9/2017 9/9/2017 3:21 AM HISTORY/REASON FOR  EXAM:  Pain Following Trauma. TECHNIQUE/EXAM DESCRIPTION AND NUMBER OF VIEWS:  Thoracic spine, 2 views. COMPARISON:  None. FINDINGS: Glands unremarkable. There is lucency extending to the superior endplate of L2 on the second image. This appearance is not present on the dedicated lumbar spine radiographs. No other finding suspicious for acute fracture are seen. There is mild loss of intervertebral disc height throughout the thoracic spine. There are anterior osteophytes at most levels.     1.  Lucency through the superior endplate of L2 on the second image, discordant with the appearance on the dedicated lumbar spine radiographs. I suspect this is artifactual however a fracture is not excluded. Further assessment is recommended with CT of the lumbar spine. 2.  Thoracic spondylosis    Dx-tibia And Fibula Left    Result Date: 9/28/2017 9/28/2017 4:32 PM HISTORY/REASON FOR EXAM:  Left tibia and fibular redness and swelling with open wound in the region of the medial malleolus. TECHNIQUE/EXAM DESCRIPTION AND NUMBER OF VIEWS:  2 views of the LEFT tibia and fibula. COMPARISON: Left knee 4/24/2015 FINDINGS: There is no evidence of acute fracture involving the tibia or fibula. Sclerotic focus posterior left tibial metadiaphyseal region is without change and may represent a sclerotic NOF or fibrous cortical defect. There is mild diffuse soft tissue swelling most prominent at the level the ankle. There is no gas in the soft tissues and there is no foreign body. There is no periostitis.     1.  There is mild diffuse swelling in the soft tissues of the distal left lower leg and ankle. There is no gas in the soft tissues or foreign body. 2.  There is no plain film evidence of osteomyelitis.    Dx-wrist-complete 3+ Left    Result Date: 9/9/2017 9/9/2017 3:24 AM HISTORY/REASON FOR EXAM:  Pain/Deformity Following Trauma. TECHNIQUE/EXAM DESCRIPTION AND NUMBER OF VIEWS:  3 views of the  LEFT wrist. COMPARISON: Radiographs 5/6/2015  FINDINGS: MINERALIZATION: Decreased. INJURY: There has been interval placement of a volar plate and screws transfixing the previously demonstrated intra-articular distal radial fracture. The hardware appears intact. There is underlying curvilinear deformity of the distal radius. There is ulnar positive variance, as before. No acute fracture is seen. JOINTS: No erosive arthropathy is evident.     1.  No radiographic evidence of acute traumatic injury. 2.  Healed surgically transfixed fracture of the distal radius 3.  Osteopenia    Echocardiogram Comp W/o Cont    Result Date: 2017  Transthoracic Echo Report Echocardiography Laboratory CONCLUSIONS Normal left ventricular size, thickness, systolic function, and diastolic function. Mitral annular calcification. Mild mitral regurgitation. Aortic sclerosis without stenosis. Mild aortic insufficiency. Normal estimated right-sided pressures. No prior study is available for comparison. MAGALI TODD Exam Date:         2017                    14:07 Exam Location:     Inpatient Priority:          Routine Ordering Physician:        CIRILO HSU JR Referring Physician:       [id number] Sonographer:               Lo Lundberg RDCS Age:    72     Gender:    M MRN:    [id number] :    1945 BSA:    1.73   Ht (in):    65     Wt (lb):    145 Exam Type:     Complete Indications:     Cardiac arrhythmia, unspecified ICD Codes:       I499 CPT Codes:       55955 BP:   89     /   57     HR:   80 Technical Quality:       Fair MEASUREMENTS  (Male / Female) Normal Values 2D ECHO LV Diastolic Diameter PLAX        4.6 cm                4.2 - 5.9 / 3.9 - 5.3 cm LV Systolic Diameter PLAX         2.1 cm                2.1 - 4.0 cm IVS Diastolic Thickness           1 cm                  LVPW Diastolic Thickness          1 cm                  RV Diameter 4C                    2.7 cm                2.5 - 2.9 cm LVOT Diameter                     1.8 cm                RA  Diameter                       2.7 cm                Estimated LV Ejection Fraction    60 %                  LV Ejection Fraction MOD BP       60.8 %                >= 55  % LV Ejection Fraction MOD 4C       58.4 %                LV Ejection Fraction MOD 2C       58.4 %                LA Volume Index                   13.5 cm³/m²           16 - 28 cm³/m² IVC Diameter                      1.5 cm                M-MODE Aortic Root Diameter MM           3.9 cm                DOPPLER AV Peak Velocity                  1.3 m/s               AV Peak Gradient                  6.7 mmHg              AV Mean Gradient                  3.3 mmHg              AI Pressure Half Time             463 ms                LVOT Peak Velocity                0.89 m/s              AV Area Cont Eq vti               1.9 cm²               Mitral E Point Velocity           0.96 m/s              Mitral E to A Ratio               0.99                  MV Pressure Half Time             60.6 ms               MV Area PHT                       3.6 cm²               MV Deceleration Time              209 ms                TR Peak Velocity                  222 cm/s              PV Peak Velocity                  0.7 m/s               PV Peak Gradient                  1.9 mmHg              RVOT Peak Velocity                0.52 m/s              * Indicates values subject to auto-interpretation LV EF:  60    % FINDINGS Left Ventricle Normal left ventricular size, thickness, systolic function, and diastolic function.  Left ventricular ejection fraction is visually estimated to be 60%. Normal regional wall motion. Right Ventricle Normal right ventricular size and systolic function. Right Atrium Normal right atrial size. Normal inferior vena cava size and inspiratory collapse. Left Atrium Normal left atrial size. Mitral Valve Mitral annular calcification. Thickened mitral valve leaflets. Mild mitral regurgitation. Aortic Valve Aortic sclerosis without stenosis.  Mild aortic insufficiency. Tricuspid Valve Structurally normal tricuspid valve. Mild tricuspid regurgitation. Estimated right ventricular systolic pressure  is 25 mmHg. Pulmonic Valve Structurally normal pulmonic valve. Mild pulmonic insufficiency. Pericardium Normal pericardium without effusion. Aorta Ascending aorta is normal for body surface area, and measured at a diameter of 3.7 cm above the sinotubular junction. Suleman Nguyen MD (Electronically Signed) Final Date:     2017                 23:50    Le Venous Duplex (specify In Comments Left, Right Or Bilateral)    Result Date: 2017   Vascular Laboratory  CONCLUSIONS  No evidence of acute DVT or SVT in the left lower extremity.  Edema is noted.  MAGALI TODD  Exam Date:     2017 16:43  Room #:     Inpatient  Priority:     Stat  Ht (in):             Wt (lb):  Ordering Physician:        REYNALDO LUU  Referring Physician:       [id number] Galeano  Sonographer:               Samina Galloway RVT  Study Type:                Complete Unilateral  Technical Quality:         Adequate  Age:    72    Gender:     M  MRN:    [id number]  :    1945      BSA:  Indications:     Localized swelling, mass and lump, left lower limb  CPT Codes:       05543  ICD Codes:         History:         Swelling and redness of left leg for less than 24 hours.  Limitations:     Severe pain.  PROCEDURES:  Left lower extremity venous duplex imaging.  The following venous structures were evaluated: common femoral, profunda  femoral, greater saphenous, femoral, popliteal, peroneal and posterior  tibial veins.  Serial compression, augmentation maneuvers, color and spectral Doppler flow  evaluations were performed.  FINDINGS:  Left lower extremity -  Complete color filling and compressibility with normal venous flow dynamics  including spontaneous flow, response to augmentation maneuvers, and  respiratory phasicity.  The peroneal and posterior tibial veins are  difficult to assess for  compressibility due to patient pain, but flow response to augmentation is  demonstrated.  Interstitial fluid consistent with edema is observed in the thigh and below  the knee.  Flow was evaluated in the contralateral common femoral vein and normal  venous flow dynamics including spontaneous flow, respiratory phasic  variation and augmentation were demonstrated.  Kevin Lester MD  (Electronically Signed)  Final Date:      29 September 2017                   15:52    Dx-abdomen For Tube Placement    Result Date: 10/1/2017  10/1/2017 7:28 PM HISTORY/REASON FOR EXAM:  Line evaluation. TECHNIQUE/EXAM DESCRIPTION AND NUMBER OF VIEWS:  1 view(s) of the abdomen. COMPARISON:  9/1/2016. FINDINGS: Enteric tube projects over the second portion of the duodenum. There is mild colonic distention. Degenerative changes are seen in the spine.     Enteric tube projects over the second portion of the duodenum. Mild colonic distention.      Micro:  Results     Procedure Component Value Units Date/Time    CULTURE WOUND W/ GRAM STAIN [[id number]]  (Abnormal)  (Susceptibility) Collected:  09/28/17 1928    Order Status:  Completed Specimen:  Wound from Right Foot Updated:  10/01/17 0541     Gram Stain Result --     Few WBCs.  Many Gram positive cocci.       Significant Indicator POS (POS)     Source WND     Site RIGHT FOOT     Culture Result Wound -- (A)     Culture Result Wound -- (A)     Beta Hemolytic Streptococcus group A  Heavy growth       Culture Result Wound -- (A)     Methicillin Resistant Staphylococcus aureus  Moderate growth  This isolate is presumed to be clindamycin resistant based on  detection of inducible resistance.  Clindamycin may still  be effective in some patients.      Narrative:       CALL  [place]  161 tel. [phone],  CALLED  161 tel. [phone] 10/01/2017, 05:40, RB PERF. RESULTS CALLED TO:APURVA  [id number] Kasper    Culture & Susceptibility     METHICILLIN RESISTANT STAPHYLOCOCCUS AUREUS      "Antibiotic Sensitivity Microscan Unit Status    Ampicillin/sulbactam Resistant 16/8 mcg/mL Final    Clindamycin Resistant <=0.5 mcg/mL Final    Daptomycin Sensitive <=0.5 mcg/mL Final    Erythromycin Resistant >4 mcg/mL Final    Moxifloxacin Resistant >4 mcg/mL Final    Oxacillin Resistant >2 mcg/mL Final    Penicillin Resistant >8 mcg/mL Final    Tetracycline Sensitive <=4 mcg/mL Final    Trimeth/Sulfa Sensitive <=0.5/9.5 mcg/mL Final    Vancomycin Sensitive 2 mcg/mL Final                       URINE CULTURE(NEW) [[id number]] Collected:  09/28/17 2020    Order Status:  Completed Specimen:  Urine Updated:  09/30/17 0644     Significant Indicator NEG     Source UR     Site --     Urine Culture No growth at 48 hours    Narrative:       Indication for culture:->Emergency Room Patient    BLOOD CULTURE [[id number]] Collected:  09/28/17 1703    Order Status:  Completed Specimen:  Blood from Peripheral Updated:  09/29/17 0847     Significant Indicator NEG     Source BLD     Site PERIPHERAL     Blood Culture --     No Growth    Note: Blood cultures are incubated for 5 days and  are monitored continuously.Positive blood cultures  are called to the RN and reported as soon as  they are identified.      Narrative:       Per Hospital Policy: Only change Specimen Src: to \"Line\" if  specified by physician order.    BLOOD CULTURE [[id number]] Collected:  09/28/17 1715    Order Status:  Completed Specimen:  Blood from Peripheral Updated:  09/29/17 0847     Significant Indicator NEG     Source BLD     Site PERIPHERAL     Blood Culture --     No Growth    Note: Blood cultures are incubated for 5 days and  are monitored continuously.Positive blood cultures  are called to the RN and reported as soon as  they are identified.      Narrative:       Per Hospital Policy: Only change Specimen Src: to \"Line\" if  specified by physician order.    GRAM STAIN [[id number]] Collected:  09/28/17 1928    Order Status:  Completed Specimen:  Wound Updated:  " 09/29/17 0749     Significant Indicator .     Source WND     Site RIGHT FOOT     Gram Stain Result --     Few WBCs.  Many Gram positive cocci.      URINALYSIS [[id number]]  (Abnormal) Collected:  09/28/17 2020    Order Status:  Completed Specimen:  Urine Updated:  09/28/17 2038     Color Yellow     Character Clear     Specific Gravity 1.016     Ph 5.0     Glucose Negative mg/dL      Ketones Trace (A) mg/dL      Protein Negative mg/dL      Bilirubin Negative     Urobilinogen, Urine 0.2     Nitrite Negative     Leukocyte Esterase Negative     Occult Blood Negative     Micro Urine Req see below     Comment: Microscopic examination not performed when specimen is clear  and chemically negative for protein, blood, leukocyte esterase  and nitrite.         Narrative:       Indication for culture:->Emergency Room Patient          Assessment:  Active Hospital Problems    Diagnosis   • Sinus tachycardia [R00.0]   • Cirrhosis (CMS-HCC) [K74.60]   • Alcoholism (CMS-HCC) [F10.20]   • Severe sepsis (CMS-HCC) [A41.9, R65.20]   • Hyponatremia [E87.1]   • Cellulitis [L03.90]   • CLL (chronic lymphocytic leukemia) (CMS-HCC) [C91.10]   • Continuous opioid dependence (CMS-HCC) [F11.20]       Plan:  Septic shock.   2/2 LLE cellulitis  Afebrile  Significant leukocytosis  Remains on pressors    LLE cellulitis  Cx - MRSA (vanco STEPHANIE 2), Group A strep  Continue ceftaroline and clindamycin  Bcx 9/28 - NGTD  Doppler neg for DVT    Leukocytosis  2/2 above  On abx  Monitor    Encephalopathy  2/2 above  Pt remains confused    CLL    Episodes of SVT  S/p adenosine    Alcohol abuse/cirrhosis    Discussed with internal medicine

## 2017-10-03 NOTE — PROGRESS NOTES
Bedside report given at 1850. Patient in bed. No family present. Drips verified.Bed alarm on. Call light within reach. Patient educated on safety precautions and skin integrity. Will provide Q2 turns and as needed adjustments. Will continue to educate. Needs reinforcement.

## 2017-10-03 NOTE — PROGRESS NOTES
Pulmonary Critical Care Progress Note      Date of service:  10/3/2017    Interval Events:  24 hour interval history reviewed  Reason for visit:  Severe sepsis with shock, AF with RVR, hypotension, ETOH withdrawal delirium  Unable to provide CC or ROS due to medical condition       - AF - rate better   - Raj 20   - TF at goal   - constipated   - confused   - give albumin and Lasix      PFSH:  No change.    Respiratory:     Pulse Oximetry: 98 %  CXR personally reviewed  CXR with worsening pulmonary edema  Scattered coarse crackles bilaterally    HemoDynamics:  Pulse: 97, Heart Rate (Monitored): 93  NIBP: (!) 76/41  CVP (mm Hg): 5 MM HG  AF  Phenylephrine 20    Neuro:  Arouses, confused    Fluids:  Intake/Output       10/01/17 0700 - 10/02/17 0659 10/02/17 0700 - 10/03/17 0659 10/03/17 0700 - 10/04/17 0659      [id number] 4132-4621 Total 9568-7831 9252-3739 Total 1558-8643 2673-0666 Total       Intake    I.V.  --  2120.6 2120.6  1030.7  197 1227.7  --  -- --    Phenylephrine Volume -- -- -- 63.1 77 140.1 -- -- --    Norepinephrine Volume -- 120.6 120.6 37.6 -- 37.6 -- -- --    IV Piggyback Volume -- 200 200 250 -- 250 -- -- --    IV Volume (NS) -- 1800 1800 680 120 800 -- -- --    Other  --  30 30  --  -- --  --  -- --    Medications (P.O./ Enteral Liquids) -- 30 30 -- -- -- -- -- --    Enteral  --  60 60  --  350 350  --  -- --    Enteral Volume -- -- -- -- 350 350 -- -- --    Free Water / Tube Flush -- 60 60 -- -- -- -- -- --    Total Intake -- 2210.6 2210.6 1030.7 547 1577.7 -- -- --       Output    Urine  900  445 1345  425  525 950  --  -- --    Indwelling Cathether  425 525 950 -- -- --    Total Output  425 525 950 -- -- --       Net I/O     -900 1765.6 865.6 605.7 22 627.7 -- -- --           Recent Labs      10/01/17   0520  10/02/17   0449  10/02/17   1125  10/03/17   0350   SODIUM  133*  135   --   136   POTASSIUM  3.8  3.5*   --   4.0   CHLORIDE  104  108   --   109   CO2  20  20   --    19*   BUN  20     --      CREATININE  0.92  0.88   --   0.97   MAGNESIUM   --    --   1.9  2.3   CALCIUM  8.6  8.1*   --   8.0*       GI/Nutrition:  Abd distended and mildly diffusely tender.  No peritoneal signs.  CT reviewed    Liver Function  Recent Labs      10/01/17   0520  10/02/17   0449  10/03/17   0350   ALTSGPT  7  8  8   ASTSGOT  16  18  18   ALKPHOSPHAT  109*  107*  120*   TBILIRUBIN  0.7  0.7  0.6   GLUCOSE  118*  75  95       Heme:  Recent Labs      10/01/17   0020  10/01/17   0520  10/02/17   0449  10/03/17   0350   RBC  2.85*  2.84*  2.58*  2.54*   HEMOGLOBIN  10.3*  10.3*  9.2*  9.3*   HEMATOCRIT  30.7*  30.4*  27.2*  26.5*   PLATELETCT  73*  70*  89*  105*   PROTHROMBTM  14.4   --    --    --    APTT  37.5*   --    --    --    INR  1.09   --    --    --        Infectious Disease:  Monitored Temp  Av.1 °C (98.7 °F)  Min: 36.4 °C (97.5 °F)  Max: 37.3 °C (99.1 °F)  Temp  Av.2 °C (98.9 °F)  Min: 37.1 °C (98.8 °F)  Max: 37.2 °C (99 °F)    Recent Labs      10/01/17   0520  10/02/17   0449  10/03/17   0350   WBC  25.2*  27.5*  22.9*   NEUTSPOLYS  43.80*  40.70*  44.30   LYMPHOCYTES  47.40*  53.40*  54.80*   MONOCYTES  0.00  0.90  0.00   EOSINOPHILS  0.00  0.00  0.00   BASOPHILS  0.00  0.00  0.00   ASTSGOT  16  18  18   ALTSGPT  7  8  8   ALKPHOSPHAT  109*  107*  120*   TBILIRUBIN  0.7  0.7  0.6     Current Facility-Administered Medications   Medication Dose Frequency Provider Last Rate Last Dose   • enoxaparin (LOVENOX) inj 40 mg  40 mg DAILY Rohit Albarado M.D.   40 mg at 10/02/17 1117   • phenylephrine (JEWEL-SYNEPHRINE) 40,000 mcg in  mL Infusion  0-300 mcg/min Continuous Rohit Albarado M.D. 9.4 mL/hr at 10/03/17 0630 25 mcg/min at 10/03/17 0630   • digoxin (LANOXIN) tablet 125 mcg  125 mcg DAILY AT 1800 XIANG JimenezOColin       • clindamycin (CLEOCIN) IVPB premix 900 mg  900 mg Q8HRS LIBAN Jimenez.O.   Stopped at 10/03/17 0654   • MD  ALERT...Adult ICU Electrolyte Replacement per Pharmacy Protocol   PRN Zain Kelly Jr. D.O.       • ceftaroline (TEFLARO) 600 mg in  mL IVPB  600 mg Q12HRS Ana Benavidez M.D.   Stopped at 10/02/17 2132   • MD ALERT...Phenobarbital Alcohol Withdrawal Protocol Pharmacist to Implement (ICU Only)   PRN Zain Kelly Jr. D.O.       • Pharmacy Consult Request 1 Each  1 Each PRN Zain Kelly Jr. D.O.        And   • PHENObarbital injection 130 mg  130 mg Q30 MIN PRN Zain Kelly Jr. D.O.   130 mg at 10/02/17 1904    And   • PHENObarbital injection 260 mg  260 mg Q30 MIN PRN LIBAN Dale Jr..O.   260 mg at 10/01/17 1702   • folic acid (FOLVITE) tablet 1 mg  1 mg DAILY Brit Chavez, A.P.R.N.   1 mg at 10/02/17 0853   • therapeutic multivitamin-minerals (THERAGRAN-M) tablet 1 Tab  1 Tab DAILY Brit Chavez A.P.R.N.   1 Tab at 10/02/17 0853   • thiamine (THIAMINE) tablet 100 mg  100 mg DAILY Brit Chavez A.P.R.N.   100 mg at 10/02/17 0853   • NS infusion   Continuous Rohit Albarado M.D. 10 mL/hr at 10/02/17 1039     • duloxetine (CYMBALTA) capsule 60 mg  60 mg DAILY Evelyn Blackwell M.D.   60 mg at 10/02/17 0853   • levothyroxine (SYNTHROID) tablet 25 mcg  25 mcg AM ES Evelyn Blackwell M.D.   25 mcg at 10/03/17 0553   • trazodone (DESYREL) tablet 100 mg  100 mg Nightly Evelyn Blackwell M.D.   100 mg at 10/02/17 2025   • senna-docusate (PERICOLACE or SENOKOT S) 8.6-50 MG per tablet 2 Tab  2 Tab BID Evelyn Blackwell M.D.   2 Tab at 10/02/17 2026    And   • polyethylene glycol/lytes (MIRALAX) PACKET 1 Packet  1 Packet QDAY PRN Haven Blanchester, M.D.   1 Packet at 10/02/17 1451    And   • magnesium hydroxide (MILK OF MAGNESIA) suspension 30 mL  30 mL QDAY PRCHUY Blackwell M.D.   30 mL at 09/30/17 1039    And   • bisacodyl (DULCOLAX) suppository 10 mg  10 mg QDAY PRN Evelyn Blackwell M.D.   10 mg at 10/01/17 1134   • ondansetron (ZOFRAN) syringe/vial injection 4 mg  4 mg Q4HRS PRN Evelyn Blackwell M.D.    4 mg at 10/01/17 0900   • ondansetron (ZOFRAN ODT) dispertab 4 mg  4 mg Q4HRS PRCHUY Blackwell M.D.       • Pharmacy Consult Request ...Pain Management Review   JOSHUA Blackwell M.D.        And   • oxycodone immediate-release (ROXICODONE) tablet 5 mg  5 mg Q3HRS PRCHUY Blackwell M.D.   5 mg at 10/03/17 0553    And   • oxycodone immediate-release (ROXICODONE) tablet 10 mg  10 mg Q3HRS PRN Evleyn Blackwell M.D.   10 mg at 09/30/17 1805    And   • HYDROmorphone (DILAUDID) injection 0.5 mg  0.5 mg Q3HRS PRCHUY Blackwell M.D.   0.5 mg at 10/02/17 2005     Last reviewed on 9/29/2017  2:17 AM by Kelsi Layton, RColinNColin    Quality  Measures:  Labs reviewed, Medications reviewed and Radiology images reviewed                        Assessment and Plan:    Acute hypoxemic respiratory failure   - cont oxygen  Acute pulmonary edema   - force diuresis as tolerated   - will give albumin  Severe sepsis with shock - skin and soft tissue source   - cont vasopressor support as necessary  LE cellulitis   - MRSA and Strep species   - cont ceftaroline and clindamycin per ID   - wound care  AF - improved rate control   - cont digoxin   - optimize K and Mg  Alcohol withdrawal delirium   - phenobarbital protocol  ETOH abuse   - vitamins  Cirrhosis  CLL    Critically ill with unstable cardiovascular status.  Active titration of vasopressor.  Worsening acute pulmonary edema.    High risk of deterioration and worsening vital organ dysfunction and death without critical care interventions.    Critical Care Time:  32 minutes  00705  No time overlap  Time excludes procedures  Discussed with RN, RT, Team

## 2017-10-03 NOTE — PROGRESS NOTES
[facility] Hospitalist Progress Note    Date of Service: 10/3/2017    Chief Complaint  72 y.o. male admitted 2017 with L leg cellulitis and sepsis/shock.    transfered to CICU on  with hypotension/shock and episodes of SVT to 160's later complicated by severe alcohol withdrawal.     Interval Problem Update  Afib with rvr over night. Loaded on digoxin  On presors for chock  Remains on phenobarb protocol  cortrack and tf onging  Maradiaga intact- 600 out with lasix   On 3L nc  On lovenox for vte  On day 3 ceftaroline/clinda for mrsa and group a strep      Consultants/Specialty  Pulmonology    Disposition  Icu  Critical care time with active titration of meds for hypotension 36min        Review of Systems   Unable to perform ROS: Mental status change      Physical Exam  Laboratory/Imaging   Hemodynamics  Temp (24hrs), Av.2 °C (98.9 °F), Min:37.1 °C (98.8 °F), Max:37.2 °C (99 °F)   Temperature: 37.2 °C (99 °F), Monitored Temp: 37 °C (98.6 °F)  Pulse  Av.3  Min: 63  Max: 188 Heart Rate (Monitored): 93  NIBP: (!) 76/41 CVP (mm Hg): 5 MM HG    Respiratory      Respiration: 17, Pulse Oximetry: 98 %             Fluids    Intake/Output Summary (Last 24 hours) at 10/03/17 0757  Last data filed at 10/03/17 0600   Gross per 24 hour   Intake          1577.74 ml   Output              950 ml   Net           627.74 ml       Nutrition  No orders of the defined types were placed in this encounter.    Physical Exam   Constitutional: He appears well-developed and well-nourished. No distress.   HENT:   Head: Normocephalic and atraumatic.   Eyes: Conjunctivae are normal.   Neck: No JVD present.   Cardiovascular: Normal rate.  Exam reveals no gallop.    No murmur heard.  Pulmonary/Chest: Effort normal. No stridor. No respiratory distress. He has no wheezes. He has no rales.   Abdominal: Soft. There is no tenderness. There is no rebound and no guarding.   Musculoskeletal: He exhibits edema.   errythema extending to prox thigh.   No subcu air   Neurological: He is alert.   Somnolent rouses to touch  Ox0   Skin: Skin is warm and dry. Rash noted. He is not diaphoretic.   errythema R ankle to tibial tubercle.  No crepitus.  No bullae   Nursing note and vitals reviewed.      Recent Labs      10/01/17   0520  10/02/17   0449  10/03/17   0350   WBC  25.2*  27.5*  22.9*   RBC  2.84*  2.58*  2.54*   HEMOGLOBIN  10.3*  9.2*  9.3*   HEMATOCRIT  30.4*  27.2*  26.5*   MCV  107.0*  105.4*  104.3*   MCH  36.3*  35.7*  36.6*   MCHC  33.9  33.8  35.1   RDW  52.7*  51.9*  52.6*   PLATELETCT  70*  89*  105*   MPV  9.4  9.5  9.2     Recent Labs      10/01/17   0520  10/02/17   0449  10/03/17   0350   SODIUM  133*  135  136   POTASSIUM  3.8  3.5*  4.0   CHLORIDE  104  108  109   CO2  20  20  19*   GLUCOSE  118*  75  95   BUN  20  17  17   CREATININE  0.92  0.88  0.97   CALCIUM  8.6  8.1*  8.0*     Recent Labs      10/01/17   0020   APTT  37.5*   INR  1.09                  Assessment/Plan     Cirrhosis (CMS-HCC)- (present on admission)   Assessment & Plan    Chronic, stable with no e/o decompensation  Continue spironolactone        Alcoholism (CMS-HCC)- (present on admission)   Assessment & Plan    No E/O active WD at this time- monitor, may need CIWA   Folate, MVI, thiamine supplementation  Phenobarb        Severe sepsis (CMS-HCC)- (present on admission)   Assessment & Plan    This is sepsis (without associated acute organ dysfunction).   2/2 left leg cellulitis and concern for possible deeper wound  Cultures positive for MRSA   Iv fluids as tolerated  Titrate pressors as tolerated for hypotension.   CT 10/1 neg for free air or any other indication of Nec Fasc.  No abcess  Iv abx per ID        Hyponatremia   Assessment & Plan    Worsening from baseline around 132; secondary to cirrhosis with sepsis and volume overload.  Monitor bmp daily.  Continue aldactone, fluid restrict and 1.5 gram sodium restricted diet, may need some lasix.        Cellulitis- (present on  admission)   Assessment & Plan    Treat as noted above        CLL (chronic lymphocytic leukemia) (CMS-HCC)- (present on admission)   Assessment & Plan    Followed by Dr. Flores outpatient, chronically elevated WBC count.        Continuous opioid dependence (CMS-HCC)- (present on admission)   Assessment & Plan    Cont home oxycodone dose            Reviewed items::  EKG reviewed, Radiology images reviewed, Labs reviewed and Medications reviewed  DVT prophylaxis pharmacological::  Heparin  DVT prophylaxis - mechanical:  SCDs  Ulcer Prophylaxis::  Not indicated  Antibiotics:  Treating active infection/contamination beyond 24 hours perioperative coverage

## 2017-10-04 ENCOUNTER — APPOINTMENT (OUTPATIENT)
Dept: RADIOLOGY | Facility: MEDICAL CENTER | Age: 72
DRG: 871 | End: 2017-10-04
Attending: INTERNAL MEDICINE
Payer: COMMERCIAL

## 2017-10-04 PROBLEM — J96.00 ACUTE RESPIRATORY FAILURE (HCC): Status: ACTIVE | Noted: 2017-10-04

## 2017-10-04 LAB
ALBUMIN SERPL BCP-MCNC: 3 G/DL (ref 3.2–4.9)
ALBUMIN/GLOB SERPL: 1.1 G/DL
ALP SERPL-CCNC: 103 U/L (ref 30–99)
ALT SERPL-CCNC: 6 U/L (ref 2–50)
ANION GAP SERPL CALC-SCNC: 6 MMOL/L (ref 0–11.9)
AST SERPL-CCNC: 15 U/L (ref 12–45)
BASOPHILS # BLD AUTO: 0 % (ref 0–1.8)
BASOPHILS # BLD: 0 K/UL (ref 0–0.12)
BILIRUB SERPL-MCNC: 0.7 MG/DL (ref 0.1–1.5)
BUN SERPL-MCNC: 17 MG/DL (ref 8–22)
C DIFF DNA SPEC QL NAA+PROBE: NEGATIVE
C DIFF TOX GENS STL QL NAA+PROBE: NEGATIVE
CALCIUM SERPL-MCNC: 8.8 MG/DL (ref 8.5–10.5)
CHLORIDE SERPL-SCNC: 106 MMOL/L (ref 96–112)
CO2 SERPL-SCNC: 24 MMOL/L (ref 20–33)
CREAT SERPL-MCNC: 1.01 MG/DL (ref 0.5–1.4)
EOSINOPHIL # BLD AUTO: 0 K/UL (ref 0–0.51)
EOSINOPHIL NFR BLD: 0 % (ref 0–6.9)
ERYTHROCYTE [DISTWIDTH] IN BLOOD BY AUTOMATED COUNT: 54 FL (ref 35.9–50)
GFR SERPL CREATININE-BSD FRML MDRD: >60 ML/MIN/1.73 M 2
GLOBULIN SER CALC-MCNC: 2.8 G/DL (ref 1.9–3.5)
GLUCOSE SERPL-MCNC: 120 MG/DL (ref 65–99)
HCT VFR BLD AUTO: 24.8 % (ref 42–52)
HGB BLD-MCNC: 8.5 G/DL (ref 14–18)
LYMPHOCYTES # BLD AUTO: 9.95 K/UL (ref 1–4.8)
LYMPHOCYTES NFR BLD: 63.4 % (ref 22–41)
MAGNESIUM SERPL-MCNC: 2.1 MG/DL (ref 1.5–2.5)
MANUAL DIFF BLD: NORMAL
MCH RBC QN AUTO: 36.2 PG (ref 27–33)
MCHC RBC AUTO-ENTMCNC: 34.3 G/DL (ref 33.7–35.3)
MCV RBC AUTO: 105.5 FL (ref 81.4–97.8)
MONOCYTES # BLD AUTO: 0 K/UL (ref 0–0.85)
MONOCYTES NFR BLD AUTO: 0 % (ref 0–13.4)
MORPHOLOGY BLD-IMP: NORMAL
NEUTROPHILS # BLD AUTO: 5.75 K/UL (ref 1.82–7.42)
NEUTROPHILS NFR BLD: 36.6 % (ref 44–72)
NRBC # BLD AUTO: 0 K/UL
NRBC BLD AUTO-RTO: 0 /100 WBC
PHOSPHATE SERPL-MCNC: 2.2 MG/DL (ref 2.5–4.5)
PLATELET # BLD AUTO: 104 K/UL (ref 164–446)
PLATELET BLD QL SMEAR: NORMAL
PMV BLD AUTO: 9.7 FL (ref 9–12.9)
POTASSIUM SERPL-SCNC: 4.2 MMOL/L (ref 3.6–5.5)
PROT SERPL-MCNC: 5.8 G/DL (ref 6–8.2)
RBC # BLD AUTO: 2.35 M/UL (ref 4.7–6.1)
RBC BLD AUTO: PRESENT
SMUDGE CELLS BLD QL SMEAR: NORMAL
SODIUM SERPL-SCNC: 136 MMOL/L (ref 135–145)
WBC # BLD AUTO: 15.7 K/UL (ref 4.8–10.8)

## 2017-10-04 PROCEDURE — 85027 COMPLETE CBC AUTOMATED: CPT

## 2017-10-04 PROCEDURE — 700105 HCHG RX REV CODE 258: Performed by: INTERNAL MEDICINE

## 2017-10-04 PROCEDURE — 700102 HCHG RX REV CODE 250 W/ 637 OVERRIDE(OP): Performed by: NURSE PRACTITIONER

## 2017-10-04 PROCEDURE — 700102 HCHG RX REV CODE 250 W/ 637 OVERRIDE(OP): Performed by: HOSPITALIST

## 2017-10-04 PROCEDURE — 84100 ASSAY OF PHOSPHORUS: CPT

## 2017-10-04 PROCEDURE — A9270 NON-COVERED ITEM OR SERVICE: HCPCS | Performed by: HOSPITALIST

## 2017-10-04 PROCEDURE — A9270 NON-COVERED ITEM OR SERVICE: HCPCS | Performed by: NURSE PRACTITIONER

## 2017-10-04 PROCEDURE — A9270 NON-COVERED ITEM OR SERVICE: HCPCS | Performed by: INTERNAL MEDICINE

## 2017-10-04 PROCEDURE — 700102 HCHG RX REV CODE 250 W/ 637 OVERRIDE(OP): Performed by: INTERNAL MEDICINE

## 2017-10-04 PROCEDURE — 99291 CRITICAL CARE FIRST HOUR: CPT | Performed by: HOSPITALIST

## 2017-10-04 PROCEDURE — P9047 ALBUMIN (HUMAN), 25%, 50ML: HCPCS | Performed by: INTERNAL MEDICINE

## 2017-10-04 PROCEDURE — 87493 C DIFF AMPLIFIED PROBE: CPT

## 2017-10-04 PROCEDURE — 83735 ASSAY OF MAGNESIUM: CPT

## 2017-10-04 PROCEDURE — 700111 HCHG RX REV CODE 636 W/ 250 OVERRIDE (IP): Performed by: INTERNAL MEDICINE

## 2017-10-04 PROCEDURE — 770022 HCHG ROOM/CARE - ICU (200)

## 2017-10-04 PROCEDURE — 700101 HCHG RX REV CODE 250: Performed by: HOSPITALIST

## 2017-10-04 PROCEDURE — 700101 HCHG RX REV CODE 250: Performed by: INTERNAL MEDICINE

## 2017-10-04 PROCEDURE — 71010 DX-CHEST-PORTABLE (1 VIEW): CPT

## 2017-10-04 PROCEDURE — 85007 BL SMEAR W/DIFF WBC COUNT: CPT

## 2017-10-04 PROCEDURE — 80053 COMPREHEN METABOLIC PANEL: CPT

## 2017-10-04 RX ORDER — FUROSEMIDE 10 MG/ML
40 INJECTION INTRAMUSCULAR; INTRAVENOUS EVERY 12 HOURS
Status: DISCONTINUED | OUTPATIENT
Start: 2017-10-04 | End: 2017-10-05

## 2017-10-04 RX ADMIN — OXYCODONE HYDROCHLORIDE 5 MG: 5 TABLET ORAL at 05:02

## 2017-10-04 RX ADMIN — ALBUMIN (HUMAN) 25 G: 0.25 INJECTION, SOLUTION INTRAVENOUS at 11:25

## 2017-10-04 RX ADMIN — FUROSEMIDE 40 MG: 10 INJECTION, SOLUTION INTRAMUSCULAR; INTRAVENOUS at 09:26

## 2017-10-04 RX ADMIN — MULTIPLE VITAMINS W/ MINERALS TAB 1 TABLET: TAB at 12:33

## 2017-10-04 RX ADMIN — CEFTAROLINE FOSAMIL 600 MG: 600 POWDER, FOR SOLUTION INTRAVENOUS at 09:24

## 2017-10-04 RX ADMIN — OXYCODONE HYDROCHLORIDE 5 MG: 5 TABLET ORAL at 16:28

## 2017-10-04 RX ADMIN — CLINDAMYCIN IN 5 PERCENT DEXTROSE 900 MG: 18 INJECTION, SOLUTION INTRAVENOUS at 05:02

## 2017-10-04 RX ADMIN — POTASSIUM BICARBONATE 50 MEQ: 25 TABLET, EFFERVESCENT ORAL at 12:14

## 2017-10-04 RX ADMIN — CLINDAMYCIN IN 5 PERCENT DEXTROSE 900 MG: 18 INJECTION, SOLUTION INTRAVENOUS at 21:36

## 2017-10-04 RX ADMIN — OXYCODONE HYDROCHLORIDE 5 MG: 5 TABLET ORAL at 21:36

## 2017-10-04 RX ADMIN — POTASSIUM BICARBONATE 50 MEQ: 25 TABLET, EFFERVESCENT ORAL at 20:09

## 2017-10-04 RX ADMIN — FUROSEMIDE 40 MG: 10 INJECTION, SOLUTION INTRAMUSCULAR; INTRAVENOUS at 20:10

## 2017-10-04 RX ADMIN — POTASSIUM PHOSPHATE, MONOBASIC AND POTASSIUM PHOSPHATE, DIBASIC 15 MMOL: 224; 236 INJECTION, SOLUTION INTRAVENOUS at 12:38

## 2017-10-04 RX ADMIN — ENOXAPARIN SODIUM 40 MG: 100 INJECTION SUBCUTANEOUS at 09:27

## 2017-10-04 RX ADMIN — SODIUM CHLORIDE: 9 INJECTION, SOLUTION INTRAVENOUS at 15:28

## 2017-10-04 RX ADMIN — OXYCODONE HYDROCHLORIDE 5 MG: 5 TABLET ORAL at 00:05

## 2017-10-04 RX ADMIN — ALBUMIN (HUMAN) 25 G: 0.25 INJECTION, SOLUTION INTRAVENOUS at 05:02

## 2017-10-04 RX ADMIN — OXYCODONE HYDROCHLORIDE 10 MG: 10 TABLET ORAL at 09:26

## 2017-10-04 RX ADMIN — DIGOXIN 125 MCG: 125 TABLET ORAL at 18:26

## 2017-10-04 RX ADMIN — THIAMINE HCL TAB 100 MG 100 MG: 100 TAB at 12:34

## 2017-10-04 RX ADMIN — CLINDAMYCIN IN 5 PERCENT DEXTROSE 900 MG: 18 INJECTION, SOLUTION INTRAVENOUS at 14:45

## 2017-10-04 RX ADMIN — CEFTAROLINE FOSAMIL 600 MG: 600 POWDER, FOR SOLUTION INTRAVENOUS at 20:26

## 2017-10-04 RX ADMIN — FOLIC ACID 1 MG: 1 TABLET ORAL at 09:28

## 2017-10-04 RX ADMIN — LEVOTHYROXINE SODIUM 25 MCG: 50 TABLET ORAL at 05:01

## 2017-10-04 RX ADMIN — DULOXETINE HYDROCHLORIDE 60 MG: 60 CAPSULE, DELAYED RELEASE ORAL at 09:27

## 2017-10-04 RX ADMIN — TRAZODONE HYDROCHLORIDE 100 MG: 50 TABLET ORAL at 20:09

## 2017-10-04 ASSESSMENT — LIFESTYLE VARIABLES
AUDITORY DISTURBANCES: NOT PRESENT
HEADACHE, FULLNESS IN HEAD: NOT PRESENT
ANXIETY: MILDLY ANXIOUS
HEADACHE, FULLNESS IN HEAD: NOT PRESENT
VISUAL DISTURBANCES: NOT PRESENT
VISUAL DISTURBANCES: NOT PRESENT
AUDITORY DISTURBANCES: NOT PRESENT
TREMOR: NO TREMOR
TOTAL SCORE: 8
ORIENTATION AND CLOUDING OF SENSORIUM: CANNOT DO SERIAL ADDITIONS OR IS UNCERTAIN ABOUT DATE
TREMOR: NO TREMOR
ORIENTATION AND CLOUDING OF SENSORIUM: CANNOT DO SERIAL ADDITIONS OR IS UNCERTAIN ABOUT DATE
PAROXYSMAL SWEATS: NO SWEAT VISIBLE
ANXIETY: *
NAUSEA AND VOMITING: NO NAUSEA AND NO VOMITING
PAROXYSMAL SWEATS: NO SWEAT VISIBLE
TREMOR: NO TREMOR
PAROXYSMAL SWEATS: NO SWEAT VISIBLE
HEADACHE, FULLNESS IN HEAD: NOT PRESENT
NAUSEA AND VOMITING: NO NAUSEA AND NO VOMITING
TOTAL SCORE: 2
ORIENTATION AND CLOUDING OF SENSORIUM: DISORIENTED FOR PLACE AND / OR PERSON
AGITATION: NORMAL ACTIVITY
AGITATION: *
ANXIETY: MILDLY ANXIOUS
VISUAL DISTURBANCES: NOT PRESENT
AUDITORY DISTURBANCES: NOT PRESENT
NAUSEA AND VOMITING: NO NAUSEA AND NO VOMITING
AGITATION: NORMAL ACTIVITY
TOTAL SCORE: 2

## 2017-10-04 NOTE — PROGRESS NOTES
Assumed care of patient. Report given bedside. Patient in bed connected to monitoring devices. Drips verified. Bed alarm on. Call light within reach.

## 2017-10-04 NOTE — PROGRESS NOTES
[facility] Hospitalist Progress Note    Date of Service: 10/4/2017    Chief Complaint  72 y.o. male admitted 2017 with L leg cellulitis and sepsis/shock.    transfered to CICU on  with hypotension/shock and episodes of SVT to 160's later complicated by severe alcohol withdrawal.     Interval Problem Update  No events over night  Still asking for oxy for pain q4  Agitated and restless  Tf at goal with soft stools  Still on pressors for hypotension  Hr stable  Resisting mobilization  Wheezing on 2L  On lovenox for vte  On abx per ID      K/phos replaced  Rt protocol  lasix      Consultants/Specialty  Pulmonology    Disposition  Icu  Critical care time with active titration of meds for hypotension 33min        Review of Systems   Unable to perform ROS: Mental status change      Physical Exam  Laboratory/Imaging   Hemodynamics  Temp (24hrs), Av.3 °C (99.1 °F), Min:37.2 °C (99 °F), Max:37.3 °C (99.1 °F)   Temperature: 37.3 °C (99.1 °F), Monitored Temp: 37.4 °C (99.3 °F)  Pulse  Av.9  Min: 63  Max: 188 Heart Rate (Monitored): 75  NIBP: 113/49 CVP (mm Hg): (!) 20 MM HG    Respiratory      Respiration: (!) 28, Pulse Oximetry: 95 %        RUL Breath Sounds: Expiratory Wheezes, RML Breath Sounds: Expiratory Wheezes, RLL Breath Sounds: Expiratory Wheezes, MONICA Breath Sounds: Expiratory Wheezes, LLL Breath Sounds: Expiratory Wheezes    Fluids    Intake/Output Summary (Last 24 hours) at 10/04/17 0844  Last data filed at 10/04/17 0800   Gross per 24 hour   Intake          2075.14 ml   Output             3610 ml   Net         -1534.86 ml       Nutrition  No orders of the defined types were placed in this encounter.    Physical Exam   Constitutional: He appears well-developed and well-nourished. He appears lethargic. No distress.   HENT:   Right Ear: External ear normal.   Left Ear: External ear normal.   Nose: Nose normal.   Eyes: Right eye exhibits no discharge. Left eye exhibits no discharge. No scleral icterus.    Neck: No JVD present. No tracheal deviation present.   Cardiovascular: Normal rate, normal heart sounds and intact distal pulses.  Exam reveals no gallop.    No murmur heard.  Pulmonary/Chest: Effort normal. No respiratory distress. He has no wheezes. He has rales.   Abdominal: Soft. Bowel sounds are normal. He exhibits no distension. There is no tenderness. There is no rebound and no guarding.   Musculoskeletal: He exhibits edema. He exhibits no tenderness.   Left leg erythema appears to be improving but extensive up to thigh and medial malleolar wound. Right medial knee erythema and wound.    Neurological: He appears lethargic. He is disoriented.   Skin: Skin is warm and dry. No rash noted. He is not diaphoretic. No erythema.   Psychiatric: His affect is labile. His speech is delayed and slurred. He is agitated and slowed. He expresses impulsivity.   Nursing note and vitals reviewed.      Recent Labs      10/02/17   0449  10/03/17   0350  10/04/17   0510   WBC  27.5*  22.9*  15.7*   RBC  2.58*  2.54*  2.35*   HEMOGLOBIN  9.2*  9.3*  8.5*   HEMATOCRIT  27.2*  26.5*  24.8*   MCV  105.4*  104.3*  105.5*   MCH  35.7*  36.6*  36.2*   MCHC  33.8  35.1  34.3   RDW  51.9*  52.6*  54.0*   PLATELETCT  89*  105*  104*   MPV  9.5  9.2  9.7     Recent Labs      10/02/17   0449  10/03/17   0350  10/04/17   0510   SODIUM  135  136  136   POTASSIUM  3.5*  4.0  4.2   CHLORIDE  108  109  106   CO2  20  19*  24   GLUCOSE  75  95  120*   BUN  17 17  17   CREATININE  0.88  0.97  1.01   CALCIUM  8.1*  8.0*  8.8                      Assessment/Plan     Cirrhosis (CMS-HCC)- (present on admission)   Assessment & Plan    Chronic, stable with no e/o decompensation  Continue spironolactone  Add lasix        Alcohol withdrawal (CMS-HCC)- (present on admission)   Assessment & Plan    Ongoing Phenobarb protocol.         Severe sepsis (CMS-[facility])- (present on admission)   Assessment & Plan    This is sepsis (without associated acute organ  dysfunction).   2/2 left leg cellulitis and concern for possible deeper wound  Cultures positive for MRSA   Iv fluids as tolerated  Continue to Titrate pressors as tolerated for hypotension.   Iv abx per ID        Hyponatremia   Assessment & Plan    Resolved.         Acute respiratory failure (CMS-HCC)   Assessment & Plan    Worsening edema noted on cxr. Add lasix. Echo looks ok. Recurrent aspiration a concern. High risk remains for intubation. On 3L. pulm toilet and rt protocol.         Cellulitis- (present on admission)   Assessment & Plan    Treat as noted above        CLL (chronic lymphocytic leukemia) (CMS-HCC)- (present on admission)   Assessment & Plan    Followed by Dr. Flores outpatient, chronically elevated WBC count.        Continuous opioid dependence (CMS-HCC)- (present on admission)   Assessment & Plan    Cont home oxycodone dose. No escalation.             Reviewed items::  EKG reviewed, Radiology images reviewed, Labs reviewed and Medications reviewed  DVT prophylaxis pharmacological::  Heparin  DVT prophylaxis - mechanical:  SCDs  Ulcer Prophylaxis::  Not indicated  Antibiotics:  Treating active infection/contamination beyond 24 hours perioperative coverage

## 2017-10-04 NOTE — CARE PLAN
Problem: Nutritional:  Goal: Nutrition support tolerated and meeting greater than 85% of estimated needs  Outcome: MET Date Met: 10/04/17

## 2017-10-04 NOTE — PROGRESS NOTES
Infectious Disease Progress Note    Author: Ana Benavidez M.D. Date & Time of service: 10/4/2017  11:06 AM    Chief Complaint:  FU sepsis/LLE cellulitis    Interval History:  10/2 AF, WBC 27.5, confused and in pain  10/3 AF, WBC 22.9, remains confused and mumbling  10/4 AF, WBC 15.7, pt obtunded, no overnight events per RN  Labs Reviewed, Medications Reviewed, Radiology Reviewed and Wound Reviewed.    Review of Systems:  Review of Systems   Unable to perform ROS: Mental acuity   Pt obtunded    Hemodynamics:  Temp (24hrs), Av.3 °C (99.1 °F), Min:37.2 °C (99 °F), Max:37.3 °C (99.1 °F)  Temperature: 37.3 °C (99.1 °F), Monitored Temp: 37.4 °C (99.3 °F)  Pulse  Av.8  Min: 63  Max: 188Heart Rate (Monitored): 76  NIBP: 117/56  CVP (mm Hg): (!) 15 MM HG    Physical Exam:  Physical Exam   Constitutional: He appears well-developed.   Thin  Older than stated age  Chronically ill   HENT:   Head: Normocephalic and atraumatic.   Eyes: EOM are normal. Pupils are equal, round, and reactive to light.   Neck: Neck supple.   R IJ catheter   Cardiovascular:   tachycardic   Pulmonary/Chest: Effort normal and breath sounds normal.   Abdominal: Soft. There is no tenderness.   Musculoskeletal: He exhibits edema and tenderness.   significant erythema and edema of the LLE radiating to the left hip and medial thigh, improving  Left medial malleolar wound.   There is a eschar on the medial right knee with surrounding erythema   Neurological:   Obtunded  Does not arouse to voice   Skin: There is erythema.       Meds:    Current Facility-Administered Medications:   •  furosemide  •  potassium bicarbonate  •  potassium phosphate ivpb  •  albumin human 25%  •  haloperidol lactate  •  enoxaparin (LOVENOX) injection  •  Phenylephrine infusion  •  digoxin  •  clindamycin (CLEOCIN) IV  •  MD ALERT...Adult ICU Electrolyte Replacement per Pharmacy Protocol  •  ceftaroline (TEFLARO) ivpb  •  MD ALERT...Phenobarbital Alcohol Withdrawal  Protocol Pharmacist to Implement (ICU Only)  •  Pharmacy **AND** If RASS 1 to 4: assess RASS every 30 minutes until RASS returns to goal sedation (RASS -2 to 0) **AND** If RASS -2 to 0: assess RASS every 30 minutes for 2 occurrences and then every 4 hours **AND** If RASS -5 to -3: assess RASS, BP, and RR every 15 minutes with Continuous Pulse Oximetry until RASS returns to goal sedation (RASS -2 to 0) **AND** Pulse ox (oximetry) **AND** PHENObarbital **AND** PHENObarbital  •  folic acid  •  therapeutic multivitamin-minerals  •  thiamine  •  NS  •  duloxetine  •  levothyroxine  •  trazodone  •  senna-docusate **AND** polyethylene glycol/lytes **AND** magnesium hydroxide **AND** bisacodyl  •  ondansetron  •  ondansetron  •  Notify provider if pain remains uncontrolled **AND** Use the numeric rating scale (NRS-11) on regular floors and Critical-Care Pain Observation Tool (CPOT) on ICUs/Trauma to assess pain **AND** Pulse Ox (Oximetry) **AND** Pharmacy Consult Request **AND** If patient difficult to arouse and/or has respiratory depression, stop any opiates that are currently infusing and call a Rapid Response. **AND** oxycodone immediate-release **AND** oxycodone immediate-release **AND** HYDROmorphone    Labs:  Recent Labs      10/02/17   0449  10/03/17   0350  10/04/17   0510   WBC  27.5*  22.9*  15.7*   RBC  2.58*  2.54*  2.35*   HEMOGLOBIN  9.2*  9.3*  8.5*   HEMATOCRIT  27.2*  26.5*  24.8*   MCV  105.4*  104.3*  105.5*   MCH  35.7*  36.6*  36.2*   RDW  51.9*  52.6*  54.0*   PLATELETCT  89*  105*  104*   MPV  9.5  9.2  9.7   NEUTSPOLYS  40.70*  44.30  36.60*   LYMPHOCYTES  53.40*  54.80*  63.40*   MONOCYTES  0.90  0.00  0.00   EOSINOPHILS  0.00  0.00  0.00   BASOPHILS  0.00  0.00  0.00   RBCMORPHOLO  Present  Present  Present     Recent Labs      10/02/17   0449  10/03/17   0350  10/04/17   0510   SODIUM  135  136  136   POTASSIUM  3.5*  4.0  4.2   CHLORIDE  108  109  106   CO2  20  19*  24   GLUCOSE  75  95  120*    BUN  17  17  17     Recent Labs      10/02/17   0449  10/03/17   0350  10/04/17   0510   ALBUMIN  2.0*  2.0*  3.0*   TBILIRUBIN  0.7  0.6  0.7   ALKPHOSPHAT  107*  120*  103*   TOTPROTEIN  4.8*  5.1*  5.8*   ALTSGPT  8  8  6   ASTSGOT  18  18  15   CREATININE  0.88  0.97  1.01       Imaging:  Ct-abdomen-pelvis With    Result Date: 10/1/2017  10/1/2017 8:07 AM HISTORY/REASON FOR EXAM:  Diffuse abdominal pain. History of sepsis. Left lower extremity cellulitis. TECHNIQUE/EXAM DESCRIPTION: CT scan of the abdomen and pelvis with contrast. Contrast-enhanced helical scanning was obtained from the diaphragmatic domes through the pubic symphysis following the bolus administration of 100 mL of Omnipaque 350 nonionic contrast without complication. Low dose optimization technique was utilized for this CT exam including automated exposure control and adjustment of the mA and/or kV according to patient size. COMPARISON: CT 6/5/2015 FINDINGS: The visualized lung bases demonstrate small amounts of dependent pleural effusion with bibasilar dependent airspace disease, most likely atelectasis. There is no acute bony process. There is degenerative change in the lower lumbar spine and pelvis. CT Abdomen: Liver is unchanged in appearance with slight nodularity. There are no definite focal masses. Spleen is mildly enlarged measuring 13.1 cm in diameter without change. Hepatic veins are somewhat attenuated. Portal veins, splenic vein and SMV are patent. Pancreas is somewhat atrophic but otherwise unremarkable. The gallbladder demonstrates some density dependently which could be related to tumefactive sludge or noncalcified stones. There is no biliary dilatation. The adrenal glands are normal in size. Kidneys demonstrate no hydronephrosis. There are probable small cortical cysts. The abdominal aorta is normal in caliber. There is atherosclerosis of the aorta. There is no lymphadenopathy. The bowel demonstrates no evidence of bowel  obstruction. There is moderate stool throughout the colon. There is a hiatal hernia. There is a small amount of fluid around the liver although this is decreased over the prior study. There is minimal fluid in both paracolic gutters. There is diffuse subcutaneous edema. CT Pelvis: There is no acute inflammatory process in the pelvis. There is free fluid in the pelvis with diffuse subcutaneous edema. There are mildly prominent lymph nodes in the inguinal regions, left greater than right with some nodes in the external iliac region as well more so on the left. The largest inguinal node is 1.5 cm short axis on the left side.. There is postoperative change involving the colon. Bladder is decompressed with a Maradiaga catheter.     1.  There is mild colonic distention with large amount of stool throughout the colon. There is no bowel obstruction. 2.  There is a hiatal hernia. 3.  Changes of the liver with mild splenomegaly and a small amount of ascites in the abdomen and pelvis suggestive of underlying hepatocellular disease. 4.  Dependent density in the gallbladder which could be tumefactive sludge or noncalcified stones. 5.  There are small dependent bilateral pleural effusions with dependent airspace disease, likely atelectasis. 6.  There is mild lymphadenopathy in the pelvis more so on the left likely inflammatory. 7.  There is diffuse subcutaneous edema.    Ct-extremity, Lower With Left    Result Date: 10/1/2017  10/1/2017 8:07 AM HISTORY/REASON FOR EXAM:  Progression of strep/staph cellulitis with sepsis. Possible necrotizing fasciitis.. TECHNIQUE/EXAM DESCRIPTION AND NUMBER OF VIEWS:  CT scan of the LEFT lower extremity with contrast, with reconstructions. Thin helical 3 mm sections were obtained from the distal femur through the proximal tibia/fibula. Sagittal and coronal multiplanar reconstructions were generated from the axial images. A total of 100 mL of Omnipaque 350 nonionic contrast was administered  IV without  complication. Up to date radiation dose reduction adjustments have been utilized to meet ALARA standards for radiation dose reduction. COMPARISON: X-rays of the foot and tibia fibula 9/29/2017, 9/28/2017 respectively. FINDINGS: Imaging of the visualized portion of the pelvis demonstrates fluid in the pelvis. Bladder is decompressed with a Maradiaga catheter. There is postoperative change involving the colon. There are mildly prominent bilateral external iliac and common femoral lymph nodes. Largest node in the left inguinal region is 1.5 cm short axis diameter. There is diffuse subcutaneous edema throughout the pelvis. Imaging throughout the left lower extremity demonstrates diffuse subcutaneous edema. There is a small amount of fluid in the intermuscular planes along the medial aspect of the proximal lower leg adjacent to the medial gastrocnemius musculature. There is  no focal abscess collection. There is a small joint effusion of the knee. There is some mild fatty infiltration of the musculature. There is atherosclerosis but basilar structures appear grossly patent. Limited partial visualization of portions of the right lower extremity also demonstrates some mild subcutaneous edema. Bones demonstrate mild degenerative changes in the hip and left knee. There is no evidence of osteomyelitis.     1.  There is diffuse subcutaneous edema throughout the left hemipelvis and left lower extremity with a small amount of fluid adjacent to the right medial gastrocnemius at the level the proximal calf. 2.  There is no focal drainable abscess. 3.  There is no evidence of osteomyelitis. 4.  There is mild atherosclerosis with grossly patent vasculature. 5.  There are probably inflammatory lymph nodes in the pelvis and left inguinal region with the largest measuring 1.5 cm short axis. 6.  There is free fluid in the visualized pelvis.    Ct-head W/o    Result Date: 10/1/2017  HISTORY/REASON FOR EXAM:  Altered Mental Status.  TECHNIQUE/EXAM DESCRIPTION: CT scan of the head without contrast, 10/1/2017 12:28 AM. Contiguous 5 mm axial sections were obtained from the skull base through the vertex. Up to date radiation dose reduction adjustments have been utilized to meet ALARA standards for radiation dose reduction. COMPARISON:  4/23/2015 FINDINGS:   The ventricular system and cortical sulci are prominent, consistent with the patient's age.  There is no midline shift or other mass effect.  A 5 mm circumscribed lucency is present in the right temporal lobe, consistent with old lacunar infarct. This is unchanged. There is no acute intra-axial abnormality or extra-axial fluid collection.  There is no intracranial hemorrhage.  The calvaria are intact. The visualized paranasal sinuses show no unusual opacity.     1.  No acute intracranial abnormality. 2.  Moderate atrophy, age-consistent. 3.  Probable old lacunar infarct in the right temporal lobe, unchanged from the prior scan. INTERPRETING LOCATION:  [address] [place] [address], [facility]    Dx-chest-portable (1 View)    Result Date: 9/29/2017 9/29/2017 1:25 PM HISTORY/REASON FOR EXAM:  Line placement. TECHNIQUE/EXAM DESCRIPTION AND NUMBER OF VIEWS: Single portable view of the chest. COMPARISON: 9/28/2017. FINDINGS: The soft tissues and bony structures are unremarkable. The heart and mediastinal structures are within normal limits. Pulmonary vascularity is normal. There is right basilar atelectasis. There is no effusion or pneumothorax. There has been interval insertion of a central venous catheter which terminates with the tip projecting over the expected region of the mid to distal superior vena cava.     1.  Interval insertion of a central venous catheter which terminates with the tip projecting over the expected region of the mid to distal superior vena cava. 2.  Right basilar atelectasis.    Dx-chest-portable (1 View)    Result Date: 9/28/2017 9/28/2017 4:32 PM HISTORY/REASON FOR EXAM:  Sepsis.  Left lower extremity wound. TECHNIQUE/EXAM DESCRIPTION AND NUMBER OF VIEWS: Single portable view of the chest. COMPARISON: 9/1/2016 FINDINGS: The mediastinal and cardiac silhouette is unremarkable. The pulmonary vascularity is within normal limits. Lungs demonstrate no evidence of pneumonia. Slight hazy density seen adjacent to the right anterior 1st rib is similar to the prior studies and likely due to degenerative change. There is no significant pleural effusion. There is no visible pneumothorax. There are no acute bony abnormalities.     1.  There is no acute cardiopulmonary process.    Dx-foot-2- Left    Result Date: 9/29/2017 9/29/2017 4:10 PM HISTORY/REASON FOR EXAM:  Left foot swelling and weeping TECHNIQUE/EXAM DESCRIPTION AND NUMBER OF VIEWS: 2 views of the LEFT foot. COMPARISON:  None. FINDINGS:  Bone mineralization is age appropriate. Bony alignment is anatomic. There is no evidence of acute fracture or dislocation. There is no soft tissue gas. There is soft tissue swelling about the ankle.     Soft tissue swelling about the ankle. No evidence of acute osseous abnormality or soft tissue gas.    Dx-lumbar Spine-2 Or 3 Views    Result Date: 9/9/2017 9/9/2017 3:24 AM HISTORY/REASON FOR EXAM:  Pain Following Trauma. TECHNIQUE/ EXAM DESCRIPTION AND NUMBER OF VIEWS:  3 views of the lumbar spine. COMPARISON: None. FINDINGS: The lowest formed intervertebral disc will be designated L5-S1 for the purposes of this report and vertebral levels numbered accordingly. No acute fracture is evident. No gross malalignment is seen. There is mild loss of intervertebral disc height in the upper lumbar spine. There is moderate loss of intervertebral disc height at L4-L5. There are anterior osteophytes at most levels. There is no evidence of spondylolisthesis or osseous lesion. There are degenerative changes of the mid to lower lumbar facet joints. There is calcific atherosclerotic plaque.     1.  No evidence of acute  fracture. 2.  Multilevel multifactorial degenerative changes    Dx-thoracic Spine-2 Views    Result Date: 9/9/2017 9/9/2017 3:21 AM HISTORY/REASON FOR EXAM:  Pain Following Trauma. TECHNIQUE/EXAM DESCRIPTION AND NUMBER OF VIEWS:  Thoracic spine, 2 views. COMPARISON:  None. FINDINGS: Glands unremarkable. There is lucency extending to the superior endplate of L2 on the second image. This appearance is not present on the dedicated lumbar spine radiographs. No other finding suspicious for acute fracture are seen. There is mild loss of intervertebral disc height throughout the thoracic spine. There are anterior osteophytes at most levels.     1.  Lucency through the superior endplate of L2 on the second image, discordant with the appearance on the dedicated lumbar spine radiographs. I suspect this is artifactual however a fracture is not excluded. Further assessment is recommended with CT of the lumbar spine. 2.  Thoracic spondylosis    Dx-tibia And Fibula Left    Result Date: 9/28/2017 9/28/2017 4:32 PM HISTORY/REASON FOR EXAM:  Left tibia and fibular redness and swelling with open wound in the region of the medial malleolus. TECHNIQUE/EXAM DESCRIPTION AND NUMBER OF VIEWS:  2 views of the LEFT tibia and fibula. COMPARISON: Left knee 4/24/2015 FINDINGS: There is no evidence of acute fracture involving the tibia or fibula. Sclerotic focus posterior left tibial metadiaphyseal region is without change and may represent a sclerotic NOF or fibrous cortical defect. There is mild diffuse soft tissue swelling most prominent at the level the ankle. There is no gas in the soft tissues and there is no foreign body. There is no periostitis.     1.  There is mild diffuse swelling in the soft tissues of the distal left lower leg and ankle. There is no gas in the soft tissues or foreign body. 2.  There is no plain film evidence of osteomyelitis.    Dx-wrist-complete 3+ Left    Result Date: 9/9/2017 9/9/2017 3:24 AM HISTORY/REASON  FOR EXAM:  Pain/Deformity Following Trauma. TECHNIQUE/EXAM DESCRIPTION AND NUMBER OF VIEWS:  3 views of the  LEFT wrist. COMPARISON: Radiographs 2015 FINDINGS: MINERALIZATION: Decreased. INJURY: There has been interval placement of a volar plate and screws transfixing the previously demonstrated intra-articular distal radial fracture. The hardware appears intact. There is underlying curvilinear deformity of the distal radius. There is ulnar positive variance, as before. No acute fracture is seen. JOINTS: No erosive arthropathy is evident.     1.  No radiographic evidence of acute traumatic injury. 2.  Healed surgically transfixed fracture of the distal radius 3.  Osteopenia    Echocardiogram Comp W/o Cont    Result Date: 2017  Transthoracic Echo Report Echocardiography Laboratory CONCLUSIONS Normal left ventricular size, thickness, systolic function, and diastolic function. Mitral annular calcification. Mild mitral regurgitation. Aortic sclerosis without stenosis. Mild aortic insufficiency. Normal estimated right-sided pressures. No prior study is available for comparison. MAGALI TODD Exam Date:         2017                    14:07 Exam Location:     Inpatient Priority:          Routine Ordering Physician:        CIRILO HSU JR Referring Physician:       [id number] Sonographer:               Lo Lundberg RDCS Age:    72     Gender:    M MRN:    [id number] :    1945 BSA:    1.73   Ht (in):    65     Wt (lb):    145 Exam Type:     Complete Indications:     Cardiac arrhythmia, unspecified ICD Codes:       I499 CPT Codes:       73184 BP:   89     /   57     HR:   80 Technical Quality:       Fair MEASUREMENTS  (Male / Female) Normal Values 2D ECHO LV Diastolic Diameter PLAX        4.6 cm                4.2 - 5.9 / 3.9 - 5.3 cm LV Systolic Diameter PLAX         2.1 cm                2.1 - 4.0 cm IVS Diastolic Thickness           1 cm                  LVPW Diastolic Thickness          1  cm                  RV Diameter 4C                    2.7 cm                2.5 - 2.9 cm LVOT Diameter                     1.8 cm                RA Diameter                       2.7 cm                Estimated LV Ejection Fraction    60 %                  LV Ejection Fraction MOD BP       60.8 %                >= 55  % LV Ejection Fraction MOD 4C       58.4 %                LV Ejection Fraction MOD 2C       58.4 %                LA Volume Index                   13.5 cm³/m²           16 - 28 cm³/m² IVC Diameter                      1.5 cm                M-MODE Aortic Root Diameter MM           3.9 cm                DOPPLER AV Peak Velocity                  1.3 m/s               AV Peak Gradient                  6.7 mmHg              AV Mean Gradient                  3.3 mmHg              AI Pressure Half Time             463 ms                LVOT Peak Velocity                0.89 m/s              AV Area Cont Eq vti               1.9 cm²               Mitral E Point Velocity           0.96 m/s              Mitral E to A Ratio               0.99                  MV Pressure Half Time             60.6 ms               MV Area PHT                       3.6 cm²               MV Deceleration Time              209 ms                TR Peak Velocity                  222 cm/s              PV Peak Velocity                  0.7 m/s               PV Peak Gradient                  1.9 mmHg              RVOT Peak Velocity                0.52 m/s              * Indicates values subject to auto-interpretation LV EF:  60    % FINDINGS Left Ventricle Normal left ventricular size, thickness, systolic function, and diastolic function.  Left ventricular ejection fraction is visually estimated to be 60%. Normal regional wall motion. Right Ventricle Normal right ventricular size and systolic function. Right Atrium Normal right atrial size. Normal inferior vena cava size and inspiratory collapse. Left Atrium Normal left atrial size.  Mitral Valve Mitral annular calcification. Thickened mitral valve leaflets. Mild mitral regurgitation. Aortic Valve Aortic sclerosis without stenosis. Mild aortic insufficiency. Tricuspid Valve Structurally normal tricuspid valve. Mild tricuspid regurgitation. Estimated right ventricular systolic pressure  is 25 mmHg. Pulmonic Valve Structurally normal pulmonic valve. Mild pulmonic insufficiency. Pericardium Normal pericardium without effusion. Aorta Ascending aorta is normal for body surface area, and measured at a diameter of 3.7 cm above the sinotubular junction. Suleman Nguyen MD (Electronically Signed) Final Date:     2017                 23:50    Le Venous Duplex (specify In Comments Left, Right Or Bilateral)    Result Date: 2017   Vascular Laboratory  CONCLUSIONS  No evidence of acute DVT or SVT in the left lower extremity.  Edema is noted.  MAGALI TODD  Exam Date:     2017 16:43  Room #:     Inpatient  Priority:     Stat  Ht (in):             Wt (lb):  Ordering Physician:        REYNALDO LUU  Referring Physician:       [id number] Galeano  Sonographer:               Samina Galloway RVT  Study Type:                Complete Unilateral  Technical Quality:         Adequate  Age:    72    Gender:     M  MRN:    [id number]  :    1945      BSA:  Indications:     Localized swelling, mass and lump, left lower limb  CPT Codes:       41515  ICD Codes:         History:         Swelling and redness of left leg for less than 24 hours.  Limitations:     Severe pain.  PROCEDURES:  Left lower extremity venous duplex imaging.  The following venous structures were evaluated: common femoral, profunda  femoral, greater saphenous, femoral, popliteal, peroneal and posterior  tibial veins.  Serial compression, augmentation maneuvers, color and spectral Doppler flow  evaluations were performed.  FINDINGS:  Left lower extremity -  Complete color filling and compressibility with normal venous flow  "dynamics  including spontaneous flow, response to augmentation maneuvers, and  respiratory phasicity.  The peroneal and posterior tibial veins are difficult to assess for  compressibility due to patient pain, but flow response to augmentation is  demonstrated.  Interstitial fluid consistent with edema is observed in the thigh and below  the knee.  Flow was evaluated in the contralateral common femoral vein and normal  venous flow dynamics including spontaneous flow, respiratory phasic  variation and augmentation were demonstrated.  Kevin Lester MD  (Electronically Signed)  Final Date:      29 September 2017                   15:52    Dx-abdomen For Tube Placement    Result Date: 10/1/2017  10/1/2017 7:28 PM HISTORY/REASON FOR EXAM:  Line evaluation. TECHNIQUE/EXAM DESCRIPTION AND NUMBER OF VIEWS:  1 view(s) of the abdomen. COMPARISON:  9/1/2016. FINDINGS: Enteric tube projects over the second portion of the duodenum. There is mild colonic distention. Degenerative changes are seen in the spine.     Enteric tube projects over the second portion of the duodenum. Mild colonic distention.      Micro:  Results     Procedure Component Value Units Date/Time    BLOOD CULTURE [[id number]] Collected:  09/28/17 1703    Order Status:  Completed Specimen:  Blood from Peripheral Updated:  10/03/17 1900     Significant Indicator NEG     Source BLD     Site PERIPHERAL     Blood Culture No growth after 5 days of incubation.    Narrative:       Per Hospital Policy: Only change Specimen Src: to \"Line\" if  specified by physician order.    BLOOD CULTURE [[id number]] Collected:  09/28/17 1715    Order Status:  Completed Specimen:  Blood from Peripheral Updated:  10/03/17 1900     Significant Indicator NEG     Source BLD     Site PERIPHERAL     Blood Culture No growth after 5 days of incubation.    Narrative:       Per Hospital Policy: Only change Specimen Src: to \"Line\" if  specified by physician order.    CULTURE WOUND W/ GRAM STAIN " [[id number]]  (Abnormal)  (Susceptibility) Collected:  09/28/17 1928    Order Status:  Completed Specimen:  Wound from Right Foot Updated:  10/01/17 0541     Gram Stain Result --     Few WBCs.  Many Gram positive cocci.       Significant Indicator POS (POS)     Source WND     Site RIGHT FOOT     Culture Result Wound -- (A)     Culture Result Wound -- (A)     Beta Hemolytic Streptococcus group A  Heavy growth       Culture Result Wound -- (A)     Methicillin Resistant Staphylococcus aureus  Moderate growth  This isolate is presumed to be clindamycin resistant based on  detection of inducible resistance.  Clindamycin may still  be effective in some patients.      Narrative:       CALL  [place]  161 tel. [phone],  CALLED  161 tel. [phone] 10/01/2017, 05:40, RB PERF. RESULTS CALLED TO:APURVA  [id number] Kasper    Culture & Susceptibility     METHICILLIN RESISTANT STAPHYLOCOCCUS AUREUS     Antibiotic Sensitivity Microscan Unit Status    Ampicillin/sulbactam Resistant 16/8 mcg/mL Final    Clindamycin Resistant <=0.5 mcg/mL Final    Daptomycin Sensitive <=0.5 mcg/mL Final    Erythromycin Resistant >4 mcg/mL Final    Moxifloxacin Resistant >4 mcg/mL Final    Oxacillin Resistant >2 mcg/mL Final    Penicillin Resistant >8 mcg/mL Final    Tetracycline Sensitive <=4 mcg/mL Final    Trimeth/Sulfa Sensitive <=0.5/9.5 mcg/mL Final    Vancomycin Sensitive 2 mcg/mL Final                       URINE CULTURE(NEW) [[id number]] Collected:  09/28/17 2020    Order Status:  Completed Specimen:  Urine Updated:  09/30/17 0644     Significant Indicator NEG     Source UR     Site --     Urine Culture No growth at 48 hours    Narrative:       Indication for culture:->Emergency Room Patient    GRAM STAIN [[id number]] Collected:  09/28/17 1928    Order Status:  Completed Specimen:  Wound Updated:  09/29/17 0749     Significant Indicator .     Source WND     Site RIGHT FOOT     Gram Stain Result --     Few WBCs.  Many Gram positive cocci.      URINALYSIS  [[id number]]  (Abnormal) Collected:  09/28/17 2020    Order Status:  Completed Specimen:  Urine Updated:  09/28/17 2038     Color Yellow     Character Clear     Specific Gravity 1.016     Ph 5.0     Glucose Negative mg/dL      Ketones Trace (A) mg/dL      Protein Negative mg/dL      Bilirubin Negative     Urobilinogen, Urine 0.2     Nitrite Negative     Leukocyte Esterase Negative     Occult Blood Negative     Micro Urine Req see below     Comment: Microscopic examination not performed when specimen is clear  and chemically negative for protein, blood, leukocyte esterase  and nitrite.         Narrative:       Indication for culture:->Emergency Room Patient          Assessment:  Active Hospital Problems    Diagnosis   • Sinus tachycardia [R00.0]   • Cirrhosis (CMS-HCC) [K74.60]   • Alcoholism (CMS-HCC) [F10.20]   • Severe sepsis (CMS-HCC) [A41.9, R65.20]   • Hyponatremia [E87.1]   • Cellulitis [L03.90]   • CLL (chronic lymphocytic leukemia) (CMS-HCC) [C91.10]   • Continuous opioid dependence (CMS-HCC) [F11.20]       Plan:  Septic shock.   2/2 LLE cellulitis  Afebrile  Significant leukocytosis - decreasing  Still on pressors  On abx below    LLE cellulitis  Cx - MRSA (vanco STEPHANIE 2), Group A strep  Continue ceftaroline and clindamycin  Bcx 9/28 - NGTD  Doppler neg for DVT    Leukocytosis, decreasing  2/2 above  On abx  Monitor    Encephalopathy  2/2 above  Pt obtunded    CLL    Episodes of SVT  S/p adenosine    Alcohol abuse/cirrhosis    Discussed with internal medicine RN

## 2017-10-04 NOTE — CARE PLAN
Problem: Pain Management  Goal: Pain level will decrease to patient's comfort goal    Intervention: Follow pain managment plan developed in collaboration with patient and Interdisciplinary Team  Assessed patient pain continuously. Provided ordered pain management to reach comfort level.       Problem: Skin Integrity  Goal: Risk for impaired skin integrity will decrease    Intervention: Implement precautions to protect skin integrity in collaboration with the interdisciplinary team  Implemented Q2 turns with pillows in place for support and positioning. Implemented wound care per wound team orders. Will continue to assess patient skin and provide adequate turns and changes as needed.

## 2017-10-04 NOTE — PROGRESS NOTES
Pulmonary Critical Care Progress Note      Date of service:  10/4/2017    Interval Events:  24 hour interval history reviewed  Reason for visit:  Severe sepsis with shock, AF with RVR, hypotension, ETOH withdrawal delirium  Unable to provide CC or ROS due to medical condition       - AF   - Raj 30   - CXR with increased edema   - force diuresis   - cont albumin   - TF at goal      PFSH:  No change.    Respiratory:     Pulse Oximetry: 97 %  CXR personally reviewed  CXR with increased pulmonary edema  Scattered coarse crackles bilaterally - about the same  No wheezing    HemoDynamics:  Pulse: 68, Heart Rate (Monitored): 64  NIBP: (!) 95/41     AF  Phenylephrine 30    Neuro:  Arouses, confused    Fluids:  Intake/Output       10/02/17 0700 - 10/03/17 0659 10/03/17 0700 - 10/04/17 0659 10/04/17 0700 - 10/05/17 0659      0576-4661 2028-5000 Total 5989-1137 5799-2550 Total 3333-5136 3779-5788 Total       Intake    I.V.  1030.7  197 1227.7  580.9  211.1 792  --  -- --    Phenylephrine Volume 63.1 77 140.1 110.9 91.1 202 -- -- --    Norepinephrine Volume 37.6 -- 37.6 -- -- -- -- -- --    IV Piggyback Volume 250 -- 250 350 -- 350 -- -- --    IV Volume (NS) 680 120 800 120 120 240 -- -- --    Enteral  --  350 350  660  720 1380  --  -- --    Enteral Volume -- 350 350  -- -- --    Total Intake 1030.7 547 1577.7 1240.9 931.1 2172 -- -- --       Output    Urine  425  525 310  2740  875 6635  --  -- --    Number of Times Voided -- -- -- 1 x -- 1 x -- -- --    Indwelling Cathether 425  092 6175 -- -- --    Stool  --  -- --  --  -- --  --  -- --    Number of Times Stooled -- -- -- -- 1 x 1 x -- -- --    Total Output 425  870 361 -- -- --       Net I/O     605.7 22 627.7 -1499.1 56.1 -1443 -- -- --           Recent Labs      10/02/17   0449  10/02/17   1125  10/03/17   0350  10/04/17   0510   SODIUM  135   --   136  136   POTASSIUM  3.5*   --   4.0  4.2   CHLORIDE  108   --   109  106   CO2  20    --   19*  24   BUN     -   CREATININE  0.88   --   0.97  1.01   MAGNESIUM   --   1.9  2.3  2.1   PHOSPHORUS   --    --    --   2.2*   CALCIUM  8.1*   --   8.0*  8.8       GI/Nutrition:  Abd slightly distended.  Non-tender.  Tolerating enteral TF    Liver Function  Recent Labs      10/02/17   0449  10/03/17   0350  10/04/17   0510   ALTSGPT  8  8  6   ASTSGOT  18  18  15   ALKPHOSPHAT  107*  120*  103*   TBILIRUBIN  0.7  0.6  0.7   GLUCOSE  75  95  120*       Heme:  Recent Labs      10/02/17   0449  10/03/17   0350  10/04/17   0510   RBC  2.58*  2.54*  2.35*   HEMOGLOBIN  9.2*  9.3*  8.5*   HEMATOCRIT  27.2*  26.5*  24.8*   PLATELETCT  89*  105*  104*       Infectious Disease:  Monitored Temp  Av.1 °C (98.7 °F)  Min: 34.2 °C (93.6 °F)  Max: 37.4 °C (99.3 °F)  Temp  Av.3 °C (99.1 °F)  Min: 37.2 °C (99 °F)  Max: 37.3 °C (99.1 °F)    Recent Labs      10/02/17   0449  10/03/17   0350  10/04/17   0510   WBC  27.5*  22.9*  15.7*   NEUTSPOLYS  40.70*  44.30   --    LYMPHOCYTES  53.40*  54.80*   --    MONOCYTES  0.90  0.00   --    EOSINOPHILS  0.00  0.00   --    BASOPHILS  0.00  0.00   --    ASTSGOT  18  18  15   ALTSGPT  8  8  6   ALKPHOSPHAT  107*  120*  103*   TBILIRUBIN  0.7  0.6  0.7     Current Facility-Administered Medications   Medication Dose Frequency Provider Last Rate Last Dose   • albumin human 25% solution 25 g  25 g Q6HRS Rohit Albarado M.D. 150 mL/hr at 10/04/17 0502 25 g at 10/04/17 0502   • haloperidol lactate (HALDOL) injection 1-3 mg  1-3 mg Q3HRS PRN Rohit Blackwell M.D.   2 mg at 10/03/17 1221   • enoxaparin (LOVENOX) inj 40 mg  40 mg DAILY Rohit Albarado M.D.   40 mg at 10/03/17 0823   • phenylephrine (JEWEL-SYNEPHRINE) 40,000 mcg in  mL Infusion  0-300 mcg/min Continuous Rohit Albarado M.D. 11.3 mL/hr at 10/04/17 0400 30 mcg/min at 10/04/17 0400   • digoxin (LANOXIN) tablet 125 mcg  125 mcg DAILY AT 1800 Mode Mireles D.O.   125 mcg at  10/03/17 1703   • clindamycin (CLEOCIN) IVPB premix 900 mg  900 mg Q8HRS XIANG JimenezOColin 50 mL/hr at 10/04/17 0502 900 mg at 10/04/17 0502   • MD ALERT...Adult ICU Electrolyte Replacement per Pharmacy Protocol   PRN Zain Kelly Jr., D.O.       • ceftaroline (TEFLARO) 600 mg in  mL IVPB  600 mg Q12HRS Ana Benavidez M.D.   Stopped at 10/03/17 2150   • MD ALERT...Phenobarbital Alcohol Withdrawal Protocol Pharmacist to Implement (ICU Only)   PRN Zain Kelly Jr., D.O.       • Pharmacy Consult Request 1 Each  1 Each PRN Zain Kelly Jr., D.O.        And   • PHENObarbital injection 130 mg  130 mg Q30 MIN PRN Zain Kelly Jr. D.O.   130 mg at 10/03/17 1738    And   • PHENObarbital injection 260 mg  260 mg Q30 MIN PRN Zain Kelly Jr., D.O.   260 mg at 10/01/17 1702   • folic acid (FOLVITE) tablet 1 mg  1 mg DAILY Brit Chavez, A.P.R.N.   1 mg at 10/03/17 0820   • therapeutic multivitamin-minerals (THERAGRAN-M) tablet 1 Tab  1 Tab DAILY Brit Chavez, A.P.R.N.   Stopped at 10/03/17 0900   • thiamine (THIAMINE) tablet 100 mg  100 mg DAILY Brit Chavez A.P.R.N.   100 mg at 10/03/17 0820   • NS infusion   Continuous Rohit Albarado M.D. 10 mL/hr at 10/02/17 1039     • duloxetine (CYMBALTA) capsule 60 mg  60 mg DAILY Evelyn Blackwell M.D.   60 mg at 10/03/17 0821   • levothyroxine (SYNTHROID) tablet 25 mcg  25 mcg AM ES Evelyn Blackwell M.D.   25 mcg at 10/04/17 0501   • trazodone (DESYREL) tablet 100 mg  100 mg Nightly Evelyn Blackwell M.D.   100 mg at 10/03/17 2156   • senna-docusate (PERICOLACE or SENOKOT S) 8.6-50 MG per tablet 2 Tab  2 Tab BID Evelyn Blackwell M.D.   2 Tab at 10/03/17 2156    And   • polyethylene glycol/lytes (MIRALAX) PACKET 1 Packet  1 Packet QDAY PRCHUY Blackwell M.D.   1 Packet at 10/03/17 0821    And   • magnesium hydroxide (MILK OF MAGNESIA) suspension 30 mL  30 mL QDAY PRCHUY Blackwell M.D.   30 mL at 09/30/17 1039    And   • bisacodyl (DULCOLAX)  suppository 10 mg  10 mg QDAY PRCHUY Blackwell M.D.   10 mg at 10/03/17 1518   • ondansetron (ZOFRAN) syringe/vial injection 4 mg  4 mg Q4HRS PRCHUY Blackwell M.D.   4 mg at 10/01/17 0900   • ondansetron (ZOFRAN ODT) dispertab 4 mg  4 mg Q4HRS PRCHUY Blackwell M.D.       • Pharmacy Consult Request ...Pain Management Review   JOSHUA Blcakwell M.D.        And   • oxycodone immediate-release (ROXICODONE) tablet 5 mg  5 mg Q3HRS PRCHUY Blackwell M.D.   5 mg at 10/04/17 0502    And   • oxycodone immediate-release (ROXICODONE) tablet 10 mg  10 mg Q3HRS PRCHUY Blackwell M.D.   10 mg at 10/03/17 1934    And   • HYDROmorphone (DILAUDID) injection 0.5 mg  0.5 mg Q3HRS PRCHUY Blackwell M.D.   0.5 mg at 10/02/17 2005     Last reviewed on 9/29/2017  2:17 AM by Kelsi Layton, RMATTHEW    Quality  Measures:  Labs reviewed, Medications reviewed and Radiology images reviewed                        Assessment and Plan:    Acute hypoxemic respiratory failure   - cont oxygen  Acute pulmonary edema   - force diuresis as tolerated   - cont albumin  Severe sepsis with shock - skin and soft tissue source   - cont vasopressor support as necessary  LE cellulitis   - MRSA and Strep species   - cont ceftaroline and clindamycin per ID   - wound care  AF - improved rate control   - cont digoxin   - optimize K and Mg  Alcohol withdrawal delirium   - phenobarbital protocol  ETOH abuse   - vitamins  Cirrhosis  CLL    Critically ill with unstable cardiovascular status.  Active titration of vasopressor.    High risk of deterioration and worsening vital organ dysfunction and death without critical care interventions.    Critical Care Time:  34 minutes  70931  No time overlap  Time excludes procedures  Discussed with RN, RT, Team

## 2017-10-05 ENCOUNTER — APPOINTMENT (OUTPATIENT)
Dept: RADIOLOGY | Facility: MEDICAL CENTER | Age: 72
DRG: 871 | End: 2017-10-05
Attending: HOSPITALIST
Payer: COMMERCIAL

## 2017-10-05 ENCOUNTER — APPOINTMENT (OUTPATIENT)
Dept: RADIOLOGY | Facility: MEDICAL CENTER | Age: 72
DRG: 871 | End: 2017-10-05
Attending: INTERNAL MEDICINE
Payer: COMMERCIAL

## 2017-10-05 LAB
ALBUMIN SERPL BCP-MCNC: 3.1 G/DL (ref 3.2–4.9)
ALBUMIN/GLOB SERPL: 1.1 G/DL
ALP SERPL-CCNC: 99 U/L (ref 30–99)
ALT SERPL-CCNC: 6 U/L (ref 2–50)
ANION GAP SERPL CALC-SCNC: 6 MMOL/L (ref 0–11.9)
ANISOCYTOSIS BLD QL SMEAR: ABNORMAL
AST SERPL-CCNC: 21 U/L (ref 12–45)
BASOPHILS # BLD AUTO: 0 % (ref 0–1.8)
BASOPHILS # BLD: 0 K/UL (ref 0–0.12)
BILIRUB SERPL-MCNC: 0.9 MG/DL (ref 0.1–1.5)
BUN SERPL-MCNC: 20 MG/DL (ref 8–22)
CALCIUM SERPL-MCNC: 8.8 MG/DL (ref 8.5–10.5)
CHLORIDE SERPL-SCNC: 102 MMOL/L (ref 96–112)
CO2 SERPL-SCNC: 27 MMOL/L (ref 20–33)
CREAT SERPL-MCNC: 1.06 MG/DL (ref 0.5–1.4)
DIGOXIN SERPL-MCNC: 1.2 NG/ML (ref 0.8–2)
EOSINOPHIL # BLD AUTO: 0 K/UL (ref 0–0.51)
EOSINOPHIL NFR BLD: 0 % (ref 0–6.9)
ERYTHROCYTE [DISTWIDTH] IN BLOOD BY AUTOMATED COUNT: 55.4 FL (ref 35.9–50)
GFR SERPL CREATININE-BSD FRML MDRD: >60 ML/MIN/1.73 M 2
GLOBULIN SER CALC-MCNC: 2.9 G/DL (ref 1.9–3.5)
GLUCOSE SERPL-MCNC: 114 MG/DL (ref 65–99)
HCT VFR BLD AUTO: 24.6 % (ref 42–52)
HGB BLD-MCNC: 8.3 G/DL (ref 14–18)
LYMPHOCYTES # BLD AUTO: 12.07 K/UL (ref 1–4.8)
LYMPHOCYTES NFR BLD: 71 % (ref 22–41)
MACROCYTES BLD QL SMEAR: ABNORMAL
MAGNESIUM SERPL-MCNC: 1.9 MG/DL (ref 1.5–2.5)
MANUAL DIFF BLD: NORMAL
MCH RBC QN AUTO: 35.5 PG (ref 27–33)
MCHC RBC AUTO-ENTMCNC: 33.7 G/DL (ref 33.7–35.3)
MCV RBC AUTO: 105.1 FL (ref 81.4–97.8)
MONOCYTES # BLD AUTO: 0 K/UL (ref 0–0.85)
MONOCYTES NFR BLD AUTO: 0 % (ref 0–13.4)
MORPHOLOGY BLD-IMP: NORMAL
NEUTROPHILS # BLD AUTO: 4.93 K/UL (ref 1.82–7.42)
NEUTROPHILS NFR BLD: 28 % (ref 44–72)
NEUTS BAND NFR BLD MANUAL: 1 % (ref 0–10)
NRBC # BLD AUTO: 0 K/UL
NRBC BLD AUTO-RTO: 0 /100 WBC
OVALOCYTES BLD QL SMEAR: NORMAL
PHOSPHATE SERPL-MCNC: 2.6 MG/DL (ref 2.5–4.5)
PLATELET # BLD AUTO: 100 K/UL (ref 164–446)
PLATELET BLD QL SMEAR: NORMAL
PMV BLD AUTO: 9.9 FL (ref 9–12.9)
POIKILOCYTOSIS BLD QL SMEAR: NORMAL
POTASSIUM SERPL-SCNC: 5 MMOL/L (ref 3.6–5.5)
PROT SERPL-MCNC: 6 G/DL (ref 6–8.2)
RBC # BLD AUTO: 2.34 M/UL (ref 4.7–6.1)
RBC BLD AUTO: PRESENT
SMUDGE CELLS BLD QL SMEAR: NORMAL
SODIUM SERPL-SCNC: 135 MMOL/L (ref 135–145)
WBC # BLD AUTO: 17 K/UL (ref 4.8–10.8)

## 2017-10-05 PROCEDURE — A9270 NON-COVERED ITEM OR SERVICE: HCPCS | Performed by: HOSPITALIST

## 2017-10-05 PROCEDURE — 700102 HCHG RX REV CODE 250 W/ 637 OVERRIDE(OP): Performed by: HOSPITALIST

## 2017-10-05 PROCEDURE — 700111 HCHG RX REV CODE 636 W/ 250 OVERRIDE (IP): Performed by: INTERNAL MEDICINE

## 2017-10-05 PROCEDURE — 700102 HCHG RX REV CODE 250 W/ 637 OVERRIDE(OP): Performed by: NURSE PRACTITIONER

## 2017-10-05 PROCEDURE — 83735 ASSAY OF MAGNESIUM: CPT

## 2017-10-05 PROCEDURE — 99291 CRITICAL CARE FIRST HOUR: CPT | Performed by: HOSPITALIST

## 2017-10-05 PROCEDURE — A9270 NON-COVERED ITEM OR SERVICE: HCPCS | Performed by: NURSE PRACTITIONER

## 2017-10-05 PROCEDURE — 700105 HCHG RX REV CODE 258: Performed by: INTERNAL MEDICINE

## 2017-10-05 PROCEDURE — 770022 HCHG ROOM/CARE - ICU (200)

## 2017-10-05 PROCEDURE — 80162 ASSAY OF DIGOXIN TOTAL: CPT

## 2017-10-05 PROCEDURE — 700101 HCHG RX REV CODE 250: Performed by: HOSPITALIST

## 2017-10-05 PROCEDURE — 85027 COMPLETE CBC AUTOMATED: CPT

## 2017-10-05 PROCEDURE — 80053 COMPREHEN METABOLIC PANEL: CPT

## 2017-10-05 PROCEDURE — 84100 ASSAY OF PHOSPHORUS: CPT

## 2017-10-05 PROCEDURE — 85007 BL SMEAR W/DIFF WBC COUNT: CPT

## 2017-10-05 PROCEDURE — 71010 DX-CHEST-PORTABLE (1 VIEW): CPT

## 2017-10-05 RX ORDER — MAGNESIUM SULFATE HEPTAHYDRATE 40 MG/ML
2 INJECTION, SOLUTION INTRAVENOUS ONCE
Status: COMPLETED | OUTPATIENT
Start: 2017-10-05 | End: 2017-10-05

## 2017-10-05 RX ORDER — FUROSEMIDE 10 MG/ML
20 INJECTION INTRAMUSCULAR; INTRAVENOUS EVERY 12 HOURS
Status: DISCONTINUED | OUTPATIENT
Start: 2017-10-05 | End: 2017-10-05

## 2017-10-05 RX ORDER — OXYCODONE HYDROCHLORIDE 5 MG/1
5 TABLET ORAL EVERY 6 HOURS PRN
Status: DISCONTINUED | OUTPATIENT
Start: 2017-10-05 | End: 2017-10-19 | Stop reason: HOSPADM

## 2017-10-05 RX ADMIN — MULTIPLE VITAMINS W/ MINERALS TAB 1 TABLET: TAB at 08:33

## 2017-10-05 RX ADMIN — FOLIC ACID 1 MG: 1 TABLET ORAL at 08:33

## 2017-10-05 RX ADMIN — CEFTAROLINE FOSAMIL 600 MG: 600 POWDER, FOR SOLUTION INTRAVENOUS at 20:46

## 2017-10-05 RX ADMIN — TRAZODONE HYDROCHLORIDE 100 MG: 50 TABLET ORAL at 20:46

## 2017-10-05 RX ADMIN — CLINDAMYCIN IN 5 PERCENT DEXTROSE 900 MG: 18 INJECTION, SOLUTION INTRAVENOUS at 05:33

## 2017-10-05 RX ADMIN — CLINDAMYCIN IN 5 PERCENT DEXTROSE 900 MG: 18 INJECTION, SOLUTION INTRAVENOUS at 20:46

## 2017-10-05 RX ADMIN — OXYCODONE HYDROCHLORIDE 5 MG: 5 TABLET ORAL at 06:16

## 2017-10-05 RX ADMIN — CEFTAROLINE FOSAMIL 600 MG: 600 POWDER, FOR SOLUTION INTRAVENOUS at 08:33

## 2017-10-05 RX ADMIN — LEVOTHYROXINE SODIUM 25 MCG: 50 TABLET ORAL at 05:33

## 2017-10-05 RX ADMIN — OXYCODONE HYDROCHLORIDE 5 MG: 5 TABLET ORAL at 20:46

## 2017-10-05 RX ADMIN — PHENYLEPHRINE HYDROCHLORIDE 15 MCG/MIN: 10 INJECTION INTRAVENOUS at 10:53

## 2017-10-05 RX ADMIN — DIGOXIN 125 MCG: 125 TABLET ORAL at 18:00

## 2017-10-05 RX ADMIN — SODIUM CHLORIDE: 9 INJECTION, SOLUTION INTRAVENOUS at 14:02

## 2017-10-05 RX ADMIN — DULOXETINE HYDROCHLORIDE 60 MG: 60 CAPSULE, DELAYED RELEASE ORAL at 08:33

## 2017-10-05 RX ADMIN — THIAMINE HCL TAB 100 MG 100 MG: 100 TAB at 08:33

## 2017-10-05 RX ADMIN — MAGNESIUM SULFATE IN WATER 2 G: 40 INJECTION, SOLUTION INTRAVENOUS at 12:59

## 2017-10-05 RX ADMIN — OXYCODONE HYDROCHLORIDE 5 MG: 5 TABLET ORAL at 02:23

## 2017-10-05 RX ADMIN — CLINDAMYCIN IN 5 PERCENT DEXTROSE 900 MG: 18 INJECTION, SOLUTION INTRAVENOUS at 14:02

## 2017-10-05 RX ADMIN — ENOXAPARIN SODIUM 40 MG: 100 INJECTION SUBCUTANEOUS at 08:34

## 2017-10-05 ASSESSMENT — PAIN SCALES - GENERAL
PAINLEVEL_OUTOF10: 5
PAINLEVEL_OUTOF10: 6
PAINLEVEL_OUTOF10: 4
PAINLEVEL_OUTOF10: 6

## 2017-10-05 NOTE — PROGRESS NOTES
Infectious Disease Progress Note    Author: Ana Benavidez M.D. Date & Time of service: 10/5/2017  10:34 AM    Chief Complaint:  FU sepsis/LLE cellulitis    Interval History:  10/2 AF, WBC 27.5, confused and in pain  10/3 AF, WBC 22.9, remains confused and mumbling  10/4 AF, WBC 15.7, pt obtunded, no overnight events per RN  10/5 AF, WBC 17, mumbling, does not engage in questioning or open eyes, grimaces when RLE examined, remains on pressors, lots of diarrhea, stool softeners held  Labs Reviewed, Medications Reviewed, Radiology Reviewed and Wound Reviewed.    Review of Systems:  Review of Systems   Unable to perform ROS: Mental acuity       Hemodynamics:  Temp (24hrs), Av.5 °C (99.5 °F), Min:37 °C (98.6 °F), Max:37.7 °C (99.9 °F)  Temperature: 37.6 °C (99.7 °F), Monitored Temp: 37.7 °C (99.9 °F)  Pulse  Av.6  Min: 60  Max: 188Heart Rate (Monitored): 95  Blood Pressure : 117/68, NIBP: 121/69  CVP (mm Hg): (!) 15 MM HG    Physical Exam:  Physical Exam   Constitutional: He appears well-developed.   Thin  Older than stated age  Chronically ill   HENT:   Head: Normocephalic and atraumatic.   Eyes: EOM are normal. Pupils are equal, round, and reactive to light.   Neck: Neck supple.   R IJ catheter   Cardiovascular: Normal rate.    Pulmonary/Chest: Effort normal and breath sounds normal.   Abdominal: Soft. There is no tenderness.   Musculoskeletal: He exhibits edema and tenderness.   Erythema and edema of the LLE radiating to the left hip and medial thigh, improving significantly. Skin peeling    Left medial malleolar wound.     There is a eschar on the medial right knee with surrounding erythema   Neurological:   Mumbling  Does not arouse to voice   Skin: There is erythema.   Resolving       Meds:    Current Facility-Administered Medications:   •  haloperidol lactate  •  enoxaparin (LOVENOX) injection  •  Phenylephrine infusion  •  digoxin  •  clindamycin (CLEOCIN) IV  •  MD ALERT...Adult ICU Electrolyte  Replacement per Pharmacy Protocol  •  ceftaroline (TEFLARO) ivpb  •  MD ALERT...Phenobarbital Alcohol Withdrawal Protocol Pharmacist to Implement (ICU Only)  •  Pharmacy **AND** If RASS 1 to 4: assess RASS every 30 minutes until RASS returns to goal sedation (RASS -2 to 0) **AND** If RASS -2 to 0: assess RASS every 30 minutes for 2 occurrences and then every 4 hours **AND** If RASS -5 to -3: assess RASS, BP, and RR every 15 minutes with Continuous Pulse Oximetry until RASS returns to goal sedation (RASS -2 to 0) **AND** Pulse ox (oximetry) **AND** PHENObarbital **AND** PHENObarbital  •  folic acid  •  therapeutic multivitamin-minerals  •  thiamine  •  NS  •  duloxetine  •  levothyroxine  •  trazodone  •  senna-docusate **AND** polyethylene glycol/lytes **AND** magnesium hydroxide **AND** bisacodyl  •  ondansetron  •  ondansetron  •  Notify provider if pain remains uncontrolled **AND** Use the numeric rating scale (NRS-11) on regular floors and Critical-Care Pain Observation Tool (CPOT) on ICUs/Trauma to assess pain **AND** Pulse Ox (Oximetry) **AND** Pharmacy Consult Request **AND** If patient difficult to arouse and/or has respiratory depression, stop any opiates that are currently infusing and call a Rapid Response. **AND** oxycodone immediate-release **AND** oxycodone immediate-release **AND** HYDROmorphone    Labs:  Recent Labs      10/03/17   0350  10/04/17   0510  10/05/17   0430   WBC  22.9*  15.7*  17.0*   RBC  2.54*  2.35*  2.34*   HEMOGLOBIN  9.3*  8.5*  8.3*   HEMATOCRIT  26.5*  24.8*  24.6*   MCV  104.3*  105.5*  105.1*   MCH  36.6*  36.2*  35.5*   RDW  52.6*  54.0*  55.4*   PLATELETCT  105*  104*  100*   MPV  9.2  9.7  9.9   NEUTSPOLYS  44.30  36.60*  28.00*   LYMPHOCYTES  54.80*  63.40*  71.00*   MONOCYTES  0.00  0.00  0.00   EOSINOPHILS  0.00  0.00  0.00   BASOPHILS  0.00  0.00  0.00   RBCMORPHOLO  Present  Present  Present     Recent Labs      10/03/17   0350  10/04/17   0510  10/05/17   0430    SODIUM  136  136  135   POTASSIUM  4.0  4.2  5.0   CHLORIDE  109  106  102   CO2  19*  24  27   GLUCOSE  95  120*  114*   BUN  17  17  20     Recent Labs      10/03/17   0350  10/04/17   0510  10/05/17   0430   ALBUMIN  2.0*  3.0*  3.1*   TBILIRUBIN  0.6  0.7  0.9   ALKPHOSPHAT  120*  103*  99   TOTPROTEIN  5.1*  5.8*  6.0   ALTSGPT  8  6  6   ASTSGOT  18  15  21   CREATININE  0.97  1.01  1.06       Imaging:  Ct-abdomen-pelvis With    Result Date: 10/1/2017  10/1/2017 8:07 AM HISTORY/REASON FOR EXAM:  Diffuse abdominal pain. History of sepsis. Left lower extremity cellulitis. TECHNIQUE/EXAM DESCRIPTION: CT scan of the abdomen and pelvis with contrast. Contrast-enhanced helical scanning was obtained from the diaphragmatic domes through the pubic symphysis following the bolus administration of 100 mL of Omnipaque 350 nonionic contrast without complication. Low dose optimization technique was utilized for this CT exam including automated exposure control and adjustment of the mA and/or kV according to patient size. COMPARISON: CT 6/5/2015 FINDINGS: The visualized lung bases demonstrate small amounts of dependent pleural effusion with bibasilar dependent airspace disease, most likely atelectasis. There is no acute bony process. There is degenerative change in the lower lumbar spine and pelvis. CT Abdomen: Liver is unchanged in appearance with slight nodularity. There are no definite focal masses. Spleen is mildly enlarged measuring 13.1 cm in diameter without change. Hepatic veins are somewhat attenuated. Portal veins, splenic vein and SMV are patent. Pancreas is somewhat atrophic but otherwise unremarkable. The gallbladder demonstrates some density dependently which could be related to tumefactive sludge or noncalcified stones. There is no biliary dilatation. The adrenal glands are normal in size. Kidneys demonstrate no hydronephrosis. There are probable small cortical cysts. The abdominal aorta is normal in caliber.  There is atherosclerosis of the aorta. There is no lymphadenopathy. The bowel demonstrates no evidence of bowel obstruction. There is moderate stool throughout the colon. There is a hiatal hernia. There is a small amount of fluid around the liver although this is decreased over the prior study. There is minimal fluid in both paracolic gutters. There is diffuse subcutaneous edema. CT Pelvis: There is no acute inflammatory process in the pelvis. There is free fluid in the pelvis with diffuse subcutaneous edema. There are mildly prominent lymph nodes in the inguinal regions, left greater than right with some nodes in the external iliac region as well more so on the left. The largest inguinal node is 1.5 cm short axis on the left side.. There is postoperative change involving the colon. Bladder is decompressed with a Maradiaga catheter.     1.  There is mild colonic distention with large amount of stool throughout the colon. There is no bowel obstruction. 2.  There is a hiatal hernia. 3.  Changes of the liver with mild splenomegaly and a small amount of ascites in the abdomen and pelvis suggestive of underlying hepatocellular disease. 4.  Dependent density in the gallbladder which could be tumefactive sludge or noncalcified stones. 5.  There are small dependent bilateral pleural effusions with dependent airspace disease, likely atelectasis. 6.  There is mild lymphadenopathy in the pelvis more so on the left likely inflammatory. 7.  There is diffuse subcutaneous edema.    Ct-extremity, Lower With Left    Result Date: 10/1/2017  10/1/2017 8:07 AM HISTORY/REASON FOR EXAM:  Progression of strep/staph cellulitis with sepsis. Possible necrotizing fasciitis.. TECHNIQUE/EXAM DESCRIPTION AND NUMBER OF VIEWS:  CT scan of the LEFT lower extremity with contrast, with reconstructions. Thin helical 3 mm sections were obtained from the distal femur through the proximal tibia/fibula. Sagittal and coronal multiplanar reconstructions were  generated from the axial images. A total of 100 mL of Omnipaque 350 nonionic contrast was administered  IV without complication. Up to date radiation dose reduction adjustments have been utilized to meet ALARA standards for radiation dose reduction. COMPARISON: X-rays of the foot and tibia fibula 9/29/2017, 9/28/2017 respectively. FINDINGS: Imaging of the visualized portion of the pelvis demonstrates fluid in the pelvis. Bladder is decompressed with a Maradiaga catheter. There is postoperative change involving the colon. There are mildly prominent bilateral external iliac and common femoral lymph nodes. Largest node in the left inguinal region is 1.5 cm short axis diameter. There is diffuse subcutaneous edema throughout the pelvis. Imaging throughout the left lower extremity demonstrates diffuse subcutaneous edema. There is a small amount of fluid in the intermuscular planes along the medial aspect of the proximal lower leg adjacent to the medial gastrocnemius musculature. There is  no focal abscess collection. There is a small joint effusion of the knee. There is some mild fatty infiltration of the musculature. There is atherosclerosis but basilar structures appear grossly patent. Limited partial visualization of portions of the right lower extremity also demonstrates some mild subcutaneous edema. Bones demonstrate mild degenerative changes in the hip and left knee. There is no evidence of osteomyelitis.     1.  There is diffuse subcutaneous edema throughout the left hemipelvis and left lower extremity with a small amount of fluid adjacent to the right medial gastrocnemius at the level the proximal calf. 2.  There is no focal drainable abscess. 3.  There is no evidence of osteomyelitis. 4.  There is mild atherosclerosis with grossly patent vasculature. 5.  There are probably inflammatory lymph nodes in the pelvis and left inguinal region with the largest measuring 1.5 cm short axis. 6.  There is free fluid in the  visualized pelvis.    Ct-head W/o    Result Date: 10/1/2017  HISTORY/REASON FOR EXAM:  Altered Mental Status. TECHNIQUE/EXAM DESCRIPTION: CT scan of the head without contrast, 10/1/2017 12:28 AM. Contiguous 5 mm axial sections were obtained from the skull base through the vertex. Up to date radiation dose reduction adjustments have been utilized to meet ALARA standards for radiation dose reduction. COMPARISON:  4/23/2015 FINDINGS:   The ventricular system and cortical sulci are prominent, consistent with the patient's age.  There is no midline shift or other mass effect.  A 5 mm circumscribed lucency is present in the right temporal lobe, consistent with old lacunar infarct. This is unchanged. There is no acute intra-axial abnormality or extra-axial fluid collection.  There is no intracranial hemorrhage.  The calvaria are intact. The visualized paranasal sinuses show no unusual opacity.     1.  No acute intracranial abnormality. 2.  Moderate atrophy, age-consistent. 3.  Probable old lacunar infarct in the right temporal lobe, unchanged from the prior scan. INTERPRETING LOCATION:  [address] [place] [address], [id number]    Dx-chest-portable (1 View)    Result Date: 9/29/2017 9/29/2017 1:25 PM HISTORY/REASON FOR EXAM:  Line placement. TECHNIQUE/EXAM DESCRIPTION AND NUMBER OF VIEWS: Single portable view of the chest. COMPARISON: 9/28/2017. FINDINGS: The soft tissues and bony structures are unremarkable. The heart and mediastinal structures are within normal limits. Pulmonary vascularity is normal. There is right basilar atelectasis. There is no effusion or pneumothorax. There has been interval insertion of a central venous catheter which terminates with the tip projecting over the expected region of the mid to distal superior vena cava.     1.  Interval insertion of a central venous catheter which terminates with the tip projecting over the expected region of the mid to distal superior vena cava. 2.  Right basilar  atelectasis.    Dx-chest-portable (1 View)    Result Date: 9/28/2017 9/28/2017 4:32 PM HISTORY/REASON FOR EXAM:  Sepsis. Left lower extremity wound. TECHNIQUE/EXAM DESCRIPTION AND NUMBER OF VIEWS: Single portable view of the chest. COMPARISON: 9/1/2016 FINDINGS: The mediastinal and cardiac silhouette is unremarkable. The pulmonary vascularity is within normal limits. Lungs demonstrate no evidence of pneumonia. Slight hazy density seen adjacent to the right anterior 1st rib is similar to the prior studies and likely due to degenerative change. There is no significant pleural effusion. There is no visible pneumothorax. There are no acute bony abnormalities.     1.  There is no acute cardiopulmonary process.    Dx-foot-2- Left    Result Date: 9/29/2017 9/29/2017 4:10 PM HISTORY/REASON FOR EXAM:  Left foot swelling and weeping TECHNIQUE/EXAM DESCRIPTION AND NUMBER OF VIEWS: 2 views of the LEFT foot. COMPARISON:  None. FINDINGS:  Bone mineralization is age appropriate. Bony alignment is anatomic. There is no evidence of acute fracture or dislocation. There is no soft tissue gas. There is soft tissue swelling about the ankle.     Soft tissue swelling about the ankle. No evidence of acute osseous abnormality or soft tissue gas.    Dx-lumbar Spine-2 Or 3 Views    Result Date: 9/9/2017 9/9/2017 3:24 AM HISTORY/REASON FOR EXAM:  Pain Following Trauma. TECHNIQUE/ EXAM DESCRIPTION AND NUMBER OF VIEWS:  3 views of the lumbar spine. COMPARISON: None. FINDINGS: The lowest formed intervertebral disc will be designated L5-S1 for the purposes of this report and vertebral levels numbered accordingly. No acute fracture is evident. No gross malalignment is seen. There is mild loss of intervertebral disc height in the upper lumbar spine. There is moderate loss of intervertebral disc height at L4-L5. There are anterior osteophytes at most levels. There is no evidence of spondylolisthesis or osseous lesion. There are degenerative  changes of the mid to lower lumbar facet joints. There is calcific atherosclerotic plaque.     1.  No evidence of acute fracture. 2.  Multilevel multifactorial degenerative changes    Dx-thoracic Spine-2 Views    Result Date: 9/9/2017 9/9/2017 3:21 AM HISTORY/REASON FOR EXAM:  Pain Following Trauma. TECHNIQUE/EXAM DESCRIPTION AND NUMBER OF VIEWS:  Thoracic spine, 2 views. COMPARISON:  None. FINDINGS: Glands unremarkable. There is lucency extending to the superior endplate of L2 on the second image. This appearance is not present on the dedicated lumbar spine radiographs. No other finding suspicious for acute fracture are seen. There is mild loss of intervertebral disc height throughout the thoracic spine. There are anterior osteophytes at most levels.     1.  Lucency through the superior endplate of L2 on the second image, discordant with the appearance on the dedicated lumbar spine radiographs. I suspect this is artifactual however a fracture is not excluded. Further assessment is recommended with CT of the lumbar spine. 2.  Thoracic spondylosis    Dx-tibia And Fibula Left    Result Date: 9/28/2017 9/28/2017 4:32 PM HISTORY/REASON FOR EXAM:  Left tibia and fibular redness and swelling with open wound in the region of the medial malleolus. TECHNIQUE/EXAM DESCRIPTION AND NUMBER OF VIEWS:  2 views of the LEFT tibia and fibula. COMPARISON: Left knee 4/24/2015 FINDINGS: There is no evidence of acute fracture involving the tibia or fibula. Sclerotic focus posterior left tibial metadiaphyseal region is without change and may represent a sclerotic NOF or fibrous cortical defect. There is mild diffuse soft tissue swelling most prominent at the level the ankle. There is no gas in the soft tissues and there is no foreign body. There is no periostitis.     1.  There is mild diffuse swelling in the soft tissues of the distal left lower leg and ankle. There is no gas in the soft tissues or foreign body. 2.  There is no plain  film evidence of osteomyelitis.    Dx-wrist-complete 3+ Left    Result Date: 2017 3:24 AM HISTORY/REASON FOR EXAM:  Pain/Deformity Following Trauma. TECHNIQUE/EXAM DESCRIPTION AND NUMBER OF VIEWS:  3 views of the  LEFT wrist. COMPARISON: Radiographs 2015 FINDINGS: MINERALIZATION: Decreased. INJURY: There has been interval placement of a volar plate and screws transfixing the previously demonstrated intra-articular distal radial fracture. The hardware appears intact. There is underlying curvilinear deformity of the distal radius. There is ulnar positive variance, as before. No acute fracture is seen. JOINTS: No erosive arthropathy is evident.     1.  No radiographic evidence of acute traumatic injury. 2.  Healed surgically transfixed fracture of the distal radius 3.  Osteopenia    Echocardiogram Comp W/o Cont    Result Date: 2017  Transthoracic Echo Report Echocardiography Laboratory CONCLUSIONS Normal left ventricular size, thickness, systolic function, and diastolic function. Mitral annular calcification. Mild mitral regurgitation. Aortic sclerosis without stenosis. Mild aortic insufficiency. Normal estimated right-sided pressures. No prior study is available for comparison. MAGALI TODD Exam Date:         2017                    14:07 Exam Location:     Inpatient Priority:          Routine Ordering Physician:        CIRILO HSU JR Referring Physician:       [id number] Sonographer:               Lo Lundberg RDCS Age:    72     Gender:    M MRN:    [id number] :    1945 BSA:    1.73   Ht (in):    65     Wt (lb):    145 Exam Type:     Complete Indications:     Cardiac arrhythmia, unspecified ICD Codes:       I499 CPT Codes:       21105 BP:   89     /   57     HR:   80 Technical Quality:       Fair MEASUREMENTS  (Male / Female) Normal Values 2D ECHO LV Diastolic Diameter PLAX        4.6 cm                4.2 - 5.9 / 3.9 - 5.3 cm LV Systolic Diameter PLAX         2.1 cm                 2.1 - 4.0 cm IVS Diastolic Thickness           1 cm                  LVPW Diastolic Thickness          1 cm                  RV Diameter 4C                    2.7 cm                2.5 - 2.9 cm LVOT Diameter                     1.8 cm                RA Diameter                       2.7 cm                Estimated LV Ejection Fraction    60 %                  LV Ejection Fraction MOD BP       60.8 %                >= 55  % LV Ejection Fraction MOD 4C       58.4 %                LV Ejection Fraction MOD 2C       58.4 %                LA Volume Index                   13.5 cm³/m²           16 - 28 cm³/m² IVC Diameter                      1.5 cm                M-MODE Aortic Root Diameter MM           3.9 cm                DOPPLER AV Peak Velocity                  1.3 m/s               AV Peak Gradient                  6.7 mmHg              AV Mean Gradient                  3.3 mmHg              AI Pressure Half Time             463 ms                LVOT Peak Velocity                0.89 m/s              AV Area Cont Eq vti               1.9 cm²               Mitral E Point Velocity           0.96 m/s              Mitral E to A Ratio               0.99                  MV Pressure Half Time             60.6 ms               MV Area PHT                       3.6 cm²               MV Deceleration Time              209 ms                TR Peak Velocity                  222 cm/s              PV Peak Velocity                  0.7 m/s               PV Peak Gradient                  1.9 mmHg              RVOT Peak Velocity                0.52 m/s              * Indicates values subject to auto-interpretation LV EF:  60    % FINDINGS Left Ventricle Normal left ventricular size, thickness, systolic function, and diastolic function.  Left ventricular ejection fraction is visually estimated to be 60%. Normal regional wall motion. Right Ventricle Normal right ventricular size and systolic function. Right Atrium  Normal right atrial size. Normal inferior vena cava size and inspiratory collapse. Left Atrium Normal left atrial size. Mitral Valve Mitral annular calcification. Thickened mitral valve leaflets. Mild mitral regurgitation. Aortic Valve Aortic sclerosis without stenosis. Mild aortic insufficiency. Tricuspid Valve Structurally normal tricuspid valve. Mild tricuspid regurgitation. Estimated right ventricular systolic pressure  is 25 mmHg. Pulmonic Valve Structurally normal pulmonic valve. Mild pulmonic insufficiency. Pericardium Normal pericardium without effusion. Aorta Ascending aorta is normal for body surface area, and measured at a diameter of 3.7 cm above the sinotubular junction. Suleman Nguyen MD (Electronically Signed) Final Date:     2017                 23:50    Le Venous Duplex (specify In Comments Left, Right Or Bilateral)    Result Date: 2017   Vascular Laboratory  CONCLUSIONS  No evidence of acute DVT or SVT in the left lower extremity.  Edema is noted.  MAGALI TODD  Exam Date:     2017 16:43  Room #:     Inpatient  Priority:     Stat  Ht (in):             Wt (lb):  Ordering Physician:        REYNALDO LUU  Referring Physician:       [id number] Galeano  Sonographer:               Samina Galloway RVT  Study Type:                Complete Unilateral  Technical Quality:         Adequate  Age:    72    Gender:     M  MRN:    [id number]  :    1945      BSA:  Indications:     Localized swelling, mass and lump, left lower limb  CPT Codes:       14536  ICD Codes:         History:         Swelling and redness of left leg for less than 24 hours.  Limitations:     Severe pain.  PROCEDURES:  Left lower extremity venous duplex imaging.  The following venous structures were evaluated: common femoral, profunda  femoral, greater saphenous, femoral, popliteal, peroneal and posterior  tibial veins.  Serial compression, augmentation maneuvers, color and spectral Doppler flow  evaluations  were performed.  FINDINGS:  Left lower extremity -  Complete color filling and compressibility with normal venous flow dynamics  including spontaneous flow, response to augmentation maneuvers, and  respiratory phasicity.  The peroneal and posterior tibial veins are difficult to assess for  compressibility due to patient pain, but flow response to augmentation is  demonstrated.  Interstitial fluid consistent with edema is observed in the thigh and below  the knee.  Flow was evaluated in the contralateral common femoral vein and normal  venous flow dynamics including spontaneous flow, respiratory phasic  variation and augmentation were demonstrated.  Kevin Lester MD  (Electronically Signed)  Final Date:      29 September 2017                   15:52    Dx-abdomen For Tube Placement    Result Date: 10/1/2017  10/1/2017 7:28 PM HISTORY/REASON FOR EXAM:  Line evaluation. TECHNIQUE/EXAM DESCRIPTION AND NUMBER OF VIEWS:  1 view(s) of the abdomen. COMPARISON:  9/1/2016. FINDINGS: Enteric tube projects over the second portion of the duodenum. There is mild colonic distention. Degenerative changes are seen in the spine.     Enteric tube projects over the second portion of the duodenum. Mild colonic distention.      Micro:  Results     Procedure Component Value Units Date/Time    CDIFF BY PCR WITH TOXIN [[id number]] Collected:  10/04/17 1837    Order Status:  Completed Specimen:  Stool from Stool Updated:  10/04/17 2038     C Diff by PCR Negative     Comment: C. difficile NOT detected by PCR.  Treatment not indicated per guidelines.  Repeat testing not indicated within 7 days.          027-NAP1-BI Presumptive Negative     Comment: Presumptive 027/NAP1/BI target DNA sequences are NOT DETECTED.       Narrative:       Special Contact [phone] CARMELA MAJOR  Does this patient have risk factors for C-diff?->Yes  C-Diff Risk Factors->antibiotic exposure  Has patient taken stool softeners or laxatives in the last  "5  days?->Yes    BLOOD CULTURE [[id number]] Collected:  09/28/17 1703    Order Status:  Completed Specimen:  Blood from Peripheral Updated:  10/03/17 1900     Significant Indicator NEG     Source BLD     Site PERIPHERAL     Blood Culture No growth after 5 days of incubation.    Narrative:       Per Hospital Policy: Only change Specimen Src: to \"Line\" if  specified by physician order.    BLOOD CULTURE [[id number]] Collected:  09/28/17 1715    Order Status:  Completed Specimen:  Blood from Peripheral Updated:  10/03/17 1900     Significant Indicator NEG     Source BLD     Site PERIPHERAL     Blood Culture No growth after 5 days of incubation.    Narrative:       Per Hospital Policy: Only change Specimen Src: to \"Line\" if  specified by physician order.    CULTURE WOUND W/ GRAM STAIN [[id number]]  (Abnormal)  (Susceptibility) Collected:  09/28/17 1928    Order Status:  Completed Specimen:  Wound from Right Foot Updated:  10/01/17 0541     Gram Stain Result --     Few WBCs.  Many Gram positive cocci.       Significant Indicator POS (POS)     Source WND     Site RIGHT FOOT     Culture Result Wound -- (A)     Culture Result Wound -- (A)     Beta Hemolytic Streptococcus group A  Heavy growth       Culture Result Wound -- (A)     Methicillin Resistant Staphylococcus aureus  Moderate growth  This isolate is presumed to be clindamycin resistant based on  detection of inducible resistance.  Clindamycin may still  be effective in some patients.      Narrative:       CALL  [place]  161 tel. [phone],  CALLED  161 tel. [phone] 10/01/2017, 05:40, RB PERF. RESULTS CALLED TO:APURVA  [id number]    Culture & Susceptibility     METHICILLIN RESISTANT STAPHYLOCOCCUS AUREUS     Antibiotic Sensitivity Microscan Unit Status    Ampicillin/sulbactam Resistant 16/8 mcg/mL Final    Clindamycin Resistant <=0.5 mcg/mL Final    Daptomycin Sensitive <=0.5 mcg/mL Final    Erythromycin Resistant >4 mcg/mL Final    Moxifloxacin Resistant >4 mcg/mL Final    " Oxacillin Resistant >2 mcg/mL Final    Penicillin Resistant >8 mcg/mL Final    Tetracycline Sensitive <=4 mcg/mL Final    Trimeth/Sulfa Sensitive <=0.5/9.5 mcg/mL Final    Vancomycin Sensitive 2 mcg/mL Final                       URINE CULTURE(NEW) [[id number]] Collected:  09/28/17 2020    Order Status:  Completed Specimen:  Urine Updated:  09/30/17 0644     Significant Indicator NEG     Source UR     Site --     Urine Culture No growth at 48 hours    Narrative:       Indication for culture:->Emergency Room Patient    GRAM STAIN [[id number]] Collected:  09/28/17 1928    Order Status:  Completed Specimen:  Wound Updated:  09/29/17 0749     Significant Indicator .     Source WND     Site RIGHT FOOT     Gram Stain Result --     Few WBCs.  Many Gram positive cocci.      URINALYSIS [[id number]]  (Abnormal) Collected:  09/28/17 2020    Order Status:  Completed Specimen:  Urine Updated:  09/28/17 2038     Color Yellow     Character Clear     Specific Gravity 1.016     Ph 5.0     Glucose Negative mg/dL      Ketones Trace (A) mg/dL      Protein Negative mg/dL      Bilirubin Negative     Urobilinogen, Urine 0.2     Nitrite Negative     Leukocyte Esterase Negative     Occult Blood Negative     Micro Urine Req see below     Comment: Microscopic examination not performed when specimen is clear  and chemically negative for protein, blood, leukocyte esterase  and nitrite.         Narrative:       Indication for culture:->Emergency Room Patient          Assessment:  Active Hospital Problems    Diagnosis   • Sinus tachycardia [R00.0]   • Cirrhosis (CMS-HCC) [K74.60]   • Alcoholism (CMS-HCC) [F10.20]   • Severe sepsis (CMS-HCC) [A41.9, R65.20]   • Hyponatremia [E87.1]   • Cellulitis [L03.90]   • CLL (chronic lymphocytic leukemia) (CMS-HCC) [C91.10]   • Continuous opioid dependence (CMS-HCC) [F11.20]       Plan:  Septic shock.   2/2 LLE cellulitis  Afebrile  Significant leukocytosis - persistent  Still on pressors  On abx below    LLE  cellulitis  Cx - MRSA (vanco STEPHANIE 2), Group A strep  Continue ceftaroline and clindamycin  Bcx 9/28 - NGTD  Doppler neg for DVT    Leukocytosis, persistent.  2/2 above  On abx  Monitor    Diarrhea  DC stool softeners  If persistent, check C diff    Encephalopathy  2/2 above  ? Opiate withdrawal    CLL    Episodes of SVT  S/p adenosine    Alcohol abuse/cirrhosis    Discussed with internal medicine RN and Dr. Blackwell

## 2017-10-05 NOTE — CARE PLAN
Problem: Bowel/Gastric:  Goal: Normal bowel function is maintained or improved  Outcome: NOT MET

## 2017-10-05 NOTE — CARE PLAN
Problem: Urinary Elimination:  Goal: Ability to reestablish a normal urinary elimination pattern will improve  Outcome: NOT MET

## 2017-10-05 NOTE — CARE PLAN
Problem: Infection  Goal: Will remain free from infection    Intervention: Implement standard precautions and perform hand washing before and after patient contact  Performed hand hygeine before and after patient care. Wore appropriate isolation gown and gloves for MRSA. Educated all individuals entering room of patient isolation status and importance of hand washing and implemented gown and gloves prior to entering. All personal agreed and followed instructions.       Problem: Skin Integrity  Goal: Risk for impaired skin integrity will decrease    Intervention: Implement precautions to protect skin integrity in collaboration with the interdisciplinary team  Provided patient Q2 turns with pillows for support and positioning. Heel float boots in place. Applied Biatain on patient facial cheeks to prevent skin breakdown from oxygen tubing. Applied Biatain near patient guerra to prevent skin breakdown from tubing. Will continue to assess.

## 2017-10-05 NOTE — CARE PLAN
Problem: Infection  Goal: Will remain free from infection  Isolation precautions followed, hand washing and gown used with each patient contact, following orders for antibiotic administration, assessed for signs of new/worsening infection.     Problem: Skin Integrity  Goal: Risk for impaired skin integrity will decrease  Left leg cellulitis assessed, dressing changed, patient sheets changed with each bowel movement, patient clean and dry. Q 2 hour turns.

## 2017-10-05 NOTE — PROGRESS NOTES
[facility] Hospitalist Progress Note    Date of Service: 10/5/2017    Chief Complaint  72 y.o. male admitted 2017 with L leg cellulitis and sepsis/shock.    transfered to CICU on  with hypotension/shock and episodes of SVT to 160's later complicated by severe alcohol withdrawal.     Interval Problem Update  Still a/o 2-3 but confused and restrained  Remains in afib  Bp low and still on pressors for hypotension  Bm today  Tf at goal  2.4Lout via guerra  Right IJ 3x lumen and guerra intact  On 3L nc        Consultants/Specialty  Pulmonology    Disposition  Icu  Critical care time with active titration of meds for hypotension 33min        Review of Systems   Unable to perform ROS: Mental status change      Physical Exam  Laboratory/Imaging   Hemodynamics  Temp (24hrs), Av.5 °C (99.5 °F), Min:37 °C (98.6 °F), Max:37.7 °C (99.9 °F)   Temperature: 37.6 °C (99.7 °F), Monitored Temp: 37.7 °C (99.9 °F)  Pulse  Av.5  Min: 60  Max: 188 Heart Rate (Monitored): 80  Blood Pressure : 117/68, NIBP: 101/50 CVP (mm Hg): (!) 11 MM HG    Respiratory      Respiration: (!) 29, Pulse Oximetry: 99 %        RUL Breath Sounds: Crackles, RML Breath Sounds: Crackles, RLL Breath Sounds: Diminished, MONICA Breath Sounds: Crackles, LLL Breath Sounds: Diminished    Fluids    Intake/Output Summary (Last 24 hours) at 10/05/17 0832  Last data filed at 10/05/17 0800   Gross per 24 hour   Intake          2803.39 ml   Output             3295 ml   Net          -491.61 ml       Nutrition  No orders of the defined types were placed in this encounter.    Physical Exam   Constitutional: He appears well-developed and well-nourished. He appears lethargic. No distress.   HENT:   Right Ear: External ear normal.   Left Ear: External ear normal.   Nose: Nose normal.   Eyes: Right eye exhibits no discharge. Left eye exhibits no discharge. No scleral icterus.   Neck: No JVD present. No tracheal deviation present.   Cardiovascular: Normal rate, normal heart  sounds and intact distal pulses.  Exam reveals no gallop.    No murmur heard.  Pulmonary/Chest: Effort normal. No respiratory distress. He has no wheezes. He has rales.   Abdominal: Soft. Bowel sounds are normal. He exhibits no distension. There is no tenderness. There is no rebound and no guarding.   Musculoskeletal: He exhibits edema. He exhibits no tenderness.   Left leg erythema appears to be improving but extensive up to thigh and medial malleolar wound. Right medial knee erythema and wound.    Neurological: He appears lethargic. He is disoriented.   Skin: Skin is warm and dry. No rash noted. He is not diaphoretic. No erythema.   Psychiatric: His affect is labile. His speech is delayed and slurred. He is agitated and slowed. He expresses impulsivity.   Nursing note and vitals reviewed.      Recent Labs      10/03/17   0350  10/04/17   0510  10/05/17   0430   WBC  22.9*  15.7*  17.0*   RBC  2.54*  2.35*  2.34*   HEMOGLOBIN  9.3*  8.5*  8.3*   HEMATOCRIT  26.5*  24.8*  24.6*   MCV  104.3*  105.5*  105.1*   MCH  36.6*  36.2*  35.5*   MCHC  35.1  34.3  33.7   RDW  52.6*  54.0*  55.4*   PLATELETCT  105*  104*  100*   MPV  9.2  9.7  9.9     Recent Labs      10/03/17   0350  10/04/17   0510  10/05/17   0430   SODIUM  136  136  135   POTASSIUM  4.0  4.2  5.0   CHLORIDE  109  106  102   CO2  19*  24  27   GLUCOSE  95  120*  114*   BUN  17  17  20   CREATININE  0.97  1.01  1.06   CALCIUM  8.0*  8.8  8.8                      Assessment/Plan     Cirrhosis (CMS-[facility])- (present on admission)   Assessment & Plan    Chronic, stable with no e/o decompensation  Continue spironolactone  Add lasix        Alcohol withdrawal (CMS-[facility])- (present on admission)   Assessment & Plan    Ongoing Phenobarb protocol.         Severe sepsis (CMS-[facility])- (present on admission)   Assessment & Plan    This is sepsis (without associated acute organ dysfunction).   2/2 left leg cellulitis and concern for possible deeper wound  Cultures positive for  MRSA   Iv fluids as tolerated  Continue to Titrate pressors as tolerated for hypotension.   Iv abx per ID        Hyponatremia   Assessment & Plan    Resolved.         Acute respiratory failure (CMS-HCC)   Assessment & Plan    Worsening edema noted on cxr. Add lasix. Echo looks ok. Recurrent aspiration a concern. High risk remains for intubation. On 3L. pulm toilet and rt protocol.         Cellulitis- (present on admission)   Assessment & Plan    Treat as noted above        CLL (chronic lymphocytic leukemia) (CMS-HCC)- (present on admission)   Assessment & Plan    Followed by Dr. Flores outpatient, chronically elevated WBC count.        Continuous opioid dependence (CMS-HCC)- (present on admission)   Assessment & Plan    Sedated some. Decrease opiates to 5 q6 and trend. Minimize doses as possible.             Reviewed items::  EKG reviewed, Radiology images reviewed, Labs reviewed and Medications reviewed  DVT prophylaxis pharmacological::  Heparin  DVT prophylaxis - mechanical:  SCDs  Ulcer Prophylaxis::  Not indicated  Antibiotics:  Treating active infection/contamination beyond 24 hours perioperative coverage

## 2017-10-05 NOTE — PROGRESS NOTES
Pulmonary Critical Care Progress Note      Date of service:  10/5/2017    Interval Events:  24 hour interval history reviewed  Reason for visit:  Severe sepsis with shock, AF with RVR, hypotension, ETOH withdrawal delirium  Unable to provide CC or ROS due to medical condition       - Raj 15   - TF at 60 - goal   - AF   - confused      PFSH:  No change.    Respiratory:     Pulse Oximetry: 98 %  CXR personally reviewed  CXR with improved, but persistent edema  Few coarse crackles bilaterally    HemoDynamics:  Pulse: 82, Heart Rate (Monitored): 81  Blood Pressure : 117/68, NIBP: 108/53  CVP (mm Hg): (!) 11 MM HG  AF  Phenylephrine 15    Neuro:  Arouses, remains confused  Moves all 4    Fluids:  Intake/Output       10/03/17 0700 - 10/04/17 0659 10/04/17 0700 - 10/05/17 0659 10/05/17 0700 - 10/06/17 0659      0155-9530 7300-4390 Total 9186-1487 1177-4809 Total 6761-7211 8959-1717 Total       Intake    P.O.  --  -- --  0  -- 0  --  -- --    P.O. -- -- -- 0 -- 0 -- -- --    I.V.  580.9  211.1 792  815.2  189.6 1004.8  --  -- --    Phenylephrine Volume 110.9 91.1 202 75.2 69.6 144.8 -- -- --    IV Piggyback Volume 350 -- 350 660 -- 660 -- -- --    IV Volume (NS) 120 120 240 80 120 200 -- -- --    Other  --  -- --  --  60 60  --  -- --    Medications (P.O./ Enteral Liquids) -- -- -- -- 60 60 -- -- --    Enteral  660  720 1380  1070  780 1850  --  -- --    Enteral Volume   -- -- --    Free Water / Tube Flush -- -- -- 350 60 410 -- -- --    Total Intake 1240.9 931.1 2172 1885.2 1029.6 2914.8 -- -- --       Output    Urine  2740  875 3615  1100  2280 3380  --  -- --    Number of Times Voided 1 x -- 1 x -- -- -- -- -- --    Indwelling Cathether 2740 875 3615 1100 2280 3380 -- -- --    Stool  --  -- --  --  -- --  --  -- --    Number of Times Stooled -- 1 x 1 x 6 x 5 x 11 x -- -- --    Total Output 2740 875 3615 1100 2280 3380 -- -- --       Net I/O     -1499.1 56.1 -1443 785.2 -1250.4 -465.2 -- -- --            Recent Labs      10/03/17   0350  10/04/17   0510  10/05/17   0430   SODIUM  136  136  135   POTASSIUM  4.0  4.2  5.0   CHLORIDE  109  106  102   CO2  19*  24  27   BUN  17  17  20   CREATININE  0.97  1.01  1.06   MAGNESIUM  2.3  2.1  1.9   PHOSPHORUS   --   2.2*  2.6   CALCIUM  8.0*  8.8  8.8       GI/Nutrition:  Abd slightly distended,  Non-tender.  Tolerating enteral TF    Liver Function  Recent Labs      10/03/17   0350  10/04/17   0510  10/05/17   0430   ALTSGPT  8  6  6   ASTSGOT  18  15  21   ALKPHOSPHAT  120*  103*  99   TBILIRUBIN  0.6  0.7  0.9   GLUCOSE  95  120*  114*       Heme:  Recent Labs      10/03/17   0350  10/04/17   0510  10/05/17   0430   RBC  2.54*  2.35*  2.34*   HEMOGLOBIN  9.3*  8.5*  8.3*   HEMATOCRIT  26.5*  24.8*  24.6*   PLATELETCT  105*  104*  100*       Infectious Disease:  Monitored Temp  Av.4 °C (99.3 °F)  Min: 37.1 °C (98.8 °F)  Max: 37.7 °C (99.9 °F)  Temp  Av.5 °C (99.5 °F)  Min: 37 °C (98.6 °F)  Max: 37.7 °C (99.9 °F)    Recent Labs      10/03/17   0350  10/04/17   0510  10/05/17   0430   WBC  22.9*  15.7*  17.0*   NEUTSPOLYS  44.30  36.60*   --    LYMPHOCYTES  54.80*  63.40*   --    MONOCYTES  0.00  0.00   --    EOSINOPHILS  0.00  0.00   --    BASOPHILS  0.00  0.00   --    ASTSGOT  18  15  21   ALTSGPT  8  6  6   ALKPHOSPHAT  120*  103*  99   TBILIRUBIN  0.6  0.7  0.9     Current Facility-Administered Medications   Medication Dose Frequency Provider Last Rate Last Dose   • furosemide (LASIX) injection 40 mg  40 mg Q12HRS Rohit Albarado M.D.   40 mg at 10/04/17 2010   • potassium bicarbonate (KLYTE) 25 MEQ effervescent tablet TBEF 50 mEq  50 mEq BID Rohit Albarado M.D.   50 mEq at 10/04/17 2009   • haloperidol lactate (HALDOL) injection 1-3 mg  1-3 mg Q3HRS PRN Rohit Blackwell M.D.   2 mg at 10/03/17 1221   • enoxaparin (LOVENOX) inj 40 mg  40 mg DAILY Rohit Albarado M.D.   40 mg at 10/04/17 0927   • phenylephrine (JEWEL-SYNEPHRINE) 40,000  mcg in  mL Infusion  0-300 mcg/min Continuous Rohit Albarado M.D. 5.6 mL/hr at 10/05/17 0106 15 mcg/min at 10/05/17 0106   • digoxin (LANOXIN) tablet 125 mcg  125 mcg DAILY AT 1800 Mode Mireles D.O.   125 mcg at 10/04/17 1826   • clindamycin (CLEOCIN) IVPB premix 900 mg  900 mg Q8HRS XIANG JimenezO. 50 mL/hr at 10/05/17 0533 900 mg at 10/05/17 0533   • MD ALERT...Adult ICU Electrolyte Replacement per Pharmacy Protocol   PRN Zain Kelly Jr. D.O.       • ceftaroline (TEFLARO) 600 mg in  mL IVPB  600 mg Q12HRS Ana Benavidez M.D.   Stopped at 10/04/17 2126   • MD ALERT...Phenobarbital Alcohol Withdrawal Protocol Pharmacist to Implement (ICU Only)   PRN Zain Kelly Jr., D.O.       • Pharmacy Consult Request 1 Each  1 Each PRN Zain Kelly Jr., D.O.        And   • PHENObarbital injection 130 mg  130 mg Q30 MIN PRN Zain Kelly Jr. D.O.   130 mg at 10/03/17 1738    And   • PHENObarbital injection 260 mg  260 mg Q30 MIN PRN Zain Kelly Jr., D.O.   260 mg at 10/01/17 1702   • folic acid (FOLVITE) tablet 1 mg  1 mg DAILY Brit Chavez A.P.R.N.   1 mg at 10/04/17 0928   • therapeutic multivitamin-minerals (THERAGRAN-M) tablet 1 Tab  1 Tab DAILY Brit Chavez A.P.R.N.   1 Tab at 10/04/17 1233   • thiamine (THIAMINE) tablet 100 mg  100 mg DAILY Brit Chavez A.P.R.N.   100 mg at 10/04/17 1234   • NS infusion   Continuous Rohit Albarado M.D. 10 mL/hr at 10/04/17 1528     • duloxetine (CYMBALTA) capsule 60 mg  60 mg DAILY Evelyn Blackwell M.D.   60 mg at 10/04/17 0927   • levothyroxine (SYNTHROID) tablet 25 mcg  25 mcg AM ES Evelyn Blackwell M.D.   25 mcg at 10/05/17 0533   • trazodone (DESYREL) tablet 100 mg  100 mg Nightly Evelyn Blackwell M.D.   100 mg at 10/04/17 2009   • senna-docusate (PERICOLACE or SENOKOT S) 8.6-50 MG per tablet 2 Tab  2 Tab BID Evelyn Blackwell M.D.   Stopped at 10/04/17 0900    And   • polyethylene glycol/lytes (MIRALAX) PACKET 1 Packet   1 Packet QDAY PRCHUY Blackwell M.D.   1 Packet at 10/03/17 0821    And   • magnesium hydroxide (MILK OF MAGNESIA) suspension 30 mL  30 mL QDAY PRCHUY Blackwell M.D.   30 mL at 09/30/17 1039    And   • bisacodyl (DULCOLAX) suppository 10 mg  10 mg QDAY PRCHUY Blackwell M.D.   10 mg at 10/03/17 1518   • ondansetron (ZOFRAN) syringe/vial injection 4 mg  4 mg Q4HRS PRCHUY Blackwell M.D.   4 mg at 10/01/17 0900   • ondansetron (ZOFRAN ODT) dispertab 4 mg  4 mg Q4HRS PRCHUY Blackwell M.D.       • Pharmacy Consult Request ...Pain Management Review   JOSHUA Blackwell M.D.        And   • oxycodone immediate-release (ROXICODONE) tablet 5 mg  5 mg Q3HRS PRCHUY Blackwell M.D.   5 mg at 10/05/17 0616    And   • oxycodone immediate-release (ROXICODONE) tablet 10 mg  10 mg Q3HRS PRCHUY Blackwell M.D.   10 mg at 10/04/17 0926    And   • HYDROmorphone (DILAUDID) injection 0.5 mg  0.5 mg Q3HRS PRCHUY Blackwell M.D.   0.5 mg at 10/02/17 2005     Last reviewed on 9/29/2017  2:17 AM by Kelsi Layton RMATTHEW    Quality  Measures:  Labs reviewed, Medications reviewed and Radiology images reviewed                        Assessment and Plan:    Acute hypoxemic respiratory failure   - cont oxygen  Acute pulmonary edema   - force diuresis as tolerated   - stop Lasix  Severe sepsis with shock - skin and soft tissue source   - cont vasopressor support as necessary  LE cellulitis   - MRSA and Strep species   - cont ceftaroline and clindamycin per ID   - wound care  AF - improved rate control   - cont digoxin   - optimize K and Mg  Alcohol withdrawal delirium   - phenobarbital protocol  ETOH abuse   - vitamins  Cirrhosis  CLL    Critically ill with unstable cardiovascular status.  Active titration of vasopressor.    High risk of deterioration and worsening vital organ dysfunction and death without critical care interventions.    Critical Care Time:  32 minutes  [id number]  No time overlap  Time excludes procedures  Discussed  with RN, RT, Team

## 2017-10-05 NOTE — PROGRESS NOTES
Late entry:    Assumed care of patient at 1900. Patient in bed. Attached to monitoring devices. Drips verified. Bed alarm on. Call light within reach.

## 2017-10-05 NOTE — PROGRESS NOTES
Chart check done.    Pt now having diarrhea; c.diff sent d/t risk factors being present.  Continues to be disoriented and agitated.  Good pain management with oxycodone.

## 2017-10-06 ENCOUNTER — APPOINTMENT (OUTPATIENT)
Dept: RADIOLOGY | Facility: MEDICAL CENTER | Age: 72
DRG: 871 | End: 2017-10-06
Attending: INTERNAL MEDICINE
Payer: COMMERCIAL

## 2017-10-06 LAB
ALBUMIN SERPL BCP-MCNC: 2.7 G/DL (ref 3.2–4.9)
ALBUMIN/GLOB SERPL: 0.9 G/DL
ALP SERPL-CCNC: 104 U/L (ref 30–99)
ALT SERPL-CCNC: 7 U/L (ref 2–50)
ANION GAP SERPL CALC-SCNC: 7 MMOL/L (ref 0–11.9)
ANISOCYTOSIS BLD QL SMEAR: ABNORMAL
AST SERPL-CCNC: 19 U/L (ref 12–45)
BASOPHILS # BLD AUTO: 0 % (ref 0–1.8)
BASOPHILS # BLD: 0 K/UL (ref 0–0.12)
BILIRUB SERPL-MCNC: 0.8 MG/DL (ref 0.1–1.5)
BUN SERPL-MCNC: 18 MG/DL (ref 8–22)
CALCIUM SERPL-MCNC: 8.7 MG/DL (ref 8.5–10.5)
CHLORIDE SERPL-SCNC: 103 MMOL/L (ref 96–112)
CO2 SERPL-SCNC: 25 MMOL/L (ref 20–33)
CREAT SERPL-MCNC: 0.86 MG/DL (ref 0.5–1.4)
EOSINOPHIL # BLD AUTO: 0 K/UL (ref 0–0.51)
EOSINOPHIL NFR BLD: 0 % (ref 0–6.9)
ERYTHROCYTE [DISTWIDTH] IN BLOOD BY AUTOMATED COUNT: 55.7 FL (ref 35.9–50)
GFR SERPL CREATININE-BSD FRML MDRD: >60 ML/MIN/1.73 M 2
GLOBULIN SER CALC-MCNC: 3.1 G/DL (ref 1.9–3.5)
GLUCOSE SERPL-MCNC: 109 MG/DL (ref 65–99)
HCT VFR BLD AUTO: 24.7 % (ref 42–52)
HGB BLD-MCNC: 8.4 G/DL (ref 14–18)
LYMPHOCYTES # BLD AUTO: 9.14 K/UL (ref 1–4.8)
LYMPHOCYTES NFR BLD: 52.2 % (ref 22–41)
MACROCYTES BLD QL SMEAR: ABNORMAL
MAGNESIUM SERPL-MCNC: 2.2 MG/DL (ref 1.5–2.5)
MANUAL DIFF BLD: NORMAL
MCH RBC QN AUTO: 35.6 PG (ref 27–33)
MCHC RBC AUTO-ENTMCNC: 34 G/DL (ref 33.7–35.3)
MCV RBC AUTO: 104.7 FL (ref 81.4–97.8)
METAMYELOCYTES NFR BLD MANUAL: 0.9 %
MONOCYTES # BLD AUTO: 0 K/UL (ref 0–0.85)
MONOCYTES NFR BLD AUTO: 0 % (ref 0–13.4)
MORPHOLOGY BLD-IMP: NORMAL
NEUTROPHILS # BLD AUTO: 8.21 K/UL (ref 1.82–7.42)
NEUTROPHILS NFR BLD: 46.1 % (ref 44–72)
NEUTS BAND NFR BLD MANUAL: 0.8 % (ref 0–10)
NRBC # BLD AUTO: 0 K/UL
NRBC BLD AUTO-RTO: 0 /100 WBC
OVALOCYTES BLD QL SMEAR: NORMAL
PHOSPHATE SERPL-MCNC: 2.7 MG/DL (ref 2.5–4.5)
PLATELET # BLD AUTO: 102 K/UL (ref 164–446)
PLATELET BLD QL SMEAR: NORMAL
PMV BLD AUTO: 9.7 FL (ref 9–12.9)
POIKILOCYTOSIS BLD QL SMEAR: NORMAL
POTASSIUM SERPL-SCNC: 4.4 MMOL/L (ref 3.6–5.5)
PROT SERPL-MCNC: 5.8 G/DL (ref 6–8.2)
RBC # BLD AUTO: 2.36 M/UL (ref 4.7–6.1)
RBC BLD AUTO: PRESENT
SMUDGE CELLS BLD QL SMEAR: NORMAL
SODIUM SERPL-SCNC: 135 MMOL/L (ref 135–145)
TOXIC GRANULES BLD QL SMEAR: SLIGHT
WBC # BLD AUTO: 17.5 K/UL (ref 4.8–10.8)

## 2017-10-06 PROCEDURE — 700102 HCHG RX REV CODE 250 W/ 637 OVERRIDE(OP): Performed by: HOSPITALIST

## 2017-10-06 PROCEDURE — 99291 CRITICAL CARE FIRST HOUR: CPT | Performed by: HOSPITALIST

## 2017-10-06 PROCEDURE — 700111 HCHG RX REV CODE 636 W/ 250 OVERRIDE (IP): Performed by: INTERNAL MEDICINE

## 2017-10-06 PROCEDURE — 700105 HCHG RX REV CODE 258: Performed by: INTERNAL MEDICINE

## 2017-10-06 PROCEDURE — 700102 HCHG RX REV CODE 250 W/ 637 OVERRIDE(OP): Performed by: INTERNAL MEDICINE

## 2017-10-06 PROCEDURE — 83735 ASSAY OF MAGNESIUM: CPT

## 2017-10-06 PROCEDURE — A9270 NON-COVERED ITEM OR SERVICE: HCPCS | Performed by: HOSPITALIST

## 2017-10-06 PROCEDURE — 84100 ASSAY OF PHOSPHORUS: CPT

## 2017-10-06 PROCEDURE — 700101 HCHG RX REV CODE 250: Performed by: HOSPITALIST

## 2017-10-06 PROCEDURE — 80053 COMPREHEN METABOLIC PANEL: CPT

## 2017-10-06 PROCEDURE — P9047 ALBUMIN (HUMAN), 25%, 50ML: HCPCS | Performed by: INTERNAL MEDICINE

## 2017-10-06 PROCEDURE — A9270 NON-COVERED ITEM OR SERVICE: HCPCS | Performed by: INTERNAL MEDICINE

## 2017-10-06 PROCEDURE — 85007 BL SMEAR W/DIFF WBC COUNT: CPT

## 2017-10-06 PROCEDURE — 71010 DX-CHEST-PORTABLE (1 VIEW): CPT

## 2017-10-06 PROCEDURE — 770022 HCHG ROOM/CARE - ICU (200)

## 2017-10-06 PROCEDURE — 306802 CATHETER,INSTAFLOW BOWEL: Performed by: HOSPITALIST

## 2017-10-06 PROCEDURE — 85027 COMPLETE CBC AUTOMATED: CPT

## 2017-10-06 PROCEDURE — 700102 HCHG RX REV CODE 250 W/ 637 OVERRIDE(OP): Performed by: NURSE PRACTITIONER

## 2017-10-06 PROCEDURE — A9270 NON-COVERED ITEM OR SERVICE: HCPCS | Performed by: NURSE PRACTITIONER

## 2017-10-06 RX ORDER — HALOPERIDOL 5 MG/ML
2-5 INJECTION INTRAMUSCULAR EVERY 4 HOURS PRN
Status: DISCONTINUED | OUTPATIENT
Start: 2017-10-06 | End: 2017-10-19 | Stop reason: HOSPADM

## 2017-10-06 RX ORDER — FUROSEMIDE 10 MG/ML
20 INJECTION INTRAMUSCULAR; INTRAVENOUS EVERY 12 HOURS
Status: DISCONTINUED | OUTPATIENT
Start: 2017-10-06 | End: 2017-10-07

## 2017-10-06 RX ORDER — ALBUMIN (HUMAN) 12.5 G/50ML
25 SOLUTION INTRAVENOUS EVERY 6 HOURS
Status: COMPLETED | OUTPATIENT
Start: 2017-10-06 | End: 2017-10-07

## 2017-10-06 RX ADMIN — DULOXETINE HYDROCHLORIDE 60 MG: 60 CAPSULE, DELAYED RELEASE ORAL at 08:01

## 2017-10-06 RX ADMIN — LEVOTHYROXINE SODIUM 25 MCG: 50 TABLET ORAL at 05:43

## 2017-10-06 RX ADMIN — ALBUMIN (HUMAN) 25 G: 0.25 INJECTION, SOLUTION INTRAVENOUS at 18:17

## 2017-10-06 RX ADMIN — CEFTAROLINE FOSAMIL 600 MG: 600 POWDER, FOR SOLUTION INTRAVENOUS at 19:52

## 2017-10-06 RX ADMIN — TRAZODONE HYDROCHLORIDE 100 MG: 50 TABLET ORAL at 19:52

## 2017-10-06 RX ADMIN — THIAMINE HCL TAB 100 MG 100 MG: 100 TAB at 08:01

## 2017-10-06 RX ADMIN — MULTIPLE VITAMINS W/ MINERALS TAB 1 TABLET: TAB at 08:01

## 2017-10-06 RX ADMIN — CLINDAMYCIN IN 5 PERCENT DEXTROSE 900 MG: 18 INJECTION, SOLUTION INTRAVENOUS at 05:43

## 2017-10-06 RX ADMIN — HALOPERIDOL LACTATE 5 MG: 5 INJECTION, SOLUTION INTRAMUSCULAR at 18:31

## 2017-10-06 RX ADMIN — OXYCODONE HYDROCHLORIDE 5 MG: 5 TABLET ORAL at 08:02

## 2017-10-06 RX ADMIN — FUROSEMIDE 20 MG: 10 INJECTION, SOLUTION INTRAMUSCULAR; INTRAVENOUS at 08:02

## 2017-10-06 RX ADMIN — ALBUMIN (HUMAN) 25 G: 0.25 INJECTION, SOLUTION INTRAVENOUS at 08:02

## 2017-10-06 RX ADMIN — ENOXAPARIN SODIUM 40 MG: 100 INJECTION SUBCUTANEOUS at 08:02

## 2017-10-06 RX ADMIN — HALOPERIDOL LACTATE 5 MG: 5 INJECTION, SOLUTION INTRAMUSCULAR at 08:02

## 2017-10-06 RX ADMIN — FUROSEMIDE 20 MG: 10 INJECTION, SOLUTION INTRAMUSCULAR; INTRAVENOUS at 19:53

## 2017-10-06 RX ADMIN — CEFTAROLINE FOSAMIL 600 MG: 600 POWDER, FOR SOLUTION INTRAVENOUS at 09:45

## 2017-10-06 RX ADMIN — FOLIC ACID 1 MG: 1 TABLET ORAL at 08:01

## 2017-10-06 RX ADMIN — POTASSIUM BICARBONATE 25 MEQ: 25 TABLET, EFFERVESCENT ORAL at 19:52

## 2017-10-06 RX ADMIN — POTASSIUM BICARBONATE 25 MEQ: 25 TABLET, EFFERVESCENT ORAL at 09:42

## 2017-10-06 RX ADMIN — CLINDAMYCIN IN 5 PERCENT DEXTROSE 900 MG: 18 INJECTION, SOLUTION INTRAVENOUS at 22:54

## 2017-10-06 RX ADMIN — CLINDAMYCIN IN 5 PERCENT DEXTROSE 900 MG: 18 INJECTION, SOLUTION INTRAVENOUS at 14:35

## 2017-10-06 RX ADMIN — ALBUMIN (HUMAN) 25 G: 0.25 INJECTION, SOLUTION INTRAVENOUS at 12:48

## 2017-10-06 RX ADMIN — OXYCODONE HYDROCHLORIDE 5 MG: 5 TABLET ORAL at 14:49

## 2017-10-06 RX ADMIN — DIGOXIN 125 MCG: 125 TABLET ORAL at 18:17

## 2017-10-06 ASSESSMENT — PAIN SCALES - GENERAL
PAINLEVEL_OUTOF10: 9
PAINLEVEL_OUTOF10: 0
PAINLEVEL_OUTOF10: 0
PAINLEVEL_OUTOF10: 9

## 2017-10-06 NOTE — WOUND TEAM
Wound team note:   Pt seen for placement of BMS. MD order verified. Pt assessed, noted to be incontinent of loose brown stool. Sacrum/buttocks pink and intact. Sphincter tone determined to be adequate. BMS placed without difficulty, 40 mL of tap water placed in retention balloon, irrigated with 120 mL warm tap water. Nursing to irrigate q shift with 60 mL-120 mL warm tap water or until patent.  Minimal diluted blood through tubing when irrigated.  Flushed again and output running clear, RN advised, will continue to monitor.

## 2017-10-06 NOTE — PROGRESS NOTES
Pulmonary Critical Care Progress Note      Date of service:  10/6/2017    Interval Events:  24 hour interval history reviewed  Reason for visit:  Severe sepsis with shock, AF with RVR, hypotension, ETOH withdrawal delirium  Unable to provide CC or ROS due to medical condition       - Raj 10 - titrating   - TF at 60 - goal   - AF   - CXR with increased edema   - confused   - stop phenobarb protocol      PFSH:  No change.    Respiratory:     Pulse Oximetry: 98 %  CXR personally reviewed  CXR with increased pulmonary edema  Fine bibasilar crackles bilaterally    HemoDynamics:  Pulse: (!) 115, Heart Rate (Monitored): 98  NIBP: 101/62  CVP (mm Hg): (!) 10 MM HG  AF  Phenylephrine 10    Neuro:  Arouses, remains confused  Moves all 4    Fluids:  Intake/Output       10/04/17 0700 - 10/05/17 0659 10/05/17 0700 - 10/06/17 0659 10/06/17 0700 - 10/07/17 0659      4051-0795 3416-9383 Total 5036-9561 2575-0648 Total 9325-7229 3609-7232 Total       Intake    P.O.  0  -- 0  --  -- --  --  -- --    P.O. 0 -- 0 -- -- -- -- -- --    I.V.  815.2  189.6 1004.8  337.2  335.8 673  --  -- --    Phenylephrine Volume 75.2 69.6 144.8 67.2 65.8 133 -- -- --    IV Piggyback Volume 660 -- 660 150 150 300 -- -- --    IV Volume (NS) 80 120 200 120 120 240 -- -- --    Other  --  60 60  60  150 210  --  -- --    Medications (P.O./ Enteral Liquids) -- 60 60 60 150 210 -- -- --    Enteral  1070  780 1850  660  900 1560  --  -- --    Enteral Volume   -- -- --    Free Water / Tube Flush 350 60 410 60 240 300 -- -- --    Total Intake 1885.2 1029.6 2914.8 1057.2 1385.8 2443 -- -- --       Output    Urine  1100  2280 3380  950  -- 950  --  -- --    Number of Times Voided -- -- -- -- 1200 x 1200 x -- -- --    Indwelling Cathether 1100 2280 3380 950 -- 950 -- -- --    Stool  --  -- --  --  -- --  --  -- --    Number of Times Stooled 6 x 5 x 11 x 5 x 2 x 7 x -- -- --    Total Output 1100 2280 3380 950 -- 950 -- -- --       Net I/O      785.2 -1250.4 -465.2 107.2 1385.8 1493 -- -- --        Weight: 68.2 kg (150 lb 5.7 oz)  Recent Labs      10/04/17   0510  10/05/17   0430  10/06/17   0440   SODIUM  136  135  135   POTASSIUM  4.2  5.0  4.4   CHLORIDE  106  102  103   CO2  24  27  25   BUN  17  20  18   CREATININE  1.01  1.06  0.86   MAGNESIUM  2.1  1.9  2.2   PHOSPHORUS  2.2*  2.6  2.7   CALCIUM  8.8  8.8  8.7       GI/Nutrition:  Abd slightly distended,  Non-tender.  Tolerating enteral TF    Liver Function  Recent Labs      10/04/17   0510  10/05/17   0430  10/06/17   0440   ALTSGPT  6  6  7   ASTSGOT  15  21  19   ALKPHOSPHAT  103*  99  104*   TBILIRUBIN  0.7  0.9  0.8   GLUCOSE  120*  114*  109*       Heme:  Recent Labs      10/04/17   0510  10/05/17   0430  10/06/17   0440   RBC  2.35*  2.34*  2.36*   HEMOGLOBIN  8.5*  8.3*  8.4*   HEMATOCRIT  24.8*  24.6*  24.7*   PLATELETCT  104*  100*  102*       Infectious Disease:  Temp  Av.2 °C (98.9 °F)  Min: 36.8 °C (98.2 °F)  Max: 37.6 °C (99.7 °F)    Recent Labs      10/04/17   0510  10/05/17   0430  10/06/17   0440   WBC  15.7*  17.0*  17.5*   NEUTSPOLYS  36.60*  28.00*   --    LYMPHOCYTES  63.40*  71.00*   --    MONOCYTES  0.00  0.00   --    EOSINOPHILS  0.00  0.00   --    BASOPHILS  0.00  0.00   --    ASTSGOT  15  21  19   ALTSGPT  6  6  7   ALKPHOSPHAT  103*  99  104*   TBILIRUBIN  0.7  0.9  0.8     Current Facility-Administered Medications   Medication Dose Frequency Provider Last Rate Last Dose   • oxycodone immediate-release (ROXICODONE) tablet 5 mg  5 mg Q6HRS PRN Rohit Blackwell M.D.   5 mg at 10/05/17 2046   • enoxaparin (LOVENOX) inj 40 mg  40 mg DAILY Rohit Albarado M.D.   40 mg at 10/05/17 0834   • phenylephrine (JEWEL-SYNEPHRINE) 40,000 mcg in  mL Infusion  0-300 mcg/min Continuous Rohit Albarado M.D.   Stopped at 10/06/17 0630   • digoxin (LANOXIN) tablet 125 mcg  125 mcg DAILY AT 1800 Mode Mireles D.O.   125 mcg at 10/05/17 1800   • clindamycin  (CLEOCIN) IVPB premix 900 mg  900 mg Q8HRS Mode Mireles D.O. 50 mL/hr at 10/06/17 0543 900 mg at 10/06/17 0543   • MD ALERT...Adult ICU Electrolyte Replacement per Pharmacy Protocol   PRN LIBAN Dale Jr..O.       • ceftaroline (TEFLARO) 600 mg in  mL IVPB  600 mg Q12HRS Ana Benavidez M.D.   Stopped at 10/05/17 2146   • MD ALERT...Phenobarbital Alcohol Withdrawal Protocol Pharmacist to Implement (ICU Only)   PRN LIBAN Dale Jr..O.       • Pharmacy Consult Request 1 Each  1 Each PRN LIBAN Dale Jr..ZARINA.        And   • PHENObarbital injection 130 mg  130 mg Q30 MIN PRN LIBAN Dale Jr..O.   130 mg at 10/03/17 1738    And   • PHENObarbital injection 260 mg  260 mg Q30 MIN PRN LIBAN Dale Jr..O.   260 mg at 10/01/17 1702   • folic acid (FOLVITE) tablet 1 mg  1 mg DAILY Brit Chavez, A.P.R.N.   1 mg at 10/05/17 0833   • therapeutic multivitamin-minerals (THERAGRAN-M) tablet 1 Tab  1 Tab DAILY Brit Chavez, A.P.R.N.   1 Tab at 10/05/17 0833   • thiamine (THIAMINE) tablet 100 mg  100 mg DAILY Brit Chavez A.P.R.N.   100 mg at 10/05/17 0833   • NS infusion   Continuous Rohit Albarado M.D. 10 mL/hr at 10/05/17 1402     • duloxetine (CYMBALTA) capsule 60 mg  60 mg DAILY Evelyn Blackwell M.D.   60 mg at 10/05/17 0833   • levothyroxine (SYNTHROID) tablet 25 mcg  25 mcg AM ES Evelyn Blackwell M.D.   25 mcg at 10/06/17 0543   • trazodone (DESYREL) tablet 100 mg  100 mg Nightly Evelyn Blackwell M.D.   100 mg at 10/05/17 2046   • ondansetron (ZOFRAN) syringe/vial injection 4 mg  4 mg Q4HRS PRN Evelyn Blackwell M.D.   4 mg at 10/01/17 0900   • ondansetron (ZOFRAN ODT) dispertab 4 mg  4 mg Q4HRS PRN Evelyn Blackwell M.D.         Last reviewed on 9/29/2017  2:17 AM by Kelsi Layton, RColinNColin    Quality  Measures:  Labs reviewed, Medications reviewed and Radiology images reviewed                        Assessment and Plan:    Acute hypoxemic respiratory failure   - cont  oxygen  Acute pulmonary edema   - force diuresis as tolerated   - will use Lasix with albumin  Severe sepsis with shock - skin and soft tissue source   - cont vasopressor support as necessary  LE cellulitis   - MRSA and Strep species   - cont ceftaroline and clindamycin per ID   - wound care  AF - improved rate control   - cont digoxin   - optimize K and Mg  Alcohol withdrawal delirium   - stop phenobarbital protocol   - Haldol as necessary  ETOH abuse   - vitamins  Cirrhosis  CLL    Critically ill with unstable cardiovascular status.  Active titration of vasopressor.  Worsening acute pulmonary edema.  Will resume forced diuresis with albumin.    High risk of deterioration and worsening vital organ dysfunction and death without critical care interventions.    Critical Care Time:  35 minutes  06016  No time overlap  Time excludes procedures  Discussed with RN, RT, Team

## 2017-10-06 NOTE — PROGRESS NOTES
Patient's abdomen distended and semi-firm and complaining of pain. No residuals from NG tube, bowel sounds present. MD at bedside stated to continue to monitor.

## 2017-10-06 NOTE — PROGRESS NOTES
[facility] Hospitalist Progress Note    Date of Service: 10/6/2017    Chief Complaint  72 y.o. male admitted 2017 with L leg cellulitis and sepsis/shock.    transfered to CICU on  with hypotension/shock and episodes of SVT  later complicated by severe alcohol withdrawal.     Interval Problem Update  Orientated to person  Hallucinating that there is a doctor in the room all the time   In aflutter with rvr  tmax 37.3  Bm today x2 loose  1200 uop via guerra   righ tij intact  No rt issues  abx per ID ongoing  Still on pressors for hypotension    Lasix/albumin again today  Prn haldol          Consultants/Specialty  Pulmonology    Disposition  Icu  Critical care time with active titration of meds for hypotension 33min        Review of Systems   Unable to perform ROS: Mental status change      Physical Exam  Laboratory/Imaging   Hemodynamics  Temp (24hrs), Av.1 °C (98.8 °F), Min:36.8 °C (98.2 °F), Max:37.4 °C (99.3 °F)   Temperature: 37.4 °C (99.3 °F)  Pulse  Av  Min: 60  Max: 188 Heart Rate (Monitored): 98  NIBP: 101/62 CVP (mm Hg): (!) 10 MM HG    Respiratory      Respiration: (!) 26, Pulse Oximetry: 98 %        RUL Breath Sounds: Clear;Expiratory Wheezes, RML Breath Sounds: Clear, RLL Breath Sounds: Diminished, MONICA Breath Sounds: Clear;Expiratory Wheezes, LLL Breath Sounds: Diminished    Fluids    Intake/Output Summary (Last 24 hours) at 10/06/17 0853  Last data filed at 10/06/17 0600   Gross per 24 hour   Intake          2171.82 ml   Output              725 ml   Net          1446.82 ml       Nutrition  No orders of the defined types were placed in this encounter.    Physical Exam   Constitutional: He appears well-developed and well-nourished. He appears lethargic. No distress.   HENT:   Right Ear: External ear normal.   Left Ear: External ear normal.   Nose: Nose normal.   Eyes: Right eye exhibits no discharge. Left eye exhibits no discharge. No scleral icterus.   Neck: No JVD present. No tracheal  deviation present.   Cardiovascular: Normal rate, normal heart sounds and intact distal pulses.  Exam reveals no gallop.    No murmur heard.  Pulmonary/Chest: Effort normal. No respiratory distress. He has no wheezes. He has rales.   Abdominal: Soft. Bowel sounds are normal. He exhibits no distension. There is no tenderness. There is no rebound and no guarding.   Musculoskeletal: He exhibits edema. He exhibits no tenderness.   Left leg erythema appears to be improving but extensive up to thigh and medial malleolar wound. Right medial knee erythema and wound.    Neurological: He appears lethargic. He is disoriented.   Skin: Skin is warm and dry. No rash noted. He is not diaphoretic. No erythema.   Psychiatric: His affect is labile. His speech is delayed and slurred. He is agitated and slowed. He expresses impulsivity.   Nursing note and vitals reviewed.      Recent Labs      10/04/17   0510  10/05/17   0430  10/06/17   0440   WBC  15.7*  17.0*  17.5*   RBC  2.35*  2.34*  2.36*   HEMOGLOBIN  8.5*  8.3*  8.4*   HEMATOCRIT  24.8*  24.6*  24.7*   MCV  105.5*  105.1*  104.7*   MCH  36.2*  35.5*  35.6*   MCHC  34.3  33.7  34.0   RDW  54.0*  55.4*  55.7*   PLATELETCT  104*  100*  102*   MPV  9.7  9.9  9.7     Recent Labs      10/04/17   0510  10/05/17   0430  10/06/17   0440   SODIUM  136  135  135   POTASSIUM  4.2  5.0  4.4   CHLORIDE  106  102  103   CO2  24  27  25   GLUCOSE  120*  114*  109*   BUN  17  20  18   CREATININE  1.01  1.06  0.86   CALCIUM  8.8  8.8  8.7                      Assessment/Plan     Cirrhosis (CMS-HCC)- (present on admission)   Assessment & Plan    Chronic, stable with no e/o decompensation  Continue spironolactone  lasix        Alcohol withdrawal (CMS-HCC)- (present on admission)   Assessment & Plan    Ongoing Phenobarb protocol.         Severe sepsis (CMS-[facility])- (present on admission)   Assessment & Plan    This is sepsis (without associated acute organ dysfunction).   2/2 left leg cellulitis    Cultures positive for MRSA   Iv fluids as tolerated  Iv abx per ID        Hyponatremia   Assessment & Plan    Resolved.         Acute respiratory failure (CMS-HCC)   Assessment & Plan    Improving. Continue to         Cellulitis- (present on admission)   Assessment & Plan    Treat as noted above        CLL (chronic lymphocytic leukemia) (CMS-HCC)- (present on admission)   Assessment & Plan    Followed by Dr. Flores outpatient, chronically elevated WBC count.        Continuous opioid dependence (CMS-HCC)- (present on admission)   Assessment & Plan    Decreased dose.             Reviewed items::  EKG reviewed, Radiology images reviewed, Labs reviewed and Medications reviewed  DVT prophylaxis pharmacological::  Heparin  DVT prophylaxis - mechanical:  SCDs  Ulcer Prophylaxis::  Not indicated  Antibiotics:  Treating active infection/contamination beyond 24 hours perioperative coverage

## 2017-10-06 NOTE — PROGRESS NOTES
Infectious Disease Progress Note    Author: Oma Hancock M.D. Date & Time of service: 10/6/2017  4:55 PM    Chief Complaint:  FU sepsis/LLE cellulitis    Interval History:  10/2 AF, WBC 27.5, confused and in pain  10/3 AF, WBC 22.9, remains confused and mumbling  10/4 AF, WBC 15.7, pt obtunded, no overnight events per RN  10/5 AF, WBC 17, mumbling, does not engage in questioning or open eyes, grimaces when RLE examined, remains on pressors, lots of diarrhea, stool softeners held  10/6 AF WBC 17.5 abd more distended BMS placed +blood  Labs Reviewed, Medications Reviewed, Radiology Reviewed and Wound Reviewed.    Review of Systems:  Review of Systems   Unable to perform ROS: Mental acuity       Hemodynamics:  Temp (24hrs), Av.1 °C (98.8 °F), Min:36.8 °C (98.2 °F), Max:37.4 °C (99.3 °F)  Temperature: 36.9 °C (98.4 °F)  Pulse  Av.8  Min: 60  Max: 188Heart Rate (Monitored): (!) 116  NIBP: 108/74  CVP (mm Hg): 5 MM HG    Physical Exam:  Physical Exam   Constitutional: He appears well-developed.   Thin  Older than stated age  Chronically ill   HENT:   Head: Normocephalic and atraumatic.   Eyes: Pupils are equal, round, and reactive to light.   Neck: Neck supple.   R IJ catheter   Cardiovascular: Normal rate.    Pulmonary/Chest: Effort normal and breath sounds normal.   Abdominal: Soft. He exhibits distension. There is no tenderness. There is no rebound and no guarding.   Musculoskeletal: He exhibits edema and tenderness.   Erythema and edema of the LLE radiating to the left hip and medial thigh, improving significantly. Skin peeling    Left medial malleolar wound.     There is a eschar on the medial right knee with surrounding erythema   Neurological:   obtunded   Skin: There is erythema.   Resolving   Nursing note and vitals reviewed.      Meds:    Current Facility-Administered Medications:   •  albumin human 25%  •  furosemide  •  potassium bicarbonate  •  haloperidol lactate  •  oxycodone  immediate-release  •  enoxaparin (LOVENOX) injection  •  Phenylephrine infusion  •  digoxin  •  clindamycin (CLEOCIN) IV  •  MD ALERT...Adult ICU Electrolyte Replacement per Pharmacy Protocol  •  ceftaroline (TEFLARO) ivpb  •  folic acid  •  therapeutic multivitamin-minerals  •  thiamine  •  NS  •  duloxetine  •  levothyroxine  •  trazodone  •  ondansetron  •  ondansetron    Labs:  Recent Labs      10/04/17   0510  10/05/17   0430  10/06/17   0440   WBC  15.7*  17.0*  17.5*   RBC  2.35*  2.34*  2.36*   HEMOGLOBIN  8.5*  8.3*  8.4*   HEMATOCRIT  24.8*  24.6*  24.7*   MCV  105.5*  105.1*  104.7*   MCH  36.2*  35.5*  35.6*   RDW  54.0*  55.4*  55.7*   PLATELETCT  104*  100*  102*   MPV  9.7  9.9  9.7   NEUTSPOLYS  36.60*  28.00*  46.10   LYMPHOCYTES  63.40*  71.00*  52.20*   MONOCYTES  0.00  0.00  0.00   EOSINOPHILS  0.00  0.00  0.00   BASOPHILS  0.00  0.00  0.00   RBCMORPHOLO  Present  Present  Present     Recent Labs      10/04/17   0510  10/05/17   0430  10/06/17   0440   SODIUM  136  135  135   POTASSIUM  4.2  5.0  4.4   CHLORIDE  106  102  103   CO2  24  27  25   GLUCOSE  120*  114*  109*   BUN  17  20  18     Recent Labs      10/04/17   0510  10/05/17   0430  10/06/17   0440   ALBUMIN  3.0*  3.1*  2.7*   TBILIRUBIN  0.7  0.9  0.8   ALKPHOSPHAT  103*  99  104*   TOTPROTEIN  5.8*  6.0  5.8*   ALTSGPT  6  6  7   ASTSGOT  15  21  19   CREATININE  1.01  1.06  0.86       Imaging:  Ct-abdomen-pelvis With    Result Date: 10/1/2017  10/1/2017 8:07 AM HISTORY/REASON FOR EXAM:  Diffuse abdominal pain. History of sepsis. Left lower extremity cellulitis. TECHNIQUE/EXAM DESCRIPTION: CT scan of the abdomen and pelvis with contrast. Contrast-enhanced helical scanning was obtained from the diaphragmatic domes through the pubic symphysis following the bolus administration of 100 mL of Omnipaque 350 nonionic contrast without complication. Low dose optimization technique was utilized for this CT exam including automated exposure  control and adjustment of the mA and/or kV according to patient size. COMPARISON: CT 6/5/2015 FINDINGS: The visualized lung bases demonstrate small amounts of dependent pleural effusion with bibasilar dependent airspace disease, most likely atelectasis. There is no acute bony process. There is degenerative change in the lower lumbar spine and pelvis. CT Abdomen: Liver is unchanged in appearance with slight nodularity. There are no definite focal masses. Spleen is mildly enlarged measuring 13.1 cm in diameter without change. Hepatic veins are somewhat attenuated. Portal veins, splenic vein and SMV are patent. Pancreas is somewhat atrophic but otherwise unremarkable. The gallbladder demonstrates some density dependently which could be related to tumefactive sludge or noncalcified stones. There is no biliary dilatation. The adrenal glands are normal in size. Kidneys demonstrate no hydronephrosis. There are probable small cortical cysts. The abdominal aorta is normal in caliber. There is atherosclerosis of the aorta. There is no lymphadenopathy. The bowel demonstrates no evidence of bowel obstruction. There is moderate stool throughout the colon. There is a hiatal hernia. There is a small amount of fluid around the liver although this is decreased over the prior study. There is minimal fluid in both paracolic gutters. There is diffuse subcutaneous edema. CT Pelvis: There is no acute inflammatory process in the pelvis. There is free fluid in the pelvis with diffuse subcutaneous edema. There are mildly prominent lymph nodes in the inguinal regions, left greater than right with some nodes in the external iliac region as well more so on the left. The largest inguinal node is 1.5 cm short axis on the left side.. There is postoperative change involving the colon. Bladder is decompressed with a Maradiaga catheter.     1.  There is mild colonic distention with large amount of stool throughout the colon. There is no bowel  obstruction. 2.  There is a hiatal hernia. 3.  Changes of the liver with mild splenomegaly and a small amount of ascites in the abdomen and pelvis suggestive of underlying hepatocellular disease. 4.  Dependent density in the gallbladder which could be tumefactive sludge or noncalcified stones. 5.  There are small dependent bilateral pleural effusions with dependent airspace disease, likely atelectasis. 6.  There is mild lymphadenopathy in the pelvis more so on the left likely inflammatory. 7.  There is diffuse subcutaneous edema.    Ct-extremity, Lower With Left    Result Date: 10/1/2017  10/1/2017 8:07 AM HISTORY/REASON FOR EXAM:  Progression of strep/staph cellulitis with sepsis. Possible necrotizing fasciitis.. TECHNIQUE/EXAM DESCRIPTION AND NUMBER OF VIEWS:  CT scan of the LEFT lower extremity with contrast, with reconstructions. Thin helical 3 mm sections were obtained from the distal femur through the proximal tibia/fibula. Sagittal and coronal multiplanar reconstructions were generated from the axial images. A total of 100 mL of Omnipaque 350 nonionic contrast was administered  IV without complication. Up to date radiation dose reduction adjustments have been utilized to meet ALARA standards for radiation dose reduction. COMPARISON: X-rays of the foot and tibia fibula 9/29/2017, 9/28/2017 respectively. FINDINGS: Imaging of the visualized portion of the pelvis demonstrates fluid in the pelvis. Bladder is decompressed with a Maradiaga catheter. There is postoperative change involving the colon. There are mildly prominent bilateral external iliac and common femoral lymph nodes. Largest node in the left inguinal region is 1.5 cm short axis diameter. There is diffuse subcutaneous edema throughout the pelvis. Imaging throughout the left lower extremity demonstrates diffuse subcutaneous edema. There is a small amount of fluid in the intermuscular planes along the medial aspect of the proximal lower leg adjacent to the  medial gastrocnemius musculature. There is  no focal abscess collection. There is a small joint effusion of the knee. There is some mild fatty infiltration of the musculature. There is atherosclerosis but basilar structures appear grossly patent. Limited partial visualization of portions of the right lower extremity also demonstrates some mild subcutaneous edema. Bones demonstrate mild degenerative changes in the hip and left knee. There is no evidence of osteomyelitis.     1.  There is diffuse subcutaneous edema throughout the left hemipelvis and left lower extremity with a small amount of fluid adjacent to the right medial gastrocnemius at the level the proximal calf. 2.  There is no focal drainable abscess. 3.  There is no evidence of osteomyelitis. 4.  There is mild atherosclerosis with grossly patent vasculature. 5.  There are probably inflammatory lymph nodes in the pelvis and left inguinal region with the largest measuring 1.5 cm short axis. 6.  There is free fluid in the visualized pelvis.    Ct-head W/o    Result Date: 10/1/2017  HISTORY/REASON FOR EXAM:  Altered Mental Status. TECHNIQUE/EXAM DESCRIPTION: CT scan of the head without contrast, 10/1/2017 12:28 AM. Contiguous 5 mm axial sections were obtained from the skull base through the vertex. Up to date radiation dose reduction adjustments have been utilized to meet ALARA standards for radiation dose reduction. COMPARISON:  4/23/2015 FINDINGS:   The ventricular system and cortical sulci are prominent, consistent with the patient's age.  There is no midline shift or other mass effect.  A 5 mm circumscribed lucency is present in the right temporal lobe, consistent with old lacunar infarct. This is unchanged. There is no acute intra-axial abnormality or extra-axial fluid collection.  There is no intracranial hemorrhage.  The calvaria are intact. The visualized paranasal sinuses show no unusual opacity.     1.  No acute intracranial abnormality. 2.  Moderate  atrophy, age-consistent. 3.  Probable old lacunar infarct in the right temporal lobe, unchanged from the prior scan. INTERPRETING LOCATION:  [address] [facility], [place], [address]    Dx-chest-portable (1 View)    Result Date: 9/29/2017 9/29/2017 1:25 PM HISTORY/REASON FOR EXAM:  Line placement. TECHNIQUE/EXAM DESCRIPTION AND NUMBER OF VIEWS: Single portable view of the chest. COMPARISON: 9/28/2017. FINDINGS: The soft tissues and bony structures are unremarkable. The heart and mediastinal structures are within normal limits. Pulmonary vascularity is normal. There is right basilar atelectasis. There is no effusion or pneumothorax. There has been interval insertion of a central venous catheter which terminates with the tip projecting over the expected region of the mid to distal superior vena cava.     1.  Interval insertion of a central venous catheter which terminates with the tip projecting over the expected region of the mid to distal superior vena cava. 2.  Right basilar atelectasis.    Dx-chest-portable (1 View)    Result Date: 9/28/2017 9/28/2017 4:32 PM HISTORY/REASON FOR EXAM:  Sepsis. Left lower extremity wound. TECHNIQUE/EXAM DESCRIPTION AND NUMBER OF VIEWS: Single portable view of the chest. COMPARISON: 9/1/2016 FINDINGS: The mediastinal and cardiac silhouette is unremarkable. The pulmonary vascularity is within normal limits. Lungs demonstrate no evidence of pneumonia. Slight hazy density seen adjacent to the right anterior 1st rib is similar to the prior studies and likely due to degenerative change. There is no significant pleural effusion. There is no visible pneumothorax. There are no acute bony abnormalities.     1.  There is no acute cardiopulmonary process.    Dx-foot-2- Left    Result Date: 9/29/2017 9/29/2017 4:10 PM HISTORY/REASON FOR EXAM:  Left foot swelling and weeping TECHNIQUE/EXAM DESCRIPTION AND NUMBER OF VIEWS: 2 views of the LEFT foot. COMPARISON:  None. FINDINGS:  Bone mineralization is age  appropriate. Bony alignment is anatomic. There is no evidence of acute fracture or dislocation. There is no soft tissue gas. There is soft tissue swelling about the ankle.     Soft tissue swelling about the ankle. No evidence of acute osseous abnormality or soft tissue gas.    Dx-lumbar Spine-2 Or 3 Views    Result Date: 9/9/2017 9/9/2017 3:24 AM HISTORY/REASON FOR EXAM:  Pain Following Trauma. TECHNIQUE/ EXAM DESCRIPTION AND NUMBER OF VIEWS:  3 views of the lumbar spine. COMPARISON: None. FINDINGS: The lowest formed intervertebral disc will be designated L5-S1 for the purposes of this report and vertebral levels numbered accordingly. No acute fracture is evident. No gross malalignment is seen. There is mild loss of intervertebral disc height in the upper lumbar spine. There is moderate loss of intervertebral disc height at L4-L5. There are anterior osteophytes at most levels. There is no evidence of spondylolisthesis or osseous lesion. There are degenerative changes of the mid to lower lumbar facet joints. There is calcific atherosclerotic plaque.     1.  No evidence of acute fracture. 2.  Multilevel multifactorial degenerative changes    Dx-thoracic Spine-2 Views    Result Date: 9/9/2017 9/9/2017 3:21 AM HISTORY/REASON FOR EXAM:  Pain Following Trauma. TECHNIQUE/EXAM DESCRIPTION AND NUMBER OF VIEWS:  Thoracic spine, 2 views. COMPARISON:  None. FINDINGS: Glands unremarkable. There is lucency extending to the superior endplate of L2 on the second image. This appearance is not present on the dedicated lumbar spine radiographs. No other finding suspicious for acute fracture are seen. There is mild loss of intervertebral disc height throughout the thoracic spine. There are anterior osteophytes at most levels.     1.  Lucency through the superior endplate of L2 on the second image, discordant with the appearance on the dedicated lumbar spine radiographs. I suspect this is artifactual however a fracture is not  excluded. Further assessment is recommended with CT of the lumbar spine. 2.  Thoracic spondylosis    Dx-tibia And Fibula Left    Result Date: 9/28/2017 9/28/2017 4:32 PM HISTORY/REASON FOR EXAM:  Left tibia and fibular redness and swelling with open wound in the region of the medial malleolus. TECHNIQUE/EXAM DESCRIPTION AND NUMBER OF VIEWS:  2 views of the LEFT tibia and fibula. COMPARISON: Left knee 4/24/2015 FINDINGS: There is no evidence of acute fracture involving the tibia or fibula. Sclerotic focus posterior left tibial metadiaphyseal region is without change and may represent a sclerotic NOF or fibrous cortical defect. There is mild diffuse soft tissue swelling most prominent at the level the ankle. There is no gas in the soft tissues and there is no foreign body. There is no periostitis.     1.  There is mild diffuse swelling in the soft tissues of the distal left lower leg and ankle. There is no gas in the soft tissues or foreign body. 2.  There is no plain film evidence of osteomyelitis.    Dx-wrist-complete 3+ Left    Result Date: 9/9/2017 9/9/2017 3:24 AM HISTORY/REASON FOR EXAM:  Pain/Deformity Following Trauma. TECHNIQUE/EXAM DESCRIPTION AND NUMBER OF VIEWS:  3 views of the  LEFT wrist. COMPARISON: Radiographs 5/6/2015 FINDINGS: MINERALIZATION: Decreased. INJURY: There has been interval placement of a volar plate and screws transfixing the previously demonstrated intra-articular distal radial fracture. The hardware appears intact. There is underlying curvilinear deformity of the distal radius. There is ulnar positive variance, as before. No acute fracture is seen. JOINTS: No erosive arthropathy is evident.     1.  No radiographic evidence of acute traumatic injury. 2.  Healed surgically transfixed fracture of the distal radius 3.  Osteopenia    Echocardiogram Comp W/o Cont    Result Date: 9/29/2017  Transthoracic Echo Report Echocardiography Laboratory CONCLUSIONS Normal left ventricular size,  thickness, systolic function, and diastolic function. Mitral annular calcification. Mild mitral regurgitation. Aortic sclerosis without stenosis. Mild aortic insufficiency. Normal estimated right-sided pressures. No prior study is available for comparison. MAGALI TODD Exam Date:         2017                    14:07 Exam Location:     Inpatient Priority:          Routine Ordering Physician:        CIRILO HSU JR Referring Physician:       [id number] JR Sonographer:               Lo Lundberg RDCS Age:    72     Gender:    M MRN:    [id number] :    1945 BSA:    1.73   Ht (in):    65     Wt (lb):    145 Exam Type:     Complete Indications:     Cardiac arrhythmia, unspecified ICD Codes:       I499 CPT Codes:       03103 BP:   89     /   57     HR:   80 Technical Quality:       Fair MEASUREMENTS  (Male / Female) Normal Values 2D ECHO LV Diastolic Diameter PLAX        4.6 cm                4.2 - 5.9 / 3.9 - 5.3 cm LV Systolic Diameter PLAX         2.1 cm                2.1 - 4.0 cm IVS Diastolic Thickness           1 cm                  LVPW Diastolic Thickness          1 cm                  RV Diameter 4C                    2.7 cm                2.5 - 2.9 cm LVOT Diameter                     1.8 cm                RA Diameter                       2.7 cm                Estimated LV Ejection Fraction    60 %                  LV Ejection Fraction MOD BP       60.8 %                >= 55  % LV Ejection Fraction MOD 4C       58.4 %                LV Ejection Fraction MOD 2C       58.4 %                LA Volume Index                   13.5 cm³/m²           16 - 28 cm³/m² IVC Diameter                      1.5 cm                M-MODE Aortic Root Diameter MM           3.9 cm                DOPPLER AV Peak Velocity                  1.3 m/s               AV Peak Gradient                  6.7 mmHg              AV Mean Gradient                  3.3 mmHg              AI Pressure Half Time             463 ms                 LVOT Peak Velocity                0.89 m/s              AV Area Cont Eq vti               1.9 cm²               Mitral E Point Velocity           0.96 m/s              Mitral E to A Ratio               0.99                  MV Pressure Half Time             60.6 ms               MV Area PHT                       3.6 cm²               MV Deceleration Time              209 ms                TR Peak Velocity                  222 cm/s              PV Peak Velocity                  0.7 m/s               PV Peak Gradient                  1.9 mmHg              RVOT Peak Velocity                0.52 m/s              * Indicates values subject to auto-interpretation LV EF:  60    % FINDINGS Left Ventricle Normal left ventricular size, thickness, systolic function, and diastolic function.  Left ventricular ejection fraction is visually estimated to be 60%. Normal regional wall motion. Right Ventricle Normal right ventricular size and systolic function. Right Atrium Normal right atrial size. Normal inferior vena cava size and inspiratory collapse. Left Atrium Normal left atrial size. Mitral Valve Mitral annular calcification. Thickened mitral valve leaflets. Mild mitral regurgitation. Aortic Valve Aortic sclerosis without stenosis. Mild aortic insufficiency. Tricuspid Valve Structurally normal tricuspid valve. Mild tricuspid regurgitation. Estimated right ventricular systolic pressure  is 25 mmHg. Pulmonic Valve Structurally normal pulmonic valve. Mild pulmonic insufficiency. Pericardium Normal pericardium without effusion. Aorta Ascending aorta is normal for body surface area, and measured at a diameter of 3.7 cm above the sinotubular junction. Suleman Nguyen MD (Electronically Signed) Final Date:     29 September 2017                 23:50    Le Venous Duplex (specify In Comments Left, Right Or Bilateral)    Result Date: 9/29/2017   Vascular Laboratory  CONCLUSIONS  No evidence of acute DVT or SVT in the left  lower extremity.  Edema is noted.  MAGALI TODD  Exam Date:     2017 16:43  Room #:     Inpatient  Priority:     Stat  Ht (in):             Wt (lb):  Ordering Physician:        REYNALDO LUU  Referring Physician:       [id number]  Sonographer:               Samina Galloway RVT  Study Type:                Complete Unilateral  Technical Quality:         Adequate  Age:    72    Gender:     M  MRN:    [id number]  :    1945      BSA:  Indications:     Localized swelling, mass and lump, left lower limb  CPT Codes:       16812  ICD Codes:         History:         Swelling and redness of left leg for less than 24 hours.  Limitations:     Severe pain.  PROCEDURES:  Left lower extremity venous duplex imaging.  The following venous structures were evaluated: common femoral, profunda  femoral, greater saphenous, femoral, popliteal, peroneal and posterior  tibial veins.  Serial compression, augmentation maneuvers, color and spectral Doppler flow  evaluations were performed.  FINDINGS:  Left lower extremity -  Complete color filling and compressibility with normal venous flow dynamics  including spontaneous flow, response to augmentation maneuvers, and  respiratory phasicity.  The peroneal and posterior tibial veins are difficult to assess for  compressibility due to patient pain, but flow response to augmentation is  demonstrated.  Interstitial fluid consistent with edema is observed in the thigh and below  the knee.  Flow was evaluated in the contralateral common femoral vein and normal  venous flow dynamics including spontaneous flow, respiratory phasic  variation and augmentation were demonstrated.  Kevin Lester MD  (Electronically Signed)  Final Date:      2017                   15:52    Dx-abdomen For Tube Placement    Result Date: 10/1/2017  10/1/2017 7:28 PM HISTORY/REASON FOR EXAM:  Line evaluation. TECHNIQUE/EXAM DESCRIPTION AND NUMBER OF VIEWS:  1 view(s) of the abdomen.  "COMPARISON:  9/1/2016. FINDINGS: Enteric tube projects over the second portion of the duodenum. There is mild colonic distention. Degenerative changes are seen in the spine.     Enteric tube projects over the second portion of the duodenum. Mild colonic distention.      Micro:  Results     Procedure Component Value Units Date/Time    CDIFF BY PCR WITH TOXIN [[id number]] Collected:  10/04/17 1837    Order Status:  Completed Specimen:  Stool from Stool Updated:  10/04/17 2038     C Diff by PCR Negative     Comment: C. difficile NOT detected by PCR.  Treatment not indicated per guidelines.  Repeat testing not indicated within 7 days.          027-NAP1-BI Presumptive Negative     Comment: Presumptive 027/NAP1/BI target DNA sequences are NOT DETECTED.       Narrative:       Special Contact [phone] CARMELA MAJOR  Does this patient have risk factors for C-diff?->Yes  C-Diff Risk Factors->antibiotic exposure  Has patient taken stool softeners or laxatives in the last 5  days?->Yes    BLOOD CULTURE [[id number]] Collected:  09/28/17 1703    Order Status:  Completed Specimen:  Blood from Peripheral Updated:  10/03/17 1900     Significant Indicator NEG     Source BLD     Site PERIPHERAL     Blood Culture No growth after 5 days of incubation.    Narrative:       Per Hospital Policy: Only change Specimen Src: to \"Line\" if  specified by physician order.    BLOOD CULTURE [[id number]] Collected:  09/28/17 1715    Order Status:  Completed Specimen:  Blood from Peripheral Updated:  10/03/17 1900     Significant Indicator NEG     Source BLD     Site PERIPHERAL     Blood Culture No growth after 5 days of incubation.    Narrative:       Per Hospital Policy: Only change Specimen Src: to \"Line\" if  specified by physician order.    CULTURE WOUND W/ GRAM STAIN [[id number]]  (Abnormal)  (Susceptibility) Collected:  09/28/17 1928    Order Status:  Completed Specimen:  Wound from Right Foot Updated:  10/01/17 0541     Gram Stain Result -- "     Few WBCs.  Many Gram positive cocci.       Significant Indicator POS (POS)     Source WND     Site RIGHT FOOT     Culture Result Wound -- (A)     Culture Result Wound -- (A)     Beta Hemolytic Streptococcus group A  Heavy growth       Culture Result Wound -- (A)     Methicillin Resistant Staphylococcus aureus  Moderate growth  This isolate is presumed to be clindamycin resistant based on  detection of inducible resistance.  Clindamycin may still  be effective in some patients.      Narrative:       CALL  [place]  161 tel. [phone],  CALLED  161 tel. [phone] 10/01/2017, 05:40, RB PERF. RESULTS CALLED TO:APURVA  [id number]    Culture & Susceptibility     METHICILLIN RESISTANT STAPHYLOCOCCUS AUREUS     Antibiotic Sensitivity Microscan Unit Status    Ampicillin/sulbactam Resistant 16/8 mcg/mL Final    Clindamycin Resistant <=0.5 mcg/mL Final    Daptomycin Sensitive <=0.5 mcg/mL Final    Erythromycin Resistant >4 mcg/mL Final    Moxifloxacin Resistant >4 mcg/mL Final    Oxacillin Resistant >2 mcg/mL Final    Penicillin Resistant >8 mcg/mL Final    Tetracycline Sensitive <=4 mcg/mL Final    Trimeth/Sulfa Sensitive <=0.5/9.5 mcg/mL Final    Vancomycin Sensitive 2 mcg/mL Final                       URINE CULTURE(NEW) [[id number]] Collected:  09/28/17 2020    Order Status:  Completed Specimen:  Urine Updated:  09/30/17 0644     Significant Indicator NEG     Source UR     Site --     Urine Culture No growth at 48 hours    Narrative:       Indication for culture:->Emergency Room Patient          Assessment:  Active Hospital Problems    Diagnosis   • Sinus tachycardia [R00.0]   • Cirrhosis (CMS-HCC) [K74.60]   • Alcoholism (CMS-HCC) [F10.20]   • Severe sepsis (CMS-HCC) [A41.9, R65.20]   • Hyponatremia [E87.1]   • Cellulitis [L03.90]   • CLL (chronic lymphocytic leukemia) (CMS-HCC) [C91.10]   • Continuous opioid dependence (CMS-HCC) [F11.20]       Plan:  Septic shock.   2/2 LLE cellulitis  Afebrile  Significant leukocytosis -  persistent  Still on pressors  On abx below    LLE cellulitis  Cx - MRSA (vanco STEPHANIE 2), Group A strep  Continue ceftaroline and clindamycin  Bcx 9/28 - NGTD  Doppler neg for DVT    Leukocytosis, persistent.  Multifactorial  Infection + CLL  On abx  Monitor    Diarrhea  DC stool softeners  If persistent, check C diff  BMS placed +blood  Abd distended, new +BS    Encephalopathy  2/2 above  ? Opiate withdrawal    CLL  Will impair ability to fight infection      Alcohol abuse/cirrhosis    Discussed with internal medicine RN

## 2017-10-06 NOTE — CARE PLAN
Problem: Pain Management  Goal: Pain level will decrease to patient's comfort goal    Intervention: Follow pain managment plan developed in collaboration with patient and Interdisciplinary Team  Pain managed with PRN medications to patient's comfort level for activity.      Problem: Skin Integrity  Goal: Risk for impaired skin integrity will decrease  Outcome: PROGRESSING AS EXPECTED  Leg wound left open to air per Wound team.

## 2017-10-06 NOTE — WOUND TEAM
[facility]  Inpatient Services  Progress Wound and Skin Care    Admission Date:  09/28/17     HPI, PMH, SH: Reviewed  Unit where seen by Wound Team:  [facility]    WOUND CONSULT RELATED TO:  LLE      SUBJECTIVE:  Confused       Self Report / Pain Level:  Tolerated      OBJECTIVE:  Patient in bed, previous dressing intact.     WOUND TYPE, LOCATION, CHARACTERISTICS (Pressure ulcers: location, stage, POA or date identified)    Location and type of wound:  LLE drained bullae now dried and peeling skin.           Periwound:  Intact        Drainage:   None     Tissue Type and %:  100% crusted brown/pink     Wound Edges:  Peeling    Odor:    None    Exposed structure(s): None   S&S of Infection:  None     INTERVENTIONS BY WOUND TEAM:  Previous dressing removed, leg gently cleansed with NS and gauze.  Covered intact areas daily moisturizer.      Interdisciplinary consultation:  RN      EVALUATION:  Erythema and edema resolved.  Dried crusted areas to LLE, OK to leave open to air and apply daily moisturizer.      Factors affecting wound healing:  Infection - cultures are growing MRSA and group B strep, drainage, agitation   Goals:  Drainage to resolve in 1 week.     NURSING PLAN OF CARE ORDERS (X):    Dressing changes: See Dressing Maintenance orders: X  Skin care: See Skin Care orders: X  Rectal tube care: See Rectal Tube Care orders:   Other orders:    Anticipated discharge plans (X):  SNF:         X  Home Care:           Outpatient Wound Center:            Self Care:            Other:

## 2017-10-06 NOTE — CARE PLAN
Problem: Respiratory:  Goal: Respiratory status will improve  Outcome: PROGRESSING AS EXPECTED  Patient maintaining adequate saturations (95-98%) off of nasal canula

## 2017-10-07 ENCOUNTER — APPOINTMENT (OUTPATIENT)
Dept: RADIOLOGY | Facility: MEDICAL CENTER | Age: 72
DRG: 871 | End: 2017-10-07
Attending: INTERNAL MEDICINE
Payer: COMMERCIAL

## 2017-10-07 LAB
ALBUMIN SERPL BCP-MCNC: 3.4 G/DL (ref 3.2–4.9)
ALBUMIN/GLOB SERPL: 1.2 G/DL
ALP SERPL-CCNC: 84 U/L (ref 30–99)
ALT SERPL-CCNC: 6 U/L (ref 2–50)
ANION GAP SERPL CALC-SCNC: 7 MMOL/L (ref 0–11.9)
ANISOCYTOSIS BLD QL SMEAR: ABNORMAL
AST SERPL-CCNC: 16 U/L (ref 12–45)
BASOPHILS # BLD AUTO: 0 % (ref 0–1.8)
BASOPHILS # BLD: 0 K/UL (ref 0–0.12)
BILIRUB SERPL-MCNC: 0.9 MG/DL (ref 0.1–1.5)
BUN SERPL-MCNC: 18 MG/DL (ref 8–22)
CALCIUM SERPL-MCNC: 9 MG/DL (ref 8.5–10.5)
CHLORIDE SERPL-SCNC: 101 MMOL/L (ref 96–112)
CO2 SERPL-SCNC: 27 MMOL/L (ref 20–33)
CREAT SERPL-MCNC: 0.88 MG/DL (ref 0.5–1.4)
EOSINOPHIL # BLD AUTO: 0 K/UL (ref 0–0.51)
EOSINOPHIL NFR BLD: 0 % (ref 0–6.9)
ERYTHROCYTE [DISTWIDTH] IN BLOOD BY AUTOMATED COUNT: 55.7 FL (ref 35.9–50)
GFR SERPL CREATININE-BSD FRML MDRD: >60 ML/MIN/1.73 M 2
GLOBULIN SER CALC-MCNC: 2.9 G/DL (ref 1.9–3.5)
GLUCOSE SERPL-MCNC: 110 MG/DL (ref 65–99)
HCT VFR BLD AUTO: 22 % (ref 42–52)
HGB BLD-MCNC: 7.2 G/DL (ref 14–18)
LYMPHOCYTES # BLD AUTO: 7.01 K/UL (ref 1–4.8)
LYMPHOCYTES NFR BLD: 57 % (ref 22–41)
MACROCYTES BLD QL SMEAR: ABNORMAL
MAGNESIUM SERPL-MCNC: 2.1 MG/DL (ref 1.5–2.5)
MANUAL DIFF BLD: NORMAL
MCH RBC QN AUTO: 34.4 PG (ref 27–33)
MCHC RBC AUTO-ENTMCNC: 32.7 G/DL (ref 33.7–35.3)
MCV RBC AUTO: 105.3 FL (ref 81.4–97.8)
MONOCYTES # BLD AUTO: 0 K/UL (ref 0–0.85)
MONOCYTES NFR BLD AUTO: 0 % (ref 0–13.4)
MORPHOLOGY BLD-IMP: NORMAL
NEUTROPHILS # BLD AUTO: 5.29 K/UL (ref 1.82–7.42)
NEUTROPHILS NFR BLD: 40.4 % (ref 44–72)
NEUTS BAND NFR BLD MANUAL: 2.6 % (ref 0–10)
NRBC # BLD AUTO: 0 K/UL
NRBC BLD AUTO-RTO: 0 /100 WBC
PHOSPHATE SERPL-MCNC: 3.1 MG/DL (ref 2.5–4.5)
PLATELET # BLD AUTO: 98 K/UL (ref 164–446)
PLATELET BLD QL SMEAR: NORMAL
PMV BLD AUTO: 10.1 FL (ref 9–12.9)
POTASSIUM SERPL-SCNC: 4.2 MMOL/L (ref 3.6–5.5)
PROT SERPL-MCNC: 6.3 G/DL (ref 6–8.2)
RBC # BLD AUTO: 2.09 M/UL (ref 4.7–6.1)
RBC BLD AUTO: PRESENT
SMUDGE CELLS BLD QL SMEAR: NORMAL
SODIUM SERPL-SCNC: 135 MMOL/L (ref 135–145)
TOXIC GRANULES BLD QL SMEAR: SLIGHT
WBC # BLD AUTO: 12.3 K/UL (ref 4.8–10.8)

## 2017-10-07 PROCEDURE — A9270 NON-COVERED ITEM OR SERVICE: HCPCS | Performed by: INTERNAL MEDICINE

## 2017-10-07 PROCEDURE — 700111 HCHG RX REV CODE 636 W/ 250 OVERRIDE (IP): Performed by: INTERNAL MEDICINE

## 2017-10-07 PROCEDURE — 700102 HCHG RX REV CODE 250 W/ 637 OVERRIDE(OP): Performed by: HOSPITALIST

## 2017-10-07 PROCEDURE — 700105 HCHG RX REV CODE 258: Performed by: INTERNAL MEDICINE

## 2017-10-07 PROCEDURE — A9270 NON-COVERED ITEM OR SERVICE: HCPCS | Performed by: HOSPITALIST

## 2017-10-07 PROCEDURE — 85027 COMPLETE CBC AUTOMATED: CPT

## 2017-10-07 PROCEDURE — 700102 HCHG RX REV CODE 250 W/ 637 OVERRIDE(OP): Performed by: INTERNAL MEDICINE

## 2017-10-07 PROCEDURE — A9270 NON-COVERED ITEM OR SERVICE: HCPCS | Performed by: NURSE PRACTITIONER

## 2017-10-07 PROCEDURE — P9047 ALBUMIN (HUMAN), 25%, 50ML: HCPCS | Performed by: INTERNAL MEDICINE

## 2017-10-07 PROCEDURE — 84100 ASSAY OF PHOSPHORUS: CPT

## 2017-10-07 PROCEDURE — 770022 HCHG ROOM/CARE - ICU (200)

## 2017-10-07 PROCEDURE — 99233 SBSQ HOSP IP/OBS HIGH 50: CPT | Performed by: HOSPITALIST

## 2017-10-07 PROCEDURE — 83735 ASSAY OF MAGNESIUM: CPT

## 2017-10-07 PROCEDURE — 700102 HCHG RX REV CODE 250 W/ 637 OVERRIDE(OP): Performed by: NURSE PRACTITIONER

## 2017-10-07 PROCEDURE — 80053 COMPREHEN METABOLIC PANEL: CPT

## 2017-10-07 PROCEDURE — 700101 HCHG RX REV CODE 250: Performed by: HOSPITALIST

## 2017-10-07 PROCEDURE — 71010 DX-CHEST-PORTABLE (1 VIEW): CPT

## 2017-10-07 PROCEDURE — 85007 BL SMEAR W/DIFF WBC COUNT: CPT

## 2017-10-07 RX ORDER — SPIRONOLACTONE 100 MG/1
100 TABLET, FILM COATED ORAL DAILY
Status: DISCONTINUED | OUTPATIENT
Start: 2017-10-08 | End: 2017-10-14

## 2017-10-07 RX ORDER — FUROSEMIDE 10 MG/ML
20 INJECTION INTRAMUSCULAR; INTRAVENOUS
Status: DISCONTINUED | OUTPATIENT
Start: 2017-10-07 | End: 2017-10-07

## 2017-10-07 RX ADMIN — FUROSEMIDE 20 MG: 10 INJECTION, SOLUTION INTRAMUSCULAR; INTRAVENOUS at 08:04

## 2017-10-07 RX ADMIN — CEFTAROLINE FOSAMIL 600 MG: 600 POWDER, FOR SOLUTION INTRAVENOUS at 08:14

## 2017-10-07 RX ADMIN — OXYCODONE HYDROCHLORIDE 5 MG: 5 TABLET ORAL at 22:07

## 2017-10-07 RX ADMIN — FOLIC ACID 1 MG: 1 TABLET ORAL at 08:05

## 2017-10-07 RX ADMIN — TRAZODONE HYDROCHLORIDE 100 MG: 50 TABLET ORAL at 19:44

## 2017-10-07 RX ADMIN — CLINDAMYCIN IN 5 PERCENT DEXTROSE 900 MG: 18 INJECTION, SOLUTION INTRAVENOUS at 14:59

## 2017-10-07 RX ADMIN — ENOXAPARIN SODIUM 40 MG: 100 INJECTION SUBCUTANEOUS at 08:05

## 2017-10-07 RX ADMIN — MULTIPLE VITAMINS W/ MINERALS TAB 1 TABLET: TAB at 08:05

## 2017-10-07 RX ADMIN — LEVOTHYROXINE SODIUM 25 MCG: 50 TABLET ORAL at 05:42

## 2017-10-07 RX ADMIN — THIAMINE HCL TAB 100 MG 100 MG: 100 TAB at 08:04

## 2017-10-07 RX ADMIN — ALBUMIN (HUMAN) 25 G: 0.25 INJECTION, SOLUTION INTRAVENOUS at 00:23

## 2017-10-07 RX ADMIN — DIGOXIN 125 MCG: 125 TABLET ORAL at 17:03

## 2017-10-07 RX ADMIN — OXYCODONE HYDROCHLORIDE 5 MG: 5 TABLET ORAL at 14:59

## 2017-10-07 RX ADMIN — DULOXETINE HYDROCHLORIDE 60 MG: 60 CAPSULE, DELAYED RELEASE ORAL at 08:04

## 2017-10-07 RX ADMIN — CLINDAMYCIN IN 5 PERCENT DEXTROSE 900 MG: 18 INJECTION, SOLUTION INTRAVENOUS at 22:08

## 2017-10-07 RX ADMIN — ALBUMIN (HUMAN) 25 G: 0.25 INJECTION, SOLUTION INTRAVENOUS at 05:41

## 2017-10-07 RX ADMIN — OXYCODONE HYDROCHLORIDE 5 MG: 5 TABLET ORAL at 02:03

## 2017-10-07 RX ADMIN — CLINDAMYCIN IN 5 PERCENT DEXTROSE 900 MG: 18 INJECTION, SOLUTION INTRAVENOUS at 05:42

## 2017-10-07 RX ADMIN — POTASSIUM BICARBONATE 25 MEQ: 25 TABLET, EFFERVESCENT ORAL at 08:04

## 2017-10-07 RX ADMIN — CEFTAROLINE FOSAMIL 600 MG: 600 POWDER, FOR SOLUTION INTRAVENOUS at 19:44

## 2017-10-07 RX ADMIN — ALBUMIN (HUMAN) 25 G: 0.25 INJECTION, SOLUTION INTRAVENOUS at 11:39

## 2017-10-07 RX ADMIN — OXYCODONE HYDROCHLORIDE 5 MG: 5 TABLET ORAL at 08:04

## 2017-10-07 ASSESSMENT — PAIN SCALES - GENERAL
PAINLEVEL_OUTOF10: 9
PAINLEVEL_OUTOF10: 6
PAINLEVEL_OUTOF10: 4
PAINLEVEL_OUTOF10: 0
PAINLEVEL_OUTOF10: 6
PAINLEVEL_OUTOF10: 4
PAINLEVEL_OUTOF10: 5
PAINLEVEL_OUTOF10: 5
PAINLEVEL_OUTOF10: 8
PAINLEVEL_OUTOF10: 6
PAINLEVEL_OUTOF10: 10
PAINLEVEL_OUTOF10: 5
PAINLEVEL_OUTOF10: 8

## 2017-10-07 ASSESSMENT — ENCOUNTER SYMPTOMS
BLURRED VISION: 0
TINGLING: 0
SORE THROAT: 0
PALPITATIONS: 0
SHORTNESS OF BREATH: 0
NECK PAIN: 0
EYE PAIN: 0
DEPRESSION: 0
DIZZINESS: 0
BACK PAIN: 0
HEADACHES: 0
NAUSEA: 0
INSOMNIA: 0
FEVER: 0
COUGH: 0
CHILLS: 0
VOMITING: 0
ABDOMINAL PAIN: 0

## 2017-10-07 NOTE — PROGRESS NOTES
[facility] Hospitalist Progress Note    Date of Service: 10/7/2017    Chief Complaint  72 y.o. male admitted 2017 with L leg cellulitis and sepsis/shock.    transfered to CICU on  with hypotension/shock and episodes of SVT  later complicated by severe alcohol withdrawal.     Interval Problem Update  Mentation improving some  More orientated  Anxious  Constant complaints of pain all over  Bp and hr stable and off pressors  No fever  Bm this am- BMS in olace  Good uop via guerra  Right IJ 3x lumen  Mobilizing  No gi proph  Lovenox proph  On iv abx per ID    Responded well to lasix/albumin  Will hold this today      Consultants/Specialty  Pulmonology    Disposition  Icu  Critical care time with active titration of meds for hypotension 33min        Review of Systems   Constitutional: Negative for chills and fever.   HENT: Negative for sore throat.    Eyes: Negative for blurred vision and pain.   Respiratory: Negative for cough and shortness of breath.    Cardiovascular: Negative for chest pain and palpitations.   Gastrointestinal: Negative for abdominal pain, nausea and vomiting.   Genitourinary: Negative for dysuria and urgency.   Musculoskeletal: Negative for back pain and neck pain.        Whole body pain   Skin: Negative for itching and rash.   Neurological: Negative for dizziness, tingling and headaches.   Psychiatric/Behavioral: Negative for depression. The patient does not have insomnia.    All other systems reviewed and are negative.     Physical Exam  Laboratory/Imaging   Hemodynamics  Temp (24hrs), Av.9 °C (98.5 °F), Min:36.6 °C (97.9 °F), Max:37.2 °C (99 °F)   Temperature: 37 °C (98.6 °F)  Pulse  Av.9  Min: 60  Max: 188 Heart Rate (Monitored): 81  NIBP: 110/60 CVP (mm Hg): (!) 23 MM HG    Respiratory      Respiration: (!) 26, Pulse Oximetry: 96 %        RUL Breath Sounds: Clear, RML Breath Sounds: Diminished, RLL Breath Sounds: Diminished, MONICA Breath Sounds: Clear, LLL Breath Sounds:  Diminished;Fine Crackles    Fluids    Intake/Output Summary (Last 24 hours) at 10/07/17 0852  Last data filed at 10/07/17 0800   Gross per 24 hour   Intake             2350 ml   Output              925 ml   Net             1425 ml       Nutrition  No orders of the defined types were placed in this encounter.    Physical Exam   Constitutional: He appears well-developed and well-nourished. No distress.   HENT:   Right Ear: External ear normal.   Left Ear: External ear normal.   Nose: Nose normal.   Eyes: Right eye exhibits no discharge. Left eye exhibits no discharge. No scleral icterus.   Neck: No JVD present. No tracheal deviation present.   Cardiovascular: Normal rate, normal heart sounds and intact distal pulses.  Exam reveals no gallop.    No murmur heard.  Pulmonary/Chest: Effort normal. He has no wheezes. He has no rales.   Abdominal: Soft. Bowel sounds are normal. He exhibits no distension. There is no tenderness.   Musculoskeletal: He exhibits edema. He exhibits no tenderness.   Left leg erythema improving. Peeling skin.    Neurological: He is alert. He is disoriented.   Skin: Skin is warm. No rash noted. He is not diaphoretic. There is erythema.   Psychiatric: His affect is labile. Thought content is delusional.   Nursing note and vitals reviewed.      Recent Labs      10/05/17   0430  10/06/17   0440  10/07/17   0550   WBC  17.0*  17.5*  12.3*   RBC  2.34*  2.36*  2.09*   HEMOGLOBIN  8.3*  8.4*  7.2*   HEMATOCRIT  24.6*  24.7*  22.0*   MCV  105.1*  104.7*  105.3*   MCH  35.5*  35.6*  34.4*   MCHC  33.7  34.0  32.7*   RDW  55.4*  55.7*  55.7*   PLATELETCT  100*  102*  98*   MPV  9.9  9.7  10.1     Recent Labs      10/05/17   0430  10/06/17   0440  10/07/17   0550   SODIUM  135  135  135   POTASSIUM  5.0  4.4  4.2   CHLORIDE  102  103  101   CO2  27  25  27   GLUCOSE  114*  109*  110*   BUN  20  18  18   CREATININE  1.06  0.86  0.88   CALCIUM  8.8  8.7  9.0                      Assessment/Plan     Cirrhosis  (CMS-HCC)- (present on admission)   Assessment & Plan    Chronic, stable with no e/o decompensation  Restart spironolactone        Alcohol withdrawal (CMS-HCC)- (present on admission)   Assessment & Plan    Resolved s/p Phenobarb protocol.         Severe sepsis (CMS-HCC)- (present on admission)   Assessment & Plan    This is sepsis (without associated acute organ dysfunction).   2/2 left leg cellulitis   Cultures positive for MRSA   Iv abx per ID  resolved        Hyponatremia   Assessment & Plan    Resolved.         Acute respiratory failure (CMS-HCC)   Assessment & Plan    Resolving. Diuresis as needed. Rt protocol.         Cellulitis- (present on admission)   Assessment & Plan    abx for mrsa, wound care.         CLL (chronic lymphocytic leukemia) (CMS-HCC)- (present on admission)   Assessment & Plan    Followed by Dr. Flores outpatient, chronically elevated WBC count.        Continuous opioid dependence (CMS-HCC)- (present on admission)   Assessment & Plan    Decreased dose.   Would avoid escalation  He constantly askes for more pain meds.             Reviewed items::  EKG reviewed, Radiology images reviewed, Labs reviewed and Medications reviewed  DVT prophylaxis pharmacological::  Heparin  DVT prophylaxis - mechanical:  SCDs  Ulcer Prophylaxis::  Not indicated  Antibiotics:  Treating active infection/contamination beyond 24 hours perioperative coverage

## 2017-10-07 NOTE — PROGRESS NOTES
Infectious Disease Progress Note    Author: Oma Hancock M.D. Date & Time of service: 10/7/2017  2:24 PM    Chief Complaint:  FU sepsis/LLE cellulitis    Interval History:  10/2 AF, WBC 27.5, confused and in pain  10/3 AF, WBC 22.9, remains confused and mumbling  10/4 AF, WBC 15.7, pt obtunded, no overnight events per RN  10/5 AF, WBC 17, mumbling, does not engage in questioning or open eyes, grimaces when RLE examined, remains on pressors, lots of diarrhea, stool softeners held  10/6 AF WBC 17.5 abd more distended BMS placed +blood  10/7 AF WBC 12.3 obtunded now but per RN better earlier  Labs Reviewed, Medications Reviewed, Radiology Reviewed and Wound Reviewed.    Review of Systems:  Review of Systems   Unable to perform ROS: Mental acuity       Hemodynamics:  Temp (24hrs), Av.9 °C (98.5 °F), Min:36.6 °C (97.9 °F), Max:37.2 °C (99 °F)  Temperature: 36.8 °C (98.2 °F)  Pulse  Av.1  Min: 60  Max: 188Heart Rate (Monitored): (!) 114  NIBP: 119/68  CVP (mm Hg): 6 MM HG    Physical Exam:  Physical Exam   Constitutional: He appears well-developed.   Thin  Older than stated age  Chronically ill   HENT:   Head: Normocephalic and atraumatic.   Eyes: Pupils are equal, round, and reactive to light.   Neck: Neck supple.   R IJ catheter   Cardiovascular:   tachycardic   Pulmonary/Chest: Effort normal and breath sounds normal.   Abdominal: Soft. He exhibits distension. There is no tenderness. There is no rebound and no guarding.   Musculoskeletal: He exhibits edema and tenderness.   Erythema and edema of the LLE radiating to the left hip and medial thigh, improving significantly. Skin peeling    Left medial malleolar wound.     There is a eschar on the medial right knee with surrounding erythema   Neurological:   obtunded   Skin: There is erythema.   Resolving   Nursing note and vitals reviewed.      Meds:    Current Facility-Administered Medications:   •  potassium bicarbonate  •  haloperidol lactate  •   oxycodone immediate-release  •  enoxaparin (LOVENOX) injection  •  digoxin  •  clindamycin (CLEOCIN) IV  •  MD ALERT...Adult ICU Electrolyte Replacement per Pharmacy Protocol  •  ceftaroline (TEFLARO) ivpb  •  folic acid  •  therapeutic multivitamin-minerals  •  thiamine  •  NS  •  duloxetine  •  levothyroxine  •  trazodone  •  ondansetron  •  ondansetron    Labs:  Recent Labs      10/05/17   0430  10/06/17   0440  10/07/17   0550   WBC  17.0*  17.5*  12.3*   RBC  2.34*  2.36*  2.09*   HEMOGLOBIN  8.3*  8.4*  7.2*   HEMATOCRIT  24.6*  24.7*  22.0*   MCV  105.1*  104.7*  105.3*   MCH  35.5*  35.6*  34.4*   RDW  55.4*  55.7*  55.7*   PLATELETCT  100*  102*  98*   MPV  9.9  9.7  10.1   NEUTSPOLYS  28.00*  46.10  40.40*   LYMPHOCYTES  71.00*  52.20*  57.00*   MONOCYTES  0.00  0.00  0.00   EOSINOPHILS  0.00  0.00  0.00   BASOPHILS  0.00  0.00  0.00   RBCMORPHOLO  Present  Present  Present     Recent Labs      10/05/17   0430  10/06/17   0440  10/07/17   0550   SODIUM  135  135  135   POTASSIUM  5.0  4.4  4.2   CHLORIDE  102  103  101   CO2  27  25  27   GLUCOSE  114*  109*  110*   BUN  20  18  18     Recent Labs      10/05/17   0430  10/06/17   0440  10/07/17   0550   ALBUMIN  3.1*  2.7*  3.4   TBILIRUBIN  0.9  0.8  0.9   ALKPHOSPHAT  99  104*  84   TOTPROTEIN  6.0  5.8*  6.3   ALTSGPT  6  7  6   ASTSGOT  21  19  16   CREATININE  1.06  0.86  0.88       Imaging:  Ct-abdomen-pelvis With    Result Date: 10/1/2017  10/1/2017 8:07 AM HISTORY/REASON FOR EXAM:  Diffuse abdominal pain. History of sepsis. Left lower extremity cellulitis. TECHNIQUE/EXAM DESCRIPTION: CT scan of the abdomen and pelvis with contrast. Contrast-enhanced helical scanning was obtained from the diaphragmatic domes through the pubic symphysis following the bolus administration of 100 mL of Omnipaque 350 nonionic contrast without complication. Low dose optimization technique was utilized for this CT exam including automated exposure control and adjustment  of the mA and/or kV according to patient size. COMPARISON: CT 6/5/2015 FINDINGS: The visualized lung bases demonstrate small amounts of dependent pleural effusion with bibasilar dependent airspace disease, most likely atelectasis. There is no acute bony process. There is degenerative change in the lower lumbar spine and pelvis. CT Abdomen: Liver is unchanged in appearance with slight nodularity. There are no definite focal masses. Spleen is mildly enlarged measuring 13.1 cm in diameter without change. Hepatic veins are somewhat attenuated. Portal veins, splenic vein and SMV are patent. Pancreas is somewhat atrophic but otherwise unremarkable. The gallbladder demonstrates some density dependently which could be related to tumefactive sludge or noncalcified stones. There is no biliary dilatation. The adrenal glands are normal in size. Kidneys demonstrate no hydronephrosis. There are probable small cortical cysts. The abdominal aorta is normal in caliber. There is atherosclerosis of the aorta. There is no lymphadenopathy. The bowel demonstrates no evidence of bowel obstruction. There is moderate stool throughout the colon. There is a hiatal hernia. There is a small amount of fluid around the liver although this is decreased over the prior study. There is minimal fluid in both paracolic gutters. There is diffuse subcutaneous edema. CT Pelvis: There is no acute inflammatory process in the pelvis. There is free fluid in the pelvis with diffuse subcutaneous edema. There are mildly prominent lymph nodes in the inguinal regions, left greater than right with some nodes in the external iliac region as well more so on the left. The largest inguinal node is 1.5 cm short axis on the left side.. There is postoperative change involving the colon. Bladder is decompressed with a Maradiaga catheter.     1.  There is mild colonic distention with large amount of stool throughout the colon. There is no bowel obstruction. 2.  There is a  hiatal hernia. 3.  Changes of the liver with mild splenomegaly and a small amount of ascites in the abdomen and pelvis suggestive of underlying hepatocellular disease. 4.  Dependent density in the gallbladder which could be tumefactive sludge or noncalcified stones. 5.  There are small dependent bilateral pleural effusions with dependent airspace disease, likely atelectasis. 6.  There is mild lymphadenopathy in the pelvis more so on the left likely inflammatory. 7.  There is diffuse subcutaneous edema.    Ct-extremity, Lower With Left    Result Date: 10/1/2017  10/1/2017 8:07 AM HISTORY/REASON FOR EXAM:  Progression of strep/staph cellulitis with sepsis. Possible necrotizing fasciitis.. TECHNIQUE/EXAM DESCRIPTION AND NUMBER OF VIEWS:  CT scan of the LEFT lower extremity with contrast, with reconstructions. Thin helical 3 mm sections were obtained from the distal femur through the proximal tibia/fibula. Sagittal and coronal multiplanar reconstructions were generated from the axial images. A total of 100 mL of Omnipaque 350 nonionic contrast was administered  IV without complication. Up to date radiation dose reduction adjustments have been utilized to meet ALARA standards for radiation dose reduction. COMPARISON: X-rays of the foot and tibia fibula 9/29/2017, 9/28/2017 respectively. FINDINGS: Imaging of the visualized portion of the pelvis demonstrates fluid in the pelvis. Bladder is decompressed with a Maradiaga catheter. There is postoperative change involving the colon. There are mildly prominent bilateral external iliac and common femoral lymph nodes. Largest node in the left inguinal region is 1.5 cm short axis diameter. There is diffuse subcutaneous edema throughout the pelvis. Imaging throughout the left lower extremity demonstrates diffuse subcutaneous edema. There is a small amount of fluid in the intermuscular planes along the medial aspect of the proximal lower leg adjacent to the medial gastrocnemius  musculature. There is  no focal abscess collection. There is a small joint effusion of the knee. There is some mild fatty infiltration of the musculature. There is atherosclerosis but basilar structures appear grossly patent. Limited partial visualization of portions of the right lower extremity also demonstrates some mild subcutaneous edema. Bones demonstrate mild degenerative changes in the hip and left knee. There is no evidence of osteomyelitis.     1.  There is diffuse subcutaneous edema throughout the left hemipelvis and left lower extremity with a small amount of fluid adjacent to the right medial gastrocnemius at the level the proximal calf. 2.  There is no focal drainable abscess. 3.  There is no evidence of osteomyelitis. 4.  There is mild atherosclerosis with grossly patent vasculature. 5.  There are probably inflammatory lymph nodes in the pelvis and left inguinal region with the largest measuring 1.5 cm short axis. 6.  There is free fluid in the visualized pelvis.    Ct-head W/o    Result Date: 10/1/2017  HISTORY/REASON FOR EXAM:  Altered Mental Status. TECHNIQUE/EXAM DESCRIPTION: CT scan of the head without contrast, 10/1/2017 12:28 AM. Contiguous 5 mm axial sections were obtained from the skull base through the vertex. Up to date radiation dose reduction adjustments have been utilized to meet ALARA standards for radiation dose reduction. COMPARISON:  4/23/2015 FINDINGS:   The ventricular system and cortical sulci are prominent, consistent with the patient's age.  There is no midline shift or other mass effect.  A 5 mm circumscribed lucency is present in the right temporal lobe, consistent with old lacunar infarct. This is unchanged. There is no acute intra-axial abnormality or extra-axial fluid collection.  There is no intracranial hemorrhage.  The calvaria are intact. The visualized paranasal sinuses show no unusual opacity.     1.  No acute intracranial abnormality. 2.  Moderate atrophy,  age-consistent. 3.  Probable old lacunar infarct in the right temporal lobe, unchanged from the prior scan. INTERPRETING LOCATION:  [address] [facility], [facility], [address]    Dx-chest-portable (1 View)    Result Date: 9/29/2017 9/29/2017 1:25 PM HISTORY/REASON FOR EXAM:  Line placement. TECHNIQUE/EXAM DESCRIPTION AND NUMBER OF VIEWS: Single portable view of the chest. COMPARISON: 9/28/2017. FINDINGS: The soft tissues and bony structures are unremarkable. The heart and mediastinal structures are within normal limits. Pulmonary vascularity is normal. There is right basilar atelectasis. There is no effusion or pneumothorax. There has been interval insertion of a central venous catheter which terminates with the tip projecting over the expected region of the mid to distal superior vena cava.     1.  Interval insertion of a central venous catheter which terminates with the tip projecting over the expected region of the mid to distal superior vena cava. 2.  Right basilar atelectasis.    Dx-chest-portable (1 View)    Result Date: 9/28/2017 9/28/2017 4:32 PM HISTORY/REASON FOR EXAM:  Sepsis. Left lower extremity wound. TECHNIQUE/EXAM DESCRIPTION AND NUMBER OF VIEWS: Single portable view of the chest. COMPARISON: 9/1/2016 FINDINGS: The mediastinal and cardiac silhouette is unremarkable. The pulmonary vascularity is within normal limits. Lungs demonstrate no evidence of pneumonia. Slight hazy density seen adjacent to the right anterior 1st rib is similar to the prior studies and likely due to degenerative change. There is no significant pleural effusion. There is no visible pneumothorax. There are no acute bony abnormalities.     1.  There is no acute cardiopulmonary process.    Dx-foot-2- Left    Result Date: 9/29/2017 9/29/2017 4:10 PM HISTORY/REASON FOR EXAM:  Left foot swelling and weeping TECHNIQUE/EXAM DESCRIPTION AND NUMBER OF VIEWS: 2 views of the LEFT foot. COMPARISON:  None. FINDINGS:  Bone mineralization is age  appropriate. Bony alignment is anatomic. There is no evidence of acute fracture or dislocation. There is no soft tissue gas. There is soft tissue swelling about the ankle.     Soft tissue swelling about the ankle. No evidence of acute osseous abnormality or soft tissue gas.    Dx-lumbar Spine-2 Or 3 Views    Result Date: 9/9/2017 9/9/2017 3:24 AM HISTORY/REASON FOR EXAM:  Pain Following Trauma. TECHNIQUE/ EXAM DESCRIPTION AND NUMBER OF VIEWS:  3 views of the lumbar spine. COMPARISON: None. FINDINGS: The lowest formed intervertebral disc will be designated L5-S1 for the purposes of this report and vertebral levels numbered accordingly. No acute fracture is evident. No gross malalignment is seen. There is mild loss of intervertebral disc height in the upper lumbar spine. There is moderate loss of intervertebral disc height at L4-L5. There are anterior osteophytes at most levels. There is no evidence of spondylolisthesis or osseous lesion. There are degenerative changes of the mid to lower lumbar facet joints. There is calcific atherosclerotic plaque.     1.  No evidence of acute fracture. 2.  Multilevel multifactorial degenerative changes    Dx-thoracic Spine-2 Views    Result Date: 9/9/2017 9/9/2017 3:21 AM HISTORY/REASON FOR EXAM:  Pain Following Trauma. TECHNIQUE/EXAM DESCRIPTION AND NUMBER OF VIEWS:  Thoracic spine, 2 views. COMPARISON:  None. FINDINGS: Glands unremarkable. There is lucency extending to the superior endplate of L2 on the second image. This appearance is not present on the dedicated lumbar spine radiographs. No other finding suspicious for acute fracture are seen. There is mild loss of intervertebral disc height throughout the thoracic spine. There are anterior osteophytes at most levels.     1.  Lucency through the superior endplate of L2 on the second image, discordant with the appearance on the dedicated lumbar spine radiographs. I suspect this is artifactual however a fracture is not  excluded. Further assessment is recommended with CT of the lumbar spine. 2.  Thoracic spondylosis    Dx-tibia And Fibula Left    Result Date: 9/28/2017 9/28/2017 4:32 PM HISTORY/REASON FOR EXAM:  Left tibia and fibular redness and swelling with open wound in the region of the medial malleolus. TECHNIQUE/EXAM DESCRIPTION AND NUMBER OF VIEWS:  2 views of the LEFT tibia and fibula. COMPARISON: Left knee 4/24/2015 FINDINGS: There is no evidence of acute fracture involving the tibia or fibula. Sclerotic focus posterior left tibial metadiaphyseal region is without change and may represent a sclerotic NOF or fibrous cortical defect. There is mild diffuse soft tissue swelling most prominent at the level the ankle. There is no gas in the soft tissues and there is no foreign body. There is no periostitis.     1.  There is mild diffuse swelling in the soft tissues of the distal left lower leg and ankle. There is no gas in the soft tissues or foreign body. 2.  There is no plain film evidence of osteomyelitis.    Dx-wrist-complete 3+ Left    Result Date: 9/9/2017 9/9/2017 3:24 AM HISTORY/REASON FOR EXAM:  Pain/Deformity Following Trauma. TECHNIQUE/EXAM DESCRIPTION AND NUMBER OF VIEWS:  3 views of the  LEFT wrist. COMPARISON: Radiographs 5/6/2015 FINDINGS: MINERALIZATION: Decreased. INJURY: There has been interval placement of a volar plate and screws transfixing the previously demonstrated intra-articular distal radial fracture. The hardware appears intact. There is underlying curvilinear deformity of the distal radius. There is ulnar positive variance, as before. No acute fracture is seen. JOINTS: No erosive arthropathy is evident.     1.  No radiographic evidence of acute traumatic injury. 2.  Healed surgically transfixed fracture of the distal radius 3.  Osteopenia    Echocardiogram Comp W/o Cont    Result Date: 9/29/2017  Transthoracic Echo Report Echocardiography Laboratory CONCLUSIONS Normal left ventricular size,  thickness, systolic function, and diastolic function. Mitral annular calcification. Mild mitral regurgitation. Aortic sclerosis without stenosis. Mild aortic insufficiency. Normal estimated right-sided pressures. No prior study is available for comparison. MAGALI TODD Exam Date:         2017                    14:07 Exam Location:     Inpatient Priority:          Routine Ordering Physician:        CIRILO HSU JR Referring Physician:       [id number] Sonographer:               Lo Lundberg RDCS Age:    72     Gender:    M MRN:    [id number] :    1945 BSA:    1.73   Ht (in):    65     Wt (lb):    145 Exam Type:     Complete Indications:     Cardiac arrhythmia, unspecified ICD Codes:       I499 CPT Codes:       61336 BP:   89     /   57     HR:   80 Technical Quality:       Fair MEASUREMENTS  (Male / Female) Normal Values 2D ECHO LV Diastolic Diameter PLAX        4.6 cm                4.2 - 5.9 / 3.9 - 5.3 cm LV Systolic Diameter PLAX         2.1 cm                2.1 - 4.0 cm IVS Diastolic Thickness           1 cm                  LVPW Diastolic Thickness          1 cm                  RV Diameter 4C                    2.7 cm                2.5 - 2.9 cm LVOT Diameter                     1.8 cm                RA Diameter                       2.7 cm                Estimated LV Ejection Fraction    60 %                  LV Ejection Fraction MOD BP       60.8 %                >= 55  % LV Ejection Fraction MOD 4C       58.4 %                LV Ejection Fraction MOD 2C       58.4 %                LA Volume Index                   13.5 cm³/m²           16 - 28 cm³/m² IVC Diameter                      1.5 cm                M-MODE Aortic Root Diameter MM           3.9 cm                DOPPLER AV Peak Velocity                  1.3 m/s               AV Peak Gradient                  6.7 mmHg              AV Mean Gradient                  3.3 mmHg              AI Pressure Half Time             463 ms                 LVOT Peak Velocity                0.89 m/s              AV Area Cont Eq vti               1.9 cm²               Mitral E Point Velocity           0.96 m/s              Mitral E to A Ratio               0.99                  MV Pressure Half Time             60.6 ms               MV Area PHT                       3.6 cm²               MV Deceleration Time              209 ms                TR Peak Velocity                  222 cm/s              PV Peak Velocity                  0.7 m/s               PV Peak Gradient                  1.9 mmHg              RVOT Peak Velocity                0.52 m/s              * Indicates values subject to auto-interpretation LV EF:  60    % FINDINGS Left Ventricle Normal left ventricular size, thickness, systolic function, and diastolic function.  Left ventricular ejection fraction is visually estimated to be 60%. Normal regional wall motion. Right Ventricle Normal right ventricular size and systolic function. Right Atrium Normal right atrial size. Normal inferior vena cava size and inspiratory collapse. Left Atrium Normal left atrial size. Mitral Valve Mitral annular calcification. Thickened mitral valve leaflets. Mild mitral regurgitation. Aortic Valve Aortic sclerosis without stenosis. Mild aortic insufficiency. Tricuspid Valve Structurally normal tricuspid valve. Mild tricuspid regurgitation. Estimated right ventricular systolic pressure  is 25 mmHg. Pulmonic Valve Structurally normal pulmonic valve. Mild pulmonic insufficiency. Pericardium Normal pericardium without effusion. Aorta Ascending aorta is normal for body surface area, and measured at a diameter of 3.7 cm above the sinotubular junction. Suleman Nguyen MD (Electronically Signed) Final Date:     29 September 2017                 23:50    Le Venous Duplex (specify In Comments Left, Right Or Bilateral)    Result Date: 9/29/2017   Vascular Laboratory  CONCLUSIONS  No evidence of acute DVT or SVT in the left  lower extremity.  Edema is noted.  MAGALI TODD  Exam Date:     2017 16:43  Room #:     Inpatient  Priority:     Stat  Ht (in):             Wt (lb):  Ordering Physician:        REYNALDO LUU  Referring Physician:       [id number]  Sonographer:               Samina Galloway RVT  Study Type:                Complete Unilateral  Technical Quality:         Adequate  Age:    72    Gender:     M  MRN:    [id number]  :    1945      BSA:  Indications:     Localized swelling, mass and lump, left lower limb  CPT Codes:       14670  ICD Codes:         History:         Swelling and redness of left leg for less than 24 hours.  Limitations:     Severe pain.  PROCEDURES:  Left lower extremity venous duplex imaging.  The following venous structures were evaluated: common femoral, profunda  femoral, greater saphenous, femoral, popliteal, peroneal and posterior  tibial veins.  Serial compression, augmentation maneuvers, color and spectral Doppler flow  evaluations were performed.  FINDINGS:  Left lower extremity -  Complete color filling and compressibility with normal venous flow dynamics  including spontaneous flow, response to augmentation maneuvers, and  respiratory phasicity.  The peroneal and posterior tibial veins are difficult to assess for  compressibility due to patient pain, but flow response to augmentation is  demonstrated.  Interstitial fluid consistent with edema is observed in the thigh and below  the knee.  Flow was evaluated in the contralateral common femoral vein and normal  venous flow dynamics including spontaneous flow, respiratory phasic  variation and augmentation were demonstrated.  Kevin Lester MD  (Electronically Signed)  Final Date:      2017                   15:52    Dx-abdomen For Tube Placement    Result Date: 10/1/2017  10/1/2017 7:28 PM HISTORY/REASON FOR EXAM:  Line evaluation. TECHNIQUE/EXAM DESCRIPTION AND NUMBER OF VIEWS:  1 view(s) of the abdomen.  "COMPARISON:  9/1/2016. FINDINGS: Enteric tube projects over the second portion of the duodenum. There is mild colonic distention. Degenerative changes are seen in the spine.     Enteric tube projects over the second portion of the duodenum. Mild colonic distention.      Micro:  Results     Procedure Component Value Units Date/Time    CDIFF BY PCR WITH TOXIN [[id number]] Collected:  10/04/17 1837    Order Status:  Completed Specimen:  Stool from Stool Updated:  10/04/17 2038     C Diff by PCR Negative     Comment: C. difficile NOT detected by PCR.  Treatment not indicated per guidelines.  Repeat testing not indicated within 7 days.          027-NAP1-BI Presumptive Negative     Comment: Presumptive 027/NAP1/BI target DNA sequences are NOT DETECTED.       Narrative:       Special Contact [phone] CARMELA MAJOR  Does this patient have risk factors for C-diff?->Yes  C-Diff Risk Factors->antibiotic exposure  Has patient taken stool softeners or laxatives in the last 5  days?->Yes    BLOOD CULTURE [[id number]] Collected:  09/28/17 1703    Order Status:  Completed Specimen:  Blood from Peripheral Updated:  10/03/17 1900     Significant Indicator NEG     Source BLD     Site PERIPHERAL     Blood Culture No growth after 5 days of incubation.    Narrative:       Per Hospital Policy: Only change Specimen Src: to \"Line\" if  specified by physician order.    BLOOD CULTURE [[id number]] Collected:  09/28/17 1715    Order Status:  Completed Specimen:  Blood from Peripheral Updated:  10/03/17 1900     Significant Indicator NEG     Source BLD     Site PERIPHERAL     Blood Culture No growth after 5 days of incubation.    Narrative:       Per Hospital Policy: Only change Specimen Src: to \"Line\" if  specified by physician order.    CULTURE WOUND W/ GRAM STAIN [[id number]]  (Abnormal)  (Susceptibility) Collected:  09/28/17 1928    Order Status:  Completed Specimen:  Wound from Right Foot Updated:  10/01/17 0541     Gram Stain Result -- "     Few WBCs.  Many Gram positive cocci.       Significant Indicator POS (POS)     Source WND     Site RIGHT FOOT     Culture Result Wound -- (A)     Culture Result Wound -- (A)     Beta Hemolytic Streptococcus group A  Heavy growth       Culture Result Wound -- (A)     Methicillin Resistant Staphylococcus aureus  Moderate growth  This isolate is presumed to be clindamycin resistant based on  detection of inducible resistance.  Clindamycin may still  be effective in some patients.      Narrative:       CALL  [place]  161 tel. [phone],  CALLED  161 tel. [phone] 10/01/2017, 05:40, RB PERF. RESULTS CALLED TO:APURVA  [id number]    Culture & Susceptibility     METHICILLIN RESISTANT STAPHYLOCOCCUS AUREUS     Antibiotic Sensitivity Microscan Unit Status    Ampicillin/sulbactam Resistant 16/8 mcg/mL Final    Clindamycin Resistant <=0.5 mcg/mL Final    Daptomycin Sensitive <=0.5 mcg/mL Final    Erythromycin Resistant >4 mcg/mL Final    Moxifloxacin Resistant >4 mcg/mL Final    Oxacillin Resistant >2 mcg/mL Final    Penicillin Resistant >8 mcg/mL Final    Tetracycline Sensitive <=4 mcg/mL Final    Trimeth/Sulfa Sensitive <=0.5/9.5 mcg/mL Final    Vancomycin Sensitive 2 mcg/mL Final                             Assessment:  Active Hospital Problems    Diagnosis   • Sinus tachycardia [R00.0]   • Cirrhosis (CMS-HCC) [K74.60]   • Alcoholism (CMS-HCC) [F10.20]   • Severe sepsis (CMS-HCC) [A41.9, R65.20]   • Hyponatremia [E87.1]   • Cellulitis [L03.90]   • CLL (chronic lymphocytic leukemia) (CMS-HCC) [C91.10]   • Continuous opioid dependence (CMS-HCC) [F11.20]       Plan:  Septic shock.   2/2 LLE cellulitis  Afebrile  Off pressors  Decreased leukocytosis - persistent  On abx below    LLE cellulitis, improved  Cx - MRSA (vanco STEPHANIE 2), Group A strep  Continue ceftaroline and clindamycin  Bcx 9/28 - NGTD  Doppler neg for DVT    Leukocytosis, persistent.  Multifactorial  Infection + CLL  On abx  Monitor    Diarrhea  Hold stool  softeners  Negative C diff  BMS placed +blood  Abd distended    Encephalopathy  2/2 above  ? Opiate withdrawal    CLL  Will impair ability to fight infection    Alcohol abuse/cirrhosis  Avoid hepatotoxic agents    Discussed with internal medicine RN

## 2017-10-07 NOTE — CARE PLAN
Problem: Skin Integrity  Goal: Risk for impaired skin integrity will decrease  Daily skin assessment complete. Continued 2 hour turns. Keep skin clean and dry. Assessed leg wound washed per wound protocol and cream applied per wound RN order. Will continue to monitor skin integrity.     Problem: Mobility  Goal: Risk for activity intolerance will decrease  Working with patient to increase activity tolerance, patient able to get to edge of bed and stand with assistance rest periods provided.

## 2017-10-07 NOTE — PROGRESS NOTES
Pulmonary Critical Care Progress Note      Date of service:  10/7/2017    Interval Events:  24 hour interval history reviewed  Reason for visit:  Severe sepsis with shock, AF with RVR, hypotension, ETOH withdrawal delirium  Unable to provide CC or ROS due to medical condition       - Raj off   - AF   - mentation improving   - anxious   - CXR with improved edema   - stop Lasix    Review of Systems   Unable to perform ROS: Medical condition     Physical Exam   Constitutional: He is well-developed, well-nourished, and in no distress. No distress.   HENT:   Head: Normocephalic and atraumatic.   Right Ear: External ear normal.   Left Ear: External ear normal.   Nose: Nose normal.   Mouth/Throat: Oropharynx is clear and moist. No oropharyngeal exudate.   Eyes: Conjunctivae and EOM are normal. Pupils are equal, round, and reactive to light. Right eye exhibits no discharge. Left eye exhibits no discharge. No scleral icterus.   Neck: Normal range of motion. Neck supple. No JVD present. No tracheal deviation present.   Cardiovascular: Normal rate, regular rhythm, normal heart sounds and intact distal pulses.    Pulmonary/Chest: Effort normal and breath sounds normal. No stridor. No respiratory distress. He has no wheezes. He has no rales.   Abdominal: Soft. Bowel sounds are normal. He exhibits no distension. There is no tenderness. There is no rebound and no guarding.   Musculoskeletal: Normal range of motion.   Neurological: No cranial nerve deficit. Coordination normal.   More alert, but confused.  Moves all 4.   Skin: Skin is warm and dry. He is not diaphoretic. There is erythema (left leg erythma improving).       PFSH:  No change.    Respiratory:     Pulse Oximetry: 95 %  CXR personally reviewed  CXR with improved pulmonary edema    HemoDynamics:  Pulse: (!) 116, Heart Rate (Monitored): (!) 115  NIBP: 119/62  CVP (mm Hg): (!) 19 MM HG    Neuro:    Fluids:  Intake/Output       10/05/17 0700 - 10/06/17 0659 10/06/17 0700 -  10/07/17 0659 10/07/17 0700 - 10/08/17 0659       Total  Total  Total       Intake    I.V.  337.2  335.8 673  1.9  270 271.9  --  -- --    Phenylephrine Volume 67.2 65.8 133 1.9 -- 1.9 -- -- --    IV Piggyback Volume 150 150 300 -- 150 150 -- -- --    IV Volume (NS) 120 120 240 -- 120 120 -- -- --    Other  60  150 210  30  150 180  --  -- --    Medications (P.O./ Enteral Liquids) 60 150 210 30 150 180 -- -- --    Enteral  660  900 1560  750  940 1690  --  -- --    Enteral Volume   -- -- --    Free Water / Tube Flush 60 240 300 90 240 330 -- -- --    Total Intake 1057.2 1385.8 2443 781.9 1360 2141.9 -- -- --       Output    Urine  950  -- 950  --  -- --  --  -- --    Number of Times Voided -- 1200 x 1200 x 1160 x 2450 x 3610 x -- -- --    Indwelling Cathether 950 -- 950 -- -- -- -- -- --    Stool  --  -- --  --  -- --  --  -- --    Number of Times Stooled 5 x 2 x 7 x 203 x -- 203 x -- -- --    Total Output 950 -- 950 -- -- -- -- -- --       Net I/O     107.2 1385.8 1493 781.9 1360 2141.9 -- -- --        Weight: 69.8 kg (153 lb 14.1 oz)  Recent Labs      10/05/17   0430  10/06/17   0440  10/07/17   0550   SODIUM  135  135  135   POTASSIUM  5.0  4.4  4.2   CHLORIDE  102  103  101   CO2  27  25  27   BUN  20  18  18   CREATININE  1.06  0.86  0.88   MAGNESIUM  1.9  2.2  2.1   PHOSPHORUS  2.6  2.7  3.1   CALCIUM  8.8  8.7  9.0       GI/Nutrition:    Liver Function  Recent Labs      10/05/17   0430  10/06/17   0440  10/07/17   0550   ALTSGPT  6  7  6   ASTSGOT  21  19  16   ALKPHOSPHAT  99  104*  84   TBILIRUBIN  0.9  0.8  0.9   GLUCOSE  114*  109*  110*       Heme:  Recent Labs      10/05/17   0430  10/06/17   0440  10/07/17   0550   RBC  2.34*  2.36*  2.09*   HEMOGLOBIN  8.3*  8.4*  7.2*   HEMATOCRIT  24.6*  24.7*  22.0*   PLATELETCT  100*  102*  98*       Infectious Disease:  Temp  Av.9 °C (98.5 °F)  Min: 36.6 °C (97.9 °F)  Max: 37.2  °C (99 °F)    Recent Labs      10/05/17   0430  10/06/17   0440  10/07/17   0550   WBC  17.0*  17.5*  12.3*   NEUTSPOLYS  28.00*  46.10   --    LYMPHOCYTES  71.00*  52.20*   --    MONOCYTES  0.00  0.00   --    EOSINOPHILS  0.00  0.00   --    BASOPHILS  0.00  0.00   --    ASTSGOT  21  19  16   ALTSGPT  6  7  6   ALKPHOSPHAT  99  104*  84   TBILIRUBIN  0.9  0.8  0.9     Current Facility-Administered Medications   Medication Dose Frequency Provider Last Rate Last Dose   • albumin human 25% solution 25 g  25 g Q6HRS Rohit Albarado M.D. 150 mL/hr at 10/07/17 0541 25 g at 10/07/17 0541   • furosemide (LASIX) injection 20 mg  20 mg Q12HRS Rohit Albarado M.D.   20 mg at 10/06/17 1953   • potassium bicarbonate (KLYTE) 25 MEQ effervescent tablet TBEF 25 mEq  25 mEq BID Rohit Albarado M.D.   25 mEq at 10/06/17 1952   • haloperidol lactate (HALDOL) injection 2-5 mg  2-5 mg Q4HRS PRN Rohit Albarado M.D.   5 mg at 10/06/17 1831   • oxycodone immediate-release (ROXICODONE) tablet 5 mg  5 mg Q6HRS PRN Rohit Blackwell M.D.   5 mg at 10/07/17 0203   • enoxaparin (LOVENOX) inj 40 mg  40 mg DAILY Rohit Albarado M.D.   40 mg at 10/06/17 0802   • phenylephrine (JEWEL-SYNEPHRINE) 40,000 mcg in  mL Infusion  0-300 mcg/min Continuous Rohit Albarado M.D.   Stopped at 10/06/17 0630   • digoxin (LANOXIN) tablet 125 mcg  125 mcg DAILY AT 1800 LIBAN Jimenez.OColin   125 mcg at 10/06/17 1817   • clindamycin (CLEOCIN) IVPB premix 900 mg  900 mg Q8HRS XIANG JimenezO. 50 mL/hr at 10/07/17 0542 900 mg at 10/07/17 0542   • MD ALERT...Adult ICU Electrolyte Replacement per Pharmacy Protocol   PRN Zain Kelly Jr., D.O.       • ceftaroline (TEFLARO) 600 mg in  mL IVPB  600 mg Q12HRS Ana Benavidez M.D.   Stopped at 10/06/17 2052   • folic acid (FOLVITE) tablet 1 mg  1 mg DAILY Brit Chavez, A.P.R.N.   1 mg at 10/06/17 0801   • therapeutic multivitamin-minerals  (THERAGRAN-M) tablet 1 Tab  1 Tab DAILY Brit Chavez, A.P.R.N.   1 Tab at 10/06/17 0801   • thiamine (THIAMINE) tablet 100 mg  100 mg DAILY Brit Chavez, A.P.R.N.   100 mg at 10/06/17 0801   • NS infusion   Continuous Rohit Albarado M.D. 10 mL/hr at 10/05/17 1402     • duloxetine (CYMBALTA) capsule 60 mg  60 mg DAILY Evelyn Blackwell M.D.   60 mg at 10/06/17 0801   • levothyroxine (SYNTHROID) tablet 25 mcg  25 mcg AM ES Havesindy Blackwell M.D.   25 mcg at 10/07/17 0542   • trazodone (DESYREL) tablet 100 mg  100 mg Nightly Havesindy Blackwell M.D.   100 mg at 10/06/17 1952   • ondansetron (ZOFRAN) syringe/vial injection 4 mg  4 mg Q4HRS PRN Evelyn Blackwell M.D.   4 mg at 10/01/17 0900   • ondansetron (ZOFRAN ODT) dispertab 4 mg  4 mg Q4HRS PRN Evelyn Blackwell M.D.         Last reviewed on 9/29/2017  2:17 AM by Klesi Layton, R.N.    Quality  Measures:  Labs reviewed, Medications reviewed and Radiology images reviewed                        Assessment and Plan:    Acute hypoxemic respiratory failure   - cont oxygen  Acute pulmonary edema   - improved   - stop Lasix  Severe sepsis with shock - skin and soft tissue source   - improved   - off vasopressor  LE cellulitis   - MRSA and Strep species   - cont ceftaroline and clindamycin per ID   - wound care  AF - improved rate control   - cont digoxin   - optimize K and Mg  Alcohol withdrawal delirium   - improving   - Haldol as necessary  ETOH abuse   - vitamins  Cirrhosis  CLL    Discussed with RN, RT, Team

## 2017-10-07 NOTE — CARE PLAN
Problem: Safety  Goal: Will remain free from injury  Outcome: PROGRESSING SLOWER THAN EXPECTED  Patient in restraints. Pulling lines, catheters.    Problem: Skin Integrity  Goal: Risk for impaired skin integrity will decrease  Outcome: PROGRESSING AS EXPECTED  BMS in place, Q 2 hour turns, barrier wipes used.

## 2017-10-08 ENCOUNTER — APPOINTMENT (OUTPATIENT)
Dept: RADIOLOGY | Facility: MEDICAL CENTER | Age: 72
DRG: 871 | End: 2017-10-08
Attending: INTERNAL MEDICINE
Payer: COMMERCIAL

## 2017-10-08 LAB
ALBUMIN SERPL BCP-MCNC: 3.2 G/DL (ref 3.2–4.9)
ALBUMIN/GLOB SERPL: 1 G/DL
ALP SERPL-CCNC: 83 U/L (ref 30–99)
ALT SERPL-CCNC: 7 U/L (ref 2–50)
ANION GAP SERPL CALC-SCNC: 7 MMOL/L (ref 0–11.9)
ANISOCYTOSIS BLD QL SMEAR: ABNORMAL
AST SERPL-CCNC: 16 U/L (ref 12–45)
BASOPHILS # BLD AUTO: 0 % (ref 0–1.8)
BASOPHILS # BLD: 0 K/UL (ref 0–0.12)
BILIRUB SERPL-MCNC: 0.9 MG/DL (ref 0.1–1.5)
BUN SERPL-MCNC: 20 MG/DL (ref 8–22)
CALCIUM SERPL-MCNC: 9 MG/DL (ref 8.5–10.5)
CHLORIDE SERPL-SCNC: 103 MMOL/L (ref 96–112)
CO2 SERPL-SCNC: 26 MMOL/L (ref 20–33)
CREAT SERPL-MCNC: 0.94 MG/DL (ref 0.5–1.4)
EOSINOPHIL # BLD AUTO: 0 K/UL (ref 0–0.51)
EOSINOPHIL NFR BLD: 0 % (ref 0–6.9)
ERYTHROCYTE [DISTWIDTH] IN BLOOD BY AUTOMATED COUNT: 56.5 FL (ref 35.9–50)
GFR SERPL CREATININE-BSD FRML MDRD: >60 ML/MIN/1.73 M 2
GLOBULIN SER CALC-MCNC: 3.1 G/DL (ref 1.9–3.5)
GLUCOSE SERPL-MCNC: 108 MG/DL (ref 65–99)
HCT VFR BLD AUTO: 22.4 % (ref 42–52)
HGB BLD-MCNC: 7.4 G/DL (ref 14–18)
LYMPHOCYTES # BLD AUTO: 9.29 K/UL (ref 1–4.8)
LYMPHOCYTES NFR BLD: 70.4 % (ref 22–41)
MACROCYTES BLD QL SMEAR: ABNORMAL
MAGNESIUM SERPL-MCNC: 2 MG/DL (ref 1.5–2.5)
MANUAL DIFF BLD: NORMAL
MCH RBC QN AUTO: 34.9 PG (ref 27–33)
MCHC RBC AUTO-ENTMCNC: 33 G/DL (ref 33.7–35.3)
MCV RBC AUTO: 105.7 FL (ref 81.4–97.8)
MONOCYTES # BLD AUTO: 0 K/UL (ref 0–0.85)
MONOCYTES NFR BLD AUTO: 0 % (ref 0–13.4)
MORPHOLOGY BLD-IMP: NORMAL
NEUTROPHILS # BLD AUTO: 3.91 K/UL (ref 1.82–7.42)
NEUTROPHILS NFR BLD: 29.6 % (ref 44–72)
NRBC # BLD AUTO: 0 K/UL
NRBC BLD AUTO-RTO: 0 /100 WBC
PHOSPHATE SERPL-MCNC: 3.4 MG/DL (ref 2.5–4.5)
PLATELET # BLD AUTO: 116 K/UL (ref 164–446)
PLATELET BLD QL SMEAR: NORMAL
PMV BLD AUTO: 10.1 FL (ref 9–12.9)
POTASSIUM SERPL-SCNC: 4.7 MMOL/L (ref 3.6–5.5)
PROT SERPL-MCNC: 6.3 G/DL (ref 6–8.2)
RBC # BLD AUTO: 2.12 M/UL (ref 4.7–6.1)
RBC BLD AUTO: PRESENT
SODIUM SERPL-SCNC: 136 MMOL/L (ref 135–145)
TOXIC GRANULES BLD QL SMEAR: SLIGHT
WBC # BLD AUTO: 13.2 K/UL (ref 4.8–10.8)

## 2017-10-08 PROCEDURE — 700111 HCHG RX REV CODE 636 W/ 250 OVERRIDE (IP): Performed by: HOSPITALIST

## 2017-10-08 PROCEDURE — 700105 HCHG RX REV CODE 258: Performed by: INTERNAL MEDICINE

## 2017-10-08 PROCEDURE — 99233 SBSQ HOSP IP/OBS HIGH 50: CPT | Performed by: HOSPITALIST

## 2017-10-08 PROCEDURE — G8997 SWALLOW GOAL STATUS: HCPCS | Mod: CH

## 2017-10-08 PROCEDURE — A9270 NON-COVERED ITEM OR SERVICE: HCPCS | Performed by: HOSPITALIST

## 2017-10-08 PROCEDURE — 700111 HCHG RX REV CODE 636 W/ 250 OVERRIDE (IP): Performed by: INTERNAL MEDICINE

## 2017-10-08 PROCEDURE — 770020 HCHG ROOM/CARE - TELE (206)

## 2017-10-08 PROCEDURE — 80053 COMPREHEN METABOLIC PANEL: CPT

## 2017-10-08 PROCEDURE — 92610 EVALUATE SWALLOWING FUNCTION: CPT

## 2017-10-08 PROCEDURE — G8996 SWALLOW CURRENT STATUS: HCPCS | Mod: CK

## 2017-10-08 PROCEDURE — 700102 HCHG RX REV CODE 250 W/ 637 OVERRIDE(OP): Performed by: HOSPITALIST

## 2017-10-08 PROCEDURE — 700102 HCHG RX REV CODE 250 W/ 637 OVERRIDE(OP): Performed by: NURSE PRACTITIONER

## 2017-10-08 PROCEDURE — 71010 DX-CHEST-PORTABLE (1 VIEW): CPT

## 2017-10-08 PROCEDURE — A9270 NON-COVERED ITEM OR SERVICE: HCPCS | Performed by: NURSE PRACTITIONER

## 2017-10-08 PROCEDURE — 85027 COMPLETE CBC AUTOMATED: CPT

## 2017-10-08 PROCEDURE — 700105 HCHG RX REV CODE 258

## 2017-10-08 PROCEDURE — 700101 HCHG RX REV CODE 250: Performed by: HOSPITALIST

## 2017-10-08 PROCEDURE — 83735 ASSAY OF MAGNESIUM: CPT

## 2017-10-08 PROCEDURE — 85007 BL SMEAR W/DIFF WBC COUNT: CPT

## 2017-10-08 PROCEDURE — 84100 ASSAY OF PHOSPHORUS: CPT

## 2017-10-08 RX ORDER — SODIUM CHLORIDE 9 MG/ML
INJECTION, SOLUTION INTRAVENOUS
Status: COMPLETED
Start: 2017-10-08 | End: 2017-10-08

## 2017-10-08 RX ORDER — FUROSEMIDE 10 MG/ML
20 INJECTION INTRAMUSCULAR; INTRAVENOUS
Status: DISCONTINUED | OUTPATIENT
Start: 2017-10-08 | End: 2017-10-13

## 2017-10-08 RX ORDER — POTASSIUM CHLORIDE 20 MEQ/1
40 TABLET, EXTENDED RELEASE ORAL ONCE
Status: COMPLETED | OUTPATIENT
Start: 2017-10-08 | End: 2017-10-08

## 2017-10-08 RX ADMIN — SODIUM CHLORIDE 500 ML: 9 INJECTION, SOLUTION INTRAVENOUS at 05:13

## 2017-10-08 RX ADMIN — OXYCODONE HYDROCHLORIDE 5 MG: 5 TABLET ORAL at 17:31

## 2017-10-08 RX ADMIN — DIGOXIN 125 MCG: 125 TABLET ORAL at 17:31

## 2017-10-08 RX ADMIN — LEVOTHYROXINE SODIUM 25 MCG: 50 TABLET ORAL at 05:12

## 2017-10-08 RX ADMIN — FOLIC ACID 1 MG: 1 TABLET ORAL at 09:40

## 2017-10-08 RX ADMIN — SPIRONOLACTONE 100 MG: 100 TABLET, FILM COATED ORAL at 10:47

## 2017-10-08 RX ADMIN — MULTIPLE VITAMINS W/ MINERALS TAB 1 TABLET: TAB at 10:49

## 2017-10-08 RX ADMIN — CLINDAMYCIN IN 5 PERCENT DEXTROSE 900 MG: 18 INJECTION, SOLUTION INTRAVENOUS at 22:32

## 2017-10-08 RX ADMIN — POTASSIUM CHLORIDE 40 MEQ: 1500 TABLET, EXTENDED RELEASE ORAL at 10:47

## 2017-10-08 RX ADMIN — CEFTAROLINE FOSAMIL 600 MG: 600 POWDER, FOR SOLUTION INTRAVENOUS at 09:40

## 2017-10-08 RX ADMIN — ENOXAPARIN SODIUM 40 MG: 100 INJECTION SUBCUTANEOUS at 09:39

## 2017-10-08 RX ADMIN — OXYCODONE HYDROCHLORIDE 5 MG: 5 TABLET ORAL at 10:49

## 2017-10-08 RX ADMIN — CLINDAMYCIN IN 5 PERCENT DEXTROSE 900 MG: 18 INJECTION, SOLUTION INTRAVENOUS at 15:44

## 2017-10-08 RX ADMIN — OXYCODONE HYDROCHLORIDE 5 MG: 5 TABLET ORAL at 04:10

## 2017-10-08 RX ADMIN — THIAMINE HCL TAB 100 MG 100 MG: 100 TAB at 09:40

## 2017-10-08 RX ADMIN — DULOXETINE HYDROCHLORIDE 60 MG: 60 CAPSULE, DELAYED RELEASE ORAL at 09:40

## 2017-10-08 RX ADMIN — FUROSEMIDE 20 MG: 10 INJECTION, SOLUTION INTRAMUSCULAR; INTRAVENOUS at 17:08

## 2017-10-08 RX ADMIN — FUROSEMIDE 20 MG: 10 INJECTION, SOLUTION INTRAMUSCULAR; INTRAVENOUS at 10:49

## 2017-10-08 RX ADMIN — TRAZODONE HYDROCHLORIDE 100 MG: 50 TABLET ORAL at 20:40

## 2017-10-08 RX ADMIN — CLINDAMYCIN IN 5 PERCENT DEXTROSE 900 MG: 18 INJECTION, SOLUTION INTRAVENOUS at 05:12

## 2017-10-08 RX ADMIN — CEFTAROLINE FOSAMIL 600 MG: 600 POWDER, FOR SOLUTION INTRAVENOUS at 20:40

## 2017-10-08 ASSESSMENT — PAIN SCALES - GENERAL
PAINLEVEL_OUTOF10: 8
PAINLEVEL_OUTOF10: 4
PAINLEVEL_OUTOF10: 10
PAINLEVEL_OUTOF10: 4

## 2017-10-08 ASSESSMENT — ENCOUNTER SYMPTOMS
HEADACHES: 0
EYE PAIN: 0
DEPRESSION: 0
SHORTNESS OF BREATH: 0
NAUSEA: 0
FEVER: 0
VOMITING: 0
COUGH: 0
INSOMNIA: 0
SORE THROAT: 0
CHILLS: 0
BACK PAIN: 0
BLURRED VISION: 0
PALPITATIONS: 0
TINGLING: 0
DIZZINESS: 0
NECK PAIN: 0
ABDOMINAL PAIN: 0

## 2017-10-08 NOTE — PROGRESS NOTES
[facility] Hospitalist Progress Note    Date of Service: 10/8/2017    Chief Complaint  72 y.o. male admitted 2017 with L leg cellulitis and sepsis/shock.    transfered to CICU on  with hypotension/shock and episodes of SVT  later complicated by severe alcohol withdrawal.     Interval Problem Update  A/o  Afib   [place] with 750 out  Right IJ 3x lumen  Mobilizing but weak  On lovenox  On iv abx per id        Pt/ot  Restart lasix/k        Consultants/Specialty  Pulmonology    Disposition  Icu  Critical care time with active titration of meds for hypotension 33min        Review of Systems   Constitutional: Negative for chills and fever.   HENT: Negative for sore throat.    Eyes: Negative for blurred vision and pain.   Respiratory: Negative for cough and shortness of breath.    Cardiovascular: Negative for chest pain and palpitations.   Gastrointestinal: Negative for abdominal pain, nausea and vomiting.   Genitourinary: Negative for dysuria and urgency.   Musculoskeletal: Negative for back pain and neck pain.        Whole body pain   Skin: Negative for itching and rash.   Neurological: Negative for dizziness, tingling and headaches.   Psychiatric/Behavioral: Negative for depression. The patient does not have insomnia.    All other systems reviewed and are negative.     Physical Exam  Laboratory/Imaging   Hemodynamics  Temp (24hrs), Av.8 °C (98.3 °F), Min:36.5 °C (97.7 °F), Max:37.2 °C (99 °F)   Temperature: 37.2 °C (99 °F)  Pulse  Av.1  Min: 60  Max: 188 Heart Rate (Monitored): 80  NIBP: 108/51 CVP (mm Hg): (!) -50 MM HG    Respiratory      Respiration: (!) 26, Pulse Oximetry: 99 %        RUL Breath Sounds: Diminished, RML Breath Sounds: Diminished, RLL Breath Sounds: Diminished, MONICA Breath Sounds: Diminished, LLL Breath Sounds: Diminished    Fluids    Intake/Output Summary (Last 24 hours) at 10/08/17 0868  Last data filed at 10/08/17 0752   Gross per 24 hour   Intake             2160 ml   Output              1855 ml   Net              305 ml       Nutrition  No orders of the defined types were placed in this encounter.    Physical Exam   Constitutional: He appears well-developed and well-nourished. No distress.   HENT:   Right Ear: External ear normal.   Left Ear: External ear normal.   Nose: Nose normal.   Eyes: Right eye exhibits no discharge. Left eye exhibits no discharge. No scleral icterus.   Neck: No JVD present. No tracheal deviation present.   Cardiovascular: Normal rate, normal heart sounds and intact distal pulses.  Exam reveals no gallop.    No murmur heard.  Pulmonary/Chest: Effort normal. He has no wheezes. He has no rales.   Abdominal: Soft. Bowel sounds are normal. He exhibits no distension. There is no tenderness.   Musculoskeletal: He exhibits edema. He exhibits no tenderness.   Left leg erythema improving. Peeling skin.    Neurological: He is alert. He is disoriented.   Skin: Skin is warm. No rash noted. He is not diaphoretic. There is erythema.   Psychiatric: His affect is labile. Thought content is delusional.   Nursing note and vitals reviewed.      Recent Labs      10/06/17   0440  10/07/17   0550  10/08/17   0517   WBC  17.5*  12.3*  13.2*   RBC  2.36*  2.09*  2.12*   HEMOGLOBIN  8.4*  7.2*  7.4*   HEMATOCRIT  24.7*  22.0*  22.4*   MCV  104.7*  105.3*  105.7*   MCH  35.6*  34.4*  34.9*   MCHC  34.0  32.7*  33.0*   RDW  55.7*  55.7*  56.5*   PLATELETCT  102*  98*  116*   MPV  9.7  10.1  10.1     Recent Labs      10/06/17   0440  10/07/17   0550  10/08/17   0517   SODIUM  135  135  136   POTASSIUM  4.4  4.2  4.7   CHLORIDE  103  101  103   CO2  25  27  26   GLUCOSE  109*  110*  108*   BUN  18  18  20   CREATININE  0.86  0.88  0.94   CALCIUM  8.7  9.0  9.0                      Assessment/Plan     Cirrhosis (CMS-HCC)- (present on admission)   Assessment & Plan    Chronic, stable with no e/o decompensation  Restart spironolactone        Alcohol withdrawal (CMS-HCC)- (present on admission)    Assessment & Plan    Resolved s/p Phenobarb protocol.         Severe sepsis (CMS-HCC)- (present on admission)   Assessment & Plan    This is sepsis (without associated acute organ dysfunction).   2/2 left leg cellulitis   Cultures positive for MRSA   Iv abx per ID  resolved        Hyponatremia   Assessment & Plan    Resolved.         Acute respiratory failure (CMS-HCC)   Assessment & Plan    Resolving. Diuresis as needed. Rt protocol.         Cellulitis- (present on admission)   Assessment & Plan    abx for mrsa, wound care.         CLL (chronic lymphocytic leukemia) (CMS-HCC)- (present on admission)   Assessment & Plan    Followed by Dr. Flores outpatient, chronically elevated WBC count.        Continuous opioid dependence (CMS-HCC)- (present on admission)   Assessment & Plan    Decreased dose.   Would avoid escalation  He constantly askes for more pain meds.             Reviewed items::  EKG reviewed, Radiology images reviewed, Labs reviewed and Medications reviewed  DVT prophylaxis pharmacological::  Heparin  DVT prophylaxis - mechanical:  SCDs  Ulcer Prophylaxis::  Not indicated  Antibiotics:  Treating active infection/contamination beyond 24 hours perioperative coverage

## 2017-10-08 NOTE — THERAPY
"Speech Language Therapy Clinical Swallow Evaluation completed.    Functional Status:  Pt was AAO to self and hospital with confusion noted, however he was able to follow 1-2 step directives appropriately. Voice and cough was strong, and laryngeal elevation palpated as complete.  Pt consumed PO trials of ice chips, nectars and purees without any overt s/sx of aspiration.  Small sips of thins resulted in an increase in WOB and wet vocal quality, which can be concerning for possible aspiration or penetration.  Pt politely declining trials of soft solids, reporting, \"I just like the smooth things and drinks today.\"  Recommend a nectar thick full liquid diet with direct supervision as his confusion places him at risk for aspiration.      Recommendations - Diet: Diet / Liquid Recommendation: Nectar Thick Full Liquid                          Strategies: Direct supervision during meals, No Straws and Head of Bed at 90 Degrees                          Medication Administration: Medication Administration : Crush all Medications in Puree    Plan of Care: Will benefit from Speech Therapy 3 times per week    Post-Acute Therapy: Discharge to a transitional care facility for continued skilled therapy services.    See \"Rehab Therapy-Acute\" Patient Summary Report for complete documentation. Thank you for the consult  "

## 2017-10-08 NOTE — CARE PLAN
Problem: Pain Management  Goal: Pain level will decrease to patient's comfort goal  Outcome: PROGRESSING AS EXPECTED  Pain goals discussed with the patient. Rest, reposition, and TV/distraction interventions in place. Pt medicated per MD order, see MAR.     Problem: Mobility  Goal: Risk for activity intolerance will decrease  Outcome: PROGRESSING AS EXPECTED  Pt mobilized to edge of bed. Pt able to stand and march in place for a few moments. Tolerated well. Range of motion exercises encouraged.

## 2017-10-08 NOTE — PROGRESS NOTES
Pulmonary Critical Care Progress Note    Date of service:  10/8/2017    Interval Events:  24 hour interval history reviewed  Reason for visit:  Severe sepsis with shock, AF with RVR, hypotension, ETOH withdrawal delirium  Unable to provide CC or ROS due to medical condition       - AF   - CXR with increased edema   - Lasix and aldactone   - tele      Review of Systems   Unable to perform ROS: Medical condition     Physical Exam   Constitutional: He is well-developed, well-nourished, and in no distress. No distress.   HENT:   Head: Normocephalic and atraumatic.   Right Ear: External ear normal.   Left Ear: External ear normal.   Nose: Nose normal.   Mouth/Throat: Oropharynx is clear and moist. No oropharyngeal exudate.   Eyes: Conjunctivae and EOM are normal. Pupils are equal, round, and reactive to light. Right eye exhibits no discharge. Left eye exhibits no discharge. No scleral icterus.   Neck: Normal range of motion. Neck supple. No JVD present. No tracheal deviation present.   Cardiovascular: Normal rate, regular rhythm, normal heart sounds and intact distal pulses.    Pulmonary/Chest: Effort normal. No stridor. No respiratory distress. He has no wheezes. He has rales (crackles at the bases).   Abdominal: Soft. Bowel sounds are normal. He exhibits no distension. There is no tenderness. There is no rebound and no guarding.   Musculoskeletal: Normal range of motion.   Neurological: No cranial nerve deficit. Coordination normal.   Remains confused.  Moves all 4.   Skin: Skin is warm and dry. He is not diaphoretic. There is erythema (left leg erythma improving).       PFSH:  No change.    Respiratory:     Pulse Oximetry: 93 %  CXR personally reviewed  CXR with increased pulmonary edema    HemoDynamics:  Pulse: 99, Heart Rate (Monitored): 93  NIBP: (!) 74/31  CVP (mm Hg): (!) -50 MM HG    Neuro:    Fluids:  Intake/Output       10/06/17 0700 - 10/07/17 0659 10/07/17 0700 - 10/08/17 0659 10/08/17 0700 - 10/09/17 0659        Total  Total  Total       Intake    I.V.  1.9  270 271.9  220  290 510  --  -- --    Phenylephrine Volume 1.9 -- 1.9 -- -- -- -- -- --    IV Piggyback Volume -- 150 150 100 200 300 -- -- --    IV Volume (NS) -- 120 120 120 90 210 -- -- --    Other  30  150 180  120  60 180  --  -- --    Medications (P.O./ Enteral Liquids) 30 150 180 120 60 180 -- -- --    Enteral  750  940 1690  990  840 1830  --  -- --    Enteral Volume   -- -- --    Free Water / Tube Flush 90 240 330 270 180 450 -- -- --    Total Intake 781.9 1360 2141.9 1330 1190 2520 -- -- --       Output    Urine  --  750 750  1200  750 1950  --  -- --    Number of Times Voided 1160 x 1700 x 2860 x -- -- -- -- -- --    Indwelling Cathether --  750 1950 -- -- --    Stool  --  -- --  --  -- --  --  -- --    Number of Times Stooled 203 x -- 203 x -- -- -- -- -- --    Total Output --  750 1950 -- -- --       Net I/O     781.9 610 1391.9 130 440 570 -- -- --        Weight: 69.1 kg (152 lb 5.4 oz)  Recent Labs      10/06/17   0440  10/07/17   0550  10/08/17   0517   SODIUM  135  135  136   POTASSIUM  4.4  4.2  4.7   CHLORIDE  103  101  103   CO2  25  27  26   BUN  18  18  20   CREATININE  0.86  0.88  0.94   MAGNESIUM  2.2  2.1  2.0   PHOSPHORUS  2.7  3.1  3.4   CALCIUM  8.7  9.0  9.0       GI/Nutrition:    Liver Function  Recent Labs      10/06/17   0440  10/07/17   0550  10/08/17   0517   ALTSGPT  7  6  7   ASTSGOT  19  16  16   ALKPHOSPHAT  104*  84  83   TBILIRUBIN  0.8  0.9  0.9   GLUCOSE  109*  110*  108*       Heme:  Recent Labs      10/06/17   0440  10/07/17   0550  10/08/17   0517   RBC  2.36*  2.09*  2.12*   HEMOGLOBIN  8.4*  7.2*  7.4*   HEMATOCRIT  24.7*  22.0*  22.4*   PLATELETCT  102*  98*  116*       Infectious Disease:  Temp  Av.8 °C (98.2 °F)  Min: 36.5 °C (97.7 °F)  Max: 37 °C (98.6 °F)    Recent Labs      10/06/17   0440  10/07/17    0550  10/08/17   0517   WBC  17.5*  12.3*  13.2*   NEUTSPOLYS  46.10  40.40*   --    LYMPHOCYTES  52.20*  57.00*   --    MONOCYTES  0.00  0.00   --    EOSINOPHILS  0.00  0.00   --    BASOPHILS  0.00  0.00   --    ASTSGOT  19  16  16   ALTSGPT  7  6  7   ALKPHOSPHAT  104*  84  83   TBILIRUBIN  0.8  0.9  0.9     Current Facility-Administered Medications   Medication Dose Frequency Provider Last Rate Last Dose   • spironolactone (ALDACTONE) tablet 100 mg  100 mg DAILY Rohit Blackwell M.D.       • haloperidol lactate (HALDOL) injection 2-5 mg  2-5 mg Q4HRS PRN Rohit Albarado M.D.   5 mg at 10/06/17 1831   • oxycodone immediate-release (ROXICODONE) tablet 5 mg  5 mg Q6HRS PRN Rohit Blackwell M.D.   5 mg at 10/08/17 0410   • enoxaparin (LOVENOX) inj 40 mg  40 mg DAILY Rohit Albarado M.D.   40 mg at 10/07/17 0805   • digoxin (LANOXIN) tablet 125 mcg  125 mcg DAILY AT 1800 LIBAN Jimenez.OColin   125 mcg at 10/07/17 1703   • clindamycin (CLEOCIN) IVPB premix 900 mg  900 mg Q8HRS Mode Mireles D.O. 50 mL/hr at 10/08/17 0512 900 mg at 10/08/17 0512   • MD ALERT...Adult ICU Electrolyte Replacement per Pharmacy Protocol   PRN Zain Kelly Jr., D.O.       • ceftaroline (TEFLARO) 600 mg in  mL IVPB  600 mg Q12HRS Ana Benavidez M.D.   Stopped at 10/07/17 2044   • folic acid (FOLVITE) tablet 1 mg  1 mg DAILY Brit Chavez, A.P.R.N.   1 mg at 10/07/17 0805   • therapeutic multivitamin-minerals (THERAGRAN-M) tablet 1 Tab  1 Tab DAILY Brit Chavez, A.P.R.N.   1 Tab at 10/07/17 0805   • thiamine (THIAMINE) tablet 100 mg  100 mg DAILY SUMEET KelleyP.R.NColin   100 mg at 10/07/17 0804   • duloxetine (CYMBALTA) capsule 60 mg  60 mg DAILY Evelyn Blackwell M.D.   60 mg at 10/07/17 0804   • levothyroxine (SYNTHROID) tablet 25 mcg  25 mcg AM ES Evelyn Blackwell M.D.   25 mcg at 10/08/17 0512   • trazodone (DESYREL) tablet 100 mg  100 mg Nightly Evelyn Blackwell M.D.   100 mg at 10/1944   •  ondansetron (ZOFRAN) syringe/vial injection 4 mg  4 mg Q4HRS PRN Evelyn Blackwell M.D.   4 mg at 10/01/17 0900   • ondansetron (ZOFRAN ODT) dispertab 4 mg  4 mg Q4HRS PRN Evelyn Blackwell M.D.         Last reviewed on 9/29/2017  2:17 AM by Kelsi Layton R.N.    Quality  Measures:   Labs reviewed, Medications reviewed and Radiology images reviewed                        Assessment and Plan:    Acute hypoxemic respiratory failure   - cont oxygen  Acute pulmonary edema   - worse   - resume Lasix  Severe sepsis with shock - skin and soft tissue source   - shock resolved - off vasopressor  LE cellulitis   - MRSA and Strep species   - cont ceftaroline and clindamycin per ID   - wound care  AF - improved rate control   - cont digoxin   - optimize K and Mg  Alcohol withdrawal delirium   - improving   - Haldol as necessary  ETOH abuse   - vitamins  Cirrhosis   - start Aldactone  CLL    OK to transfer out of ICU.  [facility] will sign off on transfer.  Please call if you have any questions.    Discussed with RN, RT, Team

## 2017-10-08 NOTE — PROGRESS NOTES
Infectious Disease Progress Note    Author: Oma Hancock M.D. Date & Time of service: 10/8/2017  10:37 AM    Chief Complaint:  FU sepsis/LLE cellulitis    Interval History:  10/2 AF, WBC 27.5, confused and in pain  10/3 AF, WBC 22.9, remains confused and mumbling  10/4 AF, WBC 15.7, pt obtunded, no overnight events per RN  10/5 AF, WBC 17, mumbling, does not engage in questioning or open eyes, grimaces when RLE examined, remains on pressors, lots of diarrhea, stool softeners held  10/6 AF WBC 17.5 abd more distended BMS placed +blood  10/7 AF WBC 12.3 obtunded now but per RN better earlier  10/8 AF WBC 13 wants something to drink-plan for swallowing study later. Denies SE abx  Labs Reviewed, Medications Reviewed, Radiology Reviewed and Wound Reviewed.    Review of Systems:  Review of Systems   Unable to perform ROS: Medical condition   Constitutional: Negative for chills and fever.       Hemodynamics:  Temp (24hrs), Av.8 °C (98.3 °F), Min:36.5 °C (97.7 °F), Max:37.2 °C (99 °F)  Temperature: 37.2 °C (99 °F)  Pulse  Av.1  Min: 60  Max: 188Heart Rate (Monitored): 80  NIBP: 108/51  CVP (mm Hg): (!) -50 MM HG    Physical Exam:  Physical Exam   Constitutional: He appears well-developed. No distress.   Thin  Older than stated age  Chronically ill   HENT:   Head: Normocephalic and atraumatic.   Eyes: EOM are normal. Pupils are equal, round, and reactive to light.   Neck: Neck supple.   R IJ catheter   Cardiovascular:   tachycardic   Pulmonary/Chest: Effort normal and breath sounds normal.   Abdominal: Soft. He exhibits distension. There is no tenderness. There is no rebound and no guarding.   Musculoskeletal: He exhibits edema and tenderness.   Erythema and edema of the LLE radiating to the left hip and medial thigh, improving significantly. Skin peeling    Left medial malleolar wound.      Neurological: He is alert.   Skin: There is erythema.   Resolving   Nursing note and vitals  reviewed.      Meds:    Current Facility-Administered Medications:   •  furosemide  •  potassium chloride SA  •  spironolactone  •  haloperidol lactate  •  oxycodone immediate-release  •  enoxaparin (LOVENOX) injection  •  digoxin  •  clindamycin (CLEOCIN) IV  •  MD ALERT...Adult ICU Electrolyte Replacement per Pharmacy Protocol  •  ceftaroline (TEFLARO) ivpb  •  folic acid  •  therapeutic multivitamin-minerals  •  thiamine  •  duloxetine  •  levothyroxine  •  trazodone  •  ondansetron  •  ondansetron    Labs:  Recent Labs      10/06/17   0440  10/07/17   0550  10/08/17   0517   WBC  17.5*  12.3*  13.2*   RBC  2.36*  2.09*  2.12*   HEMOGLOBIN  8.4*  7.2*  7.4*   HEMATOCRIT  24.7*  22.0*  22.4*   MCV  104.7*  105.3*  105.7*   MCH  35.6*  34.4*  34.9*   RDW  55.7*  55.7*  56.5*   PLATELETCT  102*  98*  116*   MPV  9.7  10.1  10.1   NEUTSPOLYS  46.10  40.40*  29.60*   LYMPHOCYTES  52.20*  57.00*  70.40*   MONOCYTES  0.00  0.00  0.00   EOSINOPHILS  0.00  0.00  0.00   BASOPHILS  0.00  0.00  0.00   RBCMORPHOLO  Present  Present  Present     Recent Labs      10/06/17   0440  10/07/17   0550  10/08/17   0517   SODIUM  135  135  136   POTASSIUM  4.4  4.2  4.7   CHLORIDE  103  101  103   CO2  25  27  26   GLUCOSE  109*  110*  108*   BUN  18  18  20     Recent Labs      10/06/17   0440  10/07/17   0550  10/08/17   0517   ALBUMIN  2.7*  3.4  3.2   TBILIRUBIN  0.8  0.9  0.9   ALKPHOSPHAT  104*  84  83   TOTPROTEIN  5.8*  6.3  6.3   ALTSGPT  7  6  7   ASTSGOT  19  16  16   CREATININE  0.86  0.88  0.94       Imaging:  Ct-abdomen-pelvis With    Result Date: 10/1/2017  10/1/2017 8:07 AM HISTORY/REASON FOR EXAM:  Diffuse abdominal pain. History of sepsis. Left lower extremity cellulitis. TECHNIQUE/EXAM DESCRIPTION: CT scan of the abdomen and pelvis with contrast. Contrast-enhanced helical scanning was obtained from the diaphragmatic domes through the pubic symphysis following the bolus administration of 100 mL of Omnipaque 350  nonionic contrast without complication. Low dose optimization technique was utilized for this CT exam including automated exposure control and adjustment of the mA and/or kV according to patient size. COMPARISON: CT 6/5/2015 FINDINGS: The visualized lung bases demonstrate small amounts of dependent pleural effusion with bibasilar dependent airspace disease, most likely atelectasis. There is no acute bony process. There is degenerative change in the lower lumbar spine and pelvis. CT Abdomen: Liver is unchanged in appearance with slight nodularity. There are no definite focal masses. Spleen is mildly enlarged measuring 13.1 cm in diameter without change. Hepatic veins are somewhat attenuated. Portal veins, splenic vein and SMV are patent. Pancreas is somewhat atrophic but otherwise unremarkable. The gallbladder demonstrates some density dependently which could be related to tumefactive sludge or noncalcified stones. There is no biliary dilatation. The adrenal glands are normal in size. Kidneys demonstrate no hydronephrosis. There are probable small cortical cysts. The abdominal aorta is normal in caliber. There is atherosclerosis of the aorta. There is no lymphadenopathy. The bowel demonstrates no evidence of bowel obstruction. There is moderate stool throughout the colon. There is a hiatal hernia. There is a small amount of fluid around the liver although this is decreased over the prior study. There is minimal fluid in both paracolic gutters. There is diffuse subcutaneous edema. CT Pelvis: There is no acute inflammatory process in the pelvis. There is free fluid in the pelvis with diffuse subcutaneous edema. There are mildly prominent lymph nodes in the inguinal regions, left greater than right with some nodes in the external iliac region as well more so on the left. The largest inguinal node is 1.5 cm short axis on the left side.. There is postoperative change involving the colon. Bladder is decompressed with a  Maradiaga catheter.     1.  There is mild colonic distention with large amount of stool throughout the colon. There is no bowel obstruction. 2.  There is a hiatal hernia. 3.  Changes of the liver with mild splenomegaly and a small amount of ascites in the abdomen and pelvis suggestive of underlying hepatocellular disease. 4.  Dependent density in the gallbladder which could be tumefactive sludge or noncalcified stones. 5.  There are small dependent bilateral pleural effusions with dependent airspace disease, likely atelectasis. 6.  There is mild lymphadenopathy in the pelvis more so on the left likely inflammatory. 7.  There is diffuse subcutaneous edema.    Ct-extremity, Lower With Left    Result Date: 10/1/2017  10/1/2017 8:07 AM HISTORY/REASON FOR EXAM:  Progression of strep/staph cellulitis with sepsis. Possible necrotizing fasciitis.. TECHNIQUE/EXAM DESCRIPTION AND NUMBER OF VIEWS:  CT scan of the LEFT lower extremity with contrast, with reconstructions. Thin helical 3 mm sections were obtained from the distal femur through the proximal tibia/fibula. Sagittal and coronal multiplanar reconstructions were generated from the axial images. A total of 100 mL of Omnipaque 350 nonionic contrast was administered  IV without complication. Up to date radiation dose reduction adjustments have been utilized to meet ALARA standards for radiation dose reduction. COMPARISON: X-rays of the foot and tibia fibula 9/29/2017, 9/28/2017 respectively. FINDINGS: Imaging of the visualized portion of the pelvis demonstrates fluid in the pelvis. Bladder is decompressed with a Maradiaga catheter. There is postoperative change involving the colon. There are mildly prominent bilateral external iliac and common femoral lymph nodes. Largest node in the left inguinal region is 1.5 cm short axis diameter. There is diffuse subcutaneous edema throughout the pelvis. Imaging throughout the left lower extremity demonstrates diffuse subcutaneous edema.  There is a small amount of fluid in the intermuscular planes along the medial aspect of the proximal lower leg adjacent to the medial gastrocnemius musculature. There is  no focal abscess collection. There is a small joint effusion of the knee. There is some mild fatty infiltration of the musculature. There is atherosclerosis but basilar structures appear grossly patent. Limited partial visualization of portions of the right lower extremity also demonstrates some mild subcutaneous edema. Bones demonstrate mild degenerative changes in the hip and left knee. There is no evidence of osteomyelitis.     1.  There is diffuse subcutaneous edema throughout the left hemipelvis and left lower extremity with a small amount of fluid adjacent to the right medial gastrocnemius at the level the proximal calf. 2.  There is no focal drainable abscess. 3.  There is no evidence of osteomyelitis. 4.  There is mild atherosclerosis with grossly patent vasculature. 5.  There are probably inflammatory lymph nodes in the pelvis and left inguinal region with the largest measuring 1.5 cm short axis. 6.  There is free fluid in the visualized pelvis.    Ct-head W/o    Result Date: 10/1/2017  HISTORY/REASON FOR EXAM:  Altered Mental Status. TECHNIQUE/EXAM DESCRIPTION: CT scan of the head without contrast, 10/1/2017 12:28 AM. Contiguous 5 mm axial sections were obtained from the skull base through the vertex. Up to date radiation dose reduction adjustments have been utilized to meet ALARA standards for radiation dose reduction. COMPARISON:  4/23/2015 FINDINGS:   The ventricular system and cortical sulci are prominent, consistent with the patient's age.  There is no midline shift or other mass effect.  A 5 mm circumscribed lucency is present in the right temporal lobe, consistent with old lacunar infarct. This is unchanged. There is no acute intra-axial abnormality or extra-axial fluid collection.  There is no intracranial hemorrhage.  The  calvaria are intact. The visualized paranasal sinuses show no unusual opacity.     1.  No acute intracranial abnormality. 2.  Moderate atrophy, age-consistent. 3.  Probable old lacunar infarct in the right temporal lobe, unchanged from the prior scan. INTERPRETING LOCATION:  [address] [facility], [place], [address]    Dx-chest-portable (1 View)    Result Date: 9/29/2017 9/29/2017 1:25 PM HISTORY/REASON FOR EXAM:  Line placement. TECHNIQUE/EXAM DESCRIPTION AND NUMBER OF VIEWS: Single portable view of the chest. COMPARISON: 9/28/2017. FINDINGS: The soft tissues and bony structures are unremarkable. The heart and mediastinal structures are within normal limits. Pulmonary vascularity is normal. There is right basilar atelectasis. There is no effusion or pneumothorax. There has been interval insertion of a central venous catheter which terminates with the tip projecting over the expected region of the mid to distal superior vena cava.     1.  Interval insertion of a central venous catheter which terminates with the tip projecting over the expected region of the mid to distal superior vena cava. 2.  Right basilar atelectasis.    Dx-chest-portable (1 View)    Result Date: 9/28/2017 9/28/2017 4:32 PM HISTORY/REASON FOR EXAM:  Sepsis. Left lower extremity wound. TECHNIQUE/EXAM DESCRIPTION AND NUMBER OF VIEWS: Single portable view of the chest. COMPARISON: 9/1/2016 FINDINGS: The mediastinal and cardiac silhouette is unremarkable. The pulmonary vascularity is within normal limits. Lungs demonstrate no evidence of pneumonia. Slight hazy density seen adjacent to the right anterior 1st rib is similar to the prior studies and likely due to degenerative change. There is no significant pleural effusion. There is no visible pneumothorax. There are no acute bony abnormalities.     1.  There is no acute cardiopulmonary process.    Dx-foot-2- Left    Result Date: 9/29/2017 9/29/2017 4:10 PM HISTORY/REASON FOR EXAM:  Left foot swelling and  gil TECHNIQUE/EXAM DESCRIPTION AND NUMBER OF VIEWS: 2 views of the LEFT foot. COMPARISON:  None. FINDINGS:  Bone mineralization is age appropriate. Bony alignment is anatomic. There is no evidence of acute fracture or dislocation. There is no soft tissue gas. There is soft tissue swelling about the ankle.     Soft tissue swelling about the ankle. No evidence of acute osseous abnormality or soft tissue gas.    Dx-lumbar Spine-2 Or 3 Views    Result Date: 9/9/2017 9/9/2017 3:24 AM HISTORY/REASON FOR EXAM:  Pain Following Trauma. TECHNIQUE/ EXAM DESCRIPTION AND NUMBER OF VIEWS:  3 views of the lumbar spine. COMPARISON: None. FINDINGS: The lowest formed intervertebral disc will be designated L5-S1 for the purposes of this report and vertebral levels numbered accordingly. No acute fracture is evident. No gross malalignment is seen. There is mild loss of intervertebral disc height in the upper lumbar spine. There is moderate loss of intervertebral disc height at L4-L5. There are anterior osteophytes at most levels. There is no evidence of spondylolisthesis or osseous lesion. There are degenerative changes of the mid to lower lumbar facet joints. There is calcific atherosclerotic plaque.     1.  No evidence of acute fracture. 2.  Multilevel multifactorial degenerative changes    Dx-thoracic Spine-2 Views    Result Date: 9/9/2017 9/9/2017 3:21 AM HISTORY/REASON FOR EXAM:  Pain Following Trauma. TECHNIQUE/EXAM DESCRIPTION AND NUMBER OF VIEWS:  Thoracic spine, 2 views. COMPARISON:  None. FINDINGS: Glands unremarkable. There is lucency extending to the superior endplate of L2 on the second image. This appearance is not present on the dedicated lumbar spine radiographs. No other finding suspicious for acute fracture are seen. There is mild loss of intervertebral disc height throughout the thoracic spine. There are anterior osteophytes at most levels.     1.  Lucency through the superior endplate of L2 on the second image,  discordant with the appearance on the dedicated lumbar spine radiographs. I suspect this is artifactual however a fracture is not excluded. Further assessment is recommended with CT of the lumbar spine. 2.  Thoracic spondylosis    Dx-tibia And Fibula Left    Result Date: 9/28/2017 9/28/2017 4:32 PM HISTORY/REASON FOR EXAM:  Left tibia and fibular redness and swelling with open wound in the region of the medial malleolus. TECHNIQUE/EXAM DESCRIPTION AND NUMBER OF VIEWS:  2 views of the LEFT tibia and fibula. COMPARISON: Left knee 4/24/2015 FINDINGS: There is no evidence of acute fracture involving the tibia or fibula. Sclerotic focus posterior left tibial metadiaphyseal region is without change and may represent a sclerotic NOF or fibrous cortical defect. There is mild diffuse soft tissue swelling most prominent at the level the ankle. There is no gas in the soft tissues and there is no foreign body. There is no periostitis.     1.  There is mild diffuse swelling in the soft tissues of the distal left lower leg and ankle. There is no gas in the soft tissues or foreign body. 2.  There is no plain film evidence of osteomyelitis.    Dx-wrist-complete 3+ Left    Result Date: 9/9/2017 9/9/2017 3:24 AM HISTORY/REASON FOR EXAM:  Pain/Deformity Following Trauma. TECHNIQUE/EXAM DESCRIPTION AND NUMBER OF VIEWS:  3 views of the  LEFT wrist. COMPARISON: Radiographs 5/6/2015 FINDINGS: MINERALIZATION: Decreased. INJURY: There has been interval placement of a volar plate and screws transfixing the previously demonstrated intra-articular distal radial fracture. The hardware appears intact. There is underlying curvilinear deformity of the distal radius. There is ulnar positive variance, as before. No acute fracture is seen. JOINTS: No erosive arthropathy is evident.     1.  No radiographic evidence of acute traumatic injury. 2.  Healed surgically transfixed fracture of the distal radius 3.  Osteopenia    Echocardiogram Comp W/o  Cont    Result Date: 2017  Transthoracic Echo Report Echocardiography Laboratory CONCLUSIONS Normal left ventricular size, thickness, systolic function, and diastolic function. Mitral annular calcification. Mild mitral regurgitation. Aortic sclerosis without stenosis. Mild aortic insufficiency. Normal estimated right-sided pressures. No prior study is available for comparison. MAGALI TODD Exam Date:         2017                    14:07 Exam Location:     Inpatient Priority:          Routine Ordering Physician:        CIRILO HSU JR Referring Physician:       [id number] Sonographer:               Lo Lundberg RDCS Age:    72     Gender:    M MRN:    [id number] :    1945 BSA:    1.73   Ht (in):    65     Wt (lb):    145 Exam Type:     Complete Indications:     Cardiac arrhythmia, unspecified ICD Codes:       I499 CPT Codes:       94796 BP:   89     /   57     HR:   80 Technical Quality:       Fair MEASUREMENTS  (Male / Female) Normal Values 2D ECHO LV Diastolic Diameter PLAX        4.6 cm                4.2 - 5.9 / 3.9 - 5.3 cm LV Systolic Diameter PLAX         2.1 cm                2.1 - 4.0 cm IVS Diastolic Thickness           1 cm                  LVPW Diastolic Thickness          1 cm                  RV Diameter 4C                    2.7 cm                2.5 - 2.9 cm LVOT Diameter                     1.8 cm                RA Diameter                       2.7 cm                Estimated LV Ejection Fraction    60 %                  LV Ejection Fraction MOD BP       60.8 %                >= 55  % LV Ejection Fraction MOD 4C       58.4 %                LV Ejection Fraction MOD 2C       58.4 %                LA Volume Index                   13.5 cm³/m²           16 - 28 cm³/m² IVC Diameter                      1.5 cm                M-MODE Aortic Root Diameter MM           3.9 cm                DOPPLER AV Peak Velocity                  1.3 m/s               AV Peak Gradient                   6.7 mmHg              AV Mean Gradient                  3.3 mmHg              AI Pressure Half Time             463 ms                LVOT Peak Velocity                0.89 m/s              AV Area Cont Eq vti               1.9 cm²               Mitral E Point Velocity           0.96 m/s              Mitral E to A Ratio               0.99                  MV Pressure Half Time             60.6 ms               MV Area PHT                       3.6 cm²               MV Deceleration Time              209 ms                TR Peak Velocity                  222 cm/s              PV Peak Velocity                  0.7 m/s               PV Peak Gradient                  1.9 mmHg              RVOT Peak Velocity                0.52 m/s              * Indicates values subject to auto-interpretation LV EF:  60    % FINDINGS Left Ventricle Normal left ventricular size, thickness, systolic function, and diastolic function.  Left ventricular ejection fraction is visually estimated to be 60%. Normal regional wall motion. Right Ventricle Normal right ventricular size and systolic function. Right Atrium Normal right atrial size. Normal inferior vena cava size and inspiratory collapse. Left Atrium Normal left atrial size. Mitral Valve Mitral annular calcification. Thickened mitral valve leaflets. Mild mitral regurgitation. Aortic Valve Aortic sclerosis without stenosis. Mild aortic insufficiency. Tricuspid Valve Structurally normal tricuspid valve. Mild tricuspid regurgitation. Estimated right ventricular systolic pressure  is 25 mmHg. Pulmonic Valve Structurally normal pulmonic valve. Mild pulmonic insufficiency. Pericardium Normal pericardium without effusion. Aorta Ascending aorta is normal for body surface area, and measured at a diameter of 3.7 cm above the sinotubular junction. Suleman Nguyen MD (Electronically Signed) Final Date:     29 September 2017                 23:50    Le Venous Duplex (specify In Comments Left,  Right Or Bilateral)    Result Date: 2017   Vascular Laboratory  CONCLUSIONS  No evidence of acute DVT or SVT in the left lower extremity.  Edema is noted.  MAGALI TODD  Exam Date:     2017 16:43  Room #:     Inpatient  Priority:     Stat  Ht (in):             Wt (lb):  Ordering Physician:        REYNALDO LUU  Referring Physician:       [id number] Galeano  Sonographer:               Samina Galloway RVT  Study Type:                Complete Unilateral  Technical Quality:         Adequate  Age:    72    Gender:     M  MRN:    [id number]  :    1945      BSA:  Indications:     Localized swelling, mass and lump, left lower limb  CPT Codes:       70949  ICD Codes:         History:         Swelling and redness of left leg for less than 24 hours.  Limitations:     Severe pain.  PROCEDURES:  Left lower extremity venous duplex imaging.  The following venous structures were evaluated: common femoral, profunda  femoral, greater saphenous, femoral, popliteal, peroneal and posterior  tibial veins.  Serial compression, augmentation maneuvers, color and spectral Doppler flow  evaluations were performed.  FINDINGS:  Left lower extremity -  Complete color filling and compressibility with normal venous flow dynamics  including spontaneous flow, response to augmentation maneuvers, and  respiratory phasicity.  The peroneal and posterior tibial veins are difficult to assess for  compressibility due to patient pain, but flow response to augmentation is  demonstrated.  Interstitial fluid consistent with edema is observed in the thigh and below  the knee.  Flow was evaluated in the contralateral common femoral vein and normal  venous flow dynamics including spontaneous flow, respiratory phasic  variation and augmentation were demonstrated.  Kevin Lester MD  (Electronically Signed)  Final Date:      2017                   15:52    Dx-abdomen For Tube Placement    Result Date: 10/1/2017  10/1/2017 7:28  "PM HISTORY/REASON FOR EXAM:  Line evaluation. TECHNIQUE/EXAM DESCRIPTION AND NUMBER OF VIEWS:  1 view(s) of the abdomen. COMPARISON:  9/1/2016. FINDINGS: Enteric tube projects over the second portion of the duodenum. There is mild colonic distention. Degenerative changes are seen in the spine.     Enteric tube projects over the second portion of the duodenum. Mild colonic distention.      Micro:  Results     Procedure Component Value Units Date/Time    CDIFF BY PCR WITH TOXIN [[id number]] Collected:  10/04/17 1837    Order Status:  Completed Specimen:  Stool from Stool Updated:  10/04/17 2038     C Diff by PCR Negative     Comment: C. difficile NOT detected by PCR.  Treatment not indicated per guidelines.  Repeat testing not indicated within 7 days.          027-NAP1-BI Presumptive Negative     Comment: Presumptive 027/NAP1/BI target DNA sequences are NOT DETECTED.       Narrative:       Special Contact [id number] CARMELA MAJOR  Does this patient have risk factors for C-diff?->Yes  C-Diff Risk Factors->antibiotic exposure  Has patient taken stool softeners or laxatives in the last 5  days?->Yes    BLOOD CULTURE [[id number]] Collected:  09/28/17 1703    Order Status:  Completed Specimen:  Blood from Peripheral Updated:  10/03/17 1900     Significant Indicator NEG     Source BLD     Site PERIPHERAL     Blood Culture No growth after 5 days of incubation.    Narrative:       Per Hospital Policy: Only change Specimen Src: to \"Line\" if  specified by physician order.    BLOOD CULTURE [[id number]] Collected:  09/28/17 1715    Order Status:  Completed Specimen:  Blood from Peripheral Updated:  10/03/17 1900     Significant Indicator NEG     Source BLD     Site PERIPHERAL     Blood Culture No growth after 5 days of incubation.    Narrative:       Per Hospital Policy: Only change Specimen Src: to \"Line\" if  specified by physician order.          Assessment:  Active Hospital Problems    Diagnosis   • Sinus tachycardia " [R00.0]   • Cirrhosis (CMS-HCC) [K74.60]   • Alcoholism (CMS-HCC) [F10.20]   • Severe sepsis (CMS-HCC) [A41.9, R65.20]   • Hyponatremia [E87.1]   • Cellulitis [L03.90]   • CLL (chronic lymphocytic leukemia) (CMS-HCC) [C91.10]   • Continuous opioid dependence (CMS-HCC) [F11.20]       Plan:  Septic shock.   2/2 LLE cellulitis  Afebrile  Off pressors  Leukocytosis - persistent  On abx below    LLE cellulitis, improved  Cx - MRSA (vanco STEPHANIE 2), Group A strep  Continue ceftaroline and clindamycin  Bcx 9/28 - NGTD  Doppler neg for DVT    Leukocytosis, persistent.  Multifactorial  Infection + CLL  On abx  Monitor    Diarrhea  Hold stool softeners  Negative C diff  BMS placed +blood  Abd distended    Encephalopathy, improving  Multifactorial    CLL  Will impair ability to fight infection    Alcohol abuse/cirrhosis  Avoid hepatotoxic agents    Discussed with internal medicine RN

## 2017-10-09 ENCOUNTER — APPOINTMENT (OUTPATIENT)
Dept: RADIOLOGY | Facility: MEDICAL CENTER | Age: 72
DRG: 871 | End: 2017-10-09
Attending: INTERNAL MEDICINE
Payer: COMMERCIAL

## 2017-10-09 PROBLEM — R13.10 DYSPHAGIA: Status: ACTIVE | Noted: 2017-10-09

## 2017-10-09 PROBLEM — J81.1 PULMONARY EDEMA: Status: ACTIVE | Noted: 2017-10-09

## 2017-10-09 PROBLEM — I48.0 PAROXYSMAL ATRIAL FIBRILLATION (HCC): Status: ACTIVE | Noted: 2017-10-09

## 2017-10-09 PROCEDURE — A9270 NON-COVERED ITEM OR SERVICE: HCPCS | Performed by: HOSPITALIST

## 2017-10-09 PROCEDURE — 700101 HCHG RX REV CODE 250: Performed by: HOSPITALIST

## 2017-10-09 PROCEDURE — 700111 HCHG RX REV CODE 636 W/ 250 OVERRIDE (IP): Performed by: INTERNAL MEDICINE

## 2017-10-09 PROCEDURE — 770020 HCHG ROOM/CARE - TELE (206)

## 2017-10-09 PROCEDURE — 700102 HCHG RX REV CODE 250 W/ 637 OVERRIDE(OP): Performed by: NURSE PRACTITIONER

## 2017-10-09 PROCEDURE — 71010 DX-CHEST-PORTABLE (1 VIEW): CPT

## 2017-10-09 PROCEDURE — A9270 NON-COVERED ITEM OR SERVICE: HCPCS | Performed by: NURSE PRACTITIONER

## 2017-10-09 PROCEDURE — 700102 HCHG RX REV CODE 250 W/ 637 OVERRIDE(OP): Performed by: HOSPITALIST

## 2017-10-09 PROCEDURE — 99233 SBSQ HOSP IP/OBS HIGH 50: CPT | Performed by: FAMILY MEDICINE

## 2017-10-09 PROCEDURE — 304222 HCHG STAT ISOLATION DAILY CHARGE

## 2017-10-09 PROCEDURE — 700105 HCHG RX REV CODE 258: Performed by: INTERNAL MEDICINE

## 2017-10-09 PROCEDURE — A9270 NON-COVERED ITEM OR SERVICE: HCPCS | Performed by: INTERNAL MEDICINE

## 2017-10-09 PROCEDURE — 700102 HCHG RX REV CODE 250 W/ 637 OVERRIDE(OP): Performed by: INTERNAL MEDICINE

## 2017-10-09 PROCEDURE — 700111 HCHG RX REV CODE 636 W/ 250 OVERRIDE (IP): Performed by: HOSPITALIST

## 2017-10-09 RX ORDER — SULFAMETHOXAZOLE AND TRIMETHOPRIM 800; 160 MG/1; MG/1
1 TABLET ORAL EVERY 12 HOURS
Status: COMPLETED | OUTPATIENT
Start: 2017-10-09 | End: 2017-10-15

## 2017-10-09 RX ORDER — CLINDAMYCIN HYDROCHLORIDE 150 MG/1
300 CAPSULE ORAL 4 TIMES DAILY
Status: DISPENSED | OUTPATIENT
Start: 2017-10-09 | End: 2017-10-15

## 2017-10-09 RX ORDER — SODIUM CHLORIDE 9 MG/ML
INJECTION, SOLUTION INTRAVENOUS
Status: ACTIVE
Start: 2017-10-09 | End: 2017-10-09

## 2017-10-09 RX ADMIN — SPIRONOLACTONE 100 MG: 100 TABLET, FILM COATED ORAL at 07:27

## 2017-10-09 RX ADMIN — CLINDAMYCIN IN 5 PERCENT DEXTROSE 900 MG: 18 INJECTION, SOLUTION INTRAVENOUS at 12:43

## 2017-10-09 RX ADMIN — TRAZODONE HYDROCHLORIDE 100 MG: 50 TABLET ORAL at 22:42

## 2017-10-09 RX ADMIN — SULFAMETHOXAZOLE AND TRIMETHOPRIM 1 TABLET: 800; 160 TABLET ORAL at 15:45

## 2017-10-09 RX ADMIN — FOLIC ACID 1 MG: 1 TABLET ORAL at 07:27

## 2017-10-09 RX ADMIN — SULFAMETHOXAZOLE AND TRIMETHOPRIM 1 TABLET: 800; 160 TABLET ORAL at 22:42

## 2017-10-09 RX ADMIN — CEFTAROLINE FOSAMIL 600 MG: 600 POWDER, FOR SOLUTION INTRAVENOUS at 12:42

## 2017-10-09 RX ADMIN — OXYCODONE HYDROCHLORIDE 5 MG: 5 TABLET ORAL at 07:27

## 2017-10-09 RX ADMIN — CLINDAMYCIN HYDROCHLORIDE 300 MG: 150 CAPSULE ORAL at 17:55

## 2017-10-09 RX ADMIN — FUROSEMIDE 20 MG: 10 INJECTION, SOLUTION INTRAMUSCULAR; INTRAVENOUS at 15:45

## 2017-10-09 RX ADMIN — MULTIPLE VITAMINS W/ MINERALS TAB 1 TABLET: TAB at 07:27

## 2017-10-09 RX ADMIN — OXYCODONE HYDROCHLORIDE 5 MG: 5 TABLET ORAL at 01:06

## 2017-10-09 RX ADMIN — CLINDAMYCIN HYDROCHLORIDE 300 MG: 150 CAPSULE ORAL at 22:42

## 2017-10-09 RX ADMIN — DIGOXIN 125 MCG: 125 TABLET ORAL at 17:55

## 2017-10-09 RX ADMIN — FUROSEMIDE 20 MG: 10 INJECTION, SOLUTION INTRAMUSCULAR; INTRAVENOUS at 05:35

## 2017-10-09 RX ADMIN — LEVOTHYROXINE SODIUM 25 MCG: 50 TABLET ORAL at 05:35

## 2017-10-09 RX ADMIN — THIAMINE HCL TAB 100 MG 100 MG: 100 TAB at 07:27

## 2017-10-09 RX ADMIN — ENOXAPARIN SODIUM 40 MG: 100 INJECTION SUBCUTANEOUS at 07:27

## 2017-10-09 RX ADMIN — CLINDAMYCIN IN 5 PERCENT DEXTROSE 900 MG: 18 INJECTION, SOLUTION INTRAVENOUS at 05:35

## 2017-10-09 RX ADMIN — OXYCODONE HYDROCHLORIDE 5 MG: 5 TABLET ORAL at 17:55

## 2017-10-09 RX ADMIN — DULOXETINE HYDROCHLORIDE 60 MG: 60 CAPSULE, DELAYED RELEASE ORAL at 07:27

## 2017-10-09 ASSESSMENT — PAIN SCALES - GENERAL
PAINLEVEL_OUTOF10: 3
PAINLEVEL_OUTOF10: 6
PAINLEVEL_OUTOF10: 5
PAINLEVEL_OUTOF10: 0

## 2017-10-09 ASSESSMENT — ENCOUNTER SYMPTOMS
COUGH: 0
BLURRED VISION: 0
SORE THROAT: 0
WEAKNESS: 1
CHILLS: 0
DIARRHEA: 0
SPEECH CHANGE: 0
VOMITING: 0
HEARTBURN: 0
NAUSEA: 0
DIZZINESS: 0
WHEEZING: 0
SHORTNESS OF BREATH: 0
HEADACHES: 0
FEVER: 0
ABDOMINAL PAIN: 0
MYALGIAS: 1

## 2017-10-09 NOTE — CARE PLAN
Problem: Pain Management  Goal: Pain level will decrease to patient's comfort goal    Intervention: Follow pain managment plan developed in collaboration with patient and Interdisciplinary Team  Pt monitored for s/s of pain.      Problem: Skin Integrity  Goal: Risk for impaired skin integrity will decrease    Intervention: Assess and monitor skin integrity, appearance and/or temperature  Turned pt q2 hours while in bed.

## 2017-10-09 NOTE — PROGRESS NOTES
Pt received via bed into  T708-2, pt alert and oriented - o2 on at 2lnc respirations easy and unlabored - tele monitor intact, monitor room aware of pt's arrival and confirm pt is visible on cardiac monitor - tube feeding infusing at 60 ml's / hr -  condom cath drainage bag noted to have clear yellow urine - in  bed with head of bed elevated - call bell in reach - no distress noted

## 2017-10-09 NOTE — PROGRESS NOTES
Report received at bedside, assumed care. Pt is resting in bed. A&O x 4. Generalized pain 4/10, repositioned pt and provided blanket. Assessment complete. VSS. Maradiaga in place. Central in place. No other concerns, complains or distress. Chart reviewed. Bed in lowest position, treaded slipper sock on, and call light within reach.

## 2017-10-09 NOTE — PROGRESS NOTES
MD paged regarding line in place prior to transfer. Per Dr. Irizarry: place peripheral lines, pull central line and guerra prior to transfer.

## 2017-10-09 NOTE — CARE PLAN
Problem: Communication  Goal: The ability to communicate needs accurately and effectively will improve    Intervention: Educate patient and significant other/support system about the plan of care, procedures, treatments, medications and allow for questions  Listened to patients discussion of concerns related to disease process and treatment plan. Discussed with patient concerns, goals and management. Discussed importance of patient reporting new signs and symptoms related to disease process. Patient verbalized understanding and understands importance of communicating needs.  Supported and educated patient by addressing specific questions regarding diagnosis, risks, benefits of pain management treatment.                Problem: Pain Management  Goal: Pain level will decrease to patient's comfort goal    Intervention: Follow pain managment plan developed in collaboration with patient and Interdisciplinary Team  Listened to patients discussion of pain. Discussed with patient pain goal and management through medications. Supported and educated patient by addressing specific questions regarding diagnosis, risks, benefits of pain management treatment.         Problem: Urinary Elimination:  Goal: Ability to reestablish a normal urinary elimination pattern will improve    Intervention: Evaluate need to continue indwelling urinary catheter  Assessing for urinary retention. Strict monitoring of I/O's being recorded. Voiding being encouraged and understands importance of communicating needs related to urinary elimination. Removed indwelling catheter. Condom cath in place.

## 2017-10-09 NOTE — PROGRESS NOTES
[facility] Hospitalist Progress Note    Date of Service: 10/9/2017    Chief Complaint  72 y.o. male admitted 2017 with Sepsis, Cellulitis, Alcohol withdrawal, Respiratory failure, Atrial fibrillation.    Interval Problem Update  Sepsis - off pressors  Cellulitis - less redness  Alc - withdrawal symptoms have resolved  Resp failure - remains on O2  Afib - back in SR    Consultants/Specialty  PMA    Disposition  Likely need SNF        Review of Systems   Constitutional: Positive for malaise/fatigue. Negative for chills and fever.   HENT: Negative for sore throat.    Eyes: Negative for blurred vision.   Respiratory: Negative for cough, shortness of breath and wheezing.    Cardiovascular: Negative for chest pain.   Gastrointestinal: Negative for abdominal pain, diarrhea, heartburn, nausea and vomiting.   Genitourinary: Negative for dysuria.   Neurological: Positive for weakness. Negative for dizziness and headaches.      Physical Exam  Laboratory/Imaging   Hemodynamics  Temp (24hrs), Av.6 °C (97.9 °F), Min:36.3 °C (97.3 °F), Max:37.1 °C (98.8 °F)   Temperature: 36.4 °C (97.6 °F)  Pulse  Av.2  Min: 60  Max: 188 Heart Rate (Monitored): 75  Blood Pressure : 109/59, NIBP: 111/53     Respiratory      Respiration: 19, Pulse Oximetry: 100 %        RUL Breath Sounds: Clear, RML Breath Sounds: Diminished, RLL Breath Sounds: Diminished, MONICA Breath Sounds: Clear, LLL Breath Sounds: Diminished    Fluids    Intake/Output Summary (Last 24 hours) at 10/09/17 1116  Last data filed at 10/09/17 0030   Gross per 24 hour   Intake              560 ml   Output             2350 ml   Net            -1790 ml       Nutrition  Orders Placed This Encounter   Procedures   • DIET ORDER     Standing Status:   Standing     Number of Occurrences:   1     Order Specific Question:   Diet:     Answer:   Full Liquid [11]     Order Specific Question:   Consistency/Fluid modifications:     Answer:   Nectar Thick [2]     Order Specific Question:    Miscellaneous modifications:     Answer:   SLP - 1:1 Supervision by Nursing [21]     Order Specific Question:   Miscellaneous modifications:     Answer:   SLP - Deliver to Nursing Station [22]     Physical Exam   Constitutional: He appears well-developed.   HENT:   Head: Normocephalic and atraumatic.   Eyes: Conjunctivae are normal. Pupils are equal, round, and reactive to light.   Neck: No tracheal deviation present. No thyromegaly present.   Cardiovascular: Normal rate and regular rhythm.    Pulmonary/Chest: Effort normal. He has rales.   Abdominal: Soft. Bowel sounds are normal. He exhibits no distension. There is no tenderness.   Lymphadenopathy:     He has no cervical adenopathy.   Neurological: He is alert.   Oriented to person and place   Skin: There is erythema.   Left leg   Nursing note and vitals reviewed.      Recent Labs      10/07/17   0550  10/08/17   0517   WBC  12.3*  13.2*   RBC  2.09*  2.12*   HEMOGLOBIN  7.2*  7.4*   HEMATOCRIT  22.0*  22.4*   MCV  105.3*  105.7*   MCH  34.4*  34.9*   MCHC  32.7*  33.0*   RDW  55.7*  56.5*   PLATELETCT  98*  116*   MPV  10.1  10.1     Recent Labs      10/07/17   0550  10/08/17   0517   SODIUM  135  136   POTASSIUM  4.2  4.7   CHLORIDE  101  103   CO2  27  26   GLUCOSE  110*  108*   BUN  18  20   CREATININE  0.88  0.94   CALCIUM  9.0  9.0                      Assessment/Plan     * Severe sepsis (CMS-HCC)- (present on admission)   Assessment & Plan    This is sepsis (without associated acute organ dysfunction).   Secondary to left leg MRSA cellulitis           Paroxysmal atrial fibrillation (CMS-HCC)- (present on admission)   Assessment & Plan    Digoxin, poor candidate for anticoagulation        Acute respiratory failure (CMS-HCC)- (present on admission)   Assessment & Plan    Keep O2 sats above 90%, RT protocol        Cellulitis- (present on admission)   Assessment & Plan    IV Teflaro, Clindamycin        Cirrhosis (CMS-HCC)- (present on admission)   Assessment &  Plan    Secondary to Alcohol   Aldactone        Alcohol withdrawal (CMS-HCC)- (present on admission)   Assessment & Plan    Finished course of Phenobarb protocol.         Dysphagia   Assessment & Plan    NTL, TF for Nutrition        Pulmonary edema- (present on admission)   Assessment & Plan    IV Lasix        Hyponatremia   Assessment & Plan    Follow bmp        CLL (chronic lymphocytic leukemia) (CMS-HCC)- (present on admission)   Assessment & Plan    Follow cbc  ffup with Oncology as outpt        Continuous opioid dependence (CMS-HCC)- (present on admission)   Assessment & Plan    Judicious pain control        Anemia- (present on admission)   Assessment & Plan    Follow cbc        Thrombocytopenia (CMS-HCC)- (present on admission)   Assessment & Plan    Follow cbc            Reviewed items::  EKG reviewed, Radiology images reviewed, Labs reviewed and Medications reviewed  Maradiaga catheter::  No Maradiaga  DVT prophylaxis pharmacological::  Enoxaparin (Lovenox)  Ulcer Prophylaxis::  No  Antibiotics:  Treating active infection/contamination beyond 24 hours perioperative coverage

## 2017-10-09 NOTE — PROGRESS NOTES
Infectious Disease Progress Note    Author: Alison Arias M.D. Date & Time of service: 10/9/2017  10:07 AM    Chief Complaint:  FU sepsis/LLE cellulitis    Interval History:  10/2 AF, WBC 27.5, confused and in pain  10/3 AF, WBC 22.9, remains confused and mumbling  10/4 AF, WBC 15.7, pt obtunded, no overnight events per RN  10/5 AF, WBC 17, mumbling, does not engage in questioning or open eyes, grimaces when RLE examined, remains on pressors, lots of diarrhea, stool softeners held  10/6 AF WBC 17.5 abd more distended BMS placed +blood  10/7 AF WBC 12.3 obtunded now but per RN better earlier  10/8 AF WBC 13 wants something to drink-plan for swallowing study later. Denies SE abx  10/9/17-MAXIMUM TEMPERATURE 98.8. Last WBC 13.2 complains of pain all over the body especially his right leg  Labs Reviewed, Medications Reviewed, Radiology Reviewed and Wound Reviewed.    Review of Systems:  Review of Systems   Unable to perform ROS: Other   Constitutional: Positive for malaise/fatigue. Negative for chills and fever.   Respiratory: Negative for cough.    Cardiovascular: Negative for chest pain.   Gastrointestinal: Negative for abdominal pain, nausea and vomiting.   Musculoskeletal: Positive for myalgias.        Complains of pain all over his body   Neurological: Negative for speech change.       Hemodynamics:  Temp (24hrs), Av.6 °C (97.9 °F), Min:36.3 °C (97.3 °F), Max:37.1 °C (98.8 °F)  Temperature: 36.4 °C (97.6 °F)  Pulse  Av.2  Min: 60  Max: 188Heart Rate (Monitored): 75  Blood Pressure : 109/59, NIBP: 111/53      Physical Exam:  Physical Exam   Constitutional: He appears well-developed. No distress.   Thin  Older than stated age  Chronically ill   HENT:   Head: Normocephalic and atraumatic.   cortrak   Eyes: EOM are normal. Pupils are equal, round, and reactive to light.   Neck: Neck supple.   Cardiovascular:   tachycardic   Pulmonary/Chest: Effort normal and breath sounds normal.   Abdominal: Soft. He  exhibits distension. There is no tenderness. There is no rebound and no guarding.   Musculoskeletal: He exhibits tenderness. He exhibits no edema.   Erythema and edema of the LLE radiating to the left hip and medial thigh, improving significantly. Skin peeling    Left medial malleolar wound.      Neurological: He is alert.   Skin: There is erythema.   Resolving  Peeling of the skin   Nursing note and vitals reviewed.      Meds:    Current Facility-Administered Medications:   •  NS  •  furosemide  •  spironolactone  •  haloperidol lactate  •  oxycodone immediate-release  •  enoxaparin (LOVENOX) injection  •  digoxin  •  clindamycin (CLEOCIN) IV  •  MD ALERT...Adult ICU Electrolyte Replacement per Pharmacy Protocol  •  ceftaroline (TEFLARO) ivpb  •  folic acid  •  therapeutic multivitamin-minerals  •  thiamine  •  duloxetine  •  levothyroxine  •  trazodone  •  ondansetron  •  ondansetron    Labs:  Recent Labs      10/07/17   0550  10/08/17   0517   WBC  12.3*  13.2*   RBC  2.09*  2.12*   HEMOGLOBIN  7.2*  7.4*   HEMATOCRIT  22.0*  22.4*   MCV  105.3*  105.7*   MCH  34.4*  34.9*   RDW  55.7*  56.5*   PLATELETCT  98*  116*   MPV  10.1  10.1   NEUTSPOLYS  40.40*  29.60*   LYMPHOCYTES  57.00*  70.40*   MONOCYTES  0.00  0.00   EOSINOPHILS  0.00  0.00   BASOPHILS  0.00  0.00   RBCMORPHOLO  Present  Present     Recent Labs      10/07/17   0550  10/08/17   0517   SODIUM  135  136   POTASSIUM  4.2  4.7   CHLORIDE  101  103   CO2  27  26   GLUCOSE  110*  108*   BUN  18  20     Recent Labs      10/07/17   0550  10/08/17   0517   ALBUMIN  3.4  3.2   TBILIRUBIN  0.9  0.9   ALKPHOSPHAT  84  83   TOTPROTEIN  6.3  6.3   ALTSGPT  6  7   ASTSGOT  16  16   CREATININE  0.88  0.94       Imaging:  Ct-abdomen-pelvis With    Result Date: 10/1/2017  10/1/2017 8:07 AM HISTORY/REASON FOR EXAM:  Diffuse abdominal pain. History of sepsis. Left lower extremity cellulitis. TECHNIQUE/EXAM DESCRIPTION: CT scan of the abdomen and pelvis with contrast.  Contrast-enhanced helical scanning was obtained from the diaphragmatic domes through the pubic symphysis following the bolus administration of 100 mL of Omnipaque 350 nonionic contrast without complication. Low dose optimization technique was utilized for this CT exam including automated exposure control and adjustment of the mA and/or kV according to patient size. COMPARISON: CT 6/5/2015 FINDINGS: The visualized lung bases demonstrate small amounts of dependent pleural effusion with bibasilar dependent airspace disease, most likely atelectasis. There is no acute bony process. There is degenerative change in the lower lumbar spine and pelvis. CT Abdomen: Liver is unchanged in appearance with slight nodularity. There are no definite focal masses. Spleen is mildly enlarged measuring 13.1 cm in diameter without change. Hepatic veins are somewhat attenuated. Portal veins, splenic vein and SMV are patent. Pancreas is somewhat atrophic but otherwise unremarkable. The gallbladder demonstrates some density dependently which could be related to tumefactive sludge or noncalcified stones. There is no biliary dilatation. The adrenal glands are normal in size. Kidneys demonstrate no hydronephrosis. There are probable small cortical cysts. The abdominal aorta is normal in caliber. There is atherosclerosis of the aorta. There is no lymphadenopathy. The bowel demonstrates no evidence of bowel obstruction. There is moderate stool throughout the colon. There is a hiatal hernia. There is a small amount of fluid around the liver although this is decreased over the prior study. There is minimal fluid in both paracolic gutters. There is diffuse subcutaneous edema. CT Pelvis: There is no acute inflammatory process in the pelvis. There is free fluid in the pelvis with diffuse subcutaneous edema. There are mildly prominent lymph nodes in the inguinal regions, left greater than right with some nodes in the external iliac region as well more  so on the left. The largest inguinal node is 1.5 cm short axis on the left side.. There is postoperative change involving the colon. Bladder is decompressed with a Maradiaga catheter.     1.  There is mild colonic distention with large amount of stool throughout the colon. There is no bowel obstruction. 2.  There is a hiatal hernia. 3.  Changes of the liver with mild splenomegaly and a small amount of ascites in the abdomen and pelvis suggestive of underlying hepatocellular disease. 4.  Dependent density in the gallbladder which could be tumefactive sludge or noncalcified stones. 5.  There are small dependent bilateral pleural effusions with dependent airspace disease, likely atelectasis. 6.  There is mild lymphadenopathy in the pelvis more so on the left likely inflammatory. 7.  There is diffuse subcutaneous edema.    Ct-extremity, Lower With Left    Result Date: 10/1/2017  10/1/2017 8:07 AM HISTORY/REASON FOR EXAM:  Progression of strep/staph cellulitis with sepsis. Possible necrotizing fasciitis.. TECHNIQUE/EXAM DESCRIPTION AND NUMBER OF VIEWS:  CT scan of the LEFT lower extremity with contrast, with reconstructions. Thin helical 3 mm sections were obtained from the distal femur through the proximal tibia/fibula. Sagittal and coronal multiplanar reconstructions were generated from the axial images. A total of 100 mL of Omnipaque 350 nonionic contrast was administered  IV without complication. Up to date radiation dose reduction adjustments have been utilized to meet ALARA standards for radiation dose reduction. COMPARISON: X-rays of the foot and tibia fibula 9/29/2017, 9/28/2017 respectively. FINDINGS: Imaging of the visualized portion of the pelvis demonstrates fluid in the pelvis. Bladder is decompressed with a Maradiaga catheter. There is postoperative change involving the colon. There are mildly prominent bilateral external iliac and common femoral lymph nodes. Largest node in the left inguinal region is 1.5 cm short  axis diameter. There is diffuse subcutaneous edema throughout the pelvis. Imaging throughout the left lower extremity demonstrates diffuse subcutaneous edema. There is a small amount of fluid in the intermuscular planes along the medial aspect of the proximal lower leg adjacent to the medial gastrocnemius musculature. There is  no focal abscess collection. There is a small joint effusion of the knee. There is some mild fatty infiltration of the musculature. There is atherosclerosis but basilar structures appear grossly patent. Limited partial visualization of portions of the right lower extremity also demonstrates some mild subcutaneous edema. Bones demonstrate mild degenerative changes in the hip and left knee. There is no evidence of osteomyelitis.     1.  There is diffuse subcutaneous edema throughout the left hemipelvis and left lower extremity with a small amount of fluid adjacent to the right medial gastrocnemius at the level the proximal calf. 2.  There is no focal drainable abscess. 3.  There is no evidence of osteomyelitis. 4.  There is mild atherosclerosis with grossly patent vasculature. 5.  There are probably inflammatory lymph nodes in the pelvis and left inguinal region with the largest measuring 1.5 cm short axis. 6.  There is free fluid in the visualized pelvis.    Ct-head W/o    Result Date: 10/1/2017  HISTORY/REASON FOR EXAM:  Altered Mental Status. TECHNIQUE/EXAM DESCRIPTION: CT scan of the head without contrast, 10/1/2017 12:28 AM. Contiguous 5 mm axial sections were obtained from the skull base through the vertex. Up to date radiation dose reduction adjustments have been utilized to meet ALARA standards for radiation dose reduction. COMPARISON:  4/23/2015 FINDINGS:   The ventricular system and cortical sulci are prominent, consistent with the patient's age.  There is no midline shift or other mass effect.  A 5 mm circumscribed lucency is present in the right temporal lobe, consistent with old  lacunar infarct. This is unchanged. There is no acute intra-axial abnormality or extra-axial fluid collection.  There is no intracranial hemorrhage.  The calvaria are intact. The visualized paranasal sinuses show no unusual opacity.     1.  No acute intracranial abnormality. 2.  Moderate atrophy, age-consistent. 3.  Probable old lacunar infarct in the right temporal lobe, unchanged from the prior scan. INTERPRETING LOCATION:  [address] [facility], [facility], [address]    Dx-chest-portable (1 View)    Result Date: 9/29/2017 9/29/2017 1:25 PM HISTORY/REASON FOR EXAM:  Line placement. TECHNIQUE/EXAM DESCRIPTION AND NUMBER OF VIEWS: Single portable view of the chest. COMPARISON: 9/28/2017. FINDINGS: The soft tissues and bony structures are unremarkable. The heart and mediastinal structures are within normal limits. Pulmonary vascularity is normal. There is right basilar atelectasis. There is no effusion or pneumothorax. There has been interval insertion of a central venous catheter which terminates with the tip projecting over the expected region of the mid to distal superior vena cava.     1.  Interval insertion of a central venous catheter which terminates with the tip projecting over the expected region of the mid to distal superior vena cava. 2.  Right basilar atelectasis.    Dx-chest-portable (1 View)    Result Date: 9/28/2017 9/28/2017 4:32 PM HISTORY/REASON FOR EXAM:  Sepsis. Left lower extremity wound. TECHNIQUE/EXAM DESCRIPTION AND NUMBER OF VIEWS: Single portable view of the chest. COMPARISON: 9/1/2016 FINDINGS: The mediastinal and cardiac silhouette is unremarkable. The pulmonary vascularity is within normal limits. Lungs demonstrate no evidence of pneumonia. Slight hazy density seen adjacent to the right anterior 1st rib is similar to the prior studies and likely due to degenerative change. There is no significant pleural effusion. There is no visible pneumothorax. There are no acute bony abnormalities.     1.  There is  no acute cardiopulmonary process.    Dx-foot-2- Left    Result Date: 9/29/2017 9/29/2017 4:10 PM HISTORY/REASON FOR EXAM:  Left foot swelling and weeping TECHNIQUE/EXAM DESCRIPTION AND NUMBER OF VIEWS: 2 views of the LEFT foot. COMPARISON:  None. FINDINGS:  Bone mineralization is age appropriate. Bony alignment is anatomic. There is no evidence of acute fracture or dislocation. There is no soft tissue gas. There is soft tissue swelling about the ankle.     Soft tissue swelling about the ankle. No evidence of acute osseous abnormality or soft tissue gas.    Dx-lumbar Spine-2 Or 3 Views    Result Date: 9/9/2017 9/9/2017 3:24 AM HISTORY/REASON FOR EXAM:  Pain Following Trauma. TECHNIQUE/ EXAM DESCRIPTION AND NUMBER OF VIEWS:  3 views of the lumbar spine. COMPARISON: None. FINDINGS: The lowest formed intervertebral disc will be designated L5-S1 for the purposes of this report and vertebral levels numbered accordingly. No acute fracture is evident. No gross malalignment is seen. There is mild loss of intervertebral disc height in the upper lumbar spine. There is moderate loss of intervertebral disc height at L4-L5. There are anterior osteophytes at most levels. There is no evidence of spondylolisthesis or osseous lesion. There are degenerative changes of the mid to lower lumbar facet joints. There is calcific atherosclerotic plaque.     1.  No evidence of acute fracture. 2.  Multilevel multifactorial degenerative changes    Dx-thoracic Spine-2 Views    Result Date: 9/9/2017 9/9/2017 3:21 AM HISTORY/REASON FOR EXAM:  Pain Following Trauma. TECHNIQUE/EXAM DESCRIPTION AND NUMBER OF VIEWS:  Thoracic spine, 2 views. COMPARISON:  None. FINDINGS: Glands unremarkable. There is lucency extending to the superior endplate of L2 on the second image. This appearance is not present on the dedicated lumbar spine radiographs. No other finding suspicious for acute fracture are seen. There is mild loss of intervertebral disc height  throughout the thoracic spine. There are anterior osteophytes at most levels.     1.  Lucency through the superior endplate of L2 on the second image, discordant with the appearance on the dedicated lumbar spine radiographs. I suspect this is artifactual however a fracture is not excluded. Further assessment is recommended with CT of the lumbar spine. 2.  Thoracic spondylosis    Dx-tibia And Fibula Left    Result Date: 9/28/2017 9/28/2017 4:32 PM HISTORY/REASON FOR EXAM:  Left tibia and fibular redness and swelling with open wound in the region of the medial malleolus. TECHNIQUE/EXAM DESCRIPTION AND NUMBER OF VIEWS:  2 views of the LEFT tibia and fibula. COMPARISON: Left knee 4/24/2015 FINDINGS: There is no evidence of acute fracture involving the tibia or fibula. Sclerotic focus posterior left tibial metadiaphyseal region is without change and may represent a sclerotic NOF or fibrous cortical defect. There is mild diffuse soft tissue swelling most prominent at the level the ankle. There is no gas in the soft tissues and there is no foreign body. There is no periostitis.     1.  There is mild diffuse swelling in the soft tissues of the distal left lower leg and ankle. There is no gas in the soft tissues or foreign body. 2.  There is no plain film evidence of osteomyelitis.    Dx-wrist-complete 3+ Left    Result Date: 9/9/2017 9/9/2017 3:24 AM HISTORY/REASON FOR EXAM:  Pain/Deformity Following Trauma. TECHNIQUE/EXAM DESCRIPTION AND NUMBER OF VIEWS:  3 views of the  LEFT wrist. COMPARISON: Radiographs 5/6/2015 FINDINGS: MINERALIZATION: Decreased. INJURY: There has been interval placement of a volar plate and screws transfixing the previously demonstrated intra-articular distal radial fracture. The hardware appears intact. There is underlying curvilinear deformity of the distal radius. There is ulnar positive variance, as before. No acute fracture is seen. JOINTS: No erosive arthropathy is evident.     1.  No  radiographic evidence of acute traumatic injury. 2.  Healed surgically transfixed fracture of the distal radius 3.  Osteopenia    Echocardiogram Comp W/o Cont    Result Date: 2017  Transthoracic Echo Report Echocardiography Laboratory CONCLUSIONS Normal left ventricular size, thickness, systolic function, and diastolic function. Mitral annular calcification. Mild mitral regurgitation. Aortic sclerosis without stenosis. Mild aortic insufficiency. Normal estimated right-sided pressures. No prior study is available for comparison. TODDMAGALI Exam Date:         2017                    14:07 Exam Location:     Inpatient Priority:          Routine Ordering Physician:        CIRILO HSU JR Referring Physician:       [id number] Sonographer:               Lo Lundberg RDCS Age:    72     Gender:    M MRN:    [id number] :    1945 BSA:    1.73   Ht (in):    65     Wt (lb):    145 Exam Type:     Complete Indications:     Cardiac arrhythmia, unspecified ICD Codes:       I499 CPT Codes:       93864 BP:   89     /   57     HR:   80 Technical Quality:       Fair MEASUREMENTS  (Male / Female) Normal Values 2D ECHO LV Diastolic Diameter PLAX        4.6 cm                4.2 - 5.9 / 3.9 - 5.3 cm LV Systolic Diameter PLAX         2.1 cm                2.1 - 4.0 cm IVS Diastolic Thickness           1 cm                  LVPW Diastolic Thickness          1 cm                  RV Diameter 4C                    2.7 cm                2.5 - 2.9 cm LVOT Diameter                     1.8 cm                RA Diameter                       2.7 cm                Estimated LV Ejection Fraction    60 %                  LV Ejection Fraction MOD BP       60.8 %                >= 55  % LV Ejection Fraction MOD 4C       58.4 %                LV Ejection Fraction MOD 2C       58.4 %                LA Volume Index                   13.5 cm³/m²           16 - 28 cm³/m² IVC Diameter                      1.5 cm                 M-MODE Aortic Root Diameter MM           3.9 cm                DOPPLER AV Peak Velocity                  1.3 m/s               AV Peak Gradient                  6.7 mmHg              AV Mean Gradient                  3.3 mmHg              AI Pressure Half Time             463 ms                LVOT Peak Velocity                0.89 m/s              AV Area Cont Eq vti               1.9 cm²               Mitral E Point Velocity           0.96 m/s              Mitral E to A Ratio               0.99                  MV Pressure Half Time             60.6 ms               MV Area PHT                       3.6 cm²               MV Deceleration Time              209 ms                TR Peak Velocity                  222 cm/s              PV Peak Velocity                  0.7 m/s               PV Peak Gradient                  1.9 mmHg              RVOT Peak Velocity                0.52 m/s              * Indicates values subject to auto-interpretation LV EF:  60    % FINDINGS Left Ventricle Normal left ventricular size, thickness, systolic function, and diastolic function.  Left ventricular ejection fraction is visually estimated to be 60%. Normal regional wall motion. Right Ventricle Normal right ventricular size and systolic function. Right Atrium Normal right atrial size. Normal inferior vena cava size and inspiratory collapse. Left Atrium Normal left atrial size. Mitral Valve Mitral annular calcification. Thickened mitral valve leaflets. Mild mitral regurgitation. Aortic Valve Aortic sclerosis without stenosis. Mild aortic insufficiency. Tricuspid Valve Structurally normal tricuspid valve. Mild tricuspid regurgitation. Estimated right ventricular systolic pressure  is 25 mmHg. Pulmonic Valve Structurally normal pulmonic valve. Mild pulmonic insufficiency. Pericardium Normal pericardium without effusion. Aorta Ascending aorta is normal for body surface area, and measured at a diameter of 3.7 cm above the sinotubular  junction. Suleman Nguyen MD (Electronically Signed) Final Date:     2017                 23:50    Le Venous Duplex (specify In Comments Left, Right Or Bilateral)    Result Date: 2017   Vascular Laboratory  CONCLUSIONS  No evidence of acute DVT or SVT in the left lower extremity.  Edema is noted.  MAGALI TODD  Exam Date:     2017 16:43  Room #:     Inpatient  Priority:     Stat  Ht (in):             Wt (lb):  Ordering Physician:        REYNALDO LUU  Referring Physician:       [id number] Balderas  Sonographer:               Samina Galloway RVT  Study Type:                Complete Unilateral  Technical Quality:         Adequate  Age:    72    Gender:     M  MRN:    [id number]  :    1945      BSA:  Indications:     Localized swelling, mass and lump, left lower limb  CPT Codes:       03888  ICD Codes:         History:         Swelling and redness of left leg for less than 24 hours.  Limitations:     Severe pain.  PROCEDURES:  Left lower extremity venous duplex imaging.  The following venous structures were evaluated: common femoral, profunda  femoral, greater saphenous, femoral, popliteal, peroneal and posterior  tibial veins.  Serial compression, augmentation maneuvers, color and spectral Doppler flow  evaluations were performed.  FINDINGS:  Left lower extremity -  Complete color filling and compressibility with normal venous flow dynamics  including spontaneous flow, response to augmentation maneuvers, and  respiratory phasicity.  The peroneal and posterior tibial veins are difficult to assess for  compressibility due to patient pain, but flow response to augmentation is  demonstrated.  Interstitial fluid consistent with edema is observed in the thigh and below  the knee.  Flow was evaluated in the contralateral common femoral vein and normal  venous flow dynamics including spontaneous flow, respiratory phasic  variation and augmentation were demonstrated.  Kevin Lester MD   "(Electronically Signed)  Final Date:      29 September 2017                   15:52    Dx-abdomen For Tube Placement    Result Date: 10/1/2017  10/1/2017 7:28 PM HISTORY/REASON FOR EXAM:  Line evaluation. TECHNIQUE/EXAM DESCRIPTION AND NUMBER OF VIEWS:  1 view(s) of the abdomen. COMPARISON:  9/1/2016. FINDINGS: Enteric tube projects over the second portion of the duodenum. There is mild colonic distention. Degenerative changes are seen in the spine.     Enteric tube projects over the second portion of the duodenum. Mild colonic distention.      Micro:  Results     Procedure Component Value Units Date/Time    CDIFF BY PCR WITH TOXIN [[id number]] Collected:  10/04/17 1837    Order Status:  Completed Specimen:  Stool from Stool Updated:  10/04/17 2038     C Diff by PCR Negative     Comment: C. difficile NOT detected by PCR.  Treatment not indicated per guidelines.  Repeat testing not indicated within 7 days.          027-NAP1-BI Presumptive Negative     Comment: Presumptive 027/NAP1/BI target DNA sequences are NOT DETECTED.       Narrative:       Special Contact [id number] CARMELA HONGColin  Does this patient have risk factors for C-diff?->Yes  C-Diff Risk Factors->antibiotic exposure  Has patient taken stool softeners or laxatives in the last 5  days?->Yes    BLOOD CULTURE [[id number]] Collected:  09/28/17 1703    Order Status:  Completed Specimen:  Blood from Peripheral Updated:  10/03/17 1900     Significant Indicator NEG     Source BLD     Site PERIPHERAL     Blood Culture No growth after 5 days of incubation.    Narrative:       Per Hospital Policy: Only change Specimen Src: to \"Line\" if  specified by physician order.    BLOOD CULTURE [[id number]] Collected:  09/28/17 1715    Order Status:  Completed Specimen:  Blood from Peripheral Updated:  10/03/17 1900     Significant Indicator NEG     Source BLD     Site PERIPHERAL     Blood Culture No growth after 5 days of incubation.    Narrative:       Per Hospital " "Policy: Only change Specimen Src: to \"Line\" if  specified by physician order.          Assessment:  Active Hospital Problems    Diagnosis   • Sinus tachycardia [R00.0]   • Cirrhosis (CMS-[facility]) [K74.60]   • Alcoholism (CMS-HCC) [F10.20]   • Severe sepsis (CMS-HCC) [A41.9, R65.20]   • Hyponatremia [E87.1]   • Cellulitis [L03.90]   • CLL (chronic lymphocytic leukemia) (CMS-HCC) [C91.10]   • Continuous opioid dependence (CMS-HCC) [F11.20]       Plan:  Septic shock.   2/2 LLE cellulitis  Afebrile  Off pressors  Leukocytosis - persistent  On abx below    LLE cellulitis, improved  Cx - MRSA (vanco STEPHANIE 2), Group A strep  Discontinue ceftarolin  Start By mouth Bactrim  Leukocytosis, persistent.  Multifactorial  Infection + CLL  On abx  Monitor    Diarrhea  Hold stool softeners  Negative C diff  BMS placed +blood  Abd distended    Encephalopathy, improving  Multifactorial    CLL  Will impair ability to fight infection    Alcohol abuse/cirrhosis  Avoid hepatotoxic agents    Discussed with internal medicine RN  "

## 2017-10-09 NOTE — DIETARY
"Nutrition Services:  Brief Update    TF update for today.  Cortrak came out while pt was changing position.  Pt refusing for another cortrak to be placed.  Per RN note, pt \"states he will eat more\".  Pt is on a full liquid, nectar thick diet.  Last meal recorded in ADL flowsheet was <25% consumed.      Plan / Recommend:  Consider adding Boost VHC between meals to encourage PO intake if pt does not eat enough.  Advance diet as tolerated per SLP/MD.    RD will continue to follow.        "

## 2017-10-09 NOTE — PROGRESS NOTES
Osiel came out while pt changing position, pt currently refusing another to be placed at this time, states he will eat more.

## 2017-10-10 LAB
ALBUMIN SERPL BCP-MCNC: 3.1 G/DL (ref 3.2–4.9)
ALBUMIN/GLOB SERPL: 0.8 G/DL
ALP SERPL-CCNC: 92 U/L (ref 30–99)
ALT SERPL-CCNC: 8 U/L (ref 2–50)
ANION GAP SERPL CALC-SCNC: 10 MMOL/L (ref 0–11.9)
ANISOCYTOSIS BLD QL SMEAR: ABNORMAL
AST SERPL-CCNC: 18 U/L (ref 12–45)
BASOPHILS # BLD AUTO: 0 % (ref 0–1.8)
BASOPHILS # BLD: 0 K/UL (ref 0–0.12)
BILIRUB SERPL-MCNC: 0.7 MG/DL (ref 0.1–1.5)
BUN SERPL-MCNC: 24 MG/DL (ref 8–22)
CALCIUM SERPL-MCNC: 8.8 MG/DL (ref 8.5–10.5)
CHLORIDE SERPL-SCNC: 99 MMOL/L (ref 96–112)
CO2 SERPL-SCNC: 24 MMOL/L (ref 20–33)
CREAT SERPL-MCNC: 1.21 MG/DL (ref 0.5–1.4)
EOSINOPHIL # BLD AUTO: 0 K/UL (ref 0–0.51)
EOSINOPHIL NFR BLD: 0 % (ref 0–6.9)
ERYTHROCYTE [DISTWIDTH] IN BLOOD BY AUTOMATED COUNT: 54.6 FL (ref 35.9–50)
GFR SERPL CREATININE-BSD FRML MDRD: 59 ML/MIN/1.73 M 2
GLOBULIN SER CALC-MCNC: 3.8 G/DL (ref 1.9–3.5)
GLUCOSE SERPL-MCNC: 83 MG/DL (ref 65–99)
HCT VFR BLD AUTO: 22.2 % (ref 42–52)
HGB BLD-MCNC: 7.3 G/DL (ref 14–18)
HYPOCHROMIA BLD QL SMEAR: ABNORMAL
LYMPHOCYTES # BLD AUTO: 7.54 K/UL (ref 1–4.8)
LYMPHOCYTES NFR BLD: 61.8 % (ref 22–41)
MACROCYTES BLD QL SMEAR: ABNORMAL
MANUAL DIFF BLD: NORMAL
MCH RBC QN AUTO: 34.3 PG (ref 27–33)
MCHC RBC AUTO-ENTMCNC: 32.9 G/DL (ref 33.7–35.3)
MCV RBC AUTO: 104.2 FL (ref 81.4–97.8)
MONOCYTES # BLD AUTO: 0.21 K/UL (ref 0–0.85)
MONOCYTES NFR BLD AUTO: 1.7 % (ref 0–13.4)
MORPHOLOGY BLD-IMP: NORMAL
NEUTROPHILS # BLD AUTO: 4.45 K/UL (ref 1.82–7.42)
NEUTROPHILS NFR BLD: 33.9 % (ref 44–72)
NEUTS BAND NFR BLD MANUAL: 2.6 % (ref 0–10)
NRBC # BLD AUTO: 0 K/UL
NRBC BLD AUTO-RTO: 0 /100 WBC
PLATELET # BLD AUTO: 132 K/UL (ref 164–446)
PLATELET BLD QL SMEAR: NORMAL
PMV BLD AUTO: 10.1 FL (ref 9–12.9)
POLYCHROMASIA BLD QL SMEAR: NORMAL
POTASSIUM SERPL-SCNC: 4.5 MMOL/L (ref 3.6–5.5)
PROT SERPL-MCNC: 6.9 G/DL (ref 6–8.2)
RBC # BLD AUTO: 2.13 M/UL (ref 4.7–6.1)
RBC BLD AUTO: PRESENT
SMUDGE CELLS BLD QL SMEAR: NORMAL
SODIUM SERPL-SCNC: 133 MMOL/L (ref 135–145)
TOXIC GRANULES BLD QL SMEAR: SLIGHT
WBC # BLD AUTO: 12.2 K/UL (ref 4.8–10.8)

## 2017-10-10 PROCEDURE — 92526 ORAL FUNCTION THERAPY: CPT

## 2017-10-10 PROCEDURE — 302255 BARRIER CREAM MOISTURE BAZA PROTECT (ZINC) 5OZ: Performed by: INTERNAL MEDICINE

## 2017-10-10 PROCEDURE — G8988 SELF CARE GOAL STATUS: HCPCS | Mod: CI

## 2017-10-10 PROCEDURE — G8987 SELF CARE CURRENT STATUS: HCPCS | Mod: CJ

## 2017-10-10 PROCEDURE — 700102 HCHG RX REV CODE 250 W/ 637 OVERRIDE(OP): Performed by: NURSE PRACTITIONER

## 2017-10-10 PROCEDURE — A9270 NON-COVERED ITEM OR SERVICE: HCPCS | Performed by: HOSPITALIST

## 2017-10-10 PROCEDURE — 85027 COMPLETE CBC AUTOMATED: CPT

## 2017-10-10 PROCEDURE — 80053 COMPREHEN METABOLIC PANEL: CPT

## 2017-10-10 PROCEDURE — 97166 OT EVAL MOD COMPLEX 45 MIN: CPT

## 2017-10-10 PROCEDURE — 700111 HCHG RX REV CODE 636 W/ 250 OVERRIDE (IP): Performed by: INTERNAL MEDICINE

## 2017-10-10 PROCEDURE — 700102 HCHG RX REV CODE 250 W/ 637 OVERRIDE(OP): Performed by: HOSPITALIST

## 2017-10-10 PROCEDURE — 700111 HCHG RX REV CODE 636 W/ 250 OVERRIDE (IP): Performed by: HOSPITALIST

## 2017-10-10 PROCEDURE — 36415 COLL VENOUS BLD VENIPUNCTURE: CPT

## 2017-10-10 PROCEDURE — 770006 HCHG ROOM/CARE - MED/SURG/GYN SEMI*

## 2017-10-10 PROCEDURE — A9270 NON-COVERED ITEM OR SERVICE: HCPCS | Performed by: NURSE PRACTITIONER

## 2017-10-10 PROCEDURE — A9270 NON-COVERED ITEM OR SERVICE: HCPCS | Performed by: INTERNAL MEDICINE

## 2017-10-10 PROCEDURE — 85007 BL SMEAR W/DIFF WBC COUNT: CPT

## 2017-10-10 PROCEDURE — 99232 SBSQ HOSP IP/OBS MODERATE 35: CPT | Performed by: INTERNAL MEDICINE

## 2017-10-10 PROCEDURE — 700102 HCHG RX REV CODE 250 W/ 637 OVERRIDE(OP): Performed by: INTERNAL MEDICINE

## 2017-10-10 RX ADMIN — FUROSEMIDE 20 MG: 10 INJECTION, SOLUTION INTRAMUSCULAR; INTRAVENOUS at 06:21

## 2017-10-10 RX ADMIN — CLINDAMYCIN HYDROCHLORIDE 300 MG: 150 CAPSULE ORAL at 13:48

## 2017-10-10 RX ADMIN — SULFAMETHOXAZOLE AND TRIMETHOPRIM 1 TABLET: 800; 160 TABLET ORAL at 09:50

## 2017-10-10 RX ADMIN — CLINDAMYCIN HYDROCHLORIDE 300 MG: 150 CAPSULE ORAL at 18:08

## 2017-10-10 RX ADMIN — DULOXETINE HYDROCHLORIDE 60 MG: 60 CAPSULE, DELAYED RELEASE ORAL at 09:50

## 2017-10-10 RX ADMIN — FOLIC ACID 1 MG: 1 TABLET ORAL at 09:50

## 2017-10-10 RX ADMIN — CLINDAMYCIN HYDROCHLORIDE 300 MG: 150 CAPSULE ORAL at 21:40

## 2017-10-10 RX ADMIN — OXYCODONE HYDROCHLORIDE 5 MG: 5 TABLET ORAL at 13:48

## 2017-10-10 RX ADMIN — FUROSEMIDE 20 MG: 10 INJECTION, SOLUTION INTRAMUSCULAR; INTRAVENOUS at 18:09

## 2017-10-10 RX ADMIN — OXYCODONE HYDROCHLORIDE 5 MG: 5 TABLET ORAL at 04:11

## 2017-10-10 RX ADMIN — SPIRONOLACTONE 100 MG: 100 TABLET, FILM COATED ORAL at 09:50

## 2017-10-10 RX ADMIN — SULFAMETHOXAZOLE AND TRIMETHOPRIM 1 TABLET: 800; 160 TABLET ORAL at 21:40

## 2017-10-10 RX ADMIN — LEVOTHYROXINE SODIUM 25 MCG: 50 TABLET ORAL at 06:21

## 2017-10-10 RX ADMIN — OXYCODONE HYDROCHLORIDE 5 MG: 5 TABLET ORAL at 21:40

## 2017-10-10 RX ADMIN — DIGOXIN 125 MCG: 125 TABLET ORAL at 18:09

## 2017-10-10 RX ADMIN — THIAMINE HCL TAB 100 MG 100 MG: 100 TAB at 09:50

## 2017-10-10 RX ADMIN — MULTIPLE VITAMINS W/ MINERALS TAB 1 TABLET: TAB at 09:50

## 2017-10-10 RX ADMIN — TRAZODONE HYDROCHLORIDE 100 MG: 50 TABLET ORAL at 21:40

## 2017-10-10 RX ADMIN — ENOXAPARIN SODIUM 40 MG: 100 INJECTION SUBCUTANEOUS at 09:49

## 2017-10-10 RX ADMIN — CLINDAMYCIN HYDROCHLORIDE 300 MG: 150 CAPSULE ORAL at 09:53

## 2017-10-10 ASSESSMENT — ENCOUNTER SYMPTOMS
CHILLS: 0
ABDOMINAL PAIN: 0
SHORTNESS OF BREATH: 0
SPEECH CHANGE: 0
DIARRHEA: 0
NAUSEA: 0
SORE THROAT: 0
WHEEZING: 0
BLURRED VISION: 0
WEAKNESS: 1
DIZZINESS: 0
MYALGIAS: 1
HEARTBURN: 0
COUGH: 0
VOMITING: 0
FEVER: 0
HEADACHES: 0

## 2017-10-10 ASSESSMENT — COGNITIVE AND FUNCTIONAL STATUS - GENERAL
DRESSING REGULAR LOWER BODY CLOTHING: A LITTLE
TOILETING: A LITTLE
DRESSING REGULAR UPPER BODY CLOTHING: A LITTLE
SUGGESTED CMS G CODE MODIFIER DAILY ACTIVITY: CK
EATING MEALS: A LITTLE
HELP NEEDED FOR BATHING: A LOT
PERSONAL GROOMING: A LITTLE
DAILY ACTIVITIY SCORE: 17

## 2017-10-10 ASSESSMENT — PAIN SCALES - GENERAL
PAINLEVEL_OUTOF10: 5
PAINLEVEL_OUTOF10: 5
PAINLEVEL_OUTOF10: 7
PAINLEVEL_OUTOF10: 4
PAINLEVEL_OUTOF10: 4
PAINLEVEL_OUTOF10: 3
PAINLEVEL_OUTOF10: 0

## 2017-10-10 ASSESSMENT — ACTIVITIES OF DAILY LIVING (ADL): TOILETING: INDEPENDENT

## 2017-10-10 NOTE — PROGRESS NOTES
[facility] Hospitalist Progress Note    Date of Service: 10/10/2017    Chief Complaint  72 y.o. male with a history of alcohol abuse and cirrhosis admitted 2017 with Sepsis, Cellulitis of left lower extremity requiring ICU admission and pressors. His course was compensated by, Alcohol withdrawal, Respiratory failure, Atrial fibrillation.  His rate is now controlled, he is not a good anticoagulant candidate.    Interval Problem Update  10/10/2017: Remains in sinus rhythm, changed to Bactrim and clindamycin, pending SNF.  Discussed with ID    Consultants/Specialty  PMA  Juana - ID    Disposition  Pending SNF        Review of Systems   Constitutional: Positive for malaise/fatigue. Negative for chills and fever.   HENT: Negative for sore throat.    Eyes: Negative for blurred vision.   Respiratory: Negative for cough, shortness of breath and wheezing.    Cardiovascular: Negative for chest pain.   Gastrointestinal: Negative for abdominal pain, diarrhea, heartburn, nausea and vomiting.   Genitourinary: Negative for dysuria.   Musculoskeletal: Positive for myalgias.   Neurological: Positive for weakness. Negative for dizziness and headaches.      Physical Exam  Laboratory/Imaging   Hemodynamics  Temp (24hrs), Av.9 °C (98.5 °F), Min:36.6 °C (97.8 °F), Max:37.5 °C (99.5 °F)   Temperature:  (Medical @ this time.)  Pulse  Av.3  Min: 60  Max: 188    Blood Pressure : 115/59     Respiratory      Respiration: 18, Pulse Oximetry: 98 %        RUL Breath Sounds: Clear, RML Breath Sounds: Clear, RLL Breath Sounds: Diminished, MONICA Breath Sounds: Clear, LLL Breath Sounds: Diminished    Fluids    Intake/Output Summary (Last 24 hours) at 10/10/17 1436  Last data filed at 10/10/17 0800   Gross per 24 hour   Intake              480 ml   Output                0 ml   Net              480 ml       Nutrition  Orders Placed This Encounter   Procedures   • DIET ORDER     Standing Status:   Standing     Number of Occurrences:   1      Order Specific Question:   Diet:     Answer:   Full Liquid [11]     Order Specific Question:   Consistency/Fluid modifications:     Answer:   Nectar Thick [2]     Order Specific Question:   Miscellaneous modifications:     Answer:   SLP - 1:1 Supervision by Nursing [21]     Order Specific Question:   Miscellaneous modifications:     Answer:   SLP - Deliver to Nursing Station [22]     Physical Exam   Constitutional: He appears well-developed.   HENT:   Head: Normocephalic and atraumatic.   Eyes: Conjunctivae are normal. Pupils are equal, round, and reactive to light.   Neck: No tracheal deviation present. No thyromegaly present.   Cardiovascular: Normal rate and regular rhythm.    Pulmonary/Chest: Effort normal. He has no rales.   Abdominal: Soft. Bowel sounds are normal. He exhibits no distension. There is no tenderness.   Lymphadenopathy:     He has no cervical adenopathy.   Neurological: He is alert.   Oriented to person and place   Skin: Rash noted. There is erythema.   Left leg, skin is sloughing, appears improved   Nursing note and vitals reviewed.      Recent Labs      10/08/17   0517  10/10/17   0211   WBC  13.2*  12.2*   RBC  2.12*  2.13*   HEMOGLOBIN  7.4*  7.3*   HEMATOCRIT  22.4*  22.2*   MCV  105.7*  104.2*   MCH  34.9*  34.3*   MCHC  33.0*  32.9*   RDW  56.5*  54.6*   PLATELETCT  116*  132*   MPV  10.1  10.1     Recent Labs      10/08/17   0517  10/10/17   0211   SODIUM  136  133*   POTASSIUM  4.7  4.5   CHLORIDE  103  99   CO2  26  24   GLUCOSE  108*  83   BUN  20  24*   CREATININE  0.94  1.21   CALCIUM  9.0  8.8                      Assessment/Plan     * Severe sepsis (CMS-HCC)- (present on admission)   Assessment & Plan    This is sepsis (without associated acute organ dysfunction).   Secondary to left leg MRSA cellulitis  Sepsis resolved          Paroxysmal atrial fibrillation (CMS-HCC)- (present on admission)   Assessment & Plan    Rate is controlled  Digoxin, poor candidate for anticoagulation         Acute respiratory failure (CMS-[facility])- (present on admission)   Assessment & Plan    With hypoxia.   Keep O2 sats above 90%, RT protocol  Wean oxygen        Cellulitis- (present on admission)   Assessment & Plan    Due to MRSA  Bactrim, Clindamycin  ID following  Pending duration abx        Cirrhosis (CMS-HCC)- (present on admission)   Assessment & Plan    Secondary to Alcohol   Aldactone        Alcohol withdrawal (CMS-HCC)- (present on admission)   Assessment & Plan    Finished course of Phenobarb protocol.         Dysphagia   Assessment & Plan    NTL, pulled cortrak  Repeat SLP eval pending        Pulmonary edema- (present on admission)   Assessment & Plan    IV Lasix        Hyponatremia   Assessment & Plan    Follow bmp  Hold HCTZ        CLL (chronic lymphocytic leukemia) (CMS-HCC)- (present on admission)   Assessment & Plan    Follow cbc  ffup with Oncology as outpt        Continuous opioid dependence (CMS-HCC)- (present on admission)   Assessment & Plan    Judicious pain control        Macrocytic anemia- (present on admission)   Assessment & Plan    Chronic, due to cirrhosis, no active bleeding.   Follow cbc  Transfuse if less than 7        Thrombocytopenia (CMS-HCC)- (present on admission)   Assessment & Plan    Stable to slightly improved  Follow cbc            Reviewed items::  EKG reviewed, Radiology images reviewed, Labs reviewed and Medications reviewed  Maradiaga catheter::  No Maradiaga  DVT prophylaxis pharmacological::  Enoxaparin (Lovenox)  Ulcer Prophylaxis::  No  Antibiotics:  Treating active infection/contamination beyond 24 hours perioperative coverage

## 2017-10-10 NOTE — THERAPY
"Occupational Therapy Evaluation completed.   Functional Status:  Mod A ADLs and Min A with FWW  Plan of Care: Will benefit from Occupational Therapy 3 times per week  Discharge Recommendations:  Equipment: Will Continue to Assess for Equipment Needs. Post-acute therapy Discharge to a transitional care facility for continued skilled therapy services. and Discharge to home with outpatient or home health for additional skilled therapy services.    Patient presents with cellulitis L LE with wound on ankle complicated by ETOH abuse and ICU transfer 9/29 2/2 sepsis, hypotension, and respiratory failure. Patient report I with ADLs, A with IADLs, and Mod I with mobilty with 4WW PTA. Patient, upon eval, presents with decreased endurance, strength, tolerance, balance, ADLs and mobility and increased o2 needs necessitating Mod A ADLs and Min A with FWW. Patient would benefit from skilled OT in this setting followed by rehab to promote gains prior to DC home with services.     See \"Rehab Therapy-Acute\" Patient Summary Report for complete documentation.    "

## 2017-10-10 NOTE — PROGRESS NOTES
Assumed care of patient. Assessment complete. Patient complains of generalized leg pain at this time. Educated to use call light for assistance. Fall precautions in place. Tele box in place. Will continue to monitor with hourly rounding.

## 2017-10-10 NOTE — DISCHARGE PLANNING
[facility] received notification from [facility]. The referral has been denied. No male beds and no isolation beds.

## 2017-10-10 NOTE — PROGRESS NOTES
Pt without complaints - in bed with call bell in reach - appetite poor - tele intact - no distress npted

## 2017-10-10 NOTE — THERAPY
"Speech Language Therapy dysphagia treatment completed.     Functional Status:  The patient was seen for dysphagia tx with a NTFL meal tray.  Pt was AAOx4, reported being tired from the morning's activities and not feeling hungry.  He consumed small amounts of puree and nectars (approx 2 oz of each), without s/sx of aspiration.   He was able to self feed without difficulty and took small bites and sips with minimal cueing.  Pt politely declined any trials of upgraded textures and requested to take a nap.  Based on limited participation, recommend to continue a NTFL diet with monitoring.       Recommendations: continue NTFL diet with monitoring and assistance for set up     Plan of Care: Will benefit from Speech Therapy 3 times per week    Post-Acute Therapy: Discharge to a transitional care facility for continued skilled therapy services.    See \"Rehab Therapy-Acute\" Patient Summary Report for complete documentation.     "

## 2017-10-10 NOTE — PROGRESS NOTES
Infectious Disease Progress Note    Author: Alison Arias M.D. Date & Time of service: 10/10/2017  8:59 AM    Chief Complaint:  FU sepsis/LLE cellulitis    Interval History:  10/2 AF, WBC 27.5, confused and in pain  10/3 AF, WBC 22.9, remains confused and mumbling  10/4 AF, WBC 15.7, pt obtunded, no overnight events per RN  10/5 AF, WBC 17, mumbling, does not engage in questioning or open eyes, grimaces when RLE examined, remains on pressors, lots of diarrhea, stool softeners held  10/6 AF WBC 17.5 abd more distended BMS placed +blood  10/7 AF WBC 12.3 obtunded now but per RN better earlier  10/8 AF WBC 13 wants something to drink-plan for swallowing study later. Denies SE abx  10/9/17-MAXIMUM TEMPERATURE 98.8. Last WBC 13.2 complains of pain all over the body especially his right leg  10/10/17-MAXIMUM TEMPERATURE 99.5. WBC 12.2  Labs Reviewed, Medications Reviewed, Radiology Reviewed and Wound Reviewed.    Review of Systems:  Review of Systems   Constitutional: Positive for malaise/fatigue. Negative for chills and fever.   Respiratory: Negative for cough.    Cardiovascular: Negative for chest pain.   Gastrointestinal: Negative for abdominal pain, nausea and vomiting.   Musculoskeletal: Positive for myalgias.        Complains of pain all over his body   Neurological: Negative for speech change.       Hemodynamics:  Temp (24hrs), Av.9 °C (98.5 °F), Min:36.6 °C (97.8 °F), Max:37.5 °C (99.5 °F)  Temperature: 37.5 °C (99.5 °F)  Pulse  Av.3  Min: 60  Max: 188   Blood Pressure : 115/59      Physical Exam:  Physical Exam   Constitutional: He is oriented to person, place, and time. He appears well-developed. No distress.   Thin  Older than stated age  Chronically ill   HENT:   Head: Normocephalic and atraumatic.   cortrak   Eyes: EOM are normal. Pupils are equal, round, and reactive to light.   Neck: Neck supple.   Cardiovascular:   tachycardic   Pulmonary/Chest: Effort normal and breath sounds normal.    Abdominal: Soft. There is no tenderness. There is no rebound and no guarding.   Musculoskeletal: He exhibits no edema.   Erythema and edema of the LLE radiating to the left hip and medial thigh, improving significantly. Skin peeling    Left medial malleolar wound.      Neurological: He is alert and oriented to person, place, and time.   Skin: There is erythema.   Resolving  Peeling of the skin   Nursing note and vitals reviewed.      Meds:    Current Facility-Administered Medications:   •  sulfamethoxazole-trimethoprim  •  clindamycin  •  furosemide  •  spironolactone  •  haloperidol lactate  •  oxycodone immediate-release  •  enoxaparin (LOVENOX) injection  •  digoxin  •  folic acid  •  therapeutic multivitamin-minerals  •  thiamine  •  duloxetine  •  levothyroxine  •  trazodone  •  ondansetron  •  ondansetron    Labs:  Recent Labs      10/08/17   0517  10/10/17   0211   WBC  13.2*  12.2*   RBC  2.12*  2.13*   HEMOGLOBIN  7.4*  7.3*   HEMATOCRIT  22.4*  22.2*   MCV  105.7*  104.2*   MCH  34.9*  34.3*   RDW  56.5*  54.6*   PLATELETCT  116*  132*   MPV  10.1  10.1   NEUTSPOLYS  29.60*  33.90*   LYMPHOCYTES  70.40*  61.80*   MONOCYTES  0.00  1.70   EOSINOPHILS  0.00  0.00   BASOPHILS  0.00  0.00   RBCMORPHOLO  Present  Present     Recent Labs      10/08/17   0517  10/10/17   0211   SODIUM  136  133*   POTASSIUM  4.7  4.5   CHLORIDE  103  99   CO2  26  24   GLUCOSE  108*  83   BUN  20  24*     Recent Labs      10/08/17   0517  10/10/17   0211   ALBUMIN  3.2  3.1*   TBILIRUBIN  0.9  0.7   ALKPHOSPHAT  83  92   TOTPROTEIN  6.3  6.9   ALTSGPT  7  8   ASTSGOT  16  18   CREATININE  0.94  1.21       Imaging:  Ct-abdomen-pelvis With    Result Date: 10/1/2017  10/1/2017 8:07 AM HISTORY/REASON FOR EXAM:  Diffuse abdominal pain. History of sepsis. Left lower extremity cellulitis. TECHNIQUE/EXAM DESCRIPTION: CT scan of the abdomen and pelvis with contrast. Contrast-enhanced helical scanning was obtained from the diaphragmatic  domes through the pubic symphysis following the bolus administration of 100 mL of Omnipaque 350 nonionic contrast without complication. Low dose optimization technique was utilized for this CT exam including automated exposure control and adjustment of the mA and/or kV according to patient size. COMPARISON: CT 6/5/2015 FINDINGS: The visualized lung bases demonstrate small amounts of dependent pleural effusion with bibasilar dependent airspace disease, most likely atelectasis. There is no acute bony process. There is degenerative change in the lower lumbar spine and pelvis. CT Abdomen: Liver is unchanged in appearance with slight nodularity. There are no definite focal masses. Spleen is mildly enlarged measuring 13.1 cm in diameter without change. Hepatic veins are somewhat attenuated. Portal veins, splenic vein and SMV are patent. Pancreas is somewhat atrophic but otherwise unremarkable. The gallbladder demonstrates some density dependently which could be related to tumefactive sludge or noncalcified stones. There is no biliary dilatation. The adrenal glands are normal in size. Kidneys demonstrate no hydronephrosis. There are probable small cortical cysts. The abdominal aorta is normal in caliber. There is atherosclerosis of the aorta. There is no lymphadenopathy. The bowel demonstrates no evidence of bowel obstruction. There is moderate stool throughout the colon. There is a hiatal hernia. There is a small amount of fluid around the liver although this is decreased over the prior study. There is minimal fluid in both paracolic gutters. There is diffuse subcutaneous edema. CT Pelvis: There is no acute inflammatory process in the pelvis. There is free fluid in the pelvis with diffuse subcutaneous edema. There are mildly prominent lymph nodes in the inguinal regions, left greater than right with some nodes in the external iliac region as well more so on the left. The largest inguinal node is 1.5 cm short axis on the  left side.. There is postoperative change involving the colon. Bladder is decompressed with a Maradiaga catheter.     1.  There is mild colonic distention with large amount of stool throughout the colon. There is no bowel obstruction. 2.  There is a hiatal hernia. 3.  Changes of the liver with mild splenomegaly and a small amount of ascites in the abdomen and pelvis suggestive of underlying hepatocellular disease. 4.  Dependent density in the gallbladder which could be tumefactive sludge or noncalcified stones. 5.  There are small dependent bilateral pleural effusions with dependent airspace disease, likely atelectasis. 6.  There is mild lymphadenopathy in the pelvis more so on the left likely inflammatory. 7.  There is diffuse subcutaneous edema.    Ct-extremity, Lower With Left    Result Date: 10/1/2017  10/1/2017 8:07 AM HISTORY/REASON FOR EXAM:  Progression of strep/staph cellulitis with sepsis. Possible necrotizing fasciitis.. TECHNIQUE/EXAM DESCRIPTION AND NUMBER OF VIEWS:  CT scan of the LEFT lower extremity with contrast, with reconstructions. Thin helical 3 mm sections were obtained from the distal femur through the proximal tibia/fibula. Sagittal and coronal multiplanar reconstructions were generated from the axial images. A total of 100 mL of Omnipaque 350 nonionic contrast was administered  IV without complication. Up to date radiation dose reduction adjustments have been utilized to meet ALARA standards for radiation dose reduction. COMPARISON: X-rays of the foot and tibia fibula 9/29/2017, 9/28/2017 respectively. FINDINGS: Imaging of the visualized portion of the pelvis demonstrates fluid in the pelvis. Bladder is decompressed with a Maradiaga catheter. There is postoperative change involving the colon. There are mildly prominent bilateral external iliac and common femoral lymph nodes. Largest node in the left inguinal region is 1.5 cm short axis diameter. There is diffuse subcutaneous edema throughout the  pelvis. Imaging throughout the left lower extremity demonstrates diffuse subcutaneous edema. There is a small amount of fluid in the intermuscular planes along the medial aspect of the proximal lower leg adjacent to the medial gastrocnemius musculature. There is  no focal abscess collection. There is a small joint effusion of the knee. There is some mild fatty infiltration of the musculature. There is atherosclerosis but basilar structures appear grossly patent. Limited partial visualization of portions of the right lower extremity also demonstrates some mild subcutaneous edema. Bones demonstrate mild degenerative changes in the hip and left knee. There is no evidence of osteomyelitis.     1.  There is diffuse subcutaneous edema throughout the left hemipelvis and left lower extremity with a small amount of fluid adjacent to the right medial gastrocnemius at the level the proximal calf. 2.  There is no focal drainable abscess. 3.  There is no evidence of osteomyelitis. 4.  There is mild atherosclerosis with grossly patent vasculature. 5.  There are probably inflammatory lymph nodes in the pelvis and left inguinal region with the largest measuring 1.5 cm short axis. 6.  There is free fluid in the visualized pelvis.    Ct-head W/o    Result Date: 10/1/2017  HISTORY/REASON FOR EXAM:  Altered Mental Status. TECHNIQUE/EXAM DESCRIPTION: CT scan of the head without contrast, 10/1/2017 12:28 AM. Contiguous 5 mm axial sections were obtained from the skull base through the vertex. Up to date radiation dose reduction adjustments have been utilized to meet ALARA standards for radiation dose reduction. COMPARISON:  4/23/2015 FINDINGS:   The ventricular system and cortical sulci are prominent, consistent with the patient's age.  There is no midline shift or other mass effect.  A 5 mm circumscribed lucency is present in the right temporal lobe, consistent with old lacunar infarct. This is unchanged. There is no acute intra-axial  abnormality or extra-axial fluid collection.  There is no intracranial hemorrhage.  The calvaria are intact. The visualized paranasal sinuses show no unusual opacity.     1.  No acute intracranial abnormality. 2.  Moderate atrophy, age-consistent. 3.  Probable old lacunar infarct in the right temporal lobe, unchanged from the prior scan. INTERPRETING LOCATION:  [address] [place] [address], [place], [address]    Dx-chest-portable (1 View)    Result Date: 9/29/2017 9/29/2017 1:25 PM HISTORY/REASON FOR EXAM:  Line placement. TECHNIQUE/EXAM DESCRIPTION AND NUMBER OF VIEWS: Single portable view of the chest. COMPARISON: 9/28/2017. FINDINGS: The soft tissues and bony structures are unremarkable. The heart and mediastinal structures are within normal limits. Pulmonary vascularity is normal. There is right basilar atelectasis. There is no effusion or pneumothorax. There has been interval insertion of a central venous catheter which terminates with the tip projecting over the expected region of the mid to distal superior vena cava.     1.  Interval insertion of a central venous catheter which terminates with the tip projecting over the expected region of the mid to distal superior vena cava. 2.  Right basilar atelectasis.    Dx-chest-portable (1 View)    Result Date: 9/28/2017 9/28/2017 4:32 PM HISTORY/REASON FOR EXAM:  Sepsis. Left lower extremity wound. TECHNIQUE/EXAM DESCRIPTION AND NUMBER OF VIEWS: Single portable view of the chest. COMPARISON: 9/1/2016 FINDINGS: The mediastinal and cardiac silhouette is unremarkable. The pulmonary vascularity is within normal limits. Lungs demonstrate no evidence of pneumonia. Slight hazy density seen adjacent to the right anterior 1st rib is similar to the prior studies and likely due to degenerative change. There is no significant pleural effusion. There is no visible pneumothorax. There are no acute bony abnormalities.     1.  There is no acute cardiopulmonary process.    Dx-foot-2- Left    Result  Date: 9/29/2017 9/29/2017 4:10 PM HISTORY/REASON FOR EXAM:  Left foot swelling and weeping TECHNIQUE/EXAM DESCRIPTION AND NUMBER OF VIEWS: 2 views of the LEFT foot. COMPARISON:  None. FINDINGS:  Bone mineralization is age appropriate. Bony alignment is anatomic. There is no evidence of acute fracture or dislocation. There is no soft tissue gas. There is soft tissue swelling about the ankle.     Soft tissue swelling about the ankle. No evidence of acute osseous abnormality or soft tissue gas.    Dx-lumbar Spine-2 Or 3 Views    Result Date: 9/9/2017 9/9/2017 3:24 AM HISTORY/REASON FOR EXAM:  Pain Following Trauma. TECHNIQUE/ EXAM DESCRIPTION AND NUMBER OF VIEWS:  3 views of the lumbar spine. COMPARISON: None. FINDINGS: The lowest formed intervertebral disc will be designated L5-S1 for the purposes of this report and vertebral levels numbered accordingly. No acute fracture is evident. No gross malalignment is seen. There is mild loss of intervertebral disc height in the upper lumbar spine. There is moderate loss of intervertebral disc height at L4-L5. There are anterior osteophytes at most levels. There is no evidence of spondylolisthesis or osseous lesion. There are degenerative changes of the mid to lower lumbar facet joints. There is calcific atherosclerotic plaque.     1.  No evidence of acute fracture. 2.  Multilevel multifactorial degenerative changes    Dx-thoracic Spine-2 Views    Result Date: 9/9/2017 9/9/2017 3:21 AM HISTORY/REASON FOR EXAM:  Pain Following Trauma. TECHNIQUE/EXAM DESCRIPTION AND NUMBER OF VIEWS:  Thoracic spine, 2 views. COMPARISON:  None. FINDINGS: Glands unremarkable. There is lucency extending to the superior endplate of L2 on the second image. This appearance is not present on the dedicated lumbar spine radiographs. No other finding suspicious for acute fracture are seen. There is mild loss of intervertebral disc height throughout the thoracic spine. There are anterior osteophytes at  most levels.     1.  Lucency through the superior endplate of L2 on the second image, discordant with the appearance on the dedicated lumbar spine radiographs. I suspect this is artifactual however a fracture is not excluded. Further assessment is recommended with CT of the lumbar spine. 2.  Thoracic spondylosis    Dx-tibia And Fibula Left    Result Date: 9/28/2017 9/28/2017 4:32 PM HISTORY/REASON FOR EXAM:  Left tibia and fibular redness and swelling with open wound in the region of the medial malleolus. TECHNIQUE/EXAM DESCRIPTION AND NUMBER OF VIEWS:  2 views of the LEFT tibia and fibula. COMPARISON: Left knee 4/24/2015 FINDINGS: There is no evidence of acute fracture involving the tibia or fibula. Sclerotic focus posterior left tibial metadiaphyseal region is without change and may represent a sclerotic NOF or fibrous cortical defect. There is mild diffuse soft tissue swelling most prominent at the level the ankle. There is no gas in the soft tissues and there is no foreign body. There is no periostitis.     1.  There is mild diffuse swelling in the soft tissues of the distal left lower leg and ankle. There is no gas in the soft tissues or foreign body. 2.  There is no plain film evidence of osteomyelitis.    Dx-wrist-complete 3+ Left    Result Date: 9/9/2017 9/9/2017 3:24 AM HISTORY/REASON FOR EXAM:  Pain/Deformity Following Trauma. TECHNIQUE/EXAM DESCRIPTION AND NUMBER OF VIEWS:  3 views of the  LEFT wrist. COMPARISON: Radiographs 5/6/2015 FINDINGS: MINERALIZATION: Decreased. INJURY: There has been interval placement of a volar plate and screws transfixing the previously demonstrated intra-articular distal radial fracture. The hardware appears intact. There is underlying curvilinear deformity of the distal radius. There is ulnar positive variance, as before. No acute fracture is seen. JOINTS: No erosive arthropathy is evident.     1.  No radiographic evidence of acute traumatic injury. 2.  Healed surgically  transfixed fracture of the distal radius 3.  Osteopenia    Echocardiogram Comp W/o Cont    Result Date: 2017  Transthoracic Echo Report Echocardiography Laboratory CONCLUSIONS Normal left ventricular size, thickness, systolic function, and diastolic function. Mitral annular calcification. Mild mitral regurgitation. Aortic sclerosis without stenosis. Mild aortic insufficiency. Normal estimated right-sided pressures. No prior study is available for comparison. MAGALI TODD Exam Date:         2017                    14:07 Exam Location:     Inpatient Priority:          Routine Ordering Physician:        CIRILO HSU JR Referring Physician:       [id number] Catalan JR Sonographer:               Lo Lundberg RDCS Age:    72     Gender:    M MRN:    [id number] :    1945 BSA:    1.73   Ht (in):    65     Wt (lb):    145 Exam Type:     Complete Indications:     Cardiac arrhythmia, unspecified ICD Codes:       I499 CPT Codes:       06452 BP:   89     /   57     HR:   80 Technical Quality:       Fair MEASUREMENTS  (Male / Female) Normal Values 2D ECHO LV Diastolic Diameter PLAX        4.6 cm                4.2 - 5.9 / 3.9 - 5.3 cm LV Systolic Diameter PLAX         2.1 cm                2.1 - 4.0 cm IVS Diastolic Thickness           1 cm                  LVPW Diastolic Thickness          1 cm                  RV Diameter 4C                    2.7 cm                2.5 - 2.9 cm LVOT Diameter                     1.8 cm                RA Diameter                       2.7 cm                Estimated LV Ejection Fraction    60 %                  LV Ejection Fraction MOD BP       60.8 %                >= 55  % LV Ejection Fraction MOD 4C       58.4 %                LV Ejection Fraction MOD 2C       58.4 %                LA Volume Index                   13.5 cm³/m²           16 - 28 cm³/m² IVC Diameter                      1.5 cm                M-MODE Aortic Root Diameter MM           3.9 cm                DOPPLER  AV Peak Velocity                  1.3 m/s               AV Peak Gradient                  6.7 mmHg              AV Mean Gradient                  3.3 mmHg              AI Pressure Half Time             463 ms                LVOT Peak Velocity                0.89 m/s              AV Area Cont Eq vti               1.9 cm²               Mitral E Point Velocity           0.96 m/s              Mitral E to A Ratio               0.99                  MV Pressure Half Time             60.6 ms               MV Area PHT                       3.6 cm²               MV Deceleration Time              209 ms                TR Peak Velocity                  222 cm/s              PV Peak Velocity                  0.7 m/s               PV Peak Gradient                  1.9 mmHg              RVOT Peak Velocity                0.52 m/s              * Indicates values subject to auto-interpretation LV EF:  60    % FINDINGS Left Ventricle Normal left ventricular size, thickness, systolic function, and diastolic function.  Left ventricular ejection fraction is visually estimated to be 60%. Normal regional wall motion. Right Ventricle Normal right ventricular size and systolic function. Right Atrium Normal right atrial size. Normal inferior vena cava size and inspiratory collapse. Left Atrium Normal left atrial size. Mitral Valve Mitral annular calcification. Thickened mitral valve leaflets. Mild mitral regurgitation. Aortic Valve Aortic sclerosis without stenosis. Mild aortic insufficiency. Tricuspid Valve Structurally normal tricuspid valve. Mild tricuspid regurgitation. Estimated right ventricular systolic pressure  is 25 mmHg. Pulmonic Valve Structurally normal pulmonic valve. Mild pulmonic insufficiency. Pericardium Normal pericardium without effusion. Aorta Ascending aorta is normal for body surface area, and measured at a diameter of 3.7 cm above the sinotubular junction. Suleman Nguyen MD (Electronically Signed) Final Date:     29  2017                 23:50    Le Venous Duplex (specify In Comments Left, Right Or Bilateral)    Result Date: 2017   Vascular Laboratory  CONCLUSIONS  No evidence of acute DVT or SVT in the left lower extremity.  Edema is noted.  MAGALI TODD  Exam Date:     2017 16:43  Room #:     Inpatient  Priority:     Stat  Ht (in):             Wt (lb):  Ordering Physician:        REYNALDO LUU  Referring Physician:       [id number] Galeano  Sonographer:               Samina Galloway RVT  Study Type:                Complete Unilateral  Technical Quality:         Adequate  Age:    72    Gender:     M  MRN:    [id number]  :    1945      BSA:  Indications:     Localized swelling, mass and lump, left lower limb  CPT Codes:       28397  ICD Codes:         History:         Swelling and redness of left leg for less than 24 hours.  Limitations:     Severe pain.  PROCEDURES:  Left lower extremity venous duplex imaging.  The following venous structures were evaluated: common femoral, profunda  femoral, greater saphenous, femoral, popliteal, peroneal and posterior  tibial veins.  Serial compression, augmentation maneuvers, color and spectral Doppler flow  evaluations were performed.  FINDINGS:  Left lower extremity -  Complete color filling and compressibility with normal venous flow dynamics  including spontaneous flow, response to augmentation maneuvers, and  respiratory phasicity.  The peroneal and posterior tibial veins are difficult to assess for  compressibility due to patient pain, but flow response to augmentation is  demonstrated.  Interstitial fluid consistent with edema is observed in the thigh and below  the knee.  Flow was evaluated in the contralateral common femoral vein and normal  venous flow dynamics including spontaneous flow, respiratory phasic  variation and augmentation were demonstrated.  Kevin Lester MD  (Electronically Signed)  Final Date:      2017                 "   15:52    Dx-abdomen For Tube Placement    Result Date: 10/1/2017  10/1/2017 7:28 PM HISTORY/REASON FOR EXAM:  Line evaluation. TECHNIQUE/EXAM DESCRIPTION AND NUMBER OF VIEWS:  1 view(s) of the abdomen. COMPARISON:  9/1/2016. FINDINGS: Enteric tube projects over the second portion of the duodenum. There is mild colonic distention. Degenerative changes are seen in the spine.     Enteric tube projects over the second portion of the duodenum. Mild colonic distention.      Micro:  Results     Procedure Component Value Units Date/Time    CDIFF BY PCR WITH TOXIN [[id number]] Collected:  10/04/17 1837    Order Status:  Completed Specimen:  Stool from Stool Updated:  10/04/17 2038     C Diff by PCR Negative     Comment: C. difficile NOT detected by PCR.  Treatment not indicated per guidelines.  Repeat testing not indicated within 7 days.          027-NAP1-BI Presumptive Negative     Comment: Presumptive 027/NAP1/BI target DNA sequences are NOT DETECTED.       Narrative:       Special Contact [id number] CARMELA MAJOR  Does this patient have risk factors for C-diff?->Yes  C-Diff Risk Factors->antibiotic exposure  Has patient taken stool softeners or laxatives in the last 5  days?->Yes    BLOOD CULTURE [[id number]] Collected:  09/28/17 1703    Order Status:  Completed Specimen:  Blood from Peripheral Updated:  10/03/17 1900     Significant Indicator NEG     Source BLD     Site PERIPHERAL     Blood Culture No growth after 5 days of incubation.    Narrative:       Per Hospital Policy: Only change Specimen Src: to \"Line\" if  specified by physician order.    BLOOD CULTURE [[id number]] Collected:  09/28/17 1715    Order Status:  Completed Specimen:  Blood from Peripheral Updated:  10/03/17 1900     Significant Indicator NEG     Source BLD     Site PERIPHERAL     Blood Culture No growth after 5 days of incubation.    Narrative:       Per Hospital Policy: Only change Specimen Src: to \"Line\" if  specified by physician order.    "       Assessment:  Active Hospital Problems    Diagnosis   • Sinus tachycardia [R00.0]   • Cirrhosis (CMS-HCC) [K74.60]   • Alcoholism (CMS-HCC) [F10.20]   • Severe sepsis (CMS-HCC) [A41.9, R65.20]   • Hyponatremia [E87.1]   • Cellulitis [L03.90]   • CLL (chronic lymphocytic leukemia) (CMS-[facility]) [C91.10]   • Continuous opioid dependence (CMS-[facility]) [F11.20]       Plan:  Septic shock.   2/2 LLE cellulitis  Afebrile  Off pressors  Leukocytosis - persistent  On abx below    LLE cellulitis, improved  Cx - MRSA (vanco STEPHANIE 2), Group A strep  Discontinue ceftarolin  Start By mouth Bactrim  Continue Clinda  Leukocytosis, persistent.  Multifactorial  Infection + CLL  On abx  Monitor    Diarrhea  Hold stool softeners  Negative C diff  BMS placed +blood  Abd distended    Encephalopathy, improving  Multifactorial  Now oriented    CLL  Will impair ability to fight infection    Alcohol abuse/cirrhosis  Avoid hepatotoxic agents    Discussed with internal medicine RN

## 2017-10-10 NOTE — DISCHARGE PLANNING
[facility] received a SNF choice form. Per the choice form the referral has been sent [facility], [facility] and [facility]

## 2017-10-10 NOTE — DISCHARGE PLANNING
Transitional Care Navigator:    TCN met with pt at bedside to discuss transitional care services for discharge planning.  SNF level of care has been recommended.  TCN explained SNF level of care in detail.  Pt in agreement.  Choice is [facility] d/t location as FIRST choice, then [facility] and [facility].  Choice form completed and faxed to [facility].  IMM also completed.  SW aware.  TCN will follow-up as needed.

## 2017-10-11 LAB
ALBUMIN SERPL BCP-MCNC: 2.9 G/DL (ref 3.2–4.9)
ALBUMIN/GLOB SERPL: 0.7 G/DL
ALP SERPL-CCNC: 94 U/L (ref 30–99)
ALT SERPL-CCNC: 9 U/L (ref 2–50)
ANION GAP SERPL CALC-SCNC: 8 MMOL/L (ref 0–11.9)
ANISOCYTOSIS BLD QL SMEAR: ABNORMAL
AST SERPL-CCNC: 19 U/L (ref 12–45)
BASOPHILS # BLD AUTO: 0.9 % (ref 0–1.8)
BASOPHILS # BLD: 0.1 K/UL (ref 0–0.12)
BILIRUB SERPL-MCNC: 0.7 MG/DL (ref 0.1–1.5)
BUN SERPL-MCNC: 24 MG/DL (ref 8–22)
CALCIUM SERPL-MCNC: 9 MG/DL (ref 8.5–10.5)
CHLORIDE SERPL-SCNC: 101 MMOL/L (ref 96–112)
CO2 SERPL-SCNC: 24 MMOL/L (ref 20–33)
CREAT SERPL-MCNC: 1.32 MG/DL (ref 0.5–1.4)
EOSINOPHIL # BLD AUTO: 0 K/UL (ref 0–0.51)
EOSINOPHIL NFR BLD: 0 % (ref 0–6.9)
ERYTHROCYTE [DISTWIDTH] IN BLOOD BY AUTOMATED COUNT: 54.4 FL (ref 35.9–50)
FOLATE SERPL-MCNC: 18.8 NG/ML
GFR SERPL CREATININE-BSD FRML MDRD: 53 ML/MIN/1.73 M 2
GLOBULIN SER CALC-MCNC: 4.1 G/DL (ref 1.9–3.5)
GLUCOSE BLD-MCNC: 76 MG/DL (ref 65–99)
GLUCOSE SERPL-MCNC: 83 MG/DL (ref 65–99)
HCT VFR BLD AUTO: 21.6 % (ref 42–52)
HGB BLD-MCNC: 7.2 G/DL (ref 14–18)
LYMPHOCYTES # BLD AUTO: 8.32 K/UL (ref 1–4.8)
LYMPHOCYTES NFR BLD: 76.3 % (ref 22–41)
MACROCYTES BLD QL SMEAR: ABNORMAL
MAGNESIUM SERPL-MCNC: 2.1 MG/DL (ref 1.5–2.5)
MANUAL DIFF BLD: NORMAL
MCH RBC QN AUTO: 35.1 PG (ref 27–33)
MCHC RBC AUTO-ENTMCNC: 33.3 G/DL (ref 33.7–35.3)
MCV RBC AUTO: 105.4 FL (ref 81.4–97.8)
MONOCYTES # BLD AUTO: 0.2 K/UL (ref 0–0.85)
MONOCYTES NFR BLD AUTO: 1.8 % (ref 0–13.4)
MORPHOLOGY BLD-IMP: NORMAL
NEUTROPHILS # BLD AUTO: 2.29 K/UL (ref 1.82–7.42)
NEUTROPHILS NFR BLD: 21 % (ref 44–72)
NRBC # BLD AUTO: 0 K/UL
NRBC BLD AUTO-RTO: 0 /100 WBC
PHOSPHATE SERPL-MCNC: 3.7 MG/DL (ref 2.5–4.5)
PLATELET # BLD AUTO: 123 K/UL (ref 164–446)
PLATELET BLD QL SMEAR: NORMAL
PMV BLD AUTO: 10.3 FL (ref 9–12.9)
POTASSIUM SERPL-SCNC: 4.7 MMOL/L (ref 3.6–5.5)
PROT SERPL-MCNC: 7 G/DL (ref 6–8.2)
RBC # BLD AUTO: 2.05 M/UL (ref 4.7–6.1)
RBC BLD AUTO: PRESENT
SMUDGE CELLS BLD QL SMEAR: NORMAL
SODIUM SERPL-SCNC: 133 MMOL/L (ref 135–145)
VIT B12 SERPL-MCNC: >1500 PG/ML (ref 211–911)
WBC # BLD AUTO: 10.9 K/UL (ref 4.8–10.8)

## 2017-10-11 PROCEDURE — 700102 HCHG RX REV CODE 250 W/ 637 OVERRIDE(OP): Performed by: INTERNAL MEDICINE

## 2017-10-11 PROCEDURE — 84100 ASSAY OF PHOSPHORUS: CPT

## 2017-10-11 PROCEDURE — 82746 ASSAY OF FOLIC ACID SERUM: CPT

## 2017-10-11 PROCEDURE — 700102 HCHG RX REV CODE 250 W/ 637 OVERRIDE(OP): Performed by: HOSPITALIST

## 2017-10-11 PROCEDURE — 700111 HCHG RX REV CODE 636 W/ 250 OVERRIDE (IP): Performed by: HOSPITALIST

## 2017-10-11 PROCEDURE — A9270 NON-COVERED ITEM OR SERVICE: HCPCS | Performed by: INTERNAL MEDICINE

## 2017-10-11 PROCEDURE — A9270 NON-COVERED ITEM OR SERVICE: HCPCS | Performed by: HOSPITALIST

## 2017-10-11 PROCEDURE — 700102 HCHG RX REV CODE 250 W/ 637 OVERRIDE(OP): Performed by: NURSE PRACTITIONER

## 2017-10-11 PROCEDURE — G8978 MOBILITY CURRENT STATUS: HCPCS | Mod: CJ

## 2017-10-11 PROCEDURE — 87493 C DIFF AMPLIFIED PROBE: CPT

## 2017-10-11 PROCEDURE — 97162 PT EVAL MOD COMPLEX 30 MIN: CPT

## 2017-10-11 PROCEDURE — 700111 HCHG RX REV CODE 636 W/ 250 OVERRIDE (IP): Performed by: INTERNAL MEDICINE

## 2017-10-11 PROCEDURE — A9270 NON-COVERED ITEM OR SERVICE: HCPCS | Performed by: NURSE PRACTITIONER

## 2017-10-11 PROCEDURE — 770021 HCHG ROOM/CARE - ISO PRIVATE

## 2017-10-11 PROCEDURE — 36415 COLL VENOUS BLD VENIPUNCTURE: CPT

## 2017-10-11 PROCEDURE — 83735 ASSAY OF MAGNESIUM: CPT

## 2017-10-11 PROCEDURE — G8979 MOBILITY GOAL STATUS: HCPCS | Mod: CI

## 2017-10-11 PROCEDURE — 82607 VITAMIN B-12: CPT

## 2017-10-11 PROCEDURE — 85007 BL SMEAR W/DIFF WBC COUNT: CPT

## 2017-10-11 PROCEDURE — 99233 SBSQ HOSP IP/OBS HIGH 50: CPT | Performed by: HOSPITALIST

## 2017-10-11 PROCEDURE — 80053 COMPREHEN METABOLIC PANEL: CPT

## 2017-10-11 PROCEDURE — 82962 GLUCOSE BLOOD TEST: CPT

## 2017-10-11 PROCEDURE — 85027 COMPLETE CBC AUTOMATED: CPT

## 2017-10-11 RX ORDER — CHOLECALCIFEROL (VITAMIN D3) 125 MCG
1000 CAPSULE ORAL DAILY
Status: DISCONTINUED | OUTPATIENT
Start: 2017-10-11 | End: 2017-10-19 | Stop reason: HOSPADM

## 2017-10-11 RX ADMIN — CYANOCOBALAMIN TAB 500 MCG 1000 MCG: 500 TAB at 16:38

## 2017-10-11 RX ADMIN — OXYCODONE HYDROCHLORIDE 5 MG: 5 TABLET ORAL at 13:33

## 2017-10-11 RX ADMIN — CLINDAMYCIN HYDROCHLORIDE 300 MG: 150 CAPSULE ORAL at 09:50

## 2017-10-11 RX ADMIN — DULOXETINE HYDROCHLORIDE 60 MG: 60 CAPSULE, DELAYED RELEASE ORAL at 09:48

## 2017-10-11 RX ADMIN — ENOXAPARIN SODIUM 40 MG: 100 INJECTION SUBCUTANEOUS at 09:52

## 2017-10-11 RX ADMIN — THIAMINE HCL TAB 100 MG 100 MG: 100 TAB at 09:50

## 2017-10-11 RX ADMIN — LEVOTHYROXINE SODIUM 25 MCG: 50 TABLET ORAL at 05:36

## 2017-10-11 RX ADMIN — DIGOXIN 125 MCG: 125 TABLET ORAL at 18:23

## 2017-10-11 RX ADMIN — FUROSEMIDE 20 MG: 10 INJECTION, SOLUTION INTRAMUSCULAR; INTRAVENOUS at 16:40

## 2017-10-11 RX ADMIN — CLINDAMYCIN HYDROCHLORIDE 300 MG: 150 CAPSULE ORAL at 16:38

## 2017-10-11 RX ADMIN — SULFAMETHOXAZOLE AND TRIMETHOPRIM 1 TABLET: 800; 160 TABLET ORAL at 09:49

## 2017-10-11 RX ADMIN — CLINDAMYCIN HYDROCHLORIDE 300 MG: 150 CAPSULE ORAL at 13:32

## 2017-10-11 RX ADMIN — TRAZODONE HYDROCHLORIDE 100 MG: 50 TABLET ORAL at 22:03

## 2017-10-11 RX ADMIN — FOLIC ACID 1 MG: 1 TABLET ORAL at 09:49

## 2017-10-11 RX ADMIN — CLINDAMYCIN HYDROCHLORIDE 300 MG: 150 CAPSULE ORAL at 22:03

## 2017-10-11 RX ADMIN — OXYCODONE HYDROCHLORIDE 5 MG: 5 TABLET ORAL at 22:17

## 2017-10-11 RX ADMIN — MULTIPLE VITAMINS W/ MINERALS TAB 1 TABLET: TAB at 09:49

## 2017-10-11 RX ADMIN — SULFAMETHOXAZOLE AND TRIMETHOPRIM 1 TABLET: 800; 160 TABLET ORAL at 22:03

## 2017-10-11 RX ADMIN — SPIRONOLACTONE 100 MG: 100 TABLET, FILM COATED ORAL at 09:50

## 2017-10-11 RX ADMIN — OXYCODONE HYDROCHLORIDE 5 MG: 5 TABLET ORAL at 04:22

## 2017-10-11 RX ADMIN — FUROSEMIDE 20 MG: 10 INJECTION, SOLUTION INTRAMUSCULAR; INTRAVENOUS at 05:36

## 2017-10-11 ASSESSMENT — COGNITIVE AND FUNCTIONAL STATUS - GENERAL
SUGGESTED CMS G CODE MODIFIER MOBILITY: CK
STANDING UP FROM CHAIR USING ARMS: A LITTLE
WALKING IN HOSPITAL ROOM: A LITTLE
MOVING FROM LYING ON BACK TO SITTING ON SIDE OF FLAT BED: UNABLE
MOBILITY SCORE: 16
CLIMB 3 TO 5 STEPS WITH RAILING: A LOT
MOVING TO AND FROM BED TO CHAIR: A LITTLE

## 2017-10-11 ASSESSMENT — ENCOUNTER SYMPTOMS
SPEECH CHANGE: 0
FOCAL WEAKNESS: 0
WHEEZING: 0
COUGH: 0
DEPRESSION: 0
CHILLS: 0
DIARRHEA: 1
VOMITING: 0
ABDOMINAL PAIN: 0
MYALGIAS: 0
SORE THROAT: 0
SHORTNESS OF BREATH: 0
HEADACHES: 0
TINGLING: 0
NAUSEA: 0
MYALGIAS: 1
FEVER: 0
PHOTOPHOBIA: 0
DIZZINESS: 0
PALPITATIONS: 0

## 2017-10-11 ASSESSMENT — PAIN SCALES - GENERAL
PAINLEVEL_OUTOF10: 8
PAINLEVEL_OUTOF10: 5
PAINLEVEL_OUTOF10: 4
PAINLEVEL_OUTOF10: 4
PAINLEVEL_OUTOF10: 6

## 2017-10-11 ASSESSMENT — GAIT ASSESSMENTS
GAIT LEVEL OF ASSIST: CONTACT GUARD ASSIST
ASSISTIVE DEVICE: FRONT WHEEL WALKER
DISTANCE (FEET): 30

## 2017-10-11 NOTE — PROGRESS NOTES
Infectious Disease Progress Note    Author: Ana Benavidez M.D. Date & Time of service: 10/11/2017  10:52 AM    Chief Complaint:  FU sepsis/LLE cellulitis    Interval History:  10/2 AF, WBC 27.5, confused and in pain  10/3 AF, WBC 22.9, remains confused and mumbling  10/4 AF, WBC 15.7, pt obtunded, no overnight events per RN  10/5 AF, WBC 17, mumbling, does not engage in questioning or open eyes, grimaces when RLE examined, remains on pressors, lots of diarrhea, stool softeners held  10/6 AF WBC 17.5 abd more distended BMS placed +blood  10/7 AF WBC 12.3 obtunded now but per RN better earlier  10/8 AF WBC 13 wants something to drink-plan for swallowing study later. Denies SE abx  10/9/17-MAXIMUM TEMPERATURE 98.8. Last WBC 13.2 complains of pain all over the body especially his right leg  10/10/17-MAXIMUM TEMPERATURE 99.5. WBC 12.2  10/11 AF, WBC 10.9, having lots of watery stools approximately 5 in the last 24 hrs, also having left sided abdominal pain which he attributes to heparin shots, denies any nausea  Labs Reviewed, Medications Reviewed, Radiology Reviewed and Wound Reviewed.    Review of Systems:  Review of Systems   Constitutional: Positive for malaise/fatigue. Negative for chills and fever.   Respiratory: Negative for cough.    Cardiovascular: Negative for chest pain.   Gastrointestinal: Positive for diarrhea. Negative for abdominal pain, nausea and vomiting.   Musculoskeletal: Positive for myalgias.   Neurological: Negative for speech change.       Hemodynamics:  Temp (24hrs), Av.6 °C (97.8 °F), Min:36.2 °C (97.1 °F), Max:37 °C (98.6 °F)  Temperature: 36.4 °C (97.6 °F)  Pulse  Av.2  Min: 60  Max: 188   Blood Pressure : 103/52      Physical Exam:  Physical Exam   Constitutional: He is oriented to person, place, and time. He appears well-developed. No distress.   Thin  Older than stated age  Chronically ill   HENT:   Head: Normocephalic and atraumatic.   Eyes: EOM are normal. Pupils are equal,  round, and reactive to light.   Neck: Neck supple.   Cardiovascular:   tachycardic   Pulmonary/Chest: Effort normal and breath sounds normal.   Abdominal: Soft. There is no tenderness. There is no rebound and no guarding.   Musculoskeletal: He exhibits no edema.   Erythema and edema of the LLE radiating to the left hip and medial thigh, improving significantly. Skin peeling    Left medial malleolar wound.      Neurological: He is alert and oriented to person, place, and time.   Skin: There is erythema.   Resolving  Peeling of the skin   Nursing note and vitals reviewed.      Meds:    Current Facility-Administered Medications:   •  sulfamethoxazole-trimethoprim  •  clindamycin  •  furosemide  •  spironolactone  •  haloperidol lactate  •  oxycodone immediate-release  •  enoxaparin (LOVENOX) injection  •  digoxin  •  folic acid  •  therapeutic multivitamin-minerals  •  thiamine  •  duloxetine  •  levothyroxine  •  trazodone  •  ondansetron  •  ondansetron    Labs:  Recent Labs      10/10/17   0211  10/11/17   0244   WBC  12.2*  10.9*   RBC  2.13*  2.05*   HEMOGLOBIN  7.3*  7.2*   HEMATOCRIT  22.2*  21.6*   MCV  104.2*  105.4*   MCH  34.3*  35.1*   RDW  54.6*  54.4*   PLATELETCT  132*  123*   MPV  10.1  10.3   NEUTSPOLYS  33.90*  21.00*   LYMPHOCYTES  61.80*  76.30*   MONOCYTES  1.70  1.80   EOSINOPHILS  0.00  0.00   BASOPHILS  0.00  0.90   RBCMORPHOLO  Present  Present     Recent Labs      10/10/17   0211  10/11/17   0244   SODIUM  133*  133*   POTASSIUM  4.5  4.7   CHLORIDE  99  101   CO2  24  24   GLUCOSE  83  83   BUN  24*  24*     Recent Labs      10/10/17   0211  10/11/17   0244   ALBUMIN  3.1*  2.9*   TBILIRUBIN  0.7  0.7   ALKPHOSPHAT  92  94   TOTPROTEIN  6.9  7.0   ALTSGPT  8  9   ASTSGOT  18  19   CREATININE  1.21  1.32       Imaging:  Ct-abdomen-pelvis With    Result Date: 10/1/2017  10/1/2017 8:07 AM HISTORY/REASON FOR EXAM:  Diffuse abdominal pain. History of sepsis. Left lower extremity cellulitis.  TECHNIQUE/EXAM DESCRIPTION: CT scan of the abdomen and pelvis with contrast. Contrast-enhanced helical scanning was obtained from the diaphragmatic domes through the pubic symphysis following the bolus administration of 100 mL of Omnipaque 350 nonionic contrast without complication. Low dose optimization technique was utilized for this CT exam including automated exposure control and adjustment of the mA and/or kV according to patient size. COMPARISON: CT 6/5/2015 FINDINGS: The visualized lung bases demonstrate small amounts of dependent pleural effusion with bibasilar dependent airspace disease, most likely atelectasis. There is no acute bony process. There is degenerative change in the lower lumbar spine and pelvis. CT Abdomen: Liver is unchanged in appearance with slight nodularity. There are no definite focal masses. Spleen is mildly enlarged measuring 13.1 cm in diameter without change. Hepatic veins are somewhat attenuated. Portal veins, splenic vein and SMV are patent. Pancreas is somewhat atrophic but otherwise unremarkable. The gallbladder demonstrates some density dependently which could be related to tumefactive sludge or noncalcified stones. There is no biliary dilatation. The adrenal glands are normal in size. Kidneys demonstrate no hydronephrosis. There are probable small cortical cysts. The abdominal aorta is normal in caliber. There is atherosclerosis of the aorta. There is no lymphadenopathy. The bowel demonstrates no evidence of bowel obstruction. There is moderate stool throughout the colon. There is a hiatal hernia. There is a small amount of fluid around the liver although this is decreased over the prior study. There is minimal fluid in both paracolic gutters. There is diffuse subcutaneous edema. CT Pelvis: There is no acute inflammatory process in the pelvis. There is free fluid in the pelvis with diffuse subcutaneous edema. There are mildly prominent lymph nodes in the inguinal regions, left  greater than right with some nodes in the external iliac region as well more so on the left. The largest inguinal node is 1.5 cm short axis on the left side.. There is postoperative change involving the colon. Bladder is decompressed with a Maradiaga catheter.     1.  There is mild colonic distention with large amount of stool throughout the colon. There is no bowel obstruction. 2.  There is a hiatal hernia. 3.  Changes of the liver with mild splenomegaly and a small amount of ascites in the abdomen and pelvis suggestive of underlying hepatocellular disease. 4.  Dependent density in the gallbladder which could be tumefactive sludge or noncalcified stones. 5.  There are small dependent bilateral pleural effusions with dependent airspace disease, likely atelectasis. 6.  There is mild lymphadenopathy in the pelvis more so on the left likely inflammatory. 7.  There is diffuse subcutaneous edema.    Ct-extremity, Lower With Left    Result Date: 10/1/2017  10/1/2017 8:07 AM HISTORY/REASON FOR EXAM:  Progression of strep/staph cellulitis with sepsis. Possible necrotizing fasciitis.. TECHNIQUE/EXAM DESCRIPTION AND NUMBER OF VIEWS:  CT scan of the LEFT lower extremity with contrast, with reconstructions. Thin helical 3 mm sections were obtained from the distal femur through the proximal tibia/fibula. Sagittal and coronal multiplanar reconstructions were generated from the axial images. A total of 100 mL of Omnipaque 350 nonionic contrast was administered  IV without complication. Up to date radiation dose reduction adjustments have been utilized to meet ALARA standards for radiation dose reduction. COMPARISON: X-rays of the foot and tibia fibula 9/29/2017, 9/28/2017 respectively. FINDINGS: Imaging of the visualized portion of the pelvis demonstrates fluid in the pelvis. Bladder is decompressed with a Maradiaga catheter. There is postoperative change involving the colon. There are mildly prominent bilateral external iliac and common  femoral lymph nodes. Largest node in the left inguinal region is 1.5 cm short axis diameter. There is diffuse subcutaneous edema throughout the pelvis. Imaging throughout the left lower extremity demonstrates diffuse subcutaneous edema. There is a small amount of fluid in the intermuscular planes along the medial aspect of the proximal lower leg adjacent to the medial gastrocnemius musculature. There is  no focal abscess collection. There is a small joint effusion of the knee. There is some mild fatty infiltration of the musculature. There is atherosclerosis but basilar structures appear grossly patent. Limited partial visualization of portions of the right lower extremity also demonstrates some mild subcutaneous edema. Bones demonstrate mild degenerative changes in the hip and left knee. There is no evidence of osteomyelitis.     1.  There is diffuse subcutaneous edema throughout the left hemipelvis and left lower extremity with a small amount of fluid adjacent to the right medial gastrocnemius at the level the proximal calf. 2.  There is no focal drainable abscess. 3.  There is no evidence of osteomyelitis. 4.  There is mild atherosclerosis with grossly patent vasculature. 5.  There are probably inflammatory lymph nodes in the pelvis and left inguinal region with the largest measuring 1.5 cm short axis. 6.  There is free fluid in the visualized pelvis.    Ct-head W/o    Result Date: 10/1/2017  HISTORY/REASON FOR EXAM:  Altered Mental Status. TECHNIQUE/EXAM DESCRIPTION: CT scan of the head without contrast, 10/1/2017 12:28 AM. Contiguous 5 mm axial sections were obtained from the skull base through the vertex. Up to date radiation dose reduction adjustments have been utilized to meet ALARA standards for radiation dose reduction. COMPARISON:  4/23/2015 FINDINGS:   The ventricular system and cortical sulci are prominent, consistent with the patient's age.  There is no midline shift or other mass effect.  A 5 mm  circumscribed lucency is present in the right temporal lobe, consistent with old lacunar infarct. This is unchanged. There is no acute intra-axial abnormality or extra-axial fluid collection.  There is no intracranial hemorrhage.  The calvaria are intact. The visualized paranasal sinuses show no unusual opacity.     1.  No acute intracranial abnormality. 2.  Moderate atrophy, age-consistent. 3.  Probable old lacunar infarct in the right temporal lobe, unchanged from the prior scan. INTERPRETING LOCATION:  [address] [facility], [place], [address]    Dx-chest-portable (1 View)    Result Date: 9/29/2017 9/29/2017 1:25 PM HISTORY/REASON FOR EXAM:  Line placement. TECHNIQUE/EXAM DESCRIPTION AND NUMBER OF VIEWS: Single portable view of the chest. COMPARISON: 9/28/2017. FINDINGS: The soft tissues and bony structures are unremarkable. The heart and mediastinal structures are within normal limits. Pulmonary vascularity is normal. There is right basilar atelectasis. There is no effusion or pneumothorax. There has been interval insertion of a central venous catheter which terminates with the tip projecting over the expected region of the mid to distal superior vena cava.     1.  Interval insertion of a central venous catheter which terminates with the tip projecting over the expected region of the mid to distal superior vena cava. 2.  Right basilar atelectasis.    Dx-chest-portable (1 View)    Result Date: 9/28/2017 9/28/2017 4:32 PM HISTORY/REASON FOR EXAM:  Sepsis. Left lower extremity wound. TECHNIQUE/EXAM DESCRIPTION AND NUMBER OF VIEWS: Single portable view of the chest. COMPARISON: 9/1/2016 FINDINGS: The mediastinal and cardiac silhouette is unremarkable. The pulmonary vascularity is within normal limits. Lungs demonstrate no evidence of pneumonia. Slight hazy density seen adjacent to the right anterior 1st rib is similar to the prior studies and likely due to degenerative change. There is no significant pleural effusion. There is  no visible pneumothorax. There are no acute bony abnormalities.     1.  There is no acute cardiopulmonary process.    Dx-foot-2- Left    Result Date: 9/29/2017 9/29/2017 4:10 PM HISTORY/REASON FOR EXAM:  Left foot swelling and weeping TECHNIQUE/EXAM DESCRIPTION AND NUMBER OF VIEWS: 2 views of the LEFT foot. COMPARISON:  None. FINDINGS:  Bone mineralization is age appropriate. Bony alignment is anatomic. There is no evidence of acute fracture or dislocation. There is no soft tissue gas. There is soft tissue swelling about the ankle.     Soft tissue swelling about the ankle. No evidence of acute osseous abnormality or soft tissue gas.    Dx-lumbar Spine-2 Or 3 Views    Result Date: 9/9/2017 9/9/2017 3:24 AM HISTORY/REASON FOR EXAM:  Pain Following Trauma. TECHNIQUE/ EXAM DESCRIPTION AND NUMBER OF VIEWS:  3 views of the lumbar spine. COMPARISON: None. FINDINGS: The lowest formed intervertebral disc will be designated L5-S1 for the purposes of this report and vertebral levels numbered accordingly. No acute fracture is evident. No gross malalignment is seen. There is mild loss of intervertebral disc height in the upper lumbar spine. There is moderate loss of intervertebral disc height at L4-L5. There are anterior osteophytes at most levels. There is no evidence of spondylolisthesis or osseous lesion. There are degenerative changes of the mid to lower lumbar facet joints. There is calcific atherosclerotic plaque.     1.  No evidence of acute fracture. 2.  Multilevel multifactorial degenerative changes    Dx-thoracic Spine-2 Views    Result Date: 9/9/2017 9/9/2017 3:21 AM HISTORY/REASON FOR EXAM:  Pain Following Trauma. TECHNIQUE/EXAM DESCRIPTION AND NUMBER OF VIEWS:  Thoracic spine, 2 views. COMPARISON:  None. FINDINGS: Glands unremarkable. There is lucency extending to the superior endplate of L2 on the second image. This appearance is not present on the dedicated lumbar spine radiographs. No other finding suspicious  for acute fracture are seen. There is mild loss of intervertebral disc height throughout the thoracic spine. There are anterior osteophytes at most levels.     1.  Lucency through the superior endplate of L2 on the second image, discordant with the appearance on the dedicated lumbar spine radiographs. I suspect this is artifactual however a fracture is not excluded. Further assessment is recommended with CT of the lumbar spine. 2.  Thoracic spondylosis    Dx-tibia And Fibula Left    Result Date: 9/28/2017 9/28/2017 4:32 PM HISTORY/REASON FOR EXAM:  Left tibia and fibular redness and swelling with open wound in the region of the medial malleolus. TECHNIQUE/EXAM DESCRIPTION AND NUMBER OF VIEWS:  2 views of the LEFT tibia and fibula. COMPARISON: Left knee 4/24/2015 FINDINGS: There is no evidence of acute fracture involving the tibia or fibula. Sclerotic focus posterior left tibial metadiaphyseal region is without change and may represent a sclerotic NOF or fibrous cortical defect. There is mild diffuse soft tissue swelling most prominent at the level the ankle. There is no gas in the soft tissues and there is no foreign body. There is no periostitis.     1.  There is mild diffuse swelling in the soft tissues of the distal left lower leg and ankle. There is no gas in the soft tissues or foreign body. 2.  There is no plain film evidence of osteomyelitis.    Dx-wrist-complete 3+ Left    Result Date: 9/9/2017 9/9/2017 3:24 AM HISTORY/REASON FOR EXAM:  Pain/Deformity Following Trauma. TECHNIQUE/EXAM DESCRIPTION AND NUMBER OF VIEWS:  3 views of the  LEFT wrist. COMPARISON: Radiographs 5/6/2015 FINDINGS: MINERALIZATION: Decreased. INJURY: There has been interval placement of a volar plate and screws transfixing the previously demonstrated intra-articular distal radial fracture. The hardware appears intact. There is underlying curvilinear deformity of the distal radius. There is ulnar positive variance, as before. No acute  fracture is seen. JOINTS: No erosive arthropathy is evident.     1.  No radiographic evidence of acute traumatic injury. 2.  Healed surgically transfixed fracture of the distal radius 3.  Osteopenia    Echocardiogram Comp W/o Cont    Result Date: 2017  Transthoracic Echo Report Echocardiography Laboratory CONCLUSIONS Normal left ventricular size, thickness, systolic function, and diastolic function. Mitral annular calcification. Mild mitral regurgitation. Aortic sclerosis without stenosis. Mild aortic insufficiency. Normal estimated right-sided pressures. No prior study is available for comparison. MAGALI TODD Exam Date:         2017                    14:07 Exam Location:     Inpatient Priority:          Routine Ordering Physician:        CIRILO HSU JR Referring Physician:       [id number] JR Sonographer:               Lo Lundberg RDCS Age:    72     Gender:    M MRN:    [id number] :    1945 BSA:    1.73   Ht (in):    65     Wt (lb):    145 Exam Type:     Complete Indications:     Cardiac arrhythmia, unspecified ICD Codes:       I499 CPT Codes:       03697 BP:   89     /   57     HR:   80 Technical Quality:       Fair MEASUREMENTS  (Male / Female) Normal Values 2D ECHO LV Diastolic Diameter PLAX        4.6 cm                4.2 - 5.9 / 3.9 - 5.3 cm LV Systolic Diameter PLAX         2.1 cm                2.1 - 4.0 cm IVS Diastolic Thickness           1 cm                  LVPW Diastolic Thickness          1 cm                  RV Diameter 4C                    2.7 cm                2.5 - 2.9 cm LVOT Diameter                     1.8 cm                RA Diameter                       2.7 cm                Estimated LV Ejection Fraction    60 %                  LV Ejection Fraction MOD BP       60.8 %                >= 55  % LV Ejection Fraction MOD 4C       58.4 %                LV Ejection Fraction MOD 2C       58.4 %                LA Volume Index                   13.5 cm³/m²            16 - 28 cm³/m² IVC Diameter                      1.5 cm                M-MODE Aortic Root Diameter MM           3.9 cm                DOPPLER AV Peak Velocity                  1.3 m/s               AV Peak Gradient                  6.7 mmHg              AV Mean Gradient                  3.3 mmHg              AI Pressure Half Time             463 ms                LVOT Peak Velocity                0.89 m/s              AV Area Cont Eq vti               1.9 cm²               Mitral E Point Velocity           0.96 m/s              Mitral E to A Ratio               0.99                  MV Pressure Half Time             60.6 ms               MV Area PHT                       3.6 cm²               MV Deceleration Time              209 ms                TR Peak Velocity                  222 cm/s              PV Peak Velocity                  0.7 m/s               PV Peak Gradient                  1.9 mmHg              RVOT Peak Velocity                0.52 m/s              * Indicates values subject to auto-interpretation LV EF:  60    % FINDINGS Left Ventricle Normal left ventricular size, thickness, systolic function, and diastolic function.  Left ventricular ejection fraction is visually estimated to be 60%. Normal regional wall motion. Right Ventricle Normal right ventricular size and systolic function. Right Atrium Normal right atrial size. Normal inferior vena cava size and inspiratory collapse. Left Atrium Normal left atrial size. Mitral Valve Mitral annular calcification. Thickened mitral valve leaflets. Mild mitral regurgitation. Aortic Valve Aortic sclerosis without stenosis. Mild aortic insufficiency. Tricuspid Valve Structurally normal tricuspid valve. Mild tricuspid regurgitation. Estimated right ventricular systolic pressure  is 25 mmHg. Pulmonic Valve Structurally normal pulmonic valve. Mild pulmonic insufficiency. Pericardium Normal pericardium without effusion. Aorta Ascending aorta is normal for body  surface area, and measured at a diameter of 3.7 cm above the sinotubular junction. Suleman Nguyen MD (Electronically Signed) Final Date:     2017                 23:50    Le Venous Duplex (specify In Comments Left, Right Or Bilateral)    Result Date: 2017   Vascular Laboratory  CONCLUSIONS  No evidence of acute DVT or SVT in the left lower extremity.  Edema is noted.  PEREZMAGALI  Exam Date:     2017 16:43  Room #:     Inpatient  Priority:     Stat  Ht (in):             Wt (lb):  Ordering Physician:        REYNALDO LUU  Referring Physician:       [id number] Galeano  Sonographer:               Samina Galloway RVT  Study Type:                Complete Unilateral  Technical Quality:         Adequate  Age:    72    Gender:     M  MRN:    [id number]  :    1945      BSA:  Indications:     Localized swelling, mass and lump, left lower limb  CPT Codes:       04193  ICD Codes:         History:         Swelling and redness of left leg for less than 24 hours.  Limitations:     Severe pain.  PROCEDURES:  Left lower extremity venous duplex imaging.  The following venous structures were evaluated: common femoral, profunda  femoral, greater saphenous, femoral, popliteal, peroneal and posterior  tibial veins.  Serial compression, augmentation maneuvers, color and spectral Doppler flow  evaluations were performed.  FINDINGS:  Left lower extremity -  Complete color filling and compressibility with normal venous flow dynamics  including spontaneous flow, response to augmentation maneuvers, and  respiratory phasicity.  The peroneal and posterior tibial veins are difficult to assess for  compressibility due to patient pain, but flow response to augmentation is  demonstrated.  Interstitial fluid consistent with edema is observed in the thigh and below  the knee.  Flow was evaluated in the contralateral common femoral vein and normal  venous flow dynamics including spontaneous flow, respiratory phasic   variation and augmentation were demonstrated.  Kevin Lester MD  (Electronically Signed)  Final Date:      29 September 2017                   15:52    Dx-abdomen For Tube Placement    Result Date: 10/1/2017  10/1/2017 7:28 PM HISTORY/REASON FOR EXAM:  Line evaluation. TECHNIQUE/EXAM DESCRIPTION AND NUMBER OF VIEWS:  1 view(s) of the abdomen. COMPARISON:  9/1/2016. FINDINGS: Enteric tube projects over the second portion of the duodenum. There is mild colonic distention. Degenerative changes are seen in the spine.     Enteric tube projects over the second portion of the duodenum. Mild colonic distention.      Micro:  Results     Procedure Component Value Units Date/Time    CDIFF BY PCR WITH TOXIN [[id number]] Collected:  10/04/17 1837    Order Status:  Completed Specimen:  Stool from Stool Updated:  10/04/17 2038     C Diff by PCR Negative     Comment: C. difficile NOT detected by PCR.  Treatment not indicated per guidelines.  Repeat testing not indicated within 7 days.          027-NAP1-BI Presumptive Negative     Comment: Presumptive 027/NAP1/BI target DNA sequences are NOT DETECTED.       Narrative:       Special Contact [id number] CARMELA HONGColin  Does this patient have risk factors for C-diff?->Yes  C-Diff Risk Factors->antibiotic exposure  Has patient taken stool softeners or laxatives in the last 5  days?->Yes          Assessment:  Active Hospital Problems    Diagnosis   • Sinus tachycardia [R00.0]   • Cirrhosis (CMS-HCC) [K74.60]   • Alcoholism (CMS-HCC) [F10.20]   • Severe sepsis (CMS-HCC) [A41.9, R65.20]   • Hyponatremia [E87.1]   • Cellulitis [L03.90]   • CLL (chronic lymphocytic leukemia) (CMS-HCC) [C91.10]   • Continuous opioid dependence (CMS-HCC) [F11.20]       Plan:  Septic shock. Shock resolved  2/2 LLE cellulitis  Afebrile  Off pressors  Leukocytosis - persistent  On abx below    LLE cellulitis, improved  Cx - MRSA (vanco STEPHANIE 2), Group A strep  Discontinue ceftaroline  Continue PO Clinda  and bactrim  Plan for 2 week course total  Stop date 10/15/17    Leukocytosis, persistent.  Multifactorial  Infection + CLL  On abx  Monitor    Diarrhea, ongoing and not improving  DC'd stool softeners  Negative C diff 10/4  BMS placed +blood  Abd distended  Imodium or lomotil PRN  Consider repeating C diff if not improving as pt certainly at risk    Encephalopathy, improving  Multifactorial  Now oriented    CLL  Will impair ability to fight infection    Alcohol abuse/cirrhosis  Avoid hepatotoxic agents    SNF placement - pt agreeable. Pt accepted at [facility]    Discussed with internal medicine/Dr. Schilling

## 2017-10-11 NOTE — CARE PLAN
Problem: Safety  Goal: Will remain free from injury  Outcome: PROGRESSING AS EXPECTED  Hourly rounding in place. Pt verbalizes he will call if he needs to get up.    Problem: Pain Management  Goal: Pain level will decrease to patient's comfort goal  Outcome: PROGRESSING AS EXPECTED  Pt verbalizes he will let staff know if in pain. Pt calm and is not in pain at this time.

## 2017-10-11 NOTE — PROGRESS NOTES
Patient arrived on this floor with transport via hospital bed at this time. This RN helped situate patient in room. Assigned CNA notified. Charge RN notified.

## 2017-10-11 NOTE — CARE PLAN
Problem: Safety  Goal: Will remain free from injury  Outcome: PROGRESSING AS EXPECTED  Safety precautions in place. Non skid socks on. Bed alarm in place and functioning. Call light within reach. Bi hourly rounding in place.    Problem: Pain Management  Goal: Pain level will decrease to patient's comfort goal  Outcome: PROGRESSING AS EXPECTED  Patient complained of pain. PRN pain medication given. Patient on bed resting comfortably. Will continue to monitor pain q 2 hrs.

## 2017-10-11 NOTE — PROGRESS NOTES
2 RN skin check done with APURVA Garcia.    Patient skin generally intact, dry, fragile.  No open wounds noted.    Redness noted on back of ears, blanching.  Bruising, scabs noted on neck area.  Bruising, scabs noted bilateral upper extremities.  Bruising noted on abdominal area.  Redness, rashes noted on groin, scrotal, inner thighs.  Redness, rashes noted on sacral area.  Bruising, scabs noted on legs.  Redness, peeling, flaking, scabs noted on left lower leg.  Redness, flaking bruising, scabs noted on right lower leg.  Redness noted on heels, blanching. Dry, scaly.

## 2017-10-11 NOTE — PROGRESS NOTES
Patient being transferred to [facility]. Report called to RN. Transport scheduled at this time.

## 2017-10-11 NOTE — PROGRESS NOTES
[facility] Hospitalist Progress Note    Date of Service: 10/11/2017    Chief Complaint  72 y.o. male presented on 2017 with left leg swelling in the setting of a spider bite. Upon assessment in the ER, he was noted to have sepsis with cellulitis of the left leg. His hospital course has been prolonged and was complicated by alcohol withdrawal which has now resolved.    Interval Problem Update  Patient reports significant liquid stools overnight, last bowel movement was this morning    Consultants/Specialty  Infectious disease    Disposition  Skilled nursing facility once medically cleared        Review of Systems   Constitutional: Positive for malaise/fatigue. Negative for chills and fever.   HENT: Negative for congestion and sore throat.    Eyes: Negative for photophobia.   Respiratory: Negative for cough, shortness of breath and wheezing.    Cardiovascular: Negative for chest pain and palpitations.   Gastrointestinal: Positive for diarrhea. Negative for abdominal pain, nausea and vomiting.   Genitourinary: Negative for dysuria.   Musculoskeletal: Negative for myalgias.   Skin: Negative.    Neurological: Negative for dizziness, tingling, focal weakness and headaches.   Psychiatric/Behavioral: Negative for depression and suicidal ideas.      Physical Exam  Laboratory/Imaging   Hemodynamics  Temp (24hrs), Av.7 °C (98.1 °F), Min:36.2 °C (97.1 °F), Max:37.5 °C (99.5 °F)   Temperature: 36.4 °C (97.6 °F)  Pulse  Av.2  Min: 60  Max: 188    Blood Pressure : 103/52     Respiratory      Respiration: 16, Pulse Oximetry: 93 %        RUL Breath Sounds: Clear, RML Breath Sounds: Clear, RLL Breath Sounds: Diminished, MONICA Breath Sounds: Clear, LLL Breath Sounds: Diminished    Fluids    Intake/Output Summary (Last 24 hours) at 10/11/17 0821  Last data filed at 10/11/17 0600   Gross per 24 hour   Intake              220 ml   Output              400 ml   Net             -180 ml       Nutrition  Orders Placed This Encounter    Procedures   • DIET ORDER     Standing Status:   Standing     Number of Occurrences:   1     Order Specific Question:   Diet:     Answer:   Full Liquid [11]     Order Specific Question:   Consistency/Fluid modifications:     Answer:   Nectar Thick [2]     Order Specific Question:   Miscellaneous modifications:     Answer:   SLP - 1:1 Supervision by Nursing [21]     Order Specific Question:   Miscellaneous modifications:     Answer:   SLP - Deliver to Nursing Station [22]     Physical Exam   Constitutional: He is oriented to person, place, and time. No distress.   Chronically ill appearing, poorly nourished   HENT:   Head: Normocephalic and atraumatic.   Right Ear: External ear normal.   Left Ear: External ear normal.   Eyes: EOM are normal. Right eye exhibits no discharge. Left eye exhibits no discharge.   Neck: Neck supple. No JVD present.   Cardiovascular: Normal rate, regular rhythm and normal heart sounds.    Pulmonary/Chest: Effort normal and breath sounds normal. No respiratory distress. He exhibits no tenderness.   Abdominal: Soft. Bowel sounds are normal. He exhibits no distension. There is no tenderness.   Musculoskeletal: He exhibits no edema.   Neurological: He is alert and oriented to person, place, and time. No cranial nerve deficit.   Skin: Skin is dry. He is not diaphoretic. There is erythema.   Psychiatric: He has a normal mood and affect. His behavior is normal.   Nursing note and vitals reviewed.      Recent Labs      10/10/17   0211  10/11/17   0244   WBC  12.2*  10.9*   RBC  2.13*  2.05*   HEMOGLOBIN  7.3*  7.2*   HEMATOCRIT  22.2*  21.6*   MCV  104.2*  105.4*   MCH  34.3*  35.1*   MCHC  32.9*  33.3*   RDW  54.6*  54.4*   PLATELETCT  132*  123*   MPV  10.1  10.3     Recent Labs      10/10/17   0211  10/11/17   0244   SODIUM  133*  133*   POTASSIUM  4.5  4.7   CHLORIDE  99  101   CO2  24  24   GLUCOSE  83  83   BUN  24*  24*   CREATININE  1.21  1.32   CALCIUM  8.8  9.0                       Assessment/Plan     * Severe sepsis (CMS-HCC)- (present on admission)   Assessment & Plan    - Resolved  - Continue Bactrim and Clindamycin  - Discussed with Dr. Benavidez of ID  - Has had new diarrhea, carries risk factors for CDiff, will check stool  - Otherwise WBC near normal, afebrile and VSS        Paroxysmal atrial fibrillation (CMS-HCC)- (present on admission)   Assessment & Plan    - Controlled on digoxin, poor candidate for anticoagulation given EtOH abuse and cirrhosis        Acute respiratory failure (CMS-HCC)- (present on admission)   Assessment & Plan    - Still requires supplemental O2 but can likely wean further today  - Continue RT as needed        Cellulitis- (present on admission)   Assessment & Plan    - Wound culture positive for GAS and MRSA  - Continue Bactrim, Clindamycin  - Discussed with Dr. Benavidez of ID  - Appreciate input on end date of abx  - Checking stool for CDiff given clindamycin use and new diarrhea        Cirrhosis (CMS-HCC)- (present on admission)   Assessment & Plan    - No devompesation  - EtOH related  - Continue aldactone        Alcohol withdrawal (CMS-HCC)- (present on admission)   Assessment & Plan    - Resolved, stable off phenobarbital protocol.         Dysphagia   Assessment & Plan    - Improving, SLP following and advanced to dysphagia diet        Pulmonary edema- (present on admission)   Assessment & Plan    - Still with net positive intake  - Continue lasix and monitor I&Os        Hyponatremia   Assessment & Plan    - Mild, stable        CLL (chronic lymphocytic leukemia) (CMS-HCC)- (present on admission)   Assessment & Plan    - This is chronic, outpt oncology followup once medically stable for discharge        Continuous opioid dependence (CMS-HCC)- (present on admission)   Assessment & Plan    - Controlled, avoid escalation        Macrocytic anemia- (present on admission)   Assessment & Plan    - Secondary to EtOH abuse with cirrhosis  - Check Vit B12 and folate  levels  - Continue folic acid and add B12  - Hgb stable in the 7s, monitor and transfuseif <7        Thrombocytopenia (CMS-HCC)- (present on admission)   Assessment & Plan    - Low but primarily stable  - Suspect direct bone marrow toxicity from EtOH abuse  - No active bleeding, monitor        Diarrhea- (present on admission)   Assessment & Plan    - Patient with liquid stools, last one was this morning  - Last stool study on 10/4 negative for CDiff  - However, he has been on clindamycin  - Will repeat Cdiff study            Reviewed items::  Labs reviewed and Medications reviewed  Antibiotics:  Treating active infection/contamination beyond 24 hours perioperative coverage

## 2017-10-11 NOTE — PROGRESS NOTES
Transport here to take patient to [facility]. Had loose bm, bottom excoriated, cream applied. Pt on stable condition.

## 2017-10-11 NOTE — PROGRESS NOTES
Patient is a transfer from [facility].     Patient is alert and oriented x 4. Able to make needs known. Assessment completed. On O2 at 3 LPM via nasal cannula, tolerating well. Complained of pain, PRN pain medication given. Due medications given as ordered. Patient educated regarding plan of care. Bed alarm in place and functioning. Bed locked, in lowest position, treaded socks on. Call light and personal belongings within reach.

## 2017-10-11 NOTE — THERAPY
"Physical Therapy Evaluation completed.   Bed Mobility:  Supine to Sit: Stand by Assist  Transfers: Sit to Stand: Minimal Assist  Gait: Level Of Assist: Contact Guard Assist with Front-Wheel Walker       Plan of Care: Will benefit from Physical Therapy 3 times per week  Discharge Recommendations: Equipment: Will Continue to Assess for Equipment Needs. Post-acute therapy Discharge to a transitional care facility for continued skilled therapy services.    See \"Rehab Therapy-Acute\" Patient Summary Report for complete documentation.     "

## 2017-10-12 LAB
ALBUMIN SERPL BCP-MCNC: 3.1 G/DL (ref 3.2–4.9)
ALBUMIN/GLOB SERPL: 0.7 G/DL
ALP SERPL-CCNC: 105 U/L (ref 30–99)
ALT SERPL-CCNC: 7 U/L (ref 2–50)
ANION GAP SERPL CALC-SCNC: 10 MMOL/L (ref 0–11.9)
AST SERPL-CCNC: 22 U/L (ref 12–45)
BILIRUB SERPL-MCNC: 0.6 MG/DL (ref 0.1–1.5)
BUN SERPL-MCNC: 24 MG/DL (ref 8–22)
C DIFF DNA SPEC QL NAA+PROBE: NEGATIVE
C DIFF TOX GENS STL QL NAA+PROBE: NEGATIVE
CALCIUM SERPL-MCNC: 9.4 MG/DL (ref 8.5–10.5)
CHLORIDE SERPL-SCNC: 99 MMOL/L (ref 96–112)
CO2 SERPL-SCNC: 24 MMOL/L (ref 20–33)
CREAT SERPL-MCNC: 1.51 MG/DL (ref 0.5–1.4)
ERYTHROCYTE [DISTWIDTH] IN BLOOD BY AUTOMATED COUNT: 55 FL (ref 35.9–50)
GFR SERPL CREATININE-BSD FRML MDRD: 45 ML/MIN/1.73 M 2
GLOBULIN SER CALC-MCNC: 4.3 G/DL (ref 1.9–3.5)
GLUCOSE SERPL-MCNC: 86 MG/DL (ref 65–99)
HCT VFR BLD AUTO: 22.6 % (ref 42–52)
HGB BLD-MCNC: 7.4 G/DL (ref 14–18)
MCH RBC QN AUTO: 34.6 PG (ref 27–33)
MCHC RBC AUTO-ENTMCNC: 32.7 G/DL (ref 33.7–35.3)
MCV RBC AUTO: 105.6 FL (ref 81.4–97.8)
PLATELET # BLD AUTO: 133 K/UL (ref 164–446)
PMV BLD AUTO: 10.2 FL (ref 9–12.9)
POTASSIUM SERPL-SCNC: 4.7 MMOL/L (ref 3.6–5.5)
PROT SERPL-MCNC: 7.4 G/DL (ref 6–8.2)
RBC # BLD AUTO: 2.14 M/UL (ref 4.7–6.1)
SODIUM SERPL-SCNC: 133 MMOL/L (ref 135–145)
WBC # BLD AUTO: 11.2 K/UL (ref 4.8–10.8)

## 2017-10-12 PROCEDURE — 700105 HCHG RX REV CODE 258: Performed by: HOSPITALIST

## 2017-10-12 PROCEDURE — 36415 COLL VENOUS BLD VENIPUNCTURE: CPT

## 2017-10-12 PROCEDURE — 700102 HCHG RX REV CODE 250 W/ 637 OVERRIDE(OP): Performed by: HOSPITALIST

## 2017-10-12 PROCEDURE — 700111 HCHG RX REV CODE 636 W/ 250 OVERRIDE (IP): Performed by: HOSPITALIST

## 2017-10-12 PROCEDURE — A9270 NON-COVERED ITEM OR SERVICE: HCPCS | Performed by: HOSPITALIST

## 2017-10-12 PROCEDURE — 700102 HCHG RX REV CODE 250 W/ 637 OVERRIDE(OP): Performed by: NURSE PRACTITIONER

## 2017-10-12 PROCEDURE — A9270 NON-COVERED ITEM OR SERVICE: HCPCS | Performed by: NURSE PRACTITIONER

## 2017-10-12 PROCEDURE — 700111 HCHG RX REV CODE 636 W/ 250 OVERRIDE (IP): Performed by: INTERNAL MEDICINE

## 2017-10-12 PROCEDURE — 92526 ORAL FUNCTION THERAPY: CPT

## 2017-10-12 PROCEDURE — 3E0234Z INTRODUCTION OF SERUM, TOXOID AND VACCINE INTO MUSCLE, PERCUTANEOUS APPROACH: ICD-10-PCS | Performed by: HOSPITALIST

## 2017-10-12 PROCEDURE — 85027 COMPLETE CBC AUTOMATED: CPT

## 2017-10-12 PROCEDURE — 99232 SBSQ HOSP IP/OBS MODERATE 35: CPT | Performed by: HOSPITALIST

## 2017-10-12 PROCEDURE — A9270 NON-COVERED ITEM OR SERVICE: HCPCS | Performed by: INTERNAL MEDICINE

## 2017-10-12 PROCEDURE — 90662 IIV NO PRSV INCREASED AG IM: CPT | Performed by: HOSPITALIST

## 2017-10-12 PROCEDURE — 90471 IMMUNIZATION ADMIN: CPT

## 2017-10-12 PROCEDURE — 700102 HCHG RX REV CODE 250 W/ 637 OVERRIDE(OP): Performed by: INTERNAL MEDICINE

## 2017-10-12 PROCEDURE — 770021 HCHG ROOM/CARE - ISO PRIVATE

## 2017-10-12 PROCEDURE — 80053 COMPREHEN METABOLIC PANEL: CPT

## 2017-10-12 RX ORDER — SODIUM CHLORIDE 9 MG/ML
1000 INJECTION, SOLUTION INTRAVENOUS ONCE
Status: COMPLETED | OUTPATIENT
Start: 2017-10-12 | End: 2017-10-13

## 2017-10-12 RX ORDER — NYSTATIN 100000 [USP'U]/G
POWDER TOPICAL 2 TIMES DAILY
Status: DISCONTINUED | OUTPATIENT
Start: 2017-10-12 | End: 2017-10-19 | Stop reason: HOSPADM

## 2017-10-12 RX ADMIN — INFLUENZA A VIRUSA/MICHIGAN/45/2015 X-275 (H1N1) ANTIGEN (FORMALDEHYDE INACTIVATED), INFLUENZA A VIRUS A/HONG KONG/4801/2014 X-263B (H3N2) ANTIGEN (FORMALDEHYDE INACTIVATED), AND INFLUENZA B VIRUS B/BRISBANE/60/2008 ANTIGEN (FORMALDEHYDE INACTIVATED) 0.5 ML: 60; 60; 60 INJECTION, SUSPENSION INTRAMUSCULAR at 05:48

## 2017-10-12 RX ADMIN — ENOXAPARIN SODIUM 40 MG: 100 INJECTION SUBCUTANEOUS at 09:30

## 2017-10-12 RX ADMIN — SULFAMETHOXAZOLE AND TRIMETHOPRIM 1 TABLET: 800; 160 TABLET ORAL at 20:07

## 2017-10-12 RX ADMIN — OXYCODONE HYDROCHLORIDE 5 MG: 5 TABLET ORAL at 22:36

## 2017-10-12 RX ADMIN — CLINDAMYCIN HYDROCHLORIDE 300 MG: 150 CAPSULE ORAL at 09:29

## 2017-10-12 RX ADMIN — DIGOXIN 125 MCG: 125 TABLET ORAL at 16:30

## 2017-10-12 RX ADMIN — CLINDAMYCIN HYDROCHLORIDE 300 MG: 150 CAPSULE ORAL at 13:15

## 2017-10-12 RX ADMIN — OXYCODONE HYDROCHLORIDE 5 MG: 5 TABLET ORAL at 09:37

## 2017-10-12 RX ADMIN — SPIRONOLACTONE 100 MG: 100 TABLET, FILM COATED ORAL at 09:30

## 2017-10-12 RX ADMIN — OXYCODONE HYDROCHLORIDE 5 MG: 5 TABLET ORAL at 04:18

## 2017-10-12 RX ADMIN — SODIUM CHLORIDE 1000 ML: 9 INJECTION, SOLUTION INTRAVENOUS at 16:31

## 2017-10-12 RX ADMIN — FOLIC ACID 1 MG: 1 TABLET ORAL at 09:30

## 2017-10-12 RX ADMIN — MULTIPLE VITAMINS W/ MINERALS TAB 1 TABLET: TAB at 09:30

## 2017-10-12 RX ADMIN — DULOXETINE HYDROCHLORIDE 60 MG: 60 CAPSULE, DELAYED RELEASE ORAL at 09:30

## 2017-10-12 RX ADMIN — OXYCODONE HYDROCHLORIDE 5 MG: 5 TABLET ORAL at 16:30

## 2017-10-12 RX ADMIN — CLINDAMYCIN HYDROCHLORIDE 300 MG: 150 CAPSULE ORAL at 20:06

## 2017-10-12 RX ADMIN — NYSTATIN 1500000 UNITS: 100000 POWDER TOPICAL at 22:36

## 2017-10-12 RX ADMIN — THIAMINE HCL TAB 100 MG 100 MG: 100 TAB at 09:30

## 2017-10-12 RX ADMIN — TRAZODONE HYDROCHLORIDE 100 MG: 50 TABLET ORAL at 20:06

## 2017-10-12 RX ADMIN — FUROSEMIDE 20 MG: 10 INJECTION, SOLUTION INTRAMUSCULAR; INTRAVENOUS at 16:38

## 2017-10-12 RX ADMIN — LEVOTHYROXINE SODIUM 25 MCG: 50 TABLET ORAL at 05:43

## 2017-10-12 RX ADMIN — CLINDAMYCIN HYDROCHLORIDE 300 MG: 150 CAPSULE ORAL at 16:30

## 2017-10-12 RX ADMIN — SULFAMETHOXAZOLE AND TRIMETHOPRIM 1 TABLET: 800; 160 TABLET ORAL at 09:30

## 2017-10-12 RX ADMIN — CYANOCOBALAMIN TAB 500 MCG 1000 MCG: 500 TAB at 09:30

## 2017-10-12 ASSESSMENT — ENCOUNTER SYMPTOMS
FEVER: 0
MYALGIAS: 1
SHORTNESS OF BREATH: 0
FOCAL WEAKNESS: 0
TINGLING: 0
DIZZINESS: 0
MYALGIAS: 0
SPEECH CHANGE: 0
COUGH: 0
ABDOMINAL PAIN: 0
SORE THROAT: 0
DEPRESSION: 0
PALPITATIONS: 0
CHILLS: 0
ROS GI COMMENTS: IMPROVING
WHEEZING: 0
DIARRHEA: 1
PHOTOPHOBIA: 0
VOMITING: 0
HEADACHES: 0
NAUSEA: 0

## 2017-10-12 ASSESSMENT — PAIN SCALES - GENERAL
PAINLEVEL_OUTOF10: 5
PAINLEVEL_OUTOF10: 8
PAINLEVEL_OUTOF10: 6
PAINLEVEL_OUTOF10: 8
PAINLEVEL_OUTOF10: 7

## 2017-10-12 NOTE — THERAPY
"Speech Language Therapy dysphagia treatment completed.   Functional Status:  Fxnl status for a diet upgrade with strategies and self monitor.  Infrequent throat clear with thins.  Needs to be upright w/ sm sips/bites and alternating liquids/solids  Recommendations: D2/ thin liquid diet upgrade (from NTFL).  Follow posted sw precautions.  Plan of Care: Will benefit from Speech Therapy 3 times per week  Post-Acute Therapy: Discharge to a transitional care facility for continued skilled therapy services.    See \"Rehab Therapy-Acute\" Patient Summary Report for complete documentation.     "

## 2017-10-12 NOTE — CARE PLAN
Problem: Nutritional:  Goal: Achieve adequate nutritional intake  Patient will consume >50% of meals and snacks.    Outcome: PROGRESSING SLOWER THAN EXPECTED  Pt is not eating well.  Supplement order in place, but pt is not receiving Boost d/t on thick liquids.  Added Boost VHC (Very High Calorie) once per day to see if pt will drink it.  SLP, please advance diet as able (still on thick full liquid diet, which is very limited).  RD following.

## 2017-10-12 NOTE — PROGRESS NOTES
[facility] Hospitalist Progress Note    Date of Service: 10/12/2017    Chief Complaint  72 y.o. male presented on 2017 with left leg swelling in the setting of a spider bite. Upon assessment in the ER, he was noted to have sepsis with cellulitis of the left leg. His hospital course has been prolonged and was complicated by alcohol withdrawal which has now resolved.    Interval Problem Update  10/11 - Patient reports significant liquid stools overnight, last bowel movement was this morning  10/12 - Diarrhea has resolved, repeat CDiff negative.  Patient feeling well and looking forward to rehab at .    Consultants/Specialty  Infectious disease    Disposition  Accepted at [facility], awaiting open bed to transfer        Review of Systems   Constitutional: Positive for malaise/fatigue. Negative for chills and fever.   HENT: Negative for congestion and sore throat.    Eyes: Negative for photophobia.   Respiratory: Negative for cough, shortness of breath and wheezing.    Cardiovascular: Negative for chest pain and palpitations.   Gastrointestinal: Positive for diarrhea. Negative for abdominal pain, nausea and vomiting.   Genitourinary: Negative for dysuria.   Musculoskeletal: Negative for myalgias.   Skin: Negative.    Neurological: Negative for dizziness, tingling, focal weakness and headaches.   Psychiatric/Behavioral: Negative for depression and suicidal ideas.      Physical Exam  Laboratory/Imaging   Hemodynamics  Temp (24hrs), Av.6 °C (97.8 °F), Min:36.2 °C (97.2 °F), Max:36.9 °C (98.4 °F)   Temperature: 36.2 °C (97.2 °F)  Pulse  Av.1  Min: 60  Max: 188    Blood Pressure : 104/51     Respiratory      Respiration: 18, Pulse Oximetry: 97 %        RUL Breath Sounds: Clear, RML Breath Sounds: Clear, RLL Breath Sounds: Diminished, MONICA Breath Sounds: Clear, LLL Breath Sounds: Diminished    Fluids    Intake/Output Summary (Last 24 hours) at 10/12/17 1404  Last data filed at 10/12/17 0500   Gross per 24 hour    Intake              240 ml   Output             1600 ml   Net            -1360 ml       Nutrition  Orders Placed This Encounter   Procedures   • DIET ORDER     Standing Status:   Standing     Number of Occurrences:   1     Order Specific Question:   Diet:     Answer:   Full Liquid [11]     Order Specific Question:   Consistency/Fluid modifications:     Answer:   Nectar Thick [2]     Order Specific Question:   Miscellaneous modifications:     Answer:   SLP - 1:1 Supervision by Nursing [21]     Order Specific Question:   Miscellaneous modifications:     Answer:   SLP - Deliver to Nursing Station [22]     Physical Exam   Constitutional: He is oriented to person, place, and time. No distress.   Chronically ill appearing, poorly nourished   HENT:   Head: Normocephalic and atraumatic.   Right Ear: External ear normal.   Left Ear: External ear normal.   Eyes: EOM are normal. Right eye exhibits no discharge. Left eye exhibits no discharge.   Neck: Neck supple. No JVD present.   Cardiovascular: Normal rate, regular rhythm and normal heart sounds.    Pulmonary/Chest: Effort normal and breath sounds normal. No respiratory distress. He exhibits no tenderness.   Abdominal: Soft. Bowel sounds are normal. He exhibits no distension. There is no tenderness.   Musculoskeletal: He exhibits no edema.   Neurological: He is alert and oriented to person, place, and time. No cranial nerve deficit.   Skin: Skin is dry. He is not diaphoretic. There is erythema.   Psychiatric: He has a normal mood and affect. His behavior is normal.   Nursing note and vitals reviewed.      Recent Labs      10/10/17   0211  10/11/17   0244  10/12/17   0256   WBC  12.2*  10.9*  11.2*   RBC  2.13*  2.05*  2.14*   HEMOGLOBIN  7.3*  7.2*  7.4*   HEMATOCRIT  22.2*  21.6*  22.6*   MCV  104.2*  105.4*  105.6*   MCH  34.3*  35.1*  34.6*   MCHC  32.9*  33.3*  32.7*   RDW  54.6*  54.4*  55.0*   PLATELETCT  132*  123*  133*   MPV  10.1  10.3  10.2     Recent Labs       10/10/17   0211  10/11/17   0244  10/12/17   0256   SODIUM  133*  133*  133*   POTASSIUM  4.5  4.7  4.7   CHLORIDE  99  101  99   CO2  24  24  24   GLUCOSE  83  83  86   BUN  24*  24*  24*   CREATININE  1.21  1.32  1.51*   CALCIUM  8.8  9.0  9.4                      Assessment/Plan     * Severe sepsis (CMS-HCC)- (present on admission)   Assessment & Plan    - Resolved  - Continue Bactrim and Clindamycin, end on 10/15  - Discussed with Dr. Benavidez of ID  - Diarrhea resolved, repeat stool negative for CDIFF on 10/11        Paroxysmal atrial fibrillation (CMS-HCC)- (present on admission)   Assessment & Plan    - Controlled on digoxin, poor candidate for anticoagulation given EtOH abuse and cirrhosis        Acute respiratory failure (CMS-HCC)- (present on admission)   Assessment & Plan    - Still requires supplemental O2 but can likely wean further today  - Continue RT as needed        Cellulitis- (present on admission)   Assessment & Plan    - Wound culture positive for GAS and MRSA  - Continue Bactrim, Clindamycin through 10/15  - Discussed with Dr. Benavidez of ID        Cirrhosis (CMS-HCC)- (present on admission)   Assessment & Plan    - No decompesation  - EtOH related  - Continue aldactone        Alcohol withdrawal (CMS-HCC)- (present on admission)   Assessment & Plan    - Resolved, stable off phenobarbital protocol.         Dysphagia   Assessment & Plan    - Improving, SLP following and advanced to dysphagia diet        Pulmonary edema- (present on admission)   Assessment & Plan    - Still with net positive intake  - Continue lasix and monitor I&Os        Hyponatremia   Assessment & Plan    - Mild, stable        CLL (chronic lymphocytic leukemia) (CMS-HCC)- (present on admission)   Assessment & Plan    - This is chronic, outpt oncology followup once medically stable for discharge        Continuous opioid dependence (CMS-HCC)- (present on admission)   Assessment & Plan    - Controlled, avoid escalation         Macrocytic anemia- (present on admission)   Assessment & Plan    - Secondary to EtOH abuse with cirrhosis  - Check Vit B12 and folate levels  - Continue folic acid and add B12  - Hgb stable in the 7s, monitor and transfuse if <7        Thrombocytopenia (CMS-HCC)- (present on admission)   Assessment & Plan    - Low but primarily stable  - Suspect direct bone marrow toxicity from EtOH abuse  - No active bleeding, monitor        Diarrhea- (present on admission)   Assessment & Plan    - Resolved  - 10/11 stool study negative for Cdiff            Reviewed items::  Labs reviewed and Medications reviewed  Antibiotics:  Treating active infection/contamination beyond 24 hours perioperative coverage

## 2017-10-12 NOTE — PROGRESS NOTES
Infectious Disease Progress Note    Author: Ana Benavidez M.D. Date & Time of service: 10/12/2017  10:30 AM    Chief Complaint:  FU sepsis/LLE cellulitis    Interval History:  10/2 AF, WBC 27.5, confused and in pain  10/3 AF, WBC 22.9, remains confused and mumbling  10/4 AF, WBC 15.7, pt obtunded, no overnight events per RN  10/5 AF, WBC 17, mumbling, does not engage in questioning or open eyes, grimaces when RLE examined, remains on pressors, lots of diarrhea, stool softeners held  10/6 AF WBC 17.5 abd more distended BMS placed +blood  10/7 AF WBC 12.3 obtunded now but per RN better earlier  10/8 AF WBC 13 wants something to drink-plan for swallowing study later. Denies SE abx  10/9/17-MAXIMUM TEMPERATURE 98.8. Last WBC 13.2 complains of pain all over the body especially his right leg  10/10/17-MAXIMUM TEMPERATURE 99.5. WBC 12.2  10/11 AF, WBC 10.9, having lots of watery stools approximately 5 in the last 24 hrs, also having left sided abdominal pain which he attributes to heparin shots, denies any nausea  10/12 AF, WBC 11.2, complaining of rash and itching in groin, some LLE pain but improving, states diarrhea has improved, repeat C diff neg  Labs Reviewed, Medications Reviewed, Radiology Reviewed and Wound Reviewed.    Review of Systems:  Review of Systems   Constitutional: Positive for malaise/fatigue. Negative for chills and fever.   Respiratory: Negative for cough.    Cardiovascular: Negative for chest pain.   Gastrointestinal: Positive for diarrhea. Negative for abdominal pain, nausea and vomiting.        Improving   Musculoskeletal: Positive for myalgias.   Neurological: Negative for speech change.       Hemodynamics:  Temp (24hrs), Av.7 °C (98.1 °F), Min:36.2 °C (97.2 °F), Max:37.2 °C (99 °F)  Temperature: 37.2 °C (99 °F)  Pulse  Av.1  Min: 60  Max: 188   Blood Pressure : 111/55      Physical Exam:  Physical Exam   Constitutional: He is oriented to person, place, and time. He appears  well-developed. No distress.   Thin  Older than stated age  Chronically ill   HENT:   Head: Normocephalic and atraumatic.   Eyes: EOM are normal. Pupils are equal, round, and reactive to light.   Neck: Neck supple.   Cardiovascular:   tachycardic   Pulmonary/Chest: Effort normal and breath sounds normal.   Abdominal: Soft. There is no tenderness. There is no rebound and no guarding.   Genitourinary:   Genitourinary Comments: Tinea cruris   Musculoskeletal: He exhibits no edema.   Erythema and edema of the LLE radiating to the left hip and medial thigh, improving significantly. Skin peeling    Left medial malleolar wound.      Neurological: He is alert and oriented to person, place, and time.   Skin: There is erythema.   Resolving  Peeling of the skin   Nursing note and vitals reviewed.      Meds:    Current Facility-Administered Medications:   •  cyanocobalamin  •  sulfamethoxazole-trimethoprim  •  clindamycin  •  furosemide  •  spironolactone  •  haloperidol lactate  •  oxycodone immediate-release  •  enoxaparin (LOVENOX) injection  •  digoxin  •  folic acid  •  therapeutic multivitamin-minerals  •  thiamine  •  duloxetine  •  levothyroxine  •  trazodone  •  ondansetron  •  ondansetron    Labs:  Recent Labs      10/10/17   0211  10/11/17   0244  10/12/17   0256   WBC  12.2*  10.9*  11.2*   RBC  2.13*  2.05*  2.14*   HEMOGLOBIN  7.3*  7.2*  7.4*   HEMATOCRIT  22.2*  21.6*  22.6*   MCV  104.2*  105.4*  105.6*   MCH  34.3*  35.1*  34.6*   RDW  54.6*  54.4*  55.0*   PLATELETCT  132*  123*  133*   MPV  10.1  10.3  10.2   NEUTSPOLYS  33.90*  21.00*   --    LYMPHOCYTES  61.80*  76.30*   --    MONOCYTES  1.70  1.80   --    EOSINOPHILS  0.00  0.00   --    BASOPHILS  0.00  0.90   --    RBCMORPHOLO  Present  Present   --      Recent Labs      10/10/17   0211  10/11/17   0244  10/12/17   0256   SODIUM  133*  133*  133*   POTASSIUM  4.5  4.7  4.7   CHLORIDE  99  101  99   CO2  24  24  24   GLUCOSE  83  83  86   BUN  24*  24*   24*     Recent Labs      10/10/17   0211  10/11/17   0244  10/12/17   0256   ALBUMIN  3.1*  2.9*  3.1*   TBILIRUBIN  0.7  0.7  0.6   ALKPHOSPHAT  92  94  105*   TOTPROTEIN  6.9  7.0  7.4   ALTSGPT  8  9  7   ASTSGOT  18  19  22   CREATININE  1.21  1.32  1.51*       Imaging:  Ct-abdomen-pelvis With    Result Date: 10/1/2017  10/1/2017 8:07 AM HISTORY/REASON FOR EXAM:  Diffuse abdominal pain. History of sepsis. Left lower extremity cellulitis. TECHNIQUE/EXAM DESCRIPTION: CT scan of the abdomen and pelvis with contrast. Contrast-enhanced helical scanning was obtained from the diaphragmatic domes through the pubic symphysis following the bolus administration of 100 mL of Omnipaque 350 nonionic contrast without complication. Low dose optimization technique was utilized for this CT exam including automated exposure control and adjustment of the mA and/or kV according to patient size. COMPARISON: CT 6/5/2015 FINDINGS: The visualized lung bases demonstrate small amounts of dependent pleural effusion with bibasilar dependent airspace disease, most likely atelectasis. There is no acute bony process. There is degenerative change in the lower lumbar spine and pelvis. CT Abdomen: Liver is unchanged in appearance with slight nodularity. There are no definite focal masses. Spleen is mildly enlarged measuring 13.1 cm in diameter without change. Hepatic veins are somewhat attenuated. Portal veins, splenic vein and SMV are patent. Pancreas is somewhat atrophic but otherwise unremarkable. The gallbladder demonstrates some density dependently which could be related to tumefactive sludge or noncalcified stones. There is no biliary dilatation. The adrenal glands are normal in size. Kidneys demonstrate no hydronephrosis. There are probable small cortical cysts. The abdominal aorta is normal in caliber. There is atherosclerosis of the aorta. There is no lymphadenopathy. The bowel demonstrates no evidence of bowel obstruction. There is  moderate stool throughout the colon. There is a hiatal hernia. There is a small amount of fluid around the liver although this is decreased over the prior study. There is minimal fluid in both paracolic gutters. There is diffuse subcutaneous edema. CT Pelvis: There is no acute inflammatory process in the pelvis. There is free fluid in the pelvis with diffuse subcutaneous edema. There are mildly prominent lymph nodes in the inguinal regions, left greater than right with some nodes in the external iliac region as well more so on the left. The largest inguinal node is 1.5 cm short axis on the left side.. There is postoperative change involving the colon. Bladder is decompressed with a Maradiaga catheter.     1.  There is mild colonic distention with large amount of stool throughout the colon. There is no bowel obstruction. 2.  There is a hiatal hernia. 3.  Changes of the liver with mild splenomegaly and a small amount of ascites in the abdomen and pelvis suggestive of underlying hepatocellular disease. 4.  Dependent density in the gallbladder which could be tumefactive sludge or noncalcified stones. 5.  There are small dependent bilateral pleural effusions with dependent airspace disease, likely atelectasis. 6.  There is mild lymphadenopathy in the pelvis more so on the left likely inflammatory. 7.  There is diffuse subcutaneous edema.    Ct-extremity, Lower With Left    Result Date: 10/1/2017  10/1/2017 8:07 AM HISTORY/REASON FOR EXAM:  Progression of strep/staph cellulitis with sepsis. Possible necrotizing fasciitis.. TECHNIQUE/EXAM DESCRIPTION AND NUMBER OF VIEWS:  CT scan of the LEFT lower extremity with contrast, with reconstructions. Thin helical 3 mm sections were obtained from the distal femur through the proximal tibia/fibula. Sagittal and coronal multiplanar reconstructions were generated from the axial images. A total of 100 mL of Omnipaque 350 nonionic contrast was administered  IV without complication. Up to  date radiation dose reduction adjustments have been utilized to meet ALARA standards for radiation dose reduction. COMPARISON: X-rays of the foot and tibia fibula 9/29/2017, 9/28/2017 respectively. FINDINGS: Imaging of the visualized portion of the pelvis demonstrates fluid in the pelvis. Bladder is decompressed with a Maradiaga catheter. There is postoperative change involving the colon. There are mildly prominent bilateral external iliac and common femoral lymph nodes. Largest node in the left inguinal region is 1.5 cm short axis diameter. There is diffuse subcutaneous edema throughout the pelvis. Imaging throughout the left lower extremity demonstrates diffuse subcutaneous edema. There is a small amount of fluid in the intermuscular planes along the medial aspect of the proximal lower leg adjacent to the medial gastrocnemius musculature. There is  no focal abscess collection. There is a small joint effusion of the knee. There is some mild fatty infiltration of the musculature. There is atherosclerosis but basilar structures appear grossly patent. Limited partial visualization of portions of the right lower extremity also demonstrates some mild subcutaneous edema. Bones demonstrate mild degenerative changes in the hip and left knee. There is no evidence of osteomyelitis.     1.  There is diffuse subcutaneous edema throughout the left hemipelvis and left lower extremity with a small amount of fluid adjacent to the right medial gastrocnemius at the level the proximal calf. 2.  There is no focal drainable abscess. 3.  There is no evidence of osteomyelitis. 4.  There is mild atherosclerosis with grossly patent vasculature. 5.  There are probably inflammatory lymph nodes in the pelvis and left inguinal region with the largest measuring 1.5 cm short axis. 6.  There is free fluid in the visualized pelvis.    Ct-head W/o    Result Date: 10/1/2017  HISTORY/REASON FOR EXAM:  Altered Mental Status. TECHNIQUE/EXAM DESCRIPTION: CT  scan of the head without contrast, 10/1/2017 12:28 AM. Contiguous 5 mm axial sections were obtained from the skull base through the vertex. Up to date radiation dose reduction adjustments have been utilized to meet ALARA standards for radiation dose reduction. COMPARISON:  4/23/2015 FINDINGS:   The ventricular system and cortical sulci are prominent, consistent with the patient's age.  There is no midline shift or other mass effect.  A 5 mm circumscribed lucency is present in the right temporal lobe, consistent with old lacunar infarct. This is unchanged. There is no acute intra-axial abnormality or extra-axial fluid collection.  There is no intracranial hemorrhage.  The calvaria are intact. The visualized paranasal sinuses show no unusual opacity.     1.  No acute intracranial abnormality. 2.  Moderate atrophy, age-consistent. 3.  Probable old lacunar infarct in the right temporal lobe, unchanged from the prior scan. INTERPRETING LOCATION:  [address] [place] [address], [id number]    Dx-chest-portable (1 View)    Result Date: 9/29/2017 9/29/2017 1:25 PM HISTORY/REASON FOR EXAM:  Line placement. TECHNIQUE/EXAM DESCRIPTION AND NUMBER OF VIEWS: Single portable view of the chest. COMPARISON: 9/28/2017. FINDINGS: The soft tissues and bony structures are unremarkable. The heart and mediastinal structures are within normal limits. Pulmonary vascularity is normal. There is right basilar atelectasis. There is no effusion or pneumothorax. There has been interval insertion of a central venous catheter which terminates with the tip projecting over the expected region of the mid to distal superior vena cava.     1.  Interval insertion of a central venous catheter which terminates with the tip projecting over the expected region of the mid to distal superior vena cava. 2.  Right basilar atelectasis.    Dx-chest-portable (1 View)    Result Date: 9/28/2017 9/28/2017 4:32 PM HISTORY/REASON FOR EXAM:  Sepsis. Left lower extremity wound.  TECHNIQUE/EXAM DESCRIPTION AND NUMBER OF VIEWS: Single portable view of the chest. COMPARISON: 9/1/2016 FINDINGS: The mediastinal and cardiac silhouette is unremarkable. The pulmonary vascularity is within normal limits. Lungs demonstrate no evidence of pneumonia. Slight hazy density seen adjacent to the right anterior 1st rib is similar to the prior studies and likely due to degenerative change. There is no significant pleural effusion. There is no visible pneumothorax. There are no acute bony abnormalities.     1.  There is no acute cardiopulmonary process.    Dx-foot-2- Left    Result Date: 9/29/2017 9/29/2017 4:10 PM HISTORY/REASON FOR EXAM:  Left foot swelling and weeping TECHNIQUE/EXAM DESCRIPTION AND NUMBER OF VIEWS: 2 views of the LEFT foot. COMPARISON:  None. FINDINGS:  Bone mineralization is age appropriate. Bony alignment is anatomic. There is no evidence of acute fracture or dislocation. There is no soft tissue gas. There is soft tissue swelling about the ankle.     Soft tissue swelling about the ankle. No evidence of acute osseous abnormality or soft tissue gas.    Dx-lumbar Spine-2 Or 3 Views    Result Date: 9/9/2017 9/9/2017 3:24 AM HISTORY/REASON FOR EXAM:  Pain Following Trauma. TECHNIQUE/ EXAM DESCRIPTION AND NUMBER OF VIEWS:  3 views of the lumbar spine. COMPARISON: None. FINDINGS: The lowest formed intervertebral disc will be designated L5-S1 for the purposes of this report and vertebral levels numbered accordingly. No acute fracture is evident. No gross malalignment is seen. There is mild loss of intervertebral disc height in the upper lumbar spine. There is moderate loss of intervertebral disc height at L4-L5. There are anterior osteophytes at most levels. There is no evidence of spondylolisthesis or osseous lesion. There are degenerative changes of the mid to lower lumbar facet joints. There is calcific atherosclerotic plaque.     1.  No evidence of acute fracture. 2.  Multilevel  multifactorial degenerative changes    Dx-thoracic Spine-2 Views    Result Date: 9/9/2017 9/9/2017 3:21 AM HISTORY/REASON FOR EXAM:  Pain Following Trauma. TECHNIQUE/EXAM DESCRIPTION AND NUMBER OF VIEWS:  Thoracic spine, 2 views. COMPARISON:  None. FINDINGS: Glands unremarkable. There is lucency extending to the superior endplate of L2 on the second image. This appearance is not present on the dedicated lumbar spine radiographs. No other finding suspicious for acute fracture are seen. There is mild loss of intervertebral disc height throughout the thoracic spine. There are anterior osteophytes at most levels.     1.  Lucency through the superior endplate of L2 on the second image, discordant with the appearance on the dedicated lumbar spine radiographs. I suspect this is artifactual however a fracture is not excluded. Further assessment is recommended with CT of the lumbar spine. 2.  Thoracic spondylosis    Dx-tibia And Fibula Left    Result Date: 9/28/2017 9/28/2017 4:32 PM HISTORY/REASON FOR EXAM:  Left tibia and fibular redness and swelling with open wound in the region of the medial malleolus. TECHNIQUE/EXAM DESCRIPTION AND NUMBER OF VIEWS:  2 views of the LEFT tibia and fibula. COMPARISON: Left knee 4/24/2015 FINDINGS: There is no evidence of acute fracture involving the tibia or fibula. Sclerotic focus posterior left tibial metadiaphyseal region is without change and may represent a sclerotic NOF or fibrous cortical defect. There is mild diffuse soft tissue swelling most prominent at the level the ankle. There is no gas in the soft tissues and there is no foreign body. There is no periostitis.     1.  There is mild diffuse swelling in the soft tissues of the distal left lower leg and ankle. There is no gas in the soft tissues or foreign body. 2.  There is no plain film evidence of osteomyelitis.    Dx-wrist-complete 3+ Left    Result Date: 9/9/2017 9/9/2017 3:24 AM HISTORY/REASON FOR EXAM:  Pain/Deformity  Following Trauma. TECHNIQUE/EXAM DESCRIPTION AND NUMBER OF VIEWS:  3 views of the  LEFT wrist. COMPARISON: Radiographs 2015 FINDINGS: MINERALIZATION: Decreased. INJURY: There has been interval placement of a volar plate and screws transfixing the previously demonstrated intra-articular distal radial fracture. The hardware appears intact. There is underlying curvilinear deformity of the distal radius. There is ulnar positive variance, as before. No acute fracture is seen. JOINTS: No erosive arthropathy is evident.     1.  No radiographic evidence of acute traumatic injury. 2.  Healed surgically transfixed fracture of the distal radius 3.  Osteopenia    Echocardiogram Comp W/o Cont    Result Date: 2017  Transthoracic Echo Report Echocardiography Laboratory CONCLUSIONS Normal left ventricular size, thickness, systolic function, and diastolic function. Mitral annular calcification. Mild mitral regurgitation. Aortic sclerosis without stenosis. Mild aortic insufficiency. Normal estimated right-sided pressures. No prior study is available for comparison. MAGALI TODD Exam Date:         2017                    14:07 Exam Location:     Inpatient Priority:          Routine Ordering Physician:        CIRILO HSU JR Referring Physician:       [id number] Sonographer:               Lo Lundberg RDCS Age:    72     Gender:    M MRN:    [id number] :    1945 BSA:    1.73   Ht (in):    65     Wt (lb):    145 Exam Type:     Complete Indications:     Cardiac arrhythmia, unspecified ICD Codes:       I499 CPT Codes:       51119 BP:   89     /   57     HR:   80 Technical Quality:       Fair MEASUREMENTS  (Male / Female) Normal Values 2D ECHO LV Diastolic Diameter PLAX        4.6 cm                4.2 - 5.9 / 3.9 - 5.3 cm LV Systolic Diameter PLAX         2.1 cm                2.1 - 4.0 cm IVS Diastolic Thickness           1 cm                  LVPW Diastolic Thickness          1 cm                  RV  Diameter 4C                    2.7 cm                2.5 - 2.9 cm LVOT Diameter                     1.8 cm                RA Diameter                       2.7 cm                Estimated LV Ejection Fraction    60 %                  LV Ejection Fraction MOD BP       60.8 %                >= 55  % LV Ejection Fraction MOD 4C       58.4 %                LV Ejection Fraction MOD 2C       58.4 %                LA Volume Index                   13.5 cm³/m²           16 - 28 cm³/m² IVC Diameter                      1.5 cm                M-MODE Aortic Root Diameter MM           3.9 cm                DOPPLER AV Peak Velocity                  1.3 m/s               AV Peak Gradient                  6.7 mmHg              AV Mean Gradient                  3.3 mmHg              AI Pressure Half Time             463 ms                LVOT Peak Velocity                0.89 m/s              AV Area Cont Eq vti               1.9 cm²               Mitral E Point Velocity           0.96 m/s              Mitral E to A Ratio               0.99                  MV Pressure Half Time             60.6 ms               MV Area PHT                       3.6 cm²               MV Deceleration Time              209 ms                TR Peak Velocity                  222 cm/s              PV Peak Velocity                  0.7 m/s               PV Peak Gradient                  1.9 mmHg              RVOT Peak Velocity                0.52 m/s              * Indicates values subject to auto-interpretation LV EF:  60    % FINDINGS Left Ventricle Normal left ventricular size, thickness, systolic function, and diastolic function.  Left ventricular ejection fraction is visually estimated to be 60%. Normal regional wall motion. Right Ventricle Normal right ventricular size and systolic function. Right Atrium Normal right atrial size. Normal inferior vena cava size and inspiratory collapse. Left Atrium Normal left atrial size. Mitral Valve Mitral  annular calcification. Thickened mitral valve leaflets. Mild mitral regurgitation. Aortic Valve Aortic sclerosis without stenosis. Mild aortic insufficiency. Tricuspid Valve Structurally normal tricuspid valve. Mild tricuspid regurgitation. Estimated right ventricular systolic pressure  is 25 mmHg. Pulmonic Valve Structurally normal pulmonic valve. Mild pulmonic insufficiency. Pericardium Normal pericardium without effusion. Aorta Ascending aorta is normal for body surface area, and measured at a diameter of 3.7 cm above the sinotubular junction. Suleman Nguyen MD (Electronically Signed) Final Date:     2017                 23:50    Le Venous Duplex (specify In Comments Left, Right Or Bilateral)    Result Date: 2017   Vascular Laboratory  CONCLUSIONS  No evidence of acute DVT or SVT in the left lower extremity.  Edema is noted.  MAGALI TODD  Exam Date:     2017 16:43  Room #:     Inpatient  Priority:     Stat  Ht (in):             Wt (lb):  Ordering Physician:        REYNALDO LUU  Referring Physician:       [id number]  Sonographer:               Samina Galloway RVT  Study Type:                Complete Unilateral  Technical Quality:         Adequate  Age:    72    Gender:     M  MRN:    [id number]  :    1945      BSA:  Indications:     Localized swelling, mass and lump, left lower limb  CPT Codes:       43909  ICD Codes:         History:         Swelling and redness of left leg for less than 24 hours.  Limitations:     Severe pain.  PROCEDURES:  Left lower extremity venous duplex imaging.  The following venous structures were evaluated: common femoral, profunda  femoral, greater saphenous, femoral, popliteal, peroneal and posterior  tibial veins.  Serial compression, augmentation maneuvers, color and spectral Doppler flow  evaluations were performed.  FINDINGS:  Left lower extremity -  Complete color filling and compressibility with normal venous flow dynamics  including  spontaneous flow, response to augmentation maneuvers, and  respiratory phasicity.  The peroneal and posterior tibial veins are difficult to assess for  compressibility due to patient pain, but flow response to augmentation is  demonstrated.  Interstitial fluid consistent with edema is observed in the thigh and below  the knee.  Flow was evaluated in the contralateral common femoral vein and normal  venous flow dynamics including spontaneous flow, respiratory phasic  variation and augmentation were demonstrated.  Kevin Lester MD  (Electronically Signed)  Final Date:      29 September 2017                   15:52    Dx-abdomen For Tube Placement    Result Date: 10/1/2017  10/1/2017 7:28 PM HISTORY/REASON FOR EXAM:  Line evaluation. TECHNIQUE/EXAM DESCRIPTION AND NUMBER OF VIEWS:  1 view(s) of the abdomen. COMPARISON:  9/1/2016. FINDINGS: Enteric tube projects over the second portion of the duodenum. There is mild colonic distention. Degenerative changes are seen in the spine.     Enteric tube projects over the second portion of the duodenum. Mild colonic distention.      Micro:  Results     Procedure Component Value Units Date/Time    C Diff by PCR rflx Toxin [[id number]] Collected:  10/11/17 1800    Order Status:  Completed Updated:  10/12/17 0751     C Diff by PCR Negative     Comment: C. difficile NOT detected by PCR.  Treatment not indicated per guidelines.  Repeat testing not indicated within 7 days.          027-NAP1-BI Presumptive Negative     Comment: Presumptive 027/NAP1/BI target DNA sequences are NOT DETECTED.       Narrative:       Talk With The RN To Release The Order,RN Did Release The Order But It Is  Not Crossing Over From Epic For THe CDIFR,So I Order On The [employer]  Side BY Myself And Print THe Paperwork From Epic    CDIFF BY PCR WITH TOXIN [[id number]] Collected:  10/11/17 1800    Order Status:  Sent Specimen:  Stool from Stool           Assessment:  Active Hospital Problems    Diagnosis   •  Sinus tachycardia [R00.0]   • Cirrhosis (CMS-HCC) [K74.60]   • Alcoholism (CMS-HCC) [F10.20]   • Severe sepsis (CMS-HCC) [A41.9, R65.20]   • Hyponatremia [E87.1]   • Cellulitis [L03.90]   • CLL (chronic lymphocytic leukemia) (CMS-HCC) [C91.10]   • Continuous opioid dependence (CMS-[facility]) [F11.20]       Plan:  Septic shock. Shock resolved  2/2 LLE cellulitis  Afebrile  Off pressors  Leukocytosis - persistent but decreasing  On abx below    LLE cellulitis, improved  Cx - MRSA (vanco STEPHANIE 2), Group A strep  Discontinue ceftaroline  Continue PO Clinda and bactrim  Plan for 2 week course total  Stop date 10/15/17    Leukocytosis, persistent. Decreasing overall  Multifactorial  Infection + CLL  On abx  Monitor    New, Tinea cruris  Nystatin cream    Diarrhea, improving  DC'd stool softeners  Negative C diff 10/4  BMS placed +blood  Abd distended  Imodium or lomotil PRN  Repeat C diff 10/11 - neg    Encephalopathy, resolved    CLL  Will impair ability to fight infection    Alcohol abuse/cirrhosis  Avoid hepatotoxic agents    SNF placement - pt agreeable. Pt accepted at [facility]    Discussed with internal medicine/Dr. Schilling. ID signing off.

## 2017-10-12 NOTE — PROGRESS NOTES
Pt c/o sacral pain, area is red and painful to touch but blanching. Mepilex placed and waffle mattress placed.

## 2017-10-12 NOTE — PROGRESS NOTES
VSS except patient has been hypotensive-MD aware, UOA, pain controlled with oxycodone, PO antibiotics

## 2017-10-12 NOTE — CARE PLAN
Problem: Infection  Goal: Will remain free from infection  Outcome: PROGRESSING AS EXPECTED  No s/s of new or worsening infection. C. Diff negative. Tolerating abx well.     Problem: Psychosocial Needs:  Goal: Level of anxiety will decrease  Outcome: PROGRESSING SLOWER THAN EXPECTED  Pt easily irritated, but otherwise withdrawn. RN encouraged pt to verbalize anxieties, fears, concerns. Cares and procedures explained. Pt questions addressed.

## 2017-10-13 LAB
ALBUMIN SERPL BCP-MCNC: 3.1 G/DL (ref 3.2–4.9)
ALBUMIN/GLOB SERPL: 0.8 G/DL
ALP SERPL-CCNC: 93 U/L (ref 30–99)
ALT SERPL-CCNC: 9 U/L (ref 2–50)
ANION GAP SERPL CALC-SCNC: 8 MMOL/L (ref 0–11.9)
AST SERPL-CCNC: 18 U/L (ref 12–45)
BILIRUB SERPL-MCNC: 0.6 MG/DL (ref 0.1–1.5)
BUN SERPL-MCNC: 22 MG/DL (ref 8–22)
CALCIUM SERPL-MCNC: 8.9 MG/DL (ref 8.5–10.5)
CHLORIDE SERPL-SCNC: 99 MMOL/L (ref 96–112)
CO2 SERPL-SCNC: 24 MMOL/L (ref 20–33)
CREAT SERPL-MCNC: 1.38 MG/DL (ref 0.5–1.4)
ERYTHROCYTE [DISTWIDTH] IN BLOOD BY AUTOMATED COUNT: 53.6 FL (ref 35.9–50)
GFR SERPL CREATININE-BSD FRML MDRD: 50 ML/MIN/1.73 M 2
GLOBULIN SER CALC-MCNC: 3.9 G/DL (ref 1.9–3.5)
GLUCOSE SERPL-MCNC: 81 MG/DL (ref 65–99)
HCT VFR BLD AUTO: 21.3 % (ref 42–52)
HGB BLD-MCNC: 7 G/DL (ref 14–18)
MCH RBC QN AUTO: 34.5 PG (ref 27–33)
MCHC RBC AUTO-ENTMCNC: 32.9 G/DL (ref 33.7–35.3)
MCV RBC AUTO: 104.9 FL (ref 81.4–97.8)
PLATELET # BLD AUTO: 118 K/UL (ref 164–446)
PMV BLD AUTO: 9.9 FL (ref 9–12.9)
POTASSIUM SERPL-SCNC: 4.7 MMOL/L (ref 3.6–5.5)
PROT SERPL-MCNC: 7 G/DL (ref 6–8.2)
RBC # BLD AUTO: 2.03 M/UL (ref 4.7–6.1)
SODIUM SERPL-SCNC: 131 MMOL/L (ref 135–145)
WBC # BLD AUTO: 8.5 K/UL (ref 4.8–10.8)

## 2017-10-13 PROCEDURE — 770021 HCHG ROOM/CARE - ISO PRIVATE

## 2017-10-13 PROCEDURE — A9270 NON-COVERED ITEM OR SERVICE: HCPCS | Performed by: HOSPITALIST

## 2017-10-13 PROCEDURE — 700102 HCHG RX REV CODE 250 W/ 637 OVERRIDE(OP): Performed by: HOSPITALIST

## 2017-10-13 PROCEDURE — 700102 HCHG RX REV CODE 250 W/ 637 OVERRIDE(OP): Performed by: INTERNAL MEDICINE

## 2017-10-13 PROCEDURE — 99232 SBSQ HOSP IP/OBS MODERATE 35: CPT | Performed by: HOSPITALIST

## 2017-10-13 PROCEDURE — 36415 COLL VENOUS BLD VENIPUNCTURE: CPT

## 2017-10-13 PROCEDURE — A9270 NON-COVERED ITEM OR SERVICE: HCPCS | Performed by: INTERNAL MEDICINE

## 2017-10-13 PROCEDURE — 80053 COMPREHEN METABOLIC PANEL: CPT

## 2017-10-13 PROCEDURE — 700102 HCHG RX REV CODE 250 W/ 637 OVERRIDE(OP): Performed by: NURSE PRACTITIONER

## 2017-10-13 PROCEDURE — 700111 HCHG RX REV CODE 636 W/ 250 OVERRIDE (IP): Performed by: INTERNAL MEDICINE

## 2017-10-13 PROCEDURE — 85027 COMPLETE CBC AUTOMATED: CPT

## 2017-10-13 PROCEDURE — 700105 HCHG RX REV CODE 258: Performed by: HOSPITALIST

## 2017-10-13 PROCEDURE — A9270 NON-COVERED ITEM OR SERVICE: HCPCS | Performed by: NURSE PRACTITIONER

## 2017-10-13 PROCEDURE — 92526 ORAL FUNCTION THERAPY: CPT

## 2017-10-13 RX ORDER — FUROSEMIDE 40 MG/1
40 TABLET ORAL
Status: DISCONTINUED | OUTPATIENT
Start: 2017-10-13 | End: 2017-10-14

## 2017-10-13 RX ORDER — SODIUM CHLORIDE 9 MG/ML
500 INJECTION, SOLUTION INTRAVENOUS ONCE
Status: COMPLETED | OUTPATIENT
Start: 2017-10-13 | End: 2017-10-13

## 2017-10-13 RX ADMIN — LEVOTHYROXINE SODIUM 25 MCG: 50 TABLET ORAL at 05:18

## 2017-10-13 RX ADMIN — TRAZODONE HYDROCHLORIDE 100 MG: 50 TABLET ORAL at 19:58

## 2017-10-13 RX ADMIN — CLINDAMYCIN HYDROCHLORIDE 300 MG: 150 CAPSULE ORAL at 12:46

## 2017-10-13 RX ADMIN — CLINDAMYCIN HYDROCHLORIDE 300 MG: 150 CAPSULE ORAL at 09:23

## 2017-10-13 RX ADMIN — ENOXAPARIN SODIUM 40 MG: 100 INJECTION SUBCUTANEOUS at 09:23

## 2017-10-13 RX ADMIN — MULTIPLE VITAMINS W/ MINERALS TAB 1 TABLET: TAB at 09:24

## 2017-10-13 RX ADMIN — FOLIC ACID 1 MG: 1 TABLET ORAL at 09:24

## 2017-10-13 RX ADMIN — OXYCODONE HYDROCHLORIDE 5 MG: 5 TABLET ORAL at 12:44

## 2017-10-13 RX ADMIN — NYSTATIN 1500000 UNITS: 100000 POWDER TOPICAL at 09:25

## 2017-10-13 RX ADMIN — THIAMINE HCL TAB 100 MG 100 MG: 100 TAB at 09:24

## 2017-10-13 RX ADMIN — OXYCODONE HYDROCHLORIDE 5 MG: 5 TABLET ORAL at 05:26

## 2017-10-13 RX ADMIN — CYANOCOBALAMIN TAB 500 MCG 1000 MCG: 500 TAB at 09:24

## 2017-10-13 RX ADMIN — SULFAMETHOXAZOLE AND TRIMETHOPRIM 1 TABLET: 800; 160 TABLET ORAL at 09:24

## 2017-10-13 RX ADMIN — CLINDAMYCIN HYDROCHLORIDE 300 MG: 150 CAPSULE ORAL at 19:59

## 2017-10-13 RX ADMIN — NYSTATIN: 100000 POWDER TOPICAL at 21:00

## 2017-10-13 RX ADMIN — FUROSEMIDE 20 MG: 10 INJECTION, SOLUTION INTRAMUSCULAR; INTRAVENOUS at 05:18

## 2017-10-13 RX ADMIN — SODIUM CHLORIDE 500 ML: 9 INJECTION, SOLUTION INTRAVENOUS at 09:26

## 2017-10-13 RX ADMIN — CLINDAMYCIN HYDROCHLORIDE 300 MG: 150 CAPSULE ORAL at 17:41

## 2017-10-13 RX ADMIN — SULFAMETHOXAZOLE AND TRIMETHOPRIM 1 TABLET: 800; 160 TABLET ORAL at 19:58

## 2017-10-13 RX ADMIN — DIGOXIN 125 MCG: 125 TABLET ORAL at 17:41

## 2017-10-13 RX ADMIN — DULOXETINE HYDROCHLORIDE 60 MG: 60 CAPSULE, DELAYED RELEASE ORAL at 09:24

## 2017-10-13 RX ADMIN — OXYCODONE HYDROCHLORIDE 5 MG: 5 TABLET ORAL at 18:39

## 2017-10-13 ASSESSMENT — ENCOUNTER SYMPTOMS
TINGLING: 0
COUGH: 0
SHORTNESS OF BREATH: 0
HEADACHES: 0
DEPRESSION: 0
DIARRHEA: 0
MYALGIAS: 0
ABDOMINAL PAIN: 0
DIZZINESS: 0
WHEEZING: 0
FEVER: 0
NAUSEA: 0
VOMITING: 0
PALPITATIONS: 0
SORE THROAT: 0
FOCAL WEAKNESS: 0
PHOTOPHOBIA: 0
CHILLS: 0

## 2017-10-13 ASSESSMENT — PAIN SCALES - GENERAL
PAINLEVEL_OUTOF10: 0
PAINLEVEL_OUTOF10: 7

## 2017-10-13 NOTE — CARE PLAN
Problem: Infection  Goal: Will remain free from infection  Outcome: PROGRESSING SLOWER THAN EXPECTED  Administered antibiotics.     Problem: Pain Management  Goal: Pain level will decrease to patient's comfort goal  Outcome: PROGRESSING AS EXPECTED  Assessed for pain, patient states he probably will need next pain med at 2230.

## 2017-10-13 NOTE — THERAPY
"Speech Language Therapy dysphagia treatment completed.   Functional Status:  The patient was seen for dysphagia therapy this date. The patient was awake, alert and eating his D2/thin liquid meal tray with min A for swallow strategies. The patient consumed PO trials with no overt s/s of aspiration when swallow strategies were completed. Patient declined any diet upgrade trials at this time 2/2 \"this is fine.\" Education provide but patient continued to decline any further trials.     Recommendations: 1) continue D2/thins. Trial ups as willing.     Plan of Care: Will benefit from Speech Therapy 3 times per week.    Post-Acute Therapy: Discharge to a transitional care facility for continued skilled therapy services.    See \"Rehab Therapy-Acute\" Patient Summary Report for complete documentation.     "

## 2017-10-13 NOTE — PROGRESS NOTES
Patient AO x 4 and pleasant, some lower leg pain bu declines intervention at this time. Denies numbness/tingling. Reports frequent loose stools today. Hourly rounding in place.

## 2017-10-13 NOTE — PROGRESS NOTES
Called Dr. Da Silva about patient's AM hemoglobin 7.0, reviewed patient's background and VS, he is AO x 4 and without complaints, got 1L NS yesterday. Dr. Da Silva stated does not sound like an emergency, pass on to day team.

## 2017-10-13 NOTE — PROGRESS NOTES
Pt hypotensive this morning, 500 ml bolus given and bp came up a bit, also bp meds held per md. md also aware of hgb of 7.0.

## 2017-10-13 NOTE — CARE PLAN
Problem: Nutritional:  Goal: Achieve adequate nutritional intake  Patient will consume >50% of meals and snacks.    Outcome: PROGRESSING AS EXPECTED  Po intake has improved today, SLP advanced diet. Now that thin liquids allowed will change once daily Boost VHC to Boost Plus with all meals until pt eating better.

## 2017-10-13 NOTE — PROGRESS NOTES
[facility] Hospitalist Progress Note    Date of Service: 10/13/2017    Chief Complaint  72 y.o. male presented on 2017 with left leg swelling in the setting of a spider bite. Upon assessment in the ER, he was noted to have sepsis with cellulitis of the left leg. His hospital course has been prolonged and was complicated by alcohol withdrawal which has now resolved.    Interval Problem Update  10/11 - Patient reports significant liquid stools overnight, last bowel movement was this morning  10/12 - Diarrhea has resolved, repeat CDiff negative.  Patient feeling well and looking forward to rehab at SNF.  10/13 - LIEN low this morning but mentation unchanged, patient denies SOB or CP.  WIll give small fluid bolus and monitor.    Consultants/Specialty  Infectious disease    Disposition  Accepted at [facility], awaiting insurance clearance        Review of Systems   Constitutional: Positive for malaise/fatigue. Negative for chills and fever.   HENT: Negative for congestion and sore throat.    Eyes: Negative for photophobia.   Respiratory: Negative for cough, shortness of breath and wheezing.    Cardiovascular: Negative for chest pain and palpitations.   Gastrointestinal: Negative for abdominal pain, diarrhea, nausea and vomiting.   Genitourinary: Negative for dysuria.   Musculoskeletal: Negative for myalgias.   Skin: Negative.    Neurological: Negative for dizziness, tingling, focal weakness and headaches.   Psychiatric/Behavioral: Negative for depression and suicidal ideas.      Physical Exam  Laboratory/Imaging   Hemodynamics  Temp (24hrs), Av.7 °C (98.1 °F), Min:36.2 °C (97.1 °F), Max:37.2 °C (99 °F)   Temperature: 36.6 °C (97.9 °F)  Pulse  Av.1  Min: 60  Max: 188    Blood Pressure : 108/49     Respiratory      Respiration: 16, Pulse Oximetry: 95 %        RUL Breath Sounds: Clear, RML Breath Sounds: Clear, RLL Breath Sounds: Diminished, MONICA Breath Sounds: Clear, LLL Breath Sounds:  Diminished    Fluids    Intake/Output Summary (Last 24 hours) at 10/13/17 0806  Last data filed at 10/12/17 2100   Gross per 24 hour   Intake              100 ml   Output              260 ml   Net             -160 ml       Nutrition  Orders Placed This Encounter   Procedures   • DIET ORDER     Standing Status:   Standing     Number of Occurrences:   1     Order Specific Question:   Diet:     Answer:   Regular [1]     Order Specific Question:   Texture/Fiber modifications:     Answer:   Dysphagia 2(Pureed/Chopped)specify fluid consistency(question 6) [2]     Order Specific Question:   Consistency/Fluid modifications:     Answer:   Thin Liquids [3]     Order Specific Question:   Miscellaneous modifications:     Answer:   SLP - 1:1 Supervision by Nursing [21]     Order Specific Question:   Miscellaneous modifications:     Answer:   SLP - Deliver to Nursing Station [22]     Physical Exam   Constitutional: He is oriented to person, place, and time. No distress.   Chronically ill appearing, poorly nourished   HENT:   Head: Normocephalic and atraumatic.   Right Ear: External ear normal.   Left Ear: External ear normal.   Eyes: EOM are normal. Right eye exhibits no discharge. Left eye exhibits no discharge.   Neck: Neck supple. No JVD present.   Cardiovascular: Normal rate, regular rhythm and normal heart sounds.    Pulmonary/Chest: Effort normal and breath sounds normal. No respiratory distress. He exhibits no tenderness.   Abdominal: Soft. Bowel sounds are normal. He exhibits no distension. There is no tenderness.   Musculoskeletal: He exhibits no edema.   Neurological: He is alert and oriented to person, place, and time. No cranial nerve deficit.   Skin: Skin is dry. He is not diaphoretic. There is erythema.   Psychiatric: He has a normal mood and affect. His behavior is normal.   Nursing note and vitals reviewed.      Recent Labs      10/11/17   0244  10/12/17   0256  10/13/17   0224   WBC  10.9*  11.2*  8.5   RBC   2.05*  2.14*  2.03*   HEMOGLOBIN  7.2*  7.4*  7.0*   HEMATOCRIT  21.6*  22.6*  21.3*   MCV  105.4*  105.6*  104.9*   MCH  35.1*  34.6*  34.5*   MCHC  33.3*  32.7*  32.9*   RDW  54.4*  55.0*  53.6*   PLATELETCT  123*  133*  118*   MPV  10.3  10.2  9.9     Recent Labs      10/11/17   0244  10/12/17   0256  10/13/17   0224   SODIUM  133*  133*  131*   POTASSIUM  4.7  4.7  4.7   CHLORIDE  101  99  99   CO2  24  24  24   GLUCOSE  83  86  81   BUN  24*  24*  22   CREATININE  1.32  1.51*  1.38   CALCIUM  9.0  9.4  8.9                      Assessment/Plan     * Severe sepsis (CMS-[facility])- (present on admission)   Assessment & Plan    - Resolved  - Continue Bactrim and Clindamycin, end on 10/15  - Discussed with Dr. Benavidez of ID  - Diarrhea resolved, repeat stool negative for CDIFF on 10/11        Paroxysmal atrial fibrillation (CMS-HCC)- (present on admission)   Assessment & Plan    - Controlled on digoxin, poor candidate for anticoagulation given EtOH abuse and cirrhosis        Acute respiratory failure (CMS-[facility])- (present on admission)   Assessment & Plan    - Still requires supplemental O2 but can likely wean further today  - Continue RT as needed        Cellulitis- (present on admission)   Assessment & Plan    - Wound culture positive for GAS and MRSA  - Continue Bactrim, Clindamycin through 10/15  - Discussed with Dr. Benavidez of ID        Cirrhosis (CMS-[facility])- (present on admission)   Assessment & Plan    - No decompesation  - EtOH related  - Continue aldactone        Alcohol withdrawal (CMS-[facility])- (present on admission)   Assessment & Plan    - Resolved, stable off phenobarbital protocol.         Dysphagia   Assessment & Plan    - Improving, SLP following and advanced to dysphagia diet        Pulmonary edema- (present on admission)   Assessment & Plan    - Still with net positive intake  - Continue lasix and monitor I&Os        Hyponatremia   Assessment & Plan    - Mild, stable        CLL (chronic lymphocytic leukemia)  (CMS-HCC)- (present on admission)   Assessment & Plan    - This is chronic, outpt oncology followup once medically stable for discharge        Continuous opioid dependence (CMS-HCC)- (present on admission)   Assessment & Plan    - Controlled, avoid escalation        Macrocytic anemia- (present on admission)   Assessment & Plan    - Secondary to EtOH abuse with cirrhosis  - Check Vit B12 and folate levels  - Continue folic acid and add B12  - Hgb stable in the 7s, monitor and transfuse if <7        Thrombocytopenia (CMS-HCC)- (present on admission)   Assessment & Plan    - Low but primarily stable  - Suspect direct bone marrow toxicity from EtOH abuse  - No active bleeding, monitor        Diarrhea- (present on admission)   Assessment & Plan    - Resolved  - 10/11 stool study negative for Cdiff            Reviewed items::  Labs reviewed and Medications reviewed  Antibiotics:  Treating active infection/contamination beyond 24 hours perioperative coverage

## 2017-10-14 LAB
ALBUMIN SERPL BCP-MCNC: 3.2 G/DL (ref 3.2–4.9)
ALBUMIN/GLOB SERPL: 0.8 G/DL
ALP SERPL-CCNC: 104 U/L (ref 30–99)
ALT SERPL-CCNC: 11 U/L (ref 2–50)
ANION GAP SERPL CALC-SCNC: 8 MMOL/L (ref 0–11.9)
AST SERPL-CCNC: 21 U/L (ref 12–45)
BILIRUB SERPL-MCNC: 0.5 MG/DL (ref 0.1–1.5)
BUN SERPL-MCNC: 19 MG/DL (ref 8–22)
CALCIUM SERPL-MCNC: 9.1 MG/DL (ref 8.5–10.5)
CHLORIDE SERPL-SCNC: 99 MMOL/L (ref 96–112)
CO2 SERPL-SCNC: 24 MMOL/L (ref 20–33)
CREAT SERPL-MCNC: 1.43 MG/DL (ref 0.5–1.4)
GFR SERPL CREATININE-BSD FRML MDRD: 48 ML/MIN/1.73 M 2
GLOBULIN SER CALC-MCNC: 4.2 G/DL (ref 1.9–3.5)
GLUCOSE SERPL-MCNC: 84 MG/DL (ref 65–99)
POTASSIUM SERPL-SCNC: 4.3 MMOL/L (ref 3.6–5.5)
PROT SERPL-MCNC: 7.4 G/DL (ref 6–8.2)
SODIUM SERPL-SCNC: 131 MMOL/L (ref 135–145)

## 2017-10-14 PROCEDURE — 302255 BARRIER CREAM MOISTURE BAZA PROTECT (ZINC) 5OZ: Performed by: HOSPITALIST

## 2017-10-14 PROCEDURE — 99232 SBSQ HOSP IP/OBS MODERATE 35: CPT | Performed by: HOSPITALIST

## 2017-10-14 PROCEDURE — A9270 NON-COVERED ITEM OR SERVICE: HCPCS | Performed by: INTERNAL MEDICINE

## 2017-10-14 PROCEDURE — 36415 COLL VENOUS BLD VENIPUNCTURE: CPT

## 2017-10-14 PROCEDURE — 770021 HCHG ROOM/CARE - ISO PRIVATE

## 2017-10-14 PROCEDURE — 700102 HCHG RX REV CODE 250 W/ 637 OVERRIDE(OP): Performed by: HOSPITALIST

## 2017-10-14 PROCEDURE — A9270 NON-COVERED ITEM OR SERVICE: HCPCS | Performed by: HOSPITALIST

## 2017-10-14 PROCEDURE — 700111 HCHG RX REV CODE 636 W/ 250 OVERRIDE (IP): Performed by: INTERNAL MEDICINE

## 2017-10-14 PROCEDURE — 700102 HCHG RX REV CODE 250 W/ 637 OVERRIDE(OP): Performed by: INTERNAL MEDICINE

## 2017-10-14 PROCEDURE — A9270 NON-COVERED ITEM OR SERVICE: HCPCS | Performed by: NURSE PRACTITIONER

## 2017-10-14 PROCEDURE — 700102 HCHG RX REV CODE 250 W/ 637 OVERRIDE(OP): Performed by: NURSE PRACTITIONER

## 2017-10-14 PROCEDURE — 80053 COMPREHEN METABOLIC PANEL: CPT

## 2017-10-14 RX ORDER — SPIRONOLACTONE 50 MG/1
50 TABLET, FILM COATED ORAL DAILY
Status: DISCONTINUED | OUTPATIENT
Start: 2017-10-15 | End: 2017-10-15

## 2017-10-14 RX ORDER — FUROSEMIDE 20 MG/1
20 TABLET ORAL
Status: DISCONTINUED | OUTPATIENT
Start: 2017-10-15 | End: 2017-10-15

## 2017-10-14 RX ADMIN — TRAZODONE HYDROCHLORIDE 100 MG: 50 TABLET ORAL at 21:12

## 2017-10-14 RX ADMIN — DULOXETINE HYDROCHLORIDE 60 MG: 60 CAPSULE, DELAYED RELEASE ORAL at 09:08

## 2017-10-14 RX ADMIN — LEVOTHYROXINE SODIUM 25 MCG: 50 TABLET ORAL at 05:15

## 2017-10-14 RX ADMIN — NYSTATIN 1500000 UNITS: 100000 POWDER TOPICAL at 09:09

## 2017-10-14 RX ADMIN — CLINDAMYCIN HYDROCHLORIDE 300 MG: 150 CAPSULE ORAL at 12:40

## 2017-10-14 RX ADMIN — CLINDAMYCIN HYDROCHLORIDE 300 MG: 150 CAPSULE ORAL at 09:08

## 2017-10-14 RX ADMIN — MULTIPLE VITAMINS W/ MINERALS TAB 1 TABLET: TAB at 09:08

## 2017-10-14 RX ADMIN — OXYCODONE HYDROCHLORIDE 5 MG: 5 TABLET ORAL at 18:16

## 2017-10-14 RX ADMIN — FOLIC ACID 1 MG: 1 TABLET ORAL at 09:08

## 2017-10-14 RX ADMIN — DIGOXIN 125 MCG: 125 TABLET ORAL at 18:04

## 2017-10-14 RX ADMIN — CLINDAMYCIN HYDROCHLORIDE 300 MG: 150 CAPSULE ORAL at 21:12

## 2017-10-14 RX ADMIN — SPIRONOLACTONE 100 MG: 100 TABLET, FILM COATED ORAL at 09:08

## 2017-10-14 RX ADMIN — ENOXAPARIN SODIUM 40 MG: 100 INJECTION SUBCUTANEOUS at 09:09

## 2017-10-14 RX ADMIN — OXYCODONE HYDROCHLORIDE 5 MG: 5 TABLET ORAL at 00:28

## 2017-10-14 RX ADMIN — FUROSEMIDE 40 MG: 40 TABLET ORAL at 09:09

## 2017-10-14 RX ADMIN — THIAMINE HCL TAB 100 MG 100 MG: 100 TAB at 09:08

## 2017-10-14 RX ADMIN — OXYCODONE HYDROCHLORIDE 5 MG: 5 TABLET ORAL at 06:17

## 2017-10-14 RX ADMIN — SULFAMETHOXAZOLE AND TRIMETHOPRIM 1 TABLET: 800; 160 TABLET ORAL at 21:12

## 2017-10-14 RX ADMIN — CYANOCOBALAMIN TAB 500 MCG 1000 MCG: 500 TAB at 09:08

## 2017-10-14 RX ADMIN — SULFAMETHOXAZOLE AND TRIMETHOPRIM 1 TABLET: 800; 160 TABLET ORAL at 09:09

## 2017-10-14 RX ADMIN — CLINDAMYCIN HYDROCHLORIDE 300 MG: 150 CAPSULE ORAL at 18:04

## 2017-10-14 RX ADMIN — NYSTATIN 1500000 UNITS: 100000 POWDER TOPICAL at 21:13

## 2017-10-14 RX ADMIN — OXYCODONE HYDROCHLORIDE 5 MG: 5 TABLET ORAL at 12:40

## 2017-10-14 ASSESSMENT — ENCOUNTER SYMPTOMS
DEPRESSION: 0
FEVER: 0
COUGH: 0
VOMITING: 0
TINGLING: 0
DIZZINESS: 0
ABDOMINAL PAIN: 0
DIARRHEA: 0
NAUSEA: 0
PHOTOPHOBIA: 0
MYALGIAS: 0
WHEEZING: 0
SORE THROAT: 0
SHORTNESS OF BREATH: 0
PALPITATIONS: 0
FOCAL WEAKNESS: 0
CHILLS: 0
HEADACHES: 0

## 2017-10-14 ASSESSMENT — PAIN SCALES - GENERAL
PAINLEVEL_OUTOF10: 7
PAINLEVEL_OUTOF10: 7
PAINLEVEL_OUTOF10: 6
PAINLEVEL_OUTOF10: 5
PAINLEVEL_OUTOF10: 0
PAINLEVEL_OUTOF10: 6
PAINLEVEL_OUTOF10: 8
PAINLEVEL_OUTOF10: 7

## 2017-10-14 NOTE — PROGRESS NOTES
[facility] Hospitalist Progress Note    Date of Service: 10/14/2017    Chief Complaint  72 y.o. male presented on 2017 with left leg swelling in the setting of a spider bite. Upon assessment in the ER, he was noted to have sepsis with cellulitis of the left leg. His hospital course has been prolonged and was complicated by alcohol withdrawal which has now resolved.    Interval Problem Update  10/11 - Patient reports significant liquid stools overnight, last bowel movement was this morning  10/12 - Diarrhea has resolved, repeat CDiff negative.  Patient feeling well and looking forward to rehab at SNF.  10/13 - BP low this morning but mentation unchanged, patient denies SOB or CP.  WIll give small fluid bolus and monitor.  10/14 - BP better but still low normal.  Decreasing lasix and spironolactone with holding orders in place for SBP <100.  Patient remains alert and pleasant, anxious for insurance clearance so he can be transferred to SNF.    Consultants/Specialty  Infectious disease    Disposition  Accepted at [facility], awaiting insurance clearance        Review of Systems   Constitutional: Positive for malaise/fatigue. Negative for chills and fever.   HENT: Negative for congestion and sore throat.    Eyes: Negative for photophobia.   Respiratory: Negative for cough, shortness of breath and wheezing.    Cardiovascular: Negative for chest pain and palpitations.   Gastrointestinal: Negative for abdominal pain, diarrhea, nausea and vomiting.   Genitourinary: Negative for dysuria.   Musculoskeletal: Negative for myalgias.   Skin: Negative.    Neurological: Negative for dizziness, tingling, focal weakness and headaches.   Psychiatric/Behavioral: Negative for depression and suicidal ideas.      Physical Exam  Laboratory/Imaging   Hemodynamics  Temp (24hrs), Av.5 °C (97.7 °F), Min:36.2 °C (97.1 °F), Max:37 °C (98.6 °F)   Temperature: 37 °C (98.6 °F)  Pulse  Av.8  Min: 60  Max: 188    Blood Pressure : (!) 97/48      Respiratory      Respiration: 14, Pulse Oximetry: 98 %        RUL Breath Sounds: Clear, RML Breath Sounds: Clear, RLL Breath Sounds: Diminished, MONICA Breath Sounds: Clear, LLL Breath Sounds: Diminished    Fluids    Intake/Output Summary (Last 24 hours) at 10/14/17 0839  Last data filed at 10/13/17 1146   Gross per 24 hour   Intake                0 ml   Output              650 ml   Net             -650 ml       Nutrition  Orders Placed This Encounter   Procedures   • DIET ORDER     Standing Status:   Standing     Number of Occurrences:   1     Order Specific Question:   Diet:     Answer:   Regular [1]     Order Specific Question:   Texture/Fiber modifications:     Answer:   Dysphagia 2(Pureed/Chopped)specify fluid consistency(question 6) [2]     Order Specific Question:   Consistency/Fluid modifications:     Answer:   Thin Liquids [3]     Order Specific Question:   Miscellaneous modifications:     Answer:   SLP - 1:1 Supervision by Nursing [21]     Order Specific Question:   Miscellaneous modifications:     Answer:   SLP - Deliver to Nursing Station [22]     Physical Exam   Constitutional: He is oriented to person, place, and time. No distress.   Chronically ill appearing, poorly nourished   HENT:   Head: Normocephalic and atraumatic.   Right Ear: External ear normal.   Left Ear: External ear normal.   Eyes: EOM are normal. Right eye exhibits no discharge. Left eye exhibits no discharge.   Neck: Neck supple. No JVD present.   Cardiovascular: Normal rate, regular rhythm and normal heart sounds.    Pulmonary/Chest: Effort normal and breath sounds normal. No respiratory distress. He exhibits no tenderness.   Abdominal: Soft. Bowel sounds are normal. He exhibits no distension. There is no tenderness.   Musculoskeletal: He exhibits no edema.   Neurological: He is alert and oriented to person, place, and time. No cranial nerve deficit.   Skin: Skin is dry. He is not diaphoretic. There is erythema.   Psychiatric: He has  a normal mood and affect. His behavior is normal.   Nursing note and vitals reviewed.      Recent Labs      10/12/17   0256  10/13/17   0224   WBC  11.2*  8.5   RBC  2.14*  2.03*   HEMOGLOBIN  7.4*  7.0*   HEMATOCRIT  22.6*  21.3*   MCV  105.6*  104.9*   MCH  34.6*  34.5*   MCHC  32.7*  32.9*   RDW  55.0*  53.6*   PLATELETCT  133*  118*   MPV  10.2  9.9     Recent Labs      10/12/17   0256  10/13/17   0224  10/14/17   0351   SODIUM  133*  131*  131*   POTASSIUM  4.7  4.7  4.3   CHLORIDE  99  99  99   CO2  24  24  24   GLUCOSE  86  81  84   BUN  24*  22  19   CREATININE  1.51*  1.38  1.43*   CALCIUM  9.4  8.9  9.1                      Assessment/Plan     * Severe sepsis (CMS-HCC)- (present on admission)   Assessment & Plan    - Resolved  - Continue Bactrim and Clindamycin, end on 10/15  - Discussed with Dr. Benavidez of ID  - Diarrhea resolved, repeat stool negative for CDIFF on 10/11        Paroxysmal atrial fibrillation (CMS-HCC)- (present on admission)   Assessment & Plan    - Controlled on digoxin, poor candidate for anticoagulation given EtOH abuse and cirrhosis        Acute respiratory failure (CMS-[facility])- (present on admission)   Assessment & Plan    - Resolving  - Weaned down to 1L, can likely wean off soon  - Continue RT as needed        Cellulitis- (present on admission)   Assessment & Plan    - Wound culture positive for GAS and MRSA  - Continue Bactrim, Clindamycin through 10/15  - Discussed with Dr. Benavidez of ID        Cirrhosis (CMS-HCC)- (present on admission)   Assessment & Plan    - No decompesation  - EtOH related  - Continue aldactone        Alcohol withdrawal (CMS-HCC)- (present on admission)   Assessment & Plan    - Resolved, stable off phenobarbital protocol.         Dysphagia   Assessment & Plan    - Improving, SLP following and advanced to dysphagia diet        Pulmonary edema- (present on admission)   Assessment & Plan    - Still with net positive intake  - Continue lasix and monitor I&Os         Hyponatremia   Assessment & Plan    - Mild, stable        CLL (chronic lymphocytic leukemia) (CMS-HCC)- (present on admission)   Assessment & Plan    - This is chronic, outpt oncology followup once medically stable for discharge        Continuous opioid dependence (CMS-HCC)- (present on admission)   Assessment & Plan    - Controlled, avoid escalation        Macrocytic anemia- (present on admission)   Assessment & Plan    - Secondary to EtOH abuse with cirrhosis  - Check Vit B12 and folate levels  - Continue folic acid and add B12  - Hgb stable in the 7s, monitor and transfuse if <7        Thrombocytopenia (CMS-HCC)- (present on admission)   Assessment & Plan    - Low but primarily stable  - Suspect direct bone marrow toxicity from EtOH abuse  - No active bleeding, monitor        Diarrhea- (present on admission)   Assessment & Plan    - Resolved  - 10/11 stool study negative for Cdiff            Reviewed items::  Labs reviewed and Medications reviewed  Antibiotics:  Treating active infection/contamination beyond 24 hours perioperative coverage

## 2017-10-14 NOTE — CARE PLAN
Problem: Safety  Goal: Will remain free from falls    Intervention: Implement fall precautions  Call light and personal belongings within reach. Pt instructed to call for assistance, pt verbalizes understanding. Non skid socks on. Strip alarm in place. Bed in lowest position.       Problem: Pain Management  Goal: Pain level will decrease to patient's comfort goal    Intervention: Follow pain managment plan developed in collaboration with patient and Interdisciplinary Team  Patient c/o pain. Medications given with good results. Pre and post pain assessment documented.

## 2017-10-15 PROBLEM — I95.9 HYPOTENSION: Status: ACTIVE | Noted: 2017-10-15

## 2017-10-15 LAB
ALBUMIN SERPL BCP-MCNC: 3.2 G/DL (ref 3.2–4.9)
ALBUMIN/GLOB SERPL: 0.8 G/DL
ALP SERPL-CCNC: 99 U/L (ref 30–99)
ALT SERPL-CCNC: 10 U/L (ref 2–50)
ANION GAP SERPL CALC-SCNC: 8 MMOL/L (ref 0–11.9)
AST SERPL-CCNC: 20 U/L (ref 12–45)
BILIRUB SERPL-MCNC: 0.5 MG/DL (ref 0.1–1.5)
BUN SERPL-MCNC: 17 MG/DL (ref 8–22)
CALCIUM SERPL-MCNC: 9 MG/DL (ref 8.5–10.5)
CHLORIDE SERPL-SCNC: 98 MMOL/L (ref 96–112)
CO2 SERPL-SCNC: 23 MMOL/L (ref 20–33)
CREAT SERPL-MCNC: 1.39 MG/DL (ref 0.5–1.4)
GFR SERPL CREATININE-BSD FRML MDRD: 50 ML/MIN/1.73 M 2
GLOBULIN SER CALC-MCNC: 4.2 G/DL (ref 1.9–3.5)
GLUCOSE SERPL-MCNC: 76 MG/DL (ref 65–99)
POTASSIUM SERPL-SCNC: 4.4 MMOL/L (ref 3.6–5.5)
PROT SERPL-MCNC: 7.4 G/DL (ref 6–8.2)
SODIUM SERPL-SCNC: 129 MMOL/L (ref 135–145)

## 2017-10-15 PROCEDURE — 99232 SBSQ HOSP IP/OBS MODERATE 35: CPT | Performed by: HOSPITALIST

## 2017-10-15 PROCEDURE — 770021 HCHG ROOM/CARE - ISO PRIVATE

## 2017-10-15 PROCEDURE — 700102 HCHG RX REV CODE 250 W/ 637 OVERRIDE(OP): Performed by: HOSPITALIST

## 2017-10-15 PROCEDURE — A9270 NON-COVERED ITEM OR SERVICE: HCPCS | Performed by: NURSE PRACTITIONER

## 2017-10-15 PROCEDURE — 80053 COMPREHEN METABOLIC PANEL: CPT

## 2017-10-15 PROCEDURE — 36415 COLL VENOUS BLD VENIPUNCTURE: CPT

## 2017-10-15 PROCEDURE — 700111 HCHG RX REV CODE 636 W/ 250 OVERRIDE (IP): Performed by: INTERNAL MEDICINE

## 2017-10-15 PROCEDURE — 700102 HCHG RX REV CODE 250 W/ 637 OVERRIDE(OP): Performed by: NURSE PRACTITIONER

## 2017-10-15 PROCEDURE — A9270 NON-COVERED ITEM OR SERVICE: HCPCS | Performed by: HOSPITALIST

## 2017-10-15 PROCEDURE — A9270 NON-COVERED ITEM OR SERVICE: HCPCS | Performed by: INTERNAL MEDICINE

## 2017-10-15 PROCEDURE — 700102 HCHG RX REV CODE 250 W/ 637 OVERRIDE(OP): Performed by: INTERNAL MEDICINE

## 2017-10-15 RX ORDER — SPIRONOLACTONE 25 MG/1
25 TABLET ORAL DAILY
Status: DISCONTINUED | OUTPATIENT
Start: 2017-10-16 | End: 2017-10-19 | Stop reason: HOSPADM

## 2017-10-15 RX ORDER — FUROSEMIDE 20 MG/1
10 TABLET ORAL
Status: DISCONTINUED | OUTPATIENT
Start: 2017-10-16 | End: 2017-10-19 | Stop reason: HOSPADM

## 2017-10-15 RX ADMIN — DULOXETINE HYDROCHLORIDE 60 MG: 60 CAPSULE, DELAYED RELEASE ORAL at 07:57

## 2017-10-15 RX ADMIN — SULFAMETHOXAZOLE AND TRIMETHOPRIM 1 TABLET: 800; 160 TABLET ORAL at 08:00

## 2017-10-15 RX ADMIN — FOLIC ACID 1 MG: 1 TABLET ORAL at 07:59

## 2017-10-15 RX ADMIN — FUROSEMIDE 20 MG: 20 TABLET ORAL at 08:03

## 2017-10-15 RX ADMIN — THIAMINE HCL TAB 100 MG 100 MG: 100 TAB at 08:00

## 2017-10-15 RX ADMIN — TRAZODONE HYDROCHLORIDE 100 MG: 50 TABLET ORAL at 21:22

## 2017-10-15 RX ADMIN — CLINDAMYCIN HYDROCHLORIDE 300 MG: 150 CAPSULE ORAL at 13:54

## 2017-10-15 RX ADMIN — CLINDAMYCIN HYDROCHLORIDE 300 MG: 150 CAPSULE ORAL at 07:59

## 2017-10-15 RX ADMIN — MULTIPLE VITAMINS W/ MINERALS TAB 1 TABLET: TAB at 07:59

## 2017-10-15 RX ADMIN — SPIRONOLACTONE 50 MG: 50 TABLET ORAL at 08:03

## 2017-10-15 RX ADMIN — OXYCODONE HYDROCHLORIDE 5 MG: 5 TABLET ORAL at 07:57

## 2017-10-15 RX ADMIN — CLINDAMYCIN HYDROCHLORIDE 300 MG: 150 CAPSULE ORAL at 21:22

## 2017-10-15 RX ADMIN — DIGOXIN 125 MCG: 125 TABLET ORAL at 17:44

## 2017-10-15 RX ADMIN — OXYCODONE HYDROCHLORIDE 5 MG: 5 TABLET ORAL at 01:46

## 2017-10-15 RX ADMIN — NYSTATIN 1500000 UNITS: 100000 POWDER TOPICAL at 08:01

## 2017-10-15 RX ADMIN — CYANOCOBALAMIN TAB 500 MCG 1000 MCG: 500 TAB at 07:59

## 2017-10-15 RX ADMIN — OXYCODONE HYDROCHLORIDE 5 MG: 5 TABLET ORAL at 13:54

## 2017-10-15 RX ADMIN — CLINDAMYCIN HYDROCHLORIDE 300 MG: 150 CAPSULE ORAL at 17:44

## 2017-10-15 RX ADMIN — NYSTATIN: 100000 POWDER TOPICAL at 21:00

## 2017-10-15 RX ADMIN — LEVOTHYROXINE SODIUM 25 MCG: 50 TABLET ORAL at 05:46

## 2017-10-15 RX ADMIN — ENOXAPARIN SODIUM 40 MG: 100 INJECTION SUBCUTANEOUS at 08:01

## 2017-10-15 RX ADMIN — SULFAMETHOXAZOLE AND TRIMETHOPRIM 1 TABLET: 800; 160 TABLET ORAL at 21:22

## 2017-10-15 RX ADMIN — OXYCODONE HYDROCHLORIDE 5 MG: 5 TABLET ORAL at 21:22

## 2017-10-15 ASSESSMENT — PAIN SCALES - GENERAL
PAINLEVEL_OUTOF10: 7
PAINLEVEL_OUTOF10: 4
PAINLEVEL_OUTOF10: 4
PAINLEVEL_OUTOF10: 6
PAINLEVEL_OUTOF10: 7
PAINLEVEL_OUTOF10: 6
PAINLEVEL_OUTOF10: 4
PAINLEVEL_OUTOF10: 4

## 2017-10-15 ASSESSMENT — ENCOUNTER SYMPTOMS
FEVER: 0
PALPITATIONS: 0
COUGH: 0
FOCAL WEAKNESS: 0
NAUSEA: 0
PHOTOPHOBIA: 0
ABDOMINAL PAIN: 0
MYALGIAS: 0
TINGLING: 0
CHILLS: 0
DEPRESSION: 0
VOMITING: 0
HEADACHES: 0
WHEEZING: 0
DIARRHEA: 0
DIZZINESS: 0
SORE THROAT: 0
SHORTNESS OF BREATH: 0

## 2017-10-15 NOTE — PROGRESS NOTES
Assumed care of pt at shift change, discussed POC. Pt A&Ox4.  IV in place. Pt pleasant and cooperative. Bed alarm on,  treaded socks on, call light within reach, bed in low position.

## 2017-10-15 NOTE — CARE PLAN
Problem: Infection  Goal: Will remain free from infection    Intervention: Implement standard precautions and perform hand washing before and after patient contact  Washing hand before and after pt contact,   abx given per MAR      Problem: Skin Integrity  Goal: Risk for impaired skin integrity will decrease    Intervention: Implement precautions to protect skin integrity in collaboration with the interdisciplinary team  Waffle overlay in used, seth cream and nystatin applied. Bed linen and pads changed

## 2017-10-15 NOTE — PROGRESS NOTES
[facility] Hospitalist Progress Note    Date of Service: 10/15/2017    Chief Complaint  72 y.o. male presented on 9/28/2017 with left leg swelling in the setting of a spider bite. Upon assessment in the ER, he was noted to have sepsis with cellulitis of the left leg. His hospital course has been prolonged and was complicated by alcohol withdrawal which has now resolved.    Interval Problem Update  10/11 - Patient reports significant liquid stools overnight, last bowel movement was this morning  10/12 - Diarrhea has resolved, repeat CDiff negative.  Patient feeling well and looking forward to rehab at [facility].  10/13 - BP low this morning but mentation unchanged, patient denies SOB or CP.  WIll give small fluid bolus and monitor.  10/14 - BP better but still low normal.  Decreasing lasix and spironolactone with holding orders in place for SBP <100.  Patient remains alert and pleasant, anxious for insurance clearance so he can be transferred to SNF.  10/15 - BP profile improved but still low normal, will decrease lasix and aldactone again and then as long as mentation remains stable, will keep at lower dose    Consultants/Specialty  Infectious disease    Disposition  Accepted at [facility], awaiting insurance clearance        Review of Systems   Constitutional: Positive for malaise/fatigue. Negative for chills and fever.   HENT: Negative for congestion and sore throat.    Eyes: Negative for photophobia.   Respiratory: Negative for cough, shortness of breath and wheezing.    Cardiovascular: Negative for chest pain and palpitations.   Gastrointestinal: Negative for abdominal pain, diarrhea, nausea and vomiting.   Genitourinary: Negative for dysuria.   Musculoskeletal: Negative for myalgias.   Skin: Negative.    Neurological: Negative for dizziness, tingling, focal weakness and headaches.   Psychiatric/Behavioral: Negative for depression and suicidal ideas.      Physical Exam  Laboratory/Imaging   Hemodynamics  Temp (24hrs),  Av.6 °C (97.8 °F), Min:35.9 °C (96.7 °F), Max:37 °C (98.6 °F)   Temperature: 36.4 °C (97.5 °F)  Pulse  Av.5  Min: 60  Max: 188    Blood Pressure : 103/52     Respiratory      Respiration: 15, Pulse Oximetry: 99 %        RUL Breath Sounds: Clear, RML Breath Sounds: Clear, RLL Breath Sounds: Diminished, MONICA Breath Sounds: Clear, LLL Breath Sounds: Diminished    Fluids    Intake/Output Summary (Last 24 hours) at 10/15/17 0897  Last data filed at 10/15/17 0250   Gross per 24 hour   Intake              560 ml   Output              800 ml   Net             -240 ml       Nutrition  Orders Placed This Encounter   Procedures   • DIET ORDER     Standing Status:   Standing     Number of Occurrences:   1     Order Specific Question:   Diet:     Answer:   Regular [1]     Order Specific Question:   Texture/Fiber modifications:     Answer:   Dysphagia 2(Pureed/Chopped)specify fluid consistency(question 6) [2]     Order Specific Question:   Consistency/Fluid modifications:     Answer:   Thin Liquids [3]     Order Specific Question:   Miscellaneous modifications:     Answer:   SLP - 1:1 Supervision by Nursing [21]     Order Specific Question:   Miscellaneous modifications:     Answer:   SLP - Deliver to Nursing Station [22]     Physical Exam   Constitutional: He is oriented to person, place, and time. No distress.   Chronically ill appearing, poorly nourished   HENT:   Head: Normocephalic and atraumatic.   Right Ear: External ear normal.   Left Ear: External ear normal.   Eyes: EOM are normal. Right eye exhibits no discharge. Left eye exhibits no discharge.   Neck: Neck supple. No JVD present.   Cardiovascular: Normal rate, regular rhythm and normal heart sounds.    Pulmonary/Chest: Effort normal and breath sounds normal. No respiratory distress. He exhibits no tenderness.   Abdominal: Soft. Bowel sounds are normal. He exhibits no distension. There is no tenderness.   Musculoskeletal: He exhibits no edema.   Neurological:  He is alert and oriented to person, place, and time. No cranial nerve deficit.   Skin: Skin is dry. He is not diaphoretic. There is erythema.   Psychiatric: He has a normal mood and affect. His behavior is normal.   Nursing note and vitals reviewed.      Recent Labs      10/13/17   0224   WBC  8.5   RBC  2.03*   HEMOGLOBIN  7.0*   HEMATOCRIT  21.3*   MCV  104.9*   MCH  34.5*   MCHC  32.9*   RDW  53.6*   PLATELETCT  118*   MPV  9.9     Recent Labs      10/13/17   0224  10/14/17   0351  10/15/17   0430   SODIUM  131*  131*  129*   POTASSIUM  4.7  4.3  4.4   CHLORIDE  99  99  98   CO2  24  24  23   GLUCOSE  81  84  76   BUN  22  19  17   CREATININE  1.38  1.43*  1.39   CALCIUM  8.9  9.1  9.0                      Assessment/Plan     * Severe sepsis (CMS-HCC)- (present on admission)   Assessment & Plan    - Resolved  - Continue Bactrim and Clindamycin, end on 10/15  - Discussed with Dr. Benavidez of ID  - Diarrhea resolved, repeat stool negative for CDIFF on 10/11        Paroxysmal atrial fibrillation (CMS-HCC)- (present on admission)   Assessment & Plan    - Controlled on digoxin, poor candidate for anticoagulation given EtOH abuse and cirrhosis        Acute respiratory failure (CMS-HCC)- (present on admission)   Assessment & Plan    - Resolving  - Weaned down to 0.5L, can likely wean off soon  - Continue RT as needed        Cellulitis- (present on admission)   Assessment & Plan    - Wound culture positive for GAS and MRSA  - Continue Bactrim, Clindamycin through 10/15  - Discussed with Dr. Arias of ID        Cirrhosis (CMS-HCC)- (present on admission)   Assessment & Plan    - No decompesation  - EtOH related  - Continue aldactone        Alcohol withdrawal (CMS-HCC)- (present on admission)   Assessment & Plan    - Resolved, stable off phenobarbital protocol.         Dysphagia   Assessment & Plan    - Improving, SLP following and advanced to dysphagia diet        Pulmonary edema- (present on admission)   Assessment & Plan     - Well diuresed, -1.5L since admission  - Decrease lasix for hypotension and monitor I&Os        Hyponatremia   Assessment & Plan    - Mild, stable        CLL (chronic lymphocytic leukemia) (CMS-HCC)- (present on admission)   Assessment & Plan    - This is chronic, outpt oncology followup once medically stable for discharge        Continuous opioid dependence (CMS-HCC)- (present on admission)   Assessment & Plan    - Controlled, avoid escalation        Macrocytic anemia- (present on admission)   Assessment & Plan    - Secondary to EtOH abuse with cirrhosis  - Continue folic acid and add B12  - Hgb stable in the 7s, monitor and transfuse if <7        Thrombocytopenia (CMS-HCC)- (present on admission)   Assessment & Plan    - Low but primarily stable  - Suspect direct bone marrow toxicity from EtOH abuse  - No active bleeding, monitor        Hypotension   Assessment & Plan    - Mild but persistent  - Have slowly decreased spironolactone and lasix for the last 2 days  - Will decrease again today        Diarrhea- (present on admission)   Assessment & Plan    - Resolved  - 10/11 stool study negative for Cdiff            Reviewed items::  Labs reviewed and Medications reviewed  Antibiotics:  Treating active infection/contamination beyond 24 hours perioperative coverage

## 2017-10-15 NOTE — CARE PLAN
"Problem: Pain Management  Goal: Pain level will decrease to patient's comfort goal  Outcome: PROGRESSING AS EXPECTED  Pt states \"Pain is being managed pretty well.\" Pt verbalizes pain regimen of oxy q 6 hrs. Will monitor.    Problem: Skin Integrity  Goal: Risk for impaired skin integrity will decrease  Outcome: PROGRESSING AS EXPECTED  Educated pt that he needs to sit in chair for meals and as much as he can tolerate due to skin break down. Pt verbalizes understanding and was up for breakfast and lunch. Will continue to encourage patient to get out of bed and to let us know if he has urge to use restroom. Pt alert and oriented x 4.      "

## 2017-10-16 LAB
ALBUMIN SERPL BCP-MCNC: 3.4 G/DL (ref 3.2–4.9)
ALBUMIN/GLOB SERPL: 0.7 G/DL
ALP SERPL-CCNC: 111 U/L (ref 30–99)
ALT SERPL-CCNC: 11 U/L (ref 2–50)
ANION GAP SERPL CALC-SCNC: 8 MMOL/L (ref 0–11.9)
AST SERPL-CCNC: 26 U/L (ref 12–45)
BILIRUB SERPL-MCNC: 0.5 MG/DL (ref 0.1–1.5)
BUN SERPL-MCNC: 17 MG/DL (ref 8–22)
CALCIUM SERPL-MCNC: 9.5 MG/DL (ref 8.5–10.5)
CHLORIDE SERPL-SCNC: 97 MMOL/L (ref 96–112)
CO2 SERPL-SCNC: 24 MMOL/L (ref 20–33)
CREAT SERPL-MCNC: 1.41 MG/DL (ref 0.5–1.4)
ERYTHROCYTE [DISTWIDTH] IN BLOOD BY AUTOMATED COUNT: 52.3 FL (ref 35.9–50)
GFR SERPL CREATININE-BSD FRML MDRD: 49 ML/MIN/1.73 M 2
GLOBULIN SER CALC-MCNC: 4.6 G/DL (ref 1.9–3.5)
GLUCOSE SERPL-MCNC: 75 MG/DL (ref 65–99)
HCT VFR BLD AUTO: 27.8 % (ref 42–52)
HGB BLD-MCNC: 9.4 G/DL (ref 14–18)
MCH RBC QN AUTO: 35.5 PG (ref 27–33)
MCHC RBC AUTO-ENTMCNC: 33.8 G/DL (ref 33.7–35.3)
MCV RBC AUTO: 104.9 FL (ref 81.4–97.8)
PLATELET # BLD AUTO: 118 K/UL (ref 164–446)
PMV BLD AUTO: 10.1 FL (ref 9–12.9)
POTASSIUM SERPL-SCNC: 4.6 MMOL/L (ref 3.6–5.5)
PROT SERPL-MCNC: 8 G/DL (ref 6–8.2)
RBC # BLD AUTO: 2.65 M/UL (ref 4.7–6.1)
SODIUM SERPL-SCNC: 129 MMOL/L (ref 135–145)
WBC # BLD AUTO: 8.4 K/UL (ref 4.8–10.8)

## 2017-10-16 PROCEDURE — 700111 HCHG RX REV CODE 636 W/ 250 OVERRIDE (IP): Performed by: INTERNAL MEDICINE

## 2017-10-16 PROCEDURE — 80053 COMPREHEN METABOLIC PANEL: CPT

## 2017-10-16 PROCEDURE — A9270 NON-COVERED ITEM OR SERVICE: HCPCS | Performed by: HOSPITALIST

## 2017-10-16 PROCEDURE — 85027 COMPLETE CBC AUTOMATED: CPT

## 2017-10-16 PROCEDURE — 700102 HCHG RX REV CODE 250 W/ 637 OVERRIDE(OP): Performed by: HOSPITALIST

## 2017-10-16 PROCEDURE — 770021 HCHG ROOM/CARE - ISO PRIVATE

## 2017-10-16 PROCEDURE — 700102 HCHG RX REV CODE 250 W/ 637 OVERRIDE(OP): Performed by: NURSE PRACTITIONER

## 2017-10-16 PROCEDURE — 36415 COLL VENOUS BLD VENIPUNCTURE: CPT

## 2017-10-16 PROCEDURE — A9270 NON-COVERED ITEM OR SERVICE: HCPCS | Performed by: NURSE PRACTITIONER

## 2017-10-16 PROCEDURE — 99232 SBSQ HOSP IP/OBS MODERATE 35: CPT | Performed by: HOSPITALIST

## 2017-10-16 RX ORDER — LANOLIN ALCOHOL/MO/W.PET/CERES
100 CREAM (GRAM) TOPICAL DAILY
Qty: 30 TAB
Start: 2017-10-16

## 2017-10-16 RX ORDER — OXYCODONE HYDROCHLORIDE 5 MG/1
5 TABLET ORAL EVERY 6 HOURS PRN
Qty: 30 TAB | Refills: 0
Start: 2017-10-16

## 2017-10-16 RX ORDER — FUROSEMIDE 20 MG/1
10 TABLET ORAL DAILY
Qty: 60 TAB
Start: 2017-10-16

## 2017-10-16 RX ORDER — NYSTATIN 100000 [USP'U]/G
POWDER TOPICAL
Qty: 15 G
Start: 2017-10-16 | End: 2017-10-19

## 2017-10-16 RX ORDER — DIGOXIN 125 MCG
125 TABLET ORAL DAILY
Qty: 30 TAB
Start: 2017-10-16

## 2017-10-16 RX ORDER — FOLIC ACID 1 MG/1
1 TABLET ORAL DAILY
Qty: 30 TAB
Start: 2017-10-16

## 2017-10-16 RX ORDER — M-VIT,TX,IRON,MINS/CALC/FOLIC 27MG-0.4MG
1 TABLET ORAL DAILY
Qty: 30 TAB | Refills: 11
Start: 2017-10-16

## 2017-10-16 RX ORDER — SPIRONOLACTONE 25 MG/1
25 TABLET ORAL DAILY
Qty: 30 TAB | Refills: 3
Start: 2017-10-16

## 2017-10-16 RX ADMIN — NYSTATIN: 100000 POWDER TOPICAL at 21:00

## 2017-10-16 RX ADMIN — DIGOXIN 125 MCG: 125 TABLET ORAL at 18:17

## 2017-10-16 RX ADMIN — TRAZODONE HYDROCHLORIDE 100 MG: 50 TABLET ORAL at 22:03

## 2017-10-16 RX ADMIN — OXYCODONE HYDROCHLORIDE 5 MG: 5 TABLET ORAL at 22:06

## 2017-10-16 RX ADMIN — DULOXETINE HYDROCHLORIDE 60 MG: 60 CAPSULE, DELAYED RELEASE ORAL at 08:05

## 2017-10-16 RX ADMIN — CYANOCOBALAMIN TAB 500 MCG 1000 MCG: 500 TAB at 08:05

## 2017-10-16 RX ADMIN — THIAMINE HCL TAB 100 MG 100 MG: 100 TAB at 08:05

## 2017-10-16 RX ADMIN — ENOXAPARIN SODIUM 40 MG: 100 INJECTION SUBCUTANEOUS at 08:05

## 2017-10-16 RX ADMIN — MULTIPLE VITAMINS W/ MINERALS TAB 1 TABLET: TAB at 08:05

## 2017-10-16 RX ADMIN — OXYCODONE HYDROCHLORIDE 5 MG: 5 TABLET ORAL at 04:05

## 2017-10-16 RX ADMIN — NYSTATIN 1500000 UNITS: 100000 POWDER TOPICAL at 08:06

## 2017-10-16 RX ADMIN — FUROSEMIDE 10 MG: 20 TABLET ORAL at 08:04

## 2017-10-16 RX ADMIN — LEVOTHYROXINE SODIUM 25 MCG: 50 TABLET ORAL at 06:28

## 2017-10-16 RX ADMIN — SPIRONOLACTONE 25 MG: 25 TABLET, FILM COATED ORAL at 08:04

## 2017-10-16 RX ADMIN — OXYCODONE HYDROCHLORIDE 5 MG: 5 TABLET ORAL at 09:59

## 2017-10-16 RX ADMIN — OXYCODONE HYDROCHLORIDE 5 MG: 5 TABLET ORAL at 16:12

## 2017-10-16 RX ADMIN — FOLIC ACID 1 MG: 1 TABLET ORAL at 08:05

## 2017-10-16 ASSESSMENT — PAIN SCALES - GENERAL
PAINLEVEL_OUTOF10: 5
PAINLEVEL_OUTOF10: 2
PAINLEVEL_OUTOF10: 6

## 2017-10-16 NOTE — CARE PLAN
Problem: Psychosocial Needs:  Goal: Level of anxiety will decrease    Intervention: Identify and develop with patient strategies to cope with anxiety triggers  Rn had 1:1 conversation with patient regarding pain management and life stressors, patient excited about transferring to rehab facility in near future.

## 2017-10-16 NOTE — PROGRESS NOTES
Patient with complaints of 6/10 LLE pain. Recived PRN meds per MAR. Will monitor for effect. Pt states having slept for some time throughout the night. Safety maintained.

## 2017-10-16 NOTE — PROGRESS NOTES
Patient A+O x4. Continues on contact precautions for MRSA in wound. LLE TAYLOR, moisturizer applied to BLE. Nystatin powder applied to scrotal, groin area, skin intact. Patient on RA denies SOB. Denies nausea. Complaints of LLE pain 6/10- received PRN per MAR. Will cont to monitor. Safety maintained

## 2017-10-16 NOTE — DISCHARGE SUMMARY
"CHIEF COMPLAINT ON ADMISSION  Chief Complaint   Patient presents with   • Leg Swelling     pt reports that he went to bed last night and woke up with a sore red leg. left leg red, tight, edematous. wound noted and ankle. pt reports that it has \"been there for years.\"        CODE STATUS  Full Code    HPI & HOSPITAL COURSE  This is a 72 y.o. male With a history of chronic lymphocytic leukemia, alcohol abuse with cirrhosis, retention, amongst other multiple medical issues who came in with complaints of left lower extremity pain, swelling, and redness. He apparently had a possible spider right several days prior to his presentation. Upon assessment in the ER, the patient was noted to have rather significant left lower extremity cellulitis with evidence of sepsis. He was started on empiric antibiotic therapy and our colleagues from infectious disease have followed along during this hospitalization. The patient's hospitalization has been prolonged and complicated by alcohol withdrawal which has now resolved. He was medically optimized from a medical standpoint for his cirrhosis. By the date of discharge, the patient had completed a full course of antibiotic therapy. Unfortunately because of the prolonged nature of his hospitalization, he has become quite debilitated and is unsafe for a direct discharge home. He has been followed by physical therapy during this hospitalization and they have recommended a inpatient skilled nursing facility for continued daily rehabilitation prior to discharge home. Social work has been involved and they have made arrangements for the patient to be transferred to [facility] skilled nursing facility. He is medically stable for transfer today.    Therefore, he is discharged in fair and stable condition with close outpatient follow-up.    SPECIFIC OUTPATIENT FOLLOW-UP  Primary care provider in one to 2 weeks after discharge from skilled nursing    DISCHARGE PROBLEM LIST  Principal Problem:    " Severe sepsis (CMS-HCC) POA: Yes  Active Problems:    Alcohol withdrawal (CMS-HCC) POA: Yes    Cirrhosis (CMS-HCC) POA: Yes      Overview: Irregular hepatic contour is noted. Hepatomegaly and diffuse low       attenuation changes of the liver are seen.      Changes of the liver suggests cirrhosis      Hepatitis panel negative.     Cellulitis POA: Yes    Acute respiratory failure (CMS-HCC) POA: Yes    Paroxysmal atrial fibrillation (CMS-HCC) POA: Yes    Thrombocytopenia (CMS-HCC) POA: Yes      Overview: Platelets 51      Likely due to cirrhosis      Platelet mapping : Low MA and 100% inhibition - will need platelet       transfusion with major surgery.      5/1 - Platelet transfusion pre and post op      Transfuse for PLT < 50      5/13 - stable trend    Macrocytic anemia POA: Yes    Continuous opioid dependence (CMS-HCC) (Chronic) POA: Yes    CLL (chronic lymphocytic leukemia) (CMS-HCC) (Chronic) POA: Yes    Hyponatremia POA: Unknown    Pulmonary edema POA: Yes    Dysphagia POA: Unknown    Diarrhea POA: Yes    Hypotension POA: Unknown  Resolved Problems:    * No resolved hospital problems. *      FOLLOW UP  Future Appointments  Date Time Provider Department Center   10/20/2017 11:20 AM NARCISO Guerrier None     No follow-up provider specified.    MEDICATIONS ON DISCHARGE   Rohit Snow   Home Medication Instructions RODERICK:[id number]    Printed on:10/16/17 0989   Medication Information                      cyanocobalamin (VITAMIN B12) 1000 MCG Tab  Take 1 Tab by mouth every day.             digoxin (LANOXIN) 125 MCG Tab  Take 1 Tab by mouth every day at 6 PM.             duloxetine (CYMBALTA) 30 MG Cap DR Particles  Take 60 mg by mouth every day.             folic acid (FOLVITE) 1 MG Tab  Take 1 Tab by mouth every day.             furosemide (LASIX) 20 MG Tab  Take 0.5 Tabs by mouth every day.             levothyroxine (SYNTHROID) 25 MCG Tab  Take 25 mcg by mouth Every morning on an empty stomach.              nystatin (MYCOSTATIN) powder  Apply to groin area             oxycodone immediate-release (ROXICODONE) 5 MG Tab  Take 1 Tab by mouth every 6 hours as needed.             spironolactone (ALDACTONE) 25 MG Tab  Take 1 Tab by mouth every day.             therapeutic multivitamin-minerals (THERAGRAN-M) Tab  Take 1 Tab by mouth every day.             thiamine (THIAMINE) 100 MG tablet  Take 1 Tab by mouth every day.             trazodone (DESYREL) 100 MG Tab  Take 100 mg by mouth every evening.                 DIET  Orders Placed This Encounter   Procedures   • DIET ORDER     Standing Status:   Standing     Number of Occurrences:   1     Order Specific Question:   Diet:     Answer:   Regular [1]     Order Specific Question:   Texture/Fiber modifications:     Answer:   Dysphagia 2(Pureed/Chopped)specify fluid consistency(question 6) [2]     Order Specific Question:   Consistency/Fluid modifications:     Answer:   Thin Liquids [3]     Order Specific Question:   Miscellaneous modifications:     Answer:   SLP - 1:1 Supervision by Nursing [21]     Order Specific Question:   Miscellaneous modifications:     Answer:   SLP - Deliver to Nursing Station [22]       ACTIVITY  As tolerated and directed by skilled nursing.  Weight bearing as tolerated      CONSULTATIONS  Infectious disease, pulmonary medicine    PROCEDURES  None    LABORATORY  Lab Results   Component Value Date/Time    SODIUM 129 (L) 10/16/2017 04:01 AM    POTASSIUM 4.6 10/16/2017 04:01 AM    CHLORIDE 97 10/16/2017 04:01 AM    CO2 24 10/16/2017 04:01 AM    GLUCOSE 75 10/16/2017 04:01 AM    BUN 17 10/16/2017 04:01 AM    CREATININE 1.41 (H) 10/16/2017 04:01 AM        Lab Results   Component Value Date/Time    WBC 8.4 10/16/2017 04:01 AM    HEMOGLOBIN 9.4 (L) 10/16/2017 04:01 AM    HEMATOCRIT 27.8 (L) 10/16/2017 04:01 AM    PLATELETCT 118 (L) 10/16/2017 04:01 AM        Total time of the discharge process exceeds 36 minutes

## 2017-10-17 LAB
ALBUMIN SERPL BCP-MCNC: 3.4 G/DL (ref 3.2–4.9)
ALBUMIN/GLOB SERPL: 0.8 G/DL
ALP SERPL-CCNC: 113 U/L (ref 30–99)
ALT SERPL-CCNC: 14 U/L (ref 2–50)
ANION GAP SERPL CALC-SCNC: 9 MMOL/L (ref 0–11.9)
AST SERPL-CCNC: 25 U/L (ref 12–45)
BILIRUB SERPL-MCNC: 0.5 MG/DL (ref 0.1–1.5)
BUN SERPL-MCNC: 17 MG/DL (ref 8–22)
CALCIUM SERPL-MCNC: 9.3 MG/DL (ref 8.5–10.5)
CHLORIDE SERPL-SCNC: 98 MMOL/L (ref 96–112)
CO2 SERPL-SCNC: 22 MMOL/L (ref 20–33)
CREAT SERPL-MCNC: 1.33 MG/DL (ref 0.5–1.4)
GFR SERPL CREATININE-BSD FRML MDRD: 53 ML/MIN/1.73 M 2
GLOBULIN SER CALC-MCNC: 4.3 G/DL (ref 1.9–3.5)
GLUCOSE SERPL-MCNC: 89 MG/DL (ref 65–99)
POTASSIUM SERPL-SCNC: 4.1 MMOL/L (ref 3.6–5.5)
PROT SERPL-MCNC: 7.7 G/DL (ref 6–8.2)
SODIUM SERPL-SCNC: 129 MMOL/L (ref 135–145)

## 2017-10-17 PROCEDURE — 97530 THERAPEUTIC ACTIVITIES: CPT

## 2017-10-17 PROCEDURE — 97116 GAIT TRAINING THERAPY: CPT

## 2017-10-17 PROCEDURE — 700102 HCHG RX REV CODE 250 W/ 637 OVERRIDE(OP): Performed by: HOSPITALIST

## 2017-10-17 PROCEDURE — A9270 NON-COVERED ITEM OR SERVICE: HCPCS | Performed by: HOSPITALIST

## 2017-10-17 PROCEDURE — 770021 HCHG ROOM/CARE - ISO PRIVATE

## 2017-10-17 PROCEDURE — 700102 HCHG RX REV CODE 250 W/ 637 OVERRIDE(OP): Performed by: NURSE PRACTITIONER

## 2017-10-17 PROCEDURE — 97535 SELF CARE MNGMENT TRAINING: CPT

## 2017-10-17 PROCEDURE — 700111 HCHG RX REV CODE 636 W/ 250 OVERRIDE (IP): Performed by: INTERNAL MEDICINE

## 2017-10-17 PROCEDURE — 80053 COMPREHEN METABOLIC PANEL: CPT

## 2017-10-17 PROCEDURE — A9270 NON-COVERED ITEM OR SERVICE: HCPCS | Performed by: NURSE PRACTITIONER

## 2017-10-17 PROCEDURE — 36415 COLL VENOUS BLD VENIPUNCTURE: CPT

## 2017-10-17 PROCEDURE — 99232 SBSQ HOSP IP/OBS MODERATE 35: CPT | Performed by: HOSPITALIST

## 2017-10-17 RX ORDER — GABAPENTIN 100 MG/1
100 CAPSULE ORAL 2 TIMES DAILY
Status: DISCONTINUED | OUTPATIENT
Start: 2017-10-17 | End: 2017-10-19 | Stop reason: HOSPADM

## 2017-10-17 RX ADMIN — THIAMINE HCL TAB 100 MG 100 MG: 100 TAB at 08:48

## 2017-10-17 RX ADMIN — OXYCODONE HYDROCHLORIDE 5 MG: 5 TABLET ORAL at 04:14

## 2017-10-17 RX ADMIN — FUROSEMIDE 10 MG: 20 TABLET ORAL at 08:48

## 2017-10-17 RX ADMIN — SPIRONOLACTONE 25 MG: 25 TABLET, FILM COATED ORAL at 08:50

## 2017-10-17 RX ADMIN — DULOXETINE HYDROCHLORIDE 60 MG: 60 CAPSULE, DELAYED RELEASE ORAL at 08:48

## 2017-10-17 RX ADMIN — ENOXAPARIN SODIUM 40 MG: 100 INJECTION SUBCUTANEOUS at 08:49

## 2017-10-17 RX ADMIN — GABAPENTIN 100 MG: 100 CAPSULE ORAL at 22:13

## 2017-10-17 RX ADMIN — DIGOXIN 125 MCG: 125 TABLET ORAL at 17:40

## 2017-10-17 RX ADMIN — NYSTATIN 1500000 UNITS: 100000 POWDER TOPICAL at 22:13

## 2017-10-17 RX ADMIN — NYSTATIN 1500000 UNITS: 100000 POWDER TOPICAL at 08:50

## 2017-10-17 RX ADMIN — OXYCODONE HYDROCHLORIDE 5 MG: 5 TABLET ORAL at 10:50

## 2017-10-17 RX ADMIN — FOLIC ACID 1 MG: 1 TABLET ORAL at 08:48

## 2017-10-17 RX ADMIN — CYANOCOBALAMIN TAB 500 MCG 1000 MCG: 500 TAB at 08:48

## 2017-10-17 RX ADMIN — MULTIPLE VITAMINS W/ MINERALS TAB 1 TABLET: TAB at 08:48

## 2017-10-17 RX ADMIN — LEVOTHYROXINE SODIUM 25 MCG: 50 TABLET ORAL at 04:13

## 2017-10-17 RX ADMIN — OXYCODONE HYDROCHLORIDE 5 MG: 5 TABLET ORAL at 16:41

## 2017-10-17 RX ADMIN — TRAZODONE HYDROCHLORIDE 100 MG: 50 TABLET ORAL at 22:13

## 2017-10-17 ASSESSMENT — PAIN SCALES - GENERAL
PAINLEVEL_OUTOF10: 5
PAINLEVEL_OUTOF10: 9
PAINLEVEL_OUTOF10: 8
PAINLEVEL_OUTOF10: 7
PAINLEVEL_OUTOF10: 4
PAINLEVEL_OUTOF10: 7

## 2017-10-17 ASSESSMENT — COGNITIVE AND FUNCTIONAL STATUS - GENERAL
DAILY ACTIVITIY SCORE: 19
TOILETING: A LITTLE
EATING MEALS: A LITTLE
PERSONAL GROOMING: A LITTLE
WALKING IN HOSPITAL ROOM: A LITTLE
CLIMB 3 TO 5 STEPS WITH RAILING: A LOT
SUGGESTED CMS G CODE MODIFIER DAILY ACTIVITY: CK
SUGGESTED CMS G CODE MODIFIER MOBILITY: CK
DRESSING REGULAR UPPER BODY CLOTHING: A LITTLE
STANDING UP FROM CHAIR USING ARMS: A LITTLE
MOVING TO AND FROM BED TO CHAIR: A LITTLE
MOBILITY SCORE: 16
HELP NEEDED FOR BATHING: A LITTLE
MOVING FROM LYING ON BACK TO SITTING ON SIDE OF FLAT BED: UNABLE

## 2017-10-17 ASSESSMENT — ENCOUNTER SYMPTOMS
MYALGIAS: 1
COUGH: 0
FEVER: 0
DIZZINESS: 0
FOCAL WEAKNESS: 0
HEADACHES: 0
NAUSEA: 0

## 2017-10-17 ASSESSMENT — GAIT ASSESSMENTS
GAIT LEVEL OF ASSIST: CONTACT GUARD ASSIST
DISTANCE (FEET): 50
DEVIATION: ANTALGIC;STEP TO
ASSISTIVE DEVICE: FRONT WHEEL WALKER

## 2017-10-17 NOTE — DISCHARGE PLANNING
Care Transition Team Discharge Planning                   Discharge Plan:  Requested [facility] f/u with [facility] to inquire about insurance authorization status.     Needs:   Pt has been accepted to [facility] pending insurance authorization.

## 2017-10-17 NOTE — PROGRESS NOTES
Received report, assumed care at 0700. Patient is alert and oriented x 4, complaining of pain to left LE- medicated as needed, up to chair for about 1 hour, tolerated well. No new complaint.

## 2017-10-17 NOTE — DISCHARGE PLANNING
Care Transition Team Discharge Planning                   Discharge Plan:  Spoke to Shanae with [facility] and they are requesting updated PT notes to provide to pt's insurance. Called PT department [phone] and requested an updated PT eval.     Needs:   Once updated PT eval done, will need to fax to Shanae with [facility] [phone]#[phone] so they can provide to pt's insurance.   normal...

## 2017-10-17 NOTE — PROGRESS NOTES
Patient remains A+O x4. Cooperative with care. Remains on contact precautions due to MRSA. Complaints of LLE pain and generalized pain 6/10 recv'd PRN medication per MAR. Will monitor for effect. BLE moisturized, LLE skin intact, discolored and dry. 1+ edema noted to LLE. Nystatin powder applied to scortum/groin area, skin reddened and intact. Patient on RA Denies SOB. Denies nausea/abd pain. Safety maintained.

## 2017-10-17 NOTE — THERAPY
"Occupational Therapy Treatment completed with focus on ADLs, ADL transfers and patient education.  Functional Status:  Pt seen for OT tx today.  Pt was pleasant and cooperative throughout the session.  Continues to be limited by pain, weakness, endurance, and self care.  Tx focus was on ADL routine and transfers.  Pt completed supine to sit with SBA and extra time, sit to stand with CGA, room ambulation using no device despite education from bed to sink with CGA.  Pt states pain is 9/10 throughout as pain pill is due. Pt completed LB dressing with CGA and seated gr/hy with SBA.  Needing no cues to initiate, follow through, and problem solve during ADLs however needs extra time due to pain.  Pt would benefit from continued inpt OT to increase independence with functional transfers, functional endurance, and ADLs.  Plan of Care: Will benefit from Occupational Therapy 3 times per week  Discharge Recommendations:  Equipment Will Continue to Assess for Equipment Needs. Post-acute therapy Discharge to a transitional care facility for continued skilled therapy services.    See \"Rehab Therapy-Acute\" Patient Summary Report for complete documentation.   "

## 2017-10-17 NOTE — CARE PLAN
Problem: Mobility  Goal: Risk for activity intolerance will decrease    Intervention: Assess and monitor signs of activity intolerance  Up to chair with stand by assist. Still complains of pain to LLE.       Problem: Urinary Elimination:  Goal: Ability to reestablish a normal urinary elimination pattern will improve    Intervention: Evaluate need to continue indwelling urinary catheter  No urinary problem at this time. Uses urinal.

## 2017-10-17 NOTE — DISCHARGE PLANNING
Spoke to Zuleyma at [facility], they are still working on the auth. She will call us back for status today.

## 2017-10-18 LAB — SODIUM SERPL-SCNC: 131 MMOL/L (ref 135–145)

## 2017-10-18 PROCEDURE — 99232 SBSQ HOSP IP/OBS MODERATE 35: CPT | Performed by: HOSPITALIST

## 2017-10-18 PROCEDURE — A9270 NON-COVERED ITEM OR SERVICE: HCPCS | Performed by: HOSPITALIST

## 2017-10-18 PROCEDURE — 700102 HCHG RX REV CODE 250 W/ 637 OVERRIDE(OP): Performed by: HOSPITALIST

## 2017-10-18 PROCEDURE — 92526 ORAL FUNCTION THERAPY: CPT

## 2017-10-18 PROCEDURE — 36415 COLL VENOUS BLD VENIPUNCTURE: CPT

## 2017-10-18 PROCEDURE — 770021 HCHG ROOM/CARE - ISO PRIVATE

## 2017-10-18 PROCEDURE — 700111 HCHG RX REV CODE 636 W/ 250 OVERRIDE (IP): Performed by: INTERNAL MEDICINE

## 2017-10-18 PROCEDURE — A9270 NON-COVERED ITEM OR SERVICE: HCPCS | Performed by: NURSE PRACTITIONER

## 2017-10-18 PROCEDURE — 700102 HCHG RX REV CODE 250 W/ 637 OVERRIDE(OP): Performed by: NURSE PRACTITIONER

## 2017-10-18 PROCEDURE — 93971 EXTREMITY STUDY: CPT

## 2017-10-18 PROCEDURE — 84295 ASSAY OF SERUM SODIUM: CPT

## 2017-10-18 RX ADMIN — LEVOTHYROXINE SODIUM 25 MCG: 50 TABLET ORAL at 05:51

## 2017-10-18 RX ADMIN — CYANOCOBALAMIN TAB 500 MCG 1000 MCG: 500 TAB at 09:29

## 2017-10-18 RX ADMIN — GABAPENTIN 100 MG: 100 CAPSULE ORAL at 19:40

## 2017-10-18 RX ADMIN — OXYCODONE HYDROCHLORIDE 5 MG: 5 TABLET ORAL at 12:03

## 2017-10-18 RX ADMIN — MULTIPLE VITAMINS W/ MINERALS TAB 1 TABLET: TAB at 09:30

## 2017-10-18 RX ADMIN — TRAZODONE HYDROCHLORIDE 100 MG: 50 TABLET ORAL at 19:40

## 2017-10-18 RX ADMIN — OXYCODONE HYDROCHLORIDE 5 MG: 5 TABLET ORAL at 05:51

## 2017-10-18 RX ADMIN — FOLIC ACID 1 MG: 1 TABLET ORAL at 09:30

## 2017-10-18 RX ADMIN — NYSTATIN 1500000 UNITS: 100000 POWDER TOPICAL at 19:40

## 2017-10-18 RX ADMIN — GABAPENTIN 100 MG: 100 CAPSULE ORAL at 09:30

## 2017-10-18 RX ADMIN — OXYCODONE HYDROCHLORIDE 5 MG: 5 TABLET ORAL at 18:20

## 2017-10-18 RX ADMIN — SPIRONOLACTONE 25 MG: 25 TABLET, FILM COATED ORAL at 09:30

## 2017-10-18 RX ADMIN — DIGOXIN 125 MCG: 125 TABLET ORAL at 19:09

## 2017-10-18 RX ADMIN — THIAMINE HCL TAB 100 MG 100 MG: 100 TAB at 09:00

## 2017-10-18 RX ADMIN — FUROSEMIDE 10 MG: 20 TABLET ORAL at 09:29

## 2017-10-18 RX ADMIN — NYSTATIN: 100000 POWDER TOPICAL at 09:00

## 2017-10-18 RX ADMIN — ENOXAPARIN SODIUM 40 MG: 100 INJECTION SUBCUTANEOUS at 09:29

## 2017-10-18 RX ADMIN — DULOXETINE HYDROCHLORIDE 60 MG: 60 CAPSULE, DELAYED RELEASE ORAL at 09:29

## 2017-10-18 ASSESSMENT — ENCOUNTER SYMPTOMS
DIZZINESS: 0
NAUSEA: 0
COUGH: 0
FOCAL WEAKNESS: 0
HEADACHES: 0
FEVER: 0

## 2017-10-18 ASSESSMENT — PAIN SCALES - GENERAL
PAINLEVEL_OUTOF10: 8
PAINLEVEL_OUTOF10: 4
PAINLEVEL_OUTOF10: 8

## 2017-10-18 NOTE — PROGRESS NOTES
[facility] Hospitalist Progress Note    Date of Service: 10/17/2017    Chief Complaint  72 y.o. male presented on 2017 with left leg swelling in the setting of a spider bite. Upon assessment in the ER, he was noted to have sepsis with cellulitis of the left leg. His hospital course has been prolonged and was complicated by alcohol withdrawal which has now resolved.    Interval Problem Update  C/o legs pain, no fever or chills, no dizziness. Waiting for insurance approval for snf.     Consultants/Specialty  Infectious disease    Disposition  Accepted at [facility]        Review of Systems   Constitutional: Negative for fever.   Respiratory: Negative for cough.    Cardiovascular: Negative for chest pain.   Gastrointestinal: Negative for nausea.   Musculoskeletal: Positive for myalgias (leg pain. ).   Neurological: Negative for dizziness, focal weakness and headaches.      Physical Exam  Laboratory/Imaging   Hemodynamics  Temp (24hrs), Av.5 °C (97.7 °F), Min:36.3 °C (97.3 °F), Max:36.6 °C (97.8 °F)   Temperature: 36.6 °C (97.8 °F)  Pulse  Av.1  Min: 60  Max: 188    Blood Pressure : 105/54     Respiratory      Respiration: 16, Pulse Oximetry: 98 %        RUL Breath Sounds: Clear, RML Breath Sounds: Clear, RLL Breath Sounds: Clear, MONICA Breath Sounds: Clear, LLL Breath Sounds: Clear    Fluids    Intake/Output Summary (Last 24 hours) at 10/17/17 1723  Last data filed at 10/17/17 1020   Gross per 24 hour   Intake              360 ml   Output              300 ml   Net               60 ml       Nutrition  Orders Placed This Encounter   Procedures   • DIET ORDER     Standing Status:   Standing     Number of Occurrences:   1     Order Specific Question:   Diet:     Answer:   Regular [1]     Order Specific Question:   Texture/Fiber modifications:     Answer:   Dysphagia 2(Pureed/Chopped)specify fluid consistency(question 6) [2]     Order Specific Question:   Consistency/Fluid modifications:     Answer:   Thin Liquids  [3]     Order Specific Question:   Miscellaneous modifications:     Answer:   SLP - 1:1 Supervision by Nursing [21]     Order Specific Question:   Miscellaneous modifications:     Answer:   SLP - Deliver to Nursing Station [22]     Physical Exam   Constitutional: He is oriented to person, place, and time. No distress.   Chronically ill appearing, poorly nourished   HENT:   Head: Normocephalic.   Mouth/Throat: No oropharyngeal exudate.   Eyes: EOM are normal. No scleral icterus.   Neck: Neck supple. No JVD present.   Cardiovascular: Normal rate, regular rhythm and normal heart sounds.    Pulmonary/Chest: Effort normal and breath sounds normal. No respiratory distress.   Abdominal: Soft. Bowel sounds are normal. He exhibits no distension. There is no tenderness.   Musculoskeletal: He exhibits no edema.   Neurological: He is alert and oriented to person, place, and time. No cranial nerve deficit.   Skin: Skin is dry. There is erythema.   Psychiatric: He has a normal mood and affect.   Nursing note and vitals reviewed.      Recent Labs      10/16/17   0401   WBC  8.4   RBC  2.65*   HEMOGLOBIN  9.4*   HEMATOCRIT  27.8*   MCV  104.9*   MCH  35.5*   MCHC  33.8   RDW  52.3*   PLATELETCT  118*   MPV  10.1     Recent Labs      10/15/17   0430  10/16/17   0401  10/17/17   0419   SODIUM  129*  129*  129*   POTASSIUM  4.4  4.6  4.1   CHLORIDE  98  97  98   CO2  23  24  22   GLUCOSE  76  75  89   BUN  17 17  17   CREATININE  1.39  1.41*  1.33   CALCIUM  9.0  9.5  9.3                      Assessment/Plan     * Severe sepsis (CMS-[facility])- (present on admission)   Assessment & Plan    Resolved. Due to cellulitis.         Paroxysmal atrial fibrillation (CMS-[facility])- (present on admission)   Assessment & Plan    Rate controlled, on digoxin, poor candidate for anticoagulation given EtOH abuse and cirrhosis        Acute respiratory failure (CMS-[facility])- (present on admission)   Assessment & Plan    Resolved. No sob.         Cellulitis- (present  on admission)   Assessment & Plan    Wound culture positive for GAS and MRSA  completed Bactrim, Clindamycin on 10/15          Cirrhosis (CMS-HCC)- (present on admission)   Assessment & Plan    Stable        Alcohol withdrawal (CMS-HCC)- (present on admission)   Assessment & Plan    - Resolved, alert and oriented.         Dysphagia   Assessment & Plan    Speech following.         Pulmonary edema- (present on admission)   Assessment & Plan    Resolved. On RA.         Hyponatremia   Assessment & Plan    - Mild, stable.         CLL (chronic lymphocytic leukemia) (CMS-HCC)- (present on admission)   Assessment & Plan    - This is chronic, outpt oncology followup once medically stable for discharge        Continuous opioid dependence (CMS-HCC)- (present on admission)   Assessment & Plan    Stable, will add gabapentin for pain.         Macrocytic anemia- (present on admission)   Assessment & Plan    Probably due to EtOH abuse with cirrhosis  On folic acid and add B12  Stable.        Thrombocytopenia (CMS-HCC)- (present on admission)   Assessment & Plan    Due to alcohol abuse. No bleeding.         Hypotension   Assessment & Plan    Stable, denies any dizziness. Low dose diuresis. Probably due to liver disease.         Diarrhea- (present on admission)   Assessment & Plan    - Resolved              Reviewed items::  Labs reviewed, Medications reviewed, Radiology images reviewed and EKG reviewed  DVT prophylaxis pharmacological::  Enoxaparin (Lovenox)

## 2017-10-18 NOTE — PROGRESS NOTES
Pt AxOx4. Pain controlled with reposition and comfort measures. Nystatin applied to groin and scrotal area, intact, reddened and flaky. Moisturizer applied to BLE, LLE TAYLOR.  Contact precautions for MRSA. Bed alarm in place. Will continue to monitor.

## 2017-10-18 NOTE — PROGRESS NOTES
Noted increased redness and tenderness to posterior left leg. Reported this to Dr. Cespedes- DVT US has been ordered.

## 2017-10-18 NOTE — CARE PLAN
Problem: Safety  Goal: Will remain free from injury  Outcome: PROGRESSING AS EXPECTED  Bed position low, call light within reach, treaded footwear, fall risk education      Problem: Infection  Goal: Will remain free from infection  Outcome: PROGRESSING AS EXPECTED  Hand hygiene and infection prevention education

## 2017-10-18 NOTE — THERAPY
"Physical Therapy Treatment completed.   Bed Mobility:  Supine to Sit: Contact Guard Assist  Transfers: Sit to Stand: Contact Guard Assist  Gait: Level Of Assist: Contact Guard Assist with Front-Wheel Walker       Plan of Care: Will benefit from Physical Therapy 3 times per week  Discharge Recommendations: Equipment: Will Continue to Assess for Equipment Needs. Post-acute therapy Discharge to a transitional care facility for continued skilled therapy services.     See \"Rehab Therapy-Acute\" Patient Summary Report for complete documentation.       "

## 2017-10-18 NOTE — CARE PLAN
Problem: Communication  Goal: The ability to communicate needs accurately and effectively will improve  Outcome: PROGRESSING AS EXPECTED  Effectively communicates needs.     Problem: Safety  Goal: Will remain free from injury  Outcome: PROGRESSING AS EXPECTED  Bed alarm in place, treaded socks, call bell within reach

## 2017-10-19 VITALS
DIASTOLIC BLOOD PRESSURE: 57 MMHG | WEIGHT: 149.91 LBS | SYSTOLIC BLOOD PRESSURE: 99 MMHG | HEIGHT: 65 IN | RESPIRATION RATE: 16 BRPM | HEART RATE: 67 BPM | OXYGEN SATURATION: 97 % | BODY MASS INDEX: 24.98 KG/M2 | TEMPERATURE: 97.6 F

## 2017-10-19 PROBLEM — J81.1 PULMONARY EDEMA: Status: RESOLVED | Noted: 2017-10-09 | Resolved: 2017-10-19

## 2017-10-19 PROBLEM — J96.00 ACUTE RESPIRATORY FAILURE (HCC): Status: RESOLVED | Noted: 2017-10-04 | Resolved: 2017-10-19

## 2017-10-19 PROBLEM — R13.10 DYSPHAGIA: Status: RESOLVED | Noted: 2017-10-09 | Resolved: 2017-10-19

## 2017-10-19 PROBLEM — L03.90 CELLULITIS: Status: RESOLVED | Noted: 2017-09-28 | Resolved: 2017-10-19

## 2017-10-19 PROCEDURE — 99239 HOSP IP/OBS DSCHRG MGMT >30: CPT | Performed by: HOSPITALIST

## 2017-10-19 PROCEDURE — 97535 SELF CARE MNGMENT TRAINING: CPT

## 2017-10-19 PROCEDURE — 700102 HCHG RX REV CODE 250 W/ 637 OVERRIDE(OP): Performed by: HOSPITALIST

## 2017-10-19 PROCEDURE — A9270 NON-COVERED ITEM OR SERVICE: HCPCS | Performed by: HOSPITALIST

## 2017-10-19 PROCEDURE — 700102 HCHG RX REV CODE 250 W/ 637 OVERRIDE(OP): Performed by: NURSE PRACTITIONER

## 2017-10-19 PROCEDURE — 97530 THERAPEUTIC ACTIVITIES: CPT

## 2017-10-19 PROCEDURE — A9270 NON-COVERED ITEM OR SERVICE: HCPCS | Performed by: NURSE PRACTITIONER

## 2017-10-19 PROCEDURE — 700111 HCHG RX REV CODE 636 W/ 250 OVERRIDE (IP): Performed by: INTERNAL MEDICINE

## 2017-10-19 PROCEDURE — 97116 GAIT TRAINING THERAPY: CPT

## 2017-10-19 RX ORDER — GABAPENTIN 100 MG/1
100 CAPSULE ORAL 2 TIMES DAILY
Qty: 90 CAP
Start: 2017-10-19 | End: 2021-12-16

## 2017-10-19 RX ADMIN — CYANOCOBALAMIN TAB 500 MCG 1000 MCG: 500 TAB at 08:00

## 2017-10-19 RX ADMIN — SPIRONOLACTONE 25 MG: 25 TABLET, FILM COATED ORAL at 08:00

## 2017-10-19 RX ADMIN — FOLIC ACID 1 MG: 1 TABLET ORAL at 07:59

## 2017-10-19 RX ADMIN — ENOXAPARIN SODIUM 40 MG: 100 INJECTION SUBCUTANEOUS at 07:59

## 2017-10-19 RX ADMIN — NYSTATIN 1500000 UNITS: 100000 POWDER TOPICAL at 07:57

## 2017-10-19 RX ADMIN — MULTIPLE VITAMINS W/ MINERALS TAB 1 TABLET: TAB at 07:59

## 2017-10-19 RX ADMIN — OXYCODONE HYDROCHLORIDE 5 MG: 5 TABLET ORAL at 07:59

## 2017-10-19 RX ADMIN — THIAMINE HCL TAB 100 MG 100 MG: 100 TAB at 08:01

## 2017-10-19 RX ADMIN — LEVOTHYROXINE SODIUM 25 MCG: 50 TABLET ORAL at 05:04

## 2017-10-19 RX ADMIN — DULOXETINE HYDROCHLORIDE 60 MG: 60 CAPSULE, DELAYED RELEASE ORAL at 08:00

## 2017-10-19 RX ADMIN — GABAPENTIN 100 MG: 100 CAPSULE ORAL at 07:59

## 2017-10-19 ASSESSMENT — COGNITIVE AND FUNCTIONAL STATUS - GENERAL
TOILETING: A LITTLE
STANDING UP FROM CHAIR USING ARMS: A LITTLE
CLIMB 3 TO 5 STEPS WITH RAILING: A LOT
MOVING FROM LYING ON BACK TO SITTING ON SIDE OF FLAT BED: UNABLE
WALKING IN HOSPITAL ROOM: A LITTLE
DAILY ACTIVITIY SCORE: 21
MOBILITY SCORE: 16
PERSONAL GROOMING: A LITTLE
SUGGESTED CMS G CODE MODIFIER MOBILITY: CK
HELP NEEDED FOR BATHING: A LITTLE
SUGGESTED CMS G CODE MODIFIER DAILY ACTIVITY: CJ
MOVING TO AND FROM BED TO CHAIR: A LITTLE

## 2017-10-19 ASSESSMENT — GAIT ASSESSMENTS
ASSISTIVE DEVICE: 4 WHEEL WALKER
DISTANCE (FEET): 50
GAIT LEVEL OF ASSIST: CONTACT GUARD ASSIST

## 2017-10-19 ASSESSMENT — PAIN SCALES - GENERAL
PAINLEVEL_OUTOF10: 4
PAINLEVEL_OUTOF10: 8

## 2017-10-19 NOTE — PROGRESS NOTES
Patient AO x 4 and pleasant, reports being tired from walking the maloney today. Pain currently well controlled. No other concerns at this time. Hourly rounding in place.

## 2017-10-19 NOTE — DISCHARGE SUMMARY
Medical Social Work  Late entry from 10/18.  PC from [facility] has approved placement.    10/19   Informed Dr. Buck patient has been accepted.  Faxed transport communication form to [facility] for a tentative transport time of 3:00 to [facility].    Informed Charge and patient's nurse of transport time.

## 2017-10-19 NOTE — THERAPY
"Occupational Therapy Treatment completed with focus on ADLs, ADL transfers and patient education.  Functional Status:  Pt seen for OT tx today.  Pt was pleasant and cooperative throughout the session.  Continues to be limited by pain, endurance, and self care.  Tx focus was on transfers and ADLs routine.  Pt completed sit to supine mod I, sit to stand with SBA, room ambulation using 4WW from bed to household distance with CGA.  Needing no cues to initiate, follow through, and problem solve.  Pt was found in the maloney ambulating with staff.  During tx Tuesday pt was only able to ambulate from bed to sink and back.  Once back to room pt declined further ADLs due to pain in L LEand fatigue.  Pt educated on home safety and discussed continued deficits.  Pt continues to agree with going to rehab as he feels he doesn't have the endurance, strength, and independence to follow through with daily routine at home as he is alone.  He did state has help with transportation and all needed AE once home.  Pt would benefit from continued inpt OT to increase independence with functional transfers, functional endurance, and ADLs.  Plan of Care: Will benefit from Occupational Therapy 3 times per week  Discharge Recommendations:  Equipment Will Continue to Assess for Equipment Needs. Post-acute therapy Discharge to a transitional care facility for continued skilled therapy services.      See \"Rehab Therapy-Acute\" Patient Summary Report for complete documentation.   "

## 2017-10-19 NOTE — PROGRESS NOTES
[facility] Hospitalist Progress Note    Date of Service: 10/18/2017    Chief Complaint  72 y.o. male presented on 2017 with left leg swelling in the setting of a spider bite. Upon assessment in the ER, he was noted to have sepsis with cellulitis of the left leg. His hospital course has been prolonged and was complicated by alcohol withdrawal which has now resolved.    Interval Problem Update  Pain is better, no new complains.     Consultants/Specialty  Infectious disease    Disposition  Accepted at [facility]        Review of Systems   Constitutional: Negative for fever.   Respiratory: Negative for cough.    Cardiovascular: Negative for chest pain.   Gastrointestinal: Negative for nausea.   Neurological: Negative for dizziness, focal weakness and headaches.      Physical Exam  Laboratory/Imaging   Hemodynamics  Temp (24hrs), Av.2 °C (97.2 °F), Min:36.1 °C (97 °F), Max:36.3 °C (97.4 °F)   Temperature: 36.2 °C (97.2 °F)  Pulse  Av  Min: 60  Max: 188    Blood Pressure : 123/59     Respiratory      Respiration: 14, Pulse Oximetry: 98 %        RUL Breath Sounds: Clear, RML Breath Sounds: Clear, RLL Breath Sounds: Clear, MONICA Breath Sounds: Clear, LLL Breath Sounds: Clear    Fluids    Intake/Output Summary (Last 24 hours) at 10/18/17 1704  Last data filed at 10/18/17 1500   Gross per 24 hour   Intake              600 ml   Output              500 ml   Net              100 ml       Nutrition  Orders Placed This Encounter   Procedures   • DIET ORDER     Standing Status:   Standing     Number of Occurrences:   1     Order Specific Question:   Diet:     Answer:   Regular [1]     Order Specific Question:   Texture/Fiber modifications:     Answer:   Dysphagia 2(Pureed/Chopped)specify fluid consistency(question 6) [2]     Order Specific Question:   Consistency/Fluid modifications:     Answer:   Thin Liquids [3]     Order Specific Question:   Miscellaneous modifications:     Answer:   SLP - 1:1 Supervision by Nursing [21]      Order Specific Question:   Miscellaneous modifications:     Answer:   SLP - Deliver to Nursing Station [22]     Physical Exam   Constitutional: He is oriented to person, place, and time. No distress.   Chronically ill appearing, poorly nourished   HENT:   Head: Normocephalic.   Mouth/Throat: No oropharyngeal exudate.   Eyes: EOM are normal. No scleral icterus.   Neck: Neck supple. No JVD present.   Cardiovascular: Normal rate, regular rhythm and normal heart sounds.    Pulmonary/Chest: Effort normal and breath sounds normal. No respiratory distress.   Abdominal: Soft. Bowel sounds are normal. He exhibits no distension. There is no tenderness.   Musculoskeletal: He exhibits no edema.   Neurological: He is alert and oriented to person, place, and time. No cranial nerve deficit.   Skin: Skin is dry. There is erythema (improved. ).   Psychiatric: He has a normal mood and affect.   Nursing note and vitals reviewed.      Recent Labs      10/16/17   0401   WBC  8.4   RBC  2.65*   HEMOGLOBIN  9.4*   HEMATOCRIT  27.8*   MCV  104.9*   MCH  35.5*   MCHC  33.8   RDW  52.3*   PLATELETCT  118*   MPV  10.1     Recent Labs      10/16/17   0401  10/17/17   0419  10/18/17   0231   SODIUM  129*  129*  131*   POTASSIUM  4.6  4.1   --    CHLORIDE  97  98   --    CO2  24  22   --    GLUCOSE  75  89   --    BUN  17  17   --    CREATININE  1.41*  1.33   --    CALCIUM  9.5  9.3   --                       Assessment/Plan     * Severe sepsis (CMS-[facility])- (present on admission)   Assessment & Plan    Resolved. Due to cellulitis.         Paroxysmal atrial fibrillation (CMS-[facility])- (present on admission)   Assessment & Plan    Rate controlled, on digoxin, poor candidate for anticoagulation given EtOH abuse and cirrhosis        Acute respiratory failure (CMS-[facility])- (present on admission)   Assessment & Plan    Resolved. No sob. On RA.         Cellulitis- (present on admission)   Assessment & Plan    Wound culture positive for GAS and  MRSA  completed Bactrim, Clindamycin on 10/15  US doppler left leg 10/18 negative for dvt.           Cirrhosis (CMS-[facility])- (present on admission)   Assessment & Plan    Stable        Alcohol withdrawal (CMS-[facility])- (present on admission)   Assessment & Plan    - Resolved, alert and oriented.         Dysphagia   Assessment & Plan    Speech following.         Pulmonary edema- (present on admission)   Assessment & Plan    Resolved. On RA.         Hyponatremia   Assessment & Plan    - Mild, stable.         CLL (chronic lymphocytic leukemia) (CMS-HCC)- (present on admission)   Assessment & Plan    - This is chronic, outpt oncology followup once medically stable for discharge        Continuous opioid dependence (CMS-HCC)- (present on admission)   Assessment & Plan    Stable, will add gabapentin for pain.         Macrocytic anemia- (present on admission)   Assessment & Plan    Probably due to EtOH abuse with cirrhosis  On folic acid and add B12  Stable.        Thrombocytopenia (CMS-[facility])- (present on admission)   Assessment & Plan    Due to alcohol abuse. No bleeding.         Hypotension   Assessment & Plan    Stable, denies any dizziness. Low dose diuresis. Probably due to liver disease.         Diarrhea- (present on admission)   Assessment & Plan    - Resolved              Reviewed items::  Labs reviewed, Medications reviewed and Radiology images reviewed  DVT prophylaxis pharmacological::  Enoxaparin (Lovenox)

## 2017-10-19 NOTE — CARE PLAN
Problem: Safety  Goal: Will remain free from injury  Outcome: PROGRESSING AS EXPECTED  Pt stating needing to use the restroom, pt assisted to restroom. Pt educated to call for assitance when ambulating.

## 2017-10-19 NOTE — CARE PLAN
Problem: Pain Management  Goal: Pain level will decrease to patient's comfort goal  Outcome: PROGRESSING AS EXPECTED  Pt educated about pain management available, pt stated understanding.

## 2017-10-19 NOTE — DISCHARGE INSTRUCTIONS
Discharge Instructions    Discharged to group home by medical transportation with escort. Discharged via wheelchair, hospital escort: Yes.  Special equipment needed: Not Applicable and Walker    Be sure to schedule a follow-up appointment with your primary care doctor or any specialists as instructed.     Discharge Plan:   Diet Plan: Discussed  Activity Level: Discussed  Confirmed Symptoms Management: Discussed  Medication Reconciliation Updated: Yes  Influenza Vaccine Indication: Indicated: 65 years and older  Influenza Vaccine Given - only chart on this line when given: Influenza Vaccine Given (See MAR)    I understand that a diet low in cholesterol, fat, and sodium is recommended for good health. Unless I have been given specific instructions below for another diet, I accept this instruction as my diet prescription.   Other diet: Dysphagia 3, Thins    Special Instructions: Sepsis, Adult  Sepsis is a serious infection of your blood or tissues that affects your whole body. The infection that causes sepsis may be bacterial, viral, fungal, or parasitic. Sepsis may be life threatening. Sepsis can cause your blood pressure to drop. This may result in shock. Shock causes your central nervous system and your organs to stop working correctly.   RISK FACTORS  Sepsis can happen in anyone, but it is more likely to happen in people who have weakened immune systems.  SIGNS AND SYMPTOMS   Symptoms of sepsis can include:  · Fever or low body temperature (hypothermia).  · Rapid breathing (hyperventilation).  · Chills.  · Rapid heartbeat (tachycardia).  · Confusion or light-headedness.  · Trouble breathing.  · Urinating much less than usual.  · Cool, clammy skin or red, flushed skin.  · Other problems with the heart, kidneys, or brain.  DIAGNOSIS   Your health care provider will likely do tests to look for an infection, to see if the infection has spread to your blood, and to see how serious your condition is. Tests can  include:  · Blood tests, including cultures of your blood.  · Cultures of other fluids from your body, such as:  ¨ Urine.  ¨ Pus from wounds.  ¨ Mucus coughed up from your lungs.  · Urine tests other than cultures.  · X-ray exams or other imaging tests.  TREATMENT   Treatment will begin with elimination of the source of infection. If your sepsis is likely caused by a bacterial or fungal infection, you will be given antibiotic or antifungal medicines.  You may also receive:  · Oxygen.  · Fluids through an IV tube.  · Medicines to increase your blood pressure.  · A machine to clean your blood (dialysis) if your kidneys fail.  · A machine to help you breathe if your lungs fail.  SEEK IMMEDIATE MEDICAL CARE IF:  You get an infection or develop any of the signs and symptoms of sepsis after surgery or a hospitalization.     This information is not intended to replace advice given to you by your health care provider. Make sure you discuss any questions you have with your health care provider.     Document Released: 09/15/2004 Document Revised: 05/03/2016 Document Reviewed: 08/25/2014  [employer] Interactive Patient Education ©2016 [employer]      · Is patient discharged on Warfarin / Coumadin?   No     · Is patient Post Blood Transfusion?  No    Depression / Suicide Risk    As you are discharged from this [facility] facility, it is important to learn how to keep safe from harming yourself.    Recognize the warning signs:  · Abrupt changes in personality, positive or negative- including increase in energy   · Giving away possessions  · Change in eating patterns- significant weight changes-  positive or negative  · Change in sleeping patterns- unable to sleep or sleeping all the time   · Unwillingness or inability to communicate  · Depression  · Unusual sadness, discouragement and loneliness  · Talk of wanting to die  · Neglect of personal appearance   · Rebelliousness- reckless behavior  · Withdrawal from  people/activities they love  · Confusion- inability to concentrate     If you or a loved one observes any of these behaviors or has concerns about self-harm, here's what you can do:  · Talk about it- your feelings and reasons for harming yourself  · Remove any means that you might use to hurt yourself (examples: pills, rope, extension cords, firearm)  · Get professional help from the community (Mental Health, Substance Abuse, psychological counseling)  · Do not be alone:Call your Safe Contact- someone whom you trust who will be there for you.  · Call your local CRISIS HOTLINE 601-0852 or 831-697-3198  · Call your local Children's Mobile Crisis Response Team [place] (970) 559-7873 or www.WordRake  · Call the toll free National Suicide Prevention Hotlines   · National Suicide Prevention Lifeline 256-467-FGOF (0661)  · [employer] Line Network 800-SUICIDE (802-3796)        Cellulitis  Cellulitis is an infection of the skin and the tissue beneath it. The infected area is usually red and tender. Cellulitis occurs most often in the arms and lower legs.   CAUSES   Cellulitis is caused by bacteria that enter the skin through cracks or cuts in the skin. The most common types of bacteria that cause cellulitis are staphylococci and streptococci.  SIGNS AND SYMPTOMS   · Redness and warmth.  · Swelling.  · Tenderness or pain.  · Fever.  DIAGNOSIS   Your health care provider can usually determine what is wrong based on a physical exam. Blood tests may also be done.  TREATMENT   Treatment usually involves taking an antibiotic medicine.  HOME CARE INSTRUCTIONS   · Take your antibiotic medicine as directed by your health care provider. Finish the antibiotic even if you start to feel better.  · Keep the infected arm or leg elevated to reduce swelling.  · Apply a warm cloth to the affected area up to 4 times per day to relieve pain.  · Take medicines only as directed by your health care provider.  · Keep all follow-up  visits as directed by your health care provider.  SEEK MEDICAL CARE IF:   · You notice red streaks coming from the infected area.  · Your red area gets larger or turns dark in color.  · Your bone or joint underneath the infected area becomes painful after the skin has healed.  · Your infection returns in the same area or another area.  · You notice a swollen bump in the infected area.  · You develop new symptoms.  · You have a fever.  SEEK IMMEDIATE MEDICAL CARE IF:   · You feel very sleepy.  · You develop vomiting or diarrhea.  · You have a general ill feeling (malaise) with muscle aches and pains.  MAKE SURE YOU:   · Understand these instructions.  · Will watch your condition.  · Will get help right away if you are not doing well or get worse.     This information is not intended to replace advice given to you by your health care provider. Make sure you discuss any questions you have with your health care provider.     Document Released: 09/27/2006 Document Revised: 01/08/2016 Document Reviewed: 03/04/2013  [employer] Interactive Patient Education ©2016 [employer]      Spider Bite  Spider bites are not common. Most spider bites do not cause serious problems. The elderly, very young children, and people with certain existing medical conditions are more likely to experience significant symptoms.  SYMPTOMS   Spider bites may not cause any pain at first. Within 1 or 2 days of the bite, there may be swelling, redness, and pain in the bite area. However, some spider bites can cause pain within the first hour.  TREATMENT   Your caregiver may prescribe antibiotic medicine if a bacterial infection develops in the bite. However, not all spider bites require antibiotics or prescription medicines.   HOME CARE INSTRUCTIONS  · Do not scratch the bite area.  · Keep the bite area clean and dry. Wash the area with soap and water as directed.  · Put ice or cool compresses on the bite area.  ¨ Put ice in a plastic bag.  ¨ Place a  towel between your skin and the bag.  ¨ Leave the ice on for 20 minutes, 4 times a day for the first 2 to 3 days, or as directed.  · Keep the bite area elevated above the level of your heart. This helps reduce redness and swelling.  · Only take over-the-counter or prescription medicines as directed by your caregiver.  · If you are given antibiotics, take them as directed. Finish them even if you start to feel better.  You may need a tetanus shot if:  · You cannot remember when you had your last tetanus shot.  · You have never had a tetanus shot.  · The injury broke your skin.  If you get a tetanus shot, your arm may swell, get red, and feel warm to the touch. This is common and not a problem. If you need a tetanus shot and you choose not to have one, there is a rare chance of getting tetanus. Sickness from tetanus can be serious.  SEEK MEDICAL CARE IF:  Your bite is not better after 3 days of treatment.  SEEK IMMEDIATE MEDICAL CARE IF:  · Your bite turns purple or develops increased swelling, pain, or redness.  · You develop shortness of breath or chest pain.  · You have muscle cramps or painful muscle spasms.  · You develop abdominal pain, nausea, or vomiting.  · You feel unusually tired or sleepy.  MAKE SURE YOU:  · Understand these instructions.  · Will watch your condition.  · Will get help right away if you are not doing well or get worse.     This information is not intended to replace advice given to you by your health care provider. Make sure you discuss any questions you have with your health care provider.     Document Released: 01/25/2006 Document Revised: 03/11/2013 Document Reviewed: 07/18/2012  [employer] Interactive Patient Education ©2016 [employer]

## 2017-10-19 NOTE — DISCHARGE SUMMARY
"CHIEF COMPLAINT ON ADMISSION  Chief Complaint   Patient presents with   • Leg Swelling     pt reports that he went to bed last night and woke up with a sore red leg. left leg red, tight, edematous. wound noted and ankle. pt reports that it has \"been there for years.\"        CODE STATUS  Full Code    HPI & HOSPITAL COURSE  Please see original H&P, consult notes and D/C summary from 10/16/17 for specific information, in addition to previous d/c summary patient continue having left leg pain, he was started on gabapentin low dose this will need to be adjusted as tolerated, also he had a doppler US left leg that was negative for DVT, patient has small hematoma will need to continue with ice therapy, patient is alert and oriented, off o2, patient is ambulating and PT/OT have seen him today and he did good, he does not have any other questions, patient denies any dizziness, lightheadedness, no fever or chills. He expressed understanding of his d/c plan and agreed with it. He will need to check BMP in 5 days to check on his electrolytes levels has chronic hyponatremia stable and trending up.   bp in the 100's or high 90's asymptomatic and improves with activity.     Therefore, he is discharged in good and stable condition with close outpatient follow-up.    SPECIFIC OUTPATIENT FOLLOW-UP  PCP in 1 week.     DISCHARGE PROBLEM LIST  Principal Problem (Resolved):    Severe sepsis (CMS-HCC) POA: Yes  Active Problems:    Cirrhosis (CMS-HCC) POA: Yes      Overview: Irregular hepatic contour is noted. Hepatomegaly and diffuse low       attenuation changes of the liver are seen.      Changes of the liver suggests cirrhosis      Hepatitis panel negative.     Paroxysmal atrial fibrillation (CMS-HCC) POA: Yes    Macrocytic anemia POA: Yes    Continuous opioid dependence (CMS-HCC) (Chronic) POA: Yes    CLL (chronic lymphocytic leukemia) (CMS-HCC) (Chronic) POA: Yes    Hyponatremia POA: Unknown    Hypotension POA: Unknown  Resolved " Problems:    Alcohol withdrawal (CMS-HCC) POA: Yes    Cellulitis POA: Yes    Acute respiratory failure (CMS-HCC) POA: Yes    Thrombocytopenia (CMS-HCC) POA: Yes      Overview: Platelets 51      Likely due to cirrhosis      Platelet mapping : Low MA and 100% inhibition - will need platelet       transfusion with major surgery.      5/1 - Platelet transfusion pre and post op      Transfuse for PLT < 50      5/13 - stable trend    Pulmonary edema POA: Yes    Dysphagia POA: Unknown    Diarrhea POA: Yes      FOLLOW UP  Future Appointments  Date Time Provider Department Center   10/20/2017 11:20 AM Jostin Flores M.D. [facility] None     [facility] ([facility])  [address] [facility] [address]  [phone]          MEDICATIONS ON DISCHARGE   Rohit Snow   Home Medication Instructions RODERICK:[id number]    Printed on:10/19/17 1130   Medication Information                      cyanocobalamin (VITAMIN B12) 1000 MCG Tab  Take 1 Tab by mouth every day.             digoxin (LANOXIN) 125 MCG Tab  Take 1 Tab by mouth every day at 6 PM.             duloxetine (CYMBALTA) 30 MG Cap DR Particles  Take 60 mg by mouth every day.             folic acid (FOLVITE) 1 MG Tab  Take 1 Tab by mouth every day.             furosemide (LASIX) 20 MG Tab  Take 0.5 Tabs by mouth every day.             gabapentin (NEURONTIN) 100 MG Cap  Take 1 Cap by mouth 2 Times a Day.             levothyroxine (SYNTHROID) 25 MCG Tab  Take 25 mcg by mouth Every morning on an empty stomach.             nystatin (MYCOSTATIN) powder  Apply to groin area             oxycodone immediate-release (ROXICODONE) 5 MG Tab  Take 1 Tab by mouth every 6 hours as needed.             spironolactone (ALDACTONE) 25 MG Tab  Take 1 Tab by mouth every day.             therapeutic multivitamin-minerals (THERAGRAN-M) Tab  Take 1 Tab by mouth every day.             thiamine (THIAMINE) 100 MG tablet  Take 1 Tab by mouth every day.             trazodone (DESYREL) 100 MG Tab  Take 100 mg  by mouth every evening.                 DIET  Orders Placed This Encounter   Procedures   • DIET ORDER     Standing Status:   Standing     Number of Occurrences:   1     Order Specific Question:   Diet:     Answer:   Regular [1]     Order Specific Question:   Texture/Fiber modifications:     Answer:   Dysphagia 3(Mechanical Soft)specify fluid consistency(question 6) [3]     Order Specific Question:   Consistency/Fluid modifications:     Answer:   Thin Liquids [3]     Order Specific Question:   Miscellaneous modifications:     Answer:   SLP - 1:1 Supervision by Nursing [21]     Order Specific Question:   Miscellaneous modifications:     Answer:   SLP - Deliver to Nursing Station [22]       ACTIVITY  As tolerated.and directed by SNF .   Weight bearing as tolerated      CONSULTATIONS  ID,  Pulmonologist.    PROCEDURES  none    LABORATORY  Lab Results   Component Value Date/Time    SODIUM 131 (L) 10/18/2017 02:31 AM    POTASSIUM 4.1 10/17/2017 04:19 AM    CHLORIDE 98 10/17/2017 04:19 AM    CO2 22 10/17/2017 04:19 AM    GLUCOSE 89 10/17/2017 04:19 AM    BUN 17 10/17/2017 04:19 AM    CREATININE 1.33 10/17/2017 04:19 AM        Lab Results   Component Value Date/Time    WBC 8.4 10/16/2017 04:01 AM    HEMOGLOBIN 9.4 (L) 10/16/2017 04:01 AM    HEMATOCRIT 27.8 (L) 10/16/2017 04:01 AM    PLATELETCT 118 (L) 10/16/2017 04:01 AM        Total time of the discharge process exceeds 40 minutes

## 2017-10-19 NOTE — CARE PLAN
Problem: Infection  Goal: Will remain free from infection  Outcome: PROGRESSING AS EXPECTED  Administered nystatin powder.     Problem: Psychosocial Needs:  Goal: Level of anxiety will decrease  Outcome: PROGRESSING AS EXPECTED  1:1 interaction with patient.

## 2017-10-19 NOTE — PROGRESS NOTES
Received discharge orders. Removed IV. Went over discharge instructions with pt. All questions answered, patient feels safe to discharge. Patient went to [facility] with Pro Breath MD. All belongings gathered and patient dressed.

## 2017-10-19 NOTE — THERAPY
"Speech Language Therapy dysphagia treatment completed.   Functional Status:  The patient was awake, reclined to 50 degrees and eating chips and crackers. HOB was repositioned. The patient reports no difficulty with consuming PO. Laryngeal elevation palpated as strong during consecutive sips of thin liquids. It was noted to be decreased during solid food intake and patient independently taking sips of thin liquid to clear. He consumed 4oz of mixed textures along with dry solids without any signs of aspiration.   Recommendations: Recommend upgrade to D3, thin liquids. Please assist patient with meal tray set up and HOB positioning.    Plan of Care: Will benefit from Speech Therapy 3 times per week  Post-Acute Therapy: Discharge to a transitional care facility for continued skilled therapy services. Please see PT/OT notes for further discharge planning    See \"Rehab Therapy-Acute\" Patient Summary Report for complete documentation.     "

## 2017-10-19 NOTE — DISCHARGE PLANNING
Transport arranged with [facility]. Patient will be leaving today @1500 via [facility] going to [facility]. RUTH Samuels notified.

## 2017-10-20 ENCOUNTER — PATIENT OUTREACH (OUTPATIENT)
Dept: HEALTH INFORMATION MANAGEMENT | Facility: OTHER | Age: 72
End: 2017-10-20

## 2017-10-20 ENCOUNTER — OFFICE VISIT (OUTPATIENT)
Dept: HEMATOLOGY ONCOLOGY | Facility: MEDICAL CENTER | Age: 72
End: 2017-10-20
Payer: COMMERCIAL

## 2017-10-20 VITALS
DIASTOLIC BLOOD PRESSURE: 58 MMHG | OXYGEN SATURATION: 99 % | RESPIRATION RATE: 16 BRPM | WEIGHT: 129.08 LBS | TEMPERATURE: 98.2 F | HEIGHT: 65 IN | HEART RATE: 88 BPM | SYSTOLIC BLOOD PRESSURE: 120 MMHG | BODY MASS INDEX: 21.51 KG/M2

## 2017-10-20 DIAGNOSIS — C91.10 CLL (CHRONIC LYMPHOCYTIC LEUKEMIA) (HCC): Chronic | ICD-10-CM

## 2017-10-20 PROCEDURE — 99213 OFFICE O/P EST LOW 20 MIN: CPT | Performed by: INTERNAL MEDICINE

## 2017-10-20 ASSESSMENT — PAIN SCALES - GENERAL: PAINLEVEL: 9=SEVERE PAIN

## 2017-10-20 NOTE — PROGRESS NOTES
Date of visit: 10/20/2017  11:16 AM      Chief Complaint- CLL      History of presenting illness: Rohit Snow  is a 72 y.o. year old male who is here for follow-up of low risk CLL. He has history of alcoholic cirrhosis and ascites who is here for evaluation of his leukocytosis with lymphocytosis.      CBC done on 9/1/2016 showed WBC of 24 with lymphocytes of 68%. Hemoglobin was 13, and platelet count was 156. 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 He has not noticed any lymphadenopathy.     Flow cytometry from 12/2016. Current performed CLL The B cells did not express  CD38 indicating favorable prognosis. He is following up with GI. His liver related symptoms have improved and he has not required paracentesis.Still drinking. Unfortunately, he recently developed extensive cellulitis from a brown recluse spider bite. He was admitted to the hospital for antibiotics. He is currently at [facility]      Past Medical History:      Past Medical History:   Diagnosis Date   • Actinic keratosis 1/3/2017   • Back pain    • CLL (chronic lymphocytic leukemia) (CMS-HCC) 3/23/2017   • ESOPHAGITIS     distal   • Esophagitis    • ETOH abuse     x 5   • ETOH abuse     daily drinker   • Fall    • KYA (generalized anxiety disorder)    • GERD (gastroesophageal reflux disease)    • Gilbert's disease    • Health care maintenance    • Helicobacter pylori (H. pylori) 01/2010    Postive, Ab Tx   • Helicobacter pylori (H. pylori)    • Hepatitis    • Hypertension 02/2004   • Hypertension    • Neck pain    • Neoplasm of uncertain behavior of skin 12/28/2016   • Neuropathy (CMS-[facility]) 05/2010    feet   • Neuropathy (CMS-HCC)    • Osteopenia     per Dexa Scan   • Osteopenia    • Ringworm 5 years old   • Vitamin D deficiency 09/2011   • Vitamin D deficiency        Past surgical history:       Past Surgical History:   Procedure Laterality Date   • WRIST ORIF Left 5/7/2015    Procedure: WRIST ORIF [79.83] DISTAL  JAYY;  Surgeon: Gurvinder Baez M.D.;  Location: SURGERY [facility];  Service:    • CERVICAL FUSION POSTERIOR  5/1/2015    Performed by Andrea Thorpe III, M.D. at SURGERY [facility]   • CERVICAL LAMINECTOMY POSTERIOR  5/1/2015    Performed by Andrea Thorpe III, M.D. at SURGERY [facility]   • ENDOSCOPY PROCEDURE  11/19/2014    Performed by Aurelio Delacruz M.D. at SURGERY [facility] ORS   • EGD ESOPHAGUS WITH ENDOSCOPIC US  2009    positive, stricture and distal esophagitis   • APPENDECTOMY      small bowel obstruction, carcinoid tumor   • COLONOSCOPY  1998, 2009    negative   • COLONOSCOPY     • EGD ESOPHAGUS WITH ENDOSCOPIC US     • EGD W/ESOPHAGEAL DIL. BALLOON  1998, 2004   • EGD W/ESOPHAGEAL DIL. BALLOON     • OTHER ABDOMINAL SURGERY      appendectomy   • TONSILLECTOMY         Allergies:       Review of patient's allergies indicates no known allergies.    Medications:         Current Outpatient Prescriptions   Medication Sig Dispense Refill   • gabapentin (NEURONTIN) 100 MG Cap Take 1 Cap by mouth 2 Times a Day. 90 Cap    • oxycodone immediate-release (ROXICODONE) 5 MG Tab Take 1 Tab by mouth every 6 hours as needed. 30 Tab 0   • spironolactone (ALDACTONE) 25 MG Tab Take 1 Tab by mouth every day. 30 Tab 3   • cyanocobalamin (VITAMIN B12) 1000 MCG Tab Take 1 Tab by mouth every day. 30 Tab 3   • digoxin (LANOXIN) 125 MCG Tab Take 1 Tab by mouth every day at 6 PM. 30 Tab    • folic acid (FOLVITE) 1 MG Tab Take 1 Tab by mouth every day. 30 Tab    • furosemide (LASIX) 20 MG Tab Take 0.5 Tabs by mouth every day. 60 Tab    • therapeutic multivitamin-minerals (THERAGRAN-M) Tab Take 1 Tab by mouth every day. 30 Tab 11   • thiamine (THIAMINE) 100 MG tablet Take 1 Tab by mouth every day. 30 Tab    • levothyroxine (SYNTHROID) 25 MCG Tab Take 25 mcg by mouth Every morning on an empty stomach.     • duloxetine (CYMBALTA) 30 MG Cap DR Particles Take 60 mg by mouth every day.     • trazodone (DESYREL) 100 MG  Tab Take 100 mg by mouth every evening.       No current facility-administered medications for this visit.          Social History:     Social History     Social History   • Marital status:      Spouse name: N/A   • Number of children: N/A   • Years of education: N/A     Occupational History   • Not on file.     Social History Main Topics   • Smoking status: Former Smoker     Packs/day: 0.25     Years: 4.00     Types: Cigarettes     Quit date: 1965   • Smokeless tobacco: Never Used   • Alcohol use 30.0 oz/week     50 Standard drinks or equivalent per week      Comment: 1/2 drinks daily (no more than a pint a day)   • Drug use:      Types: Marijuana, Inhaled      Comment: Marijuana once a week, helps with sleep and pain   • Sexual activity: Not Currently     Other Topics Concern   • Not on file     Social History Narrative    ** Merged History Encounter **    No known exposure to asbestos, dyes, chemicals, or pesticides. 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 Wife  recently of cancer in . 2 children, but has no relationship with them.             Family History:      Family History   Problem Relation Age of Onset   • Other Mother      GERD   • Heart Disease Mother      CAD   • Diabetes Mother      DMII   • Cancer Father      liver   • Cancer Maternal Grandmother      Unknown type of cancer       Review of Systems:  All other review of systems are negative except what was mentioned above in the HPI.    Constitutional: Negative for fever, chills, Positive weight loss and malaise/fatigue.    HEENT: No new auditory or visual complaints. No sore throat and neck pain.     Respiratory: Negative for cough, sputum production, shortness of breath and wheezing.    Cardiovascular: Negative for chest pain, palpitations, orthopnea and leg swelling.    Gastrointestinal: Negative for heartburn, nausea, vomiting and abdominal pain.    Genitourinary: Negative for dysuria, hematuria    Musculoskeletal: No new arthralgias  "or myalgias   Skin: Negative for rash and itching.    Neurological: Negative for focal weakness and headaches.    Endo/Heme/Allergies: No abnormal bleed/bruise.    Psychiatric/Behavioral: No new depression/anxiety.    Physical Exam:  Vitals: /58   Pulse 88   Temp 36.8 °C (98.2 °F)   Resp 16   Ht 1.651 m (5' 5\")   Wt 58.6 kg (129 lb 1.3 oz)   SpO2 99%   BMI 21.48 kg/m²     General: Not in acute distress, alert and oriented x 3  HEENT: No pallor, icterus. Oropharynx clear.   Neck: Supple, no palpable masses.  Lymph nodes: No palpable cervical, supraclavicular, axillary or inguinal lymphadenopathy.    CVS: regular rate and rhythm, no rubs or gallops  RESP: Clear to auscultate bilaterally, no wheezing or crackles.   ABD: Soft, non tender, non distended, positive bowel sounds, no palpable organomegaly  EXT: Left lower extremity with a generalized cellulitis  CNS: Alert and oriented x3, No focal deficits.  Skin- No rash      Labs:   Admission on 09/28/2017, Discharged on 10/19/2017   No results displayed because visit has over 200 results.                Assessment and Plan:  CLL, Almanza stage 0- 13 q deleted .Favorable risk with no CD 38 expression. Reassured him that 13 q deletion also indicate favorable prognosis WBC stable to improved. Mild anemia and thrombocytopenia sec to cirrhosis. No need for intervention. Reassured him in this regard. We will see him back every 6 months.    He agreed and verbalized his agreement and understanding with the current plan.  I answered all questions and concerns he has at this time         Please note that this dictation was created using voice recognition software. I have made every reasonable attempt to correct obvious errors, but I expect that there are errors of grammar and possibly content that I did not discover before finalizing the note.      SIGNATURES:  Jostin Flores    CC:  Pcp Pt States None  No ref. provider found    "

## 2017-11-03 NOTE — ADDENDUM NOTE
Encounter addended by: Yamileth Martin R.N. on: 11/3/2017  8:47 AM<BR>    Actions taken: Flowsheet accepted

## 2018-04-18 ENCOUNTER — TELEPHONE (OUTPATIENT)
Dept: HEMATOLOGY ONCOLOGY | Facility: MEDICAL CENTER | Age: 73
End: 2018-04-18

## 2018-04-18 NOTE — TELEPHONE ENCOUNTER
I spoke to patient this afternoon to confirm his insurance was [employer] for his insurance. I let patient know we are out of network with his insurance and patient stated he was aware. Patient asked to cancel his appointment for 4/19/18 with Dr. Flores as he does not want to be self pay. Patient asked if we can refer him to a provider that is in network with his insurance.

## 2018-04-19 ENCOUNTER — APPOINTMENT (OUTPATIENT)
Dept: HEMATOLOGY ONCOLOGY | Facility: MEDICAL CENTER | Age: 73
End: 2018-04-19
Payer: COMMERCIAL

## 2018-11-26 ENCOUNTER — HOSPITAL ENCOUNTER (OUTPATIENT)
Dept: HOSPITAL 8 - ED | Age: 73
Setting detail: OBSERVATION
LOS: 2 days | Discharge: HOME | End: 2018-11-28
Attending: INTERNAL MEDICINE | Admitting: INTERNAL MEDICINE
Payer: COMMERCIAL

## 2018-11-26 VITALS — DIASTOLIC BLOOD PRESSURE: 73 MMHG | SYSTOLIC BLOOD PRESSURE: 176 MMHG

## 2018-11-26 VITALS — HEIGHT: 66 IN | WEIGHT: 154.32 LBS | BODY MASS INDEX: 24.8 KG/M2

## 2018-11-26 DIAGNOSIS — Z80.9: ICD-10-CM

## 2018-11-26 DIAGNOSIS — R18.8: Primary | ICD-10-CM

## 2018-11-26 DIAGNOSIS — F10.20: ICD-10-CM

## 2018-11-26 DIAGNOSIS — I48.91: ICD-10-CM

## 2018-11-26 DIAGNOSIS — Z85.6: ICD-10-CM

## 2018-11-26 LAB
<PLATELET ESTIMATE>: ADEQUATE
<PLT MORPHOLOGY>: (no result)
ALBUMIN SERPL-MCNC: 3 G/DL (ref 3.4–5)
ALP SERPL-CCNC: 83 U/L (ref 45–117)
ALT SERPL-CCNC: 16 U/L (ref 12–78)
ANION GAP SERPL CALC-SCNC: 8 MMOL/L (ref 5–15)
BILIRUB SERPL-MCNC: 1.6 MG/DL (ref 0.2–1)
CALCIUM SERPL-MCNC: 8.2 MG/DL (ref 8.5–10.1)
CHLORIDE SERPL-SCNC: 111 MMOL/L (ref 98–107)
CREAT SERPL-MCNC: 1.07 MG/DL (ref 0.7–1.3)
ERYTHROCYTE [DISTWIDTH] IN BLOOD BY AUTOMATED COUNT: 15.1 % (ref 9.4–14.8)
LYMPH#(MANUAL): 18.47 X10^3/UL (ref 1–3.4)
LYMPHS% (MANUAL): 81 % (ref 22–44)
MACROCYTES BLD QL SMEAR: (no result)
MCH RBC QN AUTO: 37 PG (ref 27.5–34.5)
MCHC RBC AUTO-ENTMCNC: 34.1 G/DL (ref 33.2–36.2)
MCV RBC AUTO: 108.4 FL (ref 81–97)
MD: YES
MONOS#(MANUAL): 0.68 X10^3/UL (ref 0.3–2.7)
MONOS% (MANUAL): 3 % (ref 2–9)
PLATELET # BLD AUTO: 189 X10^3/UL (ref 130–400)
PMV BLD AUTO: 7.6 FL (ref 7.4–10.4)
PROT SERPL-MCNC: 6.6 G/DL (ref 6.4–8.2)
RBC # BLD AUTO: 3.57 X10^6/UL (ref 4.38–5.82)
SEG#(MANUAL): 3.65 X10^3/UL (ref 1.8–6.8)
SEGS% (MANUAL): 16 % (ref 42–75)
SMUDGE CELLS # BLD: (no result) 10*3/UL

## 2018-11-26 PROCEDURE — 96375 TX/PRO/DX INJ NEW DRUG ADDON: CPT

## 2018-11-26 PROCEDURE — 87205 SMEAR GRAM STAIN: CPT

## 2018-11-26 PROCEDURE — 80069 RENAL FUNCTION PANEL: CPT

## 2018-11-26 PROCEDURE — 88305 TISSUE EXAM BY PATHOLOGIST: CPT

## 2018-11-26 PROCEDURE — 82040 ASSAY OF SERUM ALBUMIN: CPT

## 2018-11-26 PROCEDURE — 87070 CULTURE OTHR SPECIMN AEROBIC: CPT

## 2018-11-26 PROCEDURE — G0378 HOSPITAL OBSERVATION PER HR: HCPCS

## 2018-11-26 PROCEDURE — 74177 CT ABD & PELVIS W/CONTRAST: CPT

## 2018-11-26 PROCEDURE — 83690 ASSAY OF LIPASE: CPT

## 2018-11-26 PROCEDURE — 49083 ABD PARACENTESIS W/IMAGING: CPT

## 2018-11-26 PROCEDURE — 88112 CYTOPATH CELL ENHANCE TECH: CPT

## 2018-11-26 PROCEDURE — 85025 COMPLETE CBC W/AUTO DIFF WBC: CPT

## 2018-11-26 PROCEDURE — 74022 RADEX COMPL AQT ABD SERIES: CPT

## 2018-11-26 PROCEDURE — 80048 BASIC METABOLIC PNL TOTAL CA: CPT

## 2018-11-26 PROCEDURE — 96374 THER/PROPH/DIAG INJ IV PUSH: CPT

## 2018-11-26 PROCEDURE — 84157 ASSAY OF PROTEIN OTHER: CPT

## 2018-11-26 PROCEDURE — 36415 COLL VENOUS BLD VENIPUNCTURE: CPT

## 2018-11-26 PROCEDURE — 96376 TX/PRO/DX INJ SAME DRUG ADON: CPT

## 2018-11-26 PROCEDURE — 80053 COMPREHEN METABOLIC PANEL: CPT

## 2018-11-26 PROCEDURE — 99284 EMERGENCY DEPT VISIT MOD MDM: CPT

## 2018-11-26 PROCEDURE — 87040 BLOOD CULTURE FOR BACTERIA: CPT

## 2018-11-26 PROCEDURE — 89051 BODY FLUID CELL COUNT: CPT

## 2018-11-26 PROCEDURE — 82042 OTHER SOURCE ALBUMIN QUAN EA: CPT

## 2018-11-26 PROCEDURE — 84155 ASSAY OF PROTEIN SERUM: CPT

## 2018-11-26 PROCEDURE — 71045 X-RAY EXAM CHEST 1 VIEW: CPT

## 2018-11-26 RX ADMIN — MORPHINE SULFATE PRN MG: 10 INJECTION INTRAVENOUS at 23:11

## 2018-11-26 RX ADMIN — DEXTROSE AND SODIUM CHLORIDE SCH MLS/HR: 5; .45 INJECTION, SOLUTION INTRAVENOUS at 23:11

## 2018-11-27 VITALS — SYSTOLIC BLOOD PRESSURE: 128 MMHG | DIASTOLIC BLOOD PRESSURE: 64 MMHG

## 2018-11-27 VITALS — DIASTOLIC BLOOD PRESSURE: 59 MMHG | SYSTOLIC BLOOD PRESSURE: 107 MMHG

## 2018-11-27 VITALS — SYSTOLIC BLOOD PRESSURE: 125 MMHG | DIASTOLIC BLOOD PRESSURE: 68 MMHG

## 2018-11-27 VITALS — SYSTOLIC BLOOD PRESSURE: 157 MMHG | DIASTOLIC BLOOD PRESSURE: 81 MMHG

## 2018-11-27 LAB
ALBUMIN SERPL-MCNC: 2.6 G/DL (ref 3.4–5)
ANION GAP SERPL CALC-SCNC: 6 MMOL/L (ref 5–15)
CALCIUM SERPL-MCNC: 7.6 MG/DL (ref 8.5–10.1)
CELLS COUNTED: 14
CHLORIDE SERPL-SCNC: 109 MMOL/L (ref 98–107)
CREAT SERPL-MCNC: 0.93 MG/DL (ref 0.7–1.3)
PROT SERPL-MCNC: 5.6 G/DL (ref 6.4–8.2)

## 2018-11-27 RX ADMIN — MORPHINE SULFATE PRN MG: 10 INJECTION INTRAVENOUS at 02:18

## 2018-11-27 RX ADMIN — MORPHINE SULFATE PRN MG: 10 INJECTION INTRAVENOUS at 15:00

## 2018-11-27 RX ADMIN — MORPHINE SULFATE PRN MG: 10 INJECTION INTRAVENOUS at 23:26

## 2018-11-27 RX ADMIN — DEXTROSE AND SODIUM CHLORIDE SCH MLS/HR: 5; .45 INJECTION, SOLUTION INTRAVENOUS at 17:26

## 2018-11-27 RX ADMIN — MORPHINE SULFATE PRN MG: 10 INJECTION INTRAVENOUS at 11:15

## 2018-11-27 RX ADMIN — ONDANSETRON PRN MG: 2 INJECTION, SOLUTION INTRAMUSCULAR; INTRAVENOUS at 02:17

## 2018-11-27 RX ADMIN — ONDANSETRON PRN MG: 2 INJECTION, SOLUTION INTRAMUSCULAR; INTRAVENOUS at 11:14

## 2018-11-27 RX ADMIN — DEXTROSE AND SODIUM CHLORIDE SCH MLS/HR: 5; .45 INJECTION, SOLUTION INTRAVENOUS at 06:24

## 2018-11-27 RX ADMIN — MORPHINE SULFATE PRN MG: 10 INJECTION INTRAVENOUS at 06:19

## 2018-11-27 RX ADMIN — MORPHINE SULFATE PRN MG: 10 INJECTION INTRAVENOUS at 18:28

## 2018-11-28 VITALS — DIASTOLIC BLOOD PRESSURE: 69 MMHG | SYSTOLIC BLOOD PRESSURE: 150 MMHG

## 2018-11-28 VITALS — DIASTOLIC BLOOD PRESSURE: 73 MMHG | SYSTOLIC BLOOD PRESSURE: 159 MMHG

## 2018-11-28 VITALS — DIASTOLIC BLOOD PRESSURE: 65 MMHG | SYSTOLIC BLOOD PRESSURE: 145 MMHG

## 2018-11-28 LAB
<PLATELET ESTIMATE>: ADEQUATE
<PLT MORPHOLOGY>: (no result)
ALBUMIN SERPL-MCNC: 2.6 G/DL (ref 3.4–5)
ANION GAP SERPL CALC-SCNC: 6 MMOL/L (ref 5–15)
ANISOCYTOSIS BLD QL SMEAR: (no result)
CALCIUM SERPL-MCNC: 8.1 MG/DL (ref 8.5–10.1)
CHLORIDE SERPL-SCNC: 112 MMOL/L (ref 98–107)
CREAT SERPL-MCNC: 0.97 MG/DL (ref 0.7–1.3)
EOS#(MANUAL): 0.15 X10^3/UL (ref 0–0.4)
EOS% (MANUAL): 1 % (ref 1–7)
ERYTHROCYTE [DISTWIDTH] IN BLOOD BY AUTOMATED COUNT: 15.2 % (ref 9.4–14.8)
LYMPH#(MANUAL): 12.45 X10^3/UL (ref 1–3.4)
LYMPHS% (MANUAL): 83 % (ref 22–44)
MACROCYTES BLD QL SMEAR: (no result)
MCH RBC QN AUTO: 36.4 PG (ref 27.5–34.5)
MCHC RBC AUTO-ENTMCNC: 33.3 G/DL (ref 33.2–36.2)
MCV RBC AUTO: 109.2 FL (ref 81–97)
MD: YES
MONOS#(MANUAL): 0.15 X10^3/UL (ref 0.3–2.7)
MONOS% (MANUAL): 1 % (ref 2–9)
PLATELET # BLD AUTO: 182 X10^3/UL (ref 130–400)
PMV BLD AUTO: 6.9 FL (ref 7.4–10.4)
RBC # BLD AUTO: 3.38 X10^6/UL (ref 4.38–5.82)
SEG#(MANUAL): 2.25 X10^3/UL (ref 1.8–6.8)
SEGS% (MANUAL): 15 % (ref 42–75)
SMUDGE CELLS # BLD: (no result) 10*3/UL

## 2018-11-28 RX ADMIN — DEXTROSE AND SODIUM CHLORIDE SCH MLS/HR: 5; .45 INJECTION, SOLUTION INTRAVENOUS at 08:28

## 2018-11-28 RX ADMIN — MORPHINE SULFATE PRN MG: 10 INJECTION INTRAVENOUS at 08:28

## 2018-11-28 RX ADMIN — MORPHINE SULFATE PRN MG: 10 INJECTION INTRAVENOUS at 03:46

## 2018-11-28 RX ADMIN — DEXTROSE AND SODIUM CHLORIDE SCH MLS/HR: 5; .45 INJECTION, SOLUTION INTRAVENOUS at 01:33

## 2018-11-28 RX ADMIN — MORPHINE SULFATE PRN MG: 10 INJECTION INTRAVENOUS at 12:34

## 2020-04-09 ENCOUNTER — HOSPITAL ENCOUNTER (EMERGENCY)
Dept: HOSPITAL 8 - ED | Age: 75
Discharge: HOME | End: 2020-04-09
Payer: MEDICARE

## 2020-04-09 VITALS — WEIGHT: 121.25 LBS | BODY MASS INDEX: 20.2 KG/M2 | HEIGHT: 65 IN

## 2020-04-09 VITALS — DIASTOLIC BLOOD PRESSURE: 64 MMHG | SYSTOLIC BLOOD PRESSURE: 120 MMHG
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
## 2020-04-09 DIAGNOSIS — R00.0: ICD-10-CM

## 2020-04-09 DIAGNOSIS — M85.88: ICD-10-CM

## 2020-04-09 DIAGNOSIS — I48.91: ICD-10-CM
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
## 2020-04-09 DIAGNOSIS — S22.011A: Primary | ICD-10-CM
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
## 2020-04-09 PROCEDURE — 93005 ELECTROCARDIOGRAM TRACING: CPT

## 2020-04-09 PROCEDURE — 72128 CT CHEST SPINE W/O DYE: CPT

## 2020-04-09 PROCEDURE — 72125 CT NECK SPINE W/O DYE: CPT

## 2020-04-09 PROCEDURE — 99285 EMERGENCY DEPT VISIT HI MDM: CPT

## 2020-04-09 NOTE — NUR
BIBA. REPORT RECEIVED FROM EMS. PT HAD GLF. NO LOC/BLOOD THINNER/HIT A HEAD. 
PT'S AOX4. C/O MID BACK PAIN(HIT THE BACK BY TABLE) PT'S AOX4. RSPS EVEN AND 
UNLABORED. ALL MONITORS IN PLACE. CALL LIGHT WITHIN REACH.

## 2020-04-09 NOTE — NUR
PT STATES "[facility] HAS MY WALLET. I'M SURE THEY TOOK IT FROM MY HOME." PT HAS 
JUST  'S LICENSE WITH HIM. THIS RN CALLED [facility] AND [facility] SAID"WE 
LEFT IT IN HIS HOUSE." PT NOTIFIED.

## 2021-01-14 DIAGNOSIS — Z23 NEED FOR VACCINATION: ICD-10-CM

## 2021-03-12 ENCOUNTER — APPOINTMENT (OUTPATIENT)
Dept: RADIOLOGY | Facility: MEDICAL CENTER | Age: 76
End: 2021-03-12
Attending: EMERGENCY MEDICINE
Payer: MEDICARE

## 2021-03-12 ENCOUNTER — HOSPITAL ENCOUNTER (OUTPATIENT)
Dept: RADIOLOGY | Facility: MEDICAL CENTER | Age: 76
End: 2021-03-12
Payer: MEDICARE

## 2021-03-12 ENCOUNTER — HOSPITAL ENCOUNTER (EMERGENCY)
Facility: MEDICAL CENTER | Age: 76
End: 2021-03-12
Attending: EMERGENCY MEDICINE | Admitting: EMERGENCY MEDICINE
Payer: MEDICARE

## 2021-03-12 VITALS
WEIGHT: 135 LBS | SYSTOLIC BLOOD PRESSURE: 138 MMHG | OXYGEN SATURATION: 94 % | BODY MASS INDEX: 21.19 KG/M2 | HEART RATE: 81 BPM | RESPIRATION RATE: 16 BRPM | DIASTOLIC BLOOD PRESSURE: 65 MMHG | HEIGHT: 67 IN | TEMPERATURE: 98.7 F
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
## 2021-03-12 DIAGNOSIS — S02.0XXB OPEN FRACTURE OF PARIETAL BONE, INITIAL ENCOUNTER (HCC): ICD-10-CM

## 2021-03-12 LAB
EKG IMPRESSION: NORMAL
EKG IMPRESSION: NORMAL

## 2021-03-12 PROCEDURE — 96374 THER/PROPH/DIAG INJ IV PUSH: CPT

## 2021-03-12 PROCEDURE — A9270 NON-COVERED ITEM OR SERVICE: HCPCS | Performed by: EMERGENCY MEDICINE

## 2021-03-12 PROCEDURE — 93005 ELECTROCARDIOGRAM TRACING: CPT

## 2021-03-12 PROCEDURE — 70450 CT HEAD/BRAIN W/O DYE: CPT

## 2021-03-12 PROCEDURE — 700101 HCHG RX REV CODE 250: Performed by: EMERGENCY MEDICINE

## 2021-03-12 PROCEDURE — 700111 HCHG RX REV CODE 636 W/ 250 OVERRIDE (IP): Performed by: EMERGENCY MEDICINE

## 2021-03-12 PROCEDURE — 96375 TX/PRO/DX INJ NEW DRUG ADDON: CPT

## 2021-03-12 PROCEDURE — 93005 ELECTROCARDIOGRAM TRACING: CPT | Performed by: EMERGENCY MEDICINE

## 2021-03-12 PROCEDURE — 700102 HCHG RX REV CODE 250 W/ 637 OVERRIDE(OP): Performed by: EMERGENCY MEDICINE

## 2021-03-12 PROCEDURE — 99285 EMERGENCY DEPT VISIT HI MDM: CPT

## 2021-03-12 RX ORDER — METOPROLOL TARTRATE 1 MG/ML
5 INJECTION, SOLUTION INTRAVENOUS ONCE
Status: COMPLETED | OUTPATIENT
Start: 2021-03-12 | End: 2021-03-12

## 2021-03-12 RX ORDER — CEPHALEXIN 500 MG/1
500 CAPSULE ORAL ONCE
Status: COMPLETED | OUTPATIENT
Start: 2021-03-12 | End: 2021-03-12

## 2021-03-12 RX ORDER — LORAZEPAM 2 MG/ML
1 INJECTION INTRAMUSCULAR ONCE
Status: COMPLETED | OUTPATIENT
Start: 2021-03-12 | End: 2021-03-12

## 2021-03-12 RX ORDER — TRAMADOL HYDROCHLORIDE 50 MG/1
25 TABLET ORAL 4 TIMES DAILY PRN
COMMUNITY
Start: 2021-02-10

## 2021-03-12 RX ORDER — CEPHALEXIN 500 MG/1
500 CAPSULE ORAL 4 TIMES DAILY
Qty: 40 CAPSULE | Refills: 0 | Status: SHIPPED | OUTPATIENT
Start: 2021-03-12 | End: 2021-03-22

## 2021-03-12 RX ADMIN — LORAZEPAM 1 MG: 2 INJECTION INTRAMUSCULAR; INTRAVENOUS at 18:58

## 2021-03-12 RX ADMIN — CEPHALEXIN 500 MG: 500 CAPSULE ORAL at 20:17

## 2021-03-12 RX ADMIN — METOPROLOL TARTRATE 5 MG: 1 INJECTION, SOLUTION INTRAVENOUS at 19:02

## 2021-03-12 ASSESSMENT — LIFESTYLE VARIABLES: DO YOU DRINK ALCOHOL: YES

## 2021-03-12 NOTE — ED NOTES
PT BIB EMS, pt transferred from [facility].  Pt came in today to their facility after a fall hitting his head on the left side with left head laceration repaired with staples and left elbow skin tear.  CT completed no bleed but had left skull fracture.  Pt A/O x4.  Pt reports alcohol use with .109 at outlying facility.  Pt also was admitted to [facility] on 3/5 for UGI bleed.  Tetanus given today at [facility].  ERP to see.

## 2021-03-13 NOTE — ED NOTES
[facility], pt had a bowel movement

## 2021-03-13 NOTE — ED NOTES
Pt back to room from CT and HR returned to 140's-150's.  ERP informed and orders placed.  ED tech at bedside attempting to capture EKG.

## 2021-03-13 NOTE — ED NOTES
Discharge paperwork given to pt, pt advised to follow up with his pcp, all questions answered, pt taken to the ER exit on a wheelchair, pt alert and oriented x4, pt called for a taxi

## 2021-03-13 NOTE — ED NOTES
Pt HR increased from SR 80's to 's.  ERP informed.  Pt denies any complaints.  Pt requesting food and water.  ERP aware and [place] for pt to eat and drink.  Sorbot,  Water, and mac and cheese provided.  Pt swallowing without difficulty.

## 2021-03-13 NOTE — ED NOTES
Pt had acute episode of tachycardia for approximately 5 minutes. Pt's heart rate dropped back below 100 bpm prior to getting EKG reading. EKG still performed. ERP called but no answer, message left for ERP to re-evaluate Pt. Pt was sitting up in bed with no visible signs of discomfort, simply stating that he felt like his heart was going fast. Radial pulse taken to validate and confirm monitor readings.    HR approximately 155 bpm during this episode.

## 2021-03-13 NOTE — ED PROVIDER NOTES
ED Provider Note    CHIEF COMPLAINT  Chief Complaint   Patient presents with    T-5000 FALL    T-5000 Head Injury    Elbow Pain     skin tear.       HPI  Rohit Snow is a 75 y.o. male who presents via transfer from [facility] with a ground-level fall and subsequent skull fracture without underlying intracranial hematoma.  The patient is a chronic alcoholic, earlier this morning he bent over to feed her cat and he fell striking the left side of his head.  CT scan revealed a nondisplaced parietal skull fracture.  He had a large scalp laceration that was repaired.  Patient has no weakness numbness or tingling.  He offers no other complaints, denying neck and back pain and chest pain and abdominal pain.  He was found to have a hemoglobin of 7.7 at [facility], this is close to his baseline he was recently admitted with a GI bleed for that.    REVIEW OF SYSTEMS  Negative for fever, rash, chest pain, dyspnea, abdominal pain, back pain. All other systems are negative.     PAST MEDICAL HISTORY   has a past medical history of Actinic keratosis (1/3/2017), Back pain, CLL (chronic lymphocytic leukemia) (HCC) (3/23/2017), ESOPHAGITIS, Esophagitis, ETOH abuse, ETOH abuse, Fall, KYA (generalized anxiety disorder), GERD (gastroesophageal reflux disease), Gilbert's disease, Health care maintenance, Helicobacter pylori (H. pylori) (2010), Helicobacter pylori (H. pylori), Hepatitis, Hypertension (2004), Hypertension, Neck pain, Neoplasm of uncertain behavior of skin (2016), Neuropathy (2010), Neuropathy, Osteopenia, Osteopenia, Ringworm (5 years old), Vitamin D deficiency (2011), and Vitamin D deficiency.    SOCIAL HISTORY  Social History     Tobacco Use    Smoking status: Former Smoker     Packs/day: 0.25     Years: 4.00     Pack years: 1.00     Types: Cigarettes     Quit date: 1965     Years since quittin.2    Smokeless tobacco: Never Used   Substance and  "Sexual Activity    Alcohol use: Yes     Alcohol/week: 30.0 oz     Types: 50 Standard drinks or equivalent per week     Comment: 1/2 drinks daily (no more than a pint a day)    Drug use: Yes     Types: Marijuana, Inhaled     Comment: Marijuana once a week, helps with sleep and pain    Sexual activity: Not Currently       SURGICAL HISTORY   has a past surgical history that includes egd w/esophageal dil. balloon (1998, 2004); appendectomy; colonoscopy (1998, 2009); egd esophagus with endoscopic us (2009); endoscopy procedure (11/19/2014); tonsillectomy; egd w/esophageal dil. balloon; other abdominal surgery; colonoscopy; egd esophagus with endoscopic us; cervical fusion posterior (5/1/2015); cervical laminectomy posterior (5/1/2015); and wrist orif (Left, 5/7/2015).    CURRENT MEDICATIONS  I personally reviewed the medication list in the charting documentation.     ALLERGIES  No Known Allergies    PHYSICAL EXAM  VITAL SIGNS: /66   Pulse 76   Temp 37.1 °C (98.7 °F) (Temporal)   Resp 16   Ht 1.702 m (5' 7\")   Wt 61.2 kg (135 lb)   SpO2 98%   BMI 21.14 kg/m²   Constitutional: Frail-appearing, chronically ill-appearing but no acute distress.  HENT: Large stapled left-sided parietal laceration without active bleeding.  Eyes: Conjunctiva normal, Non-icteric.   Chest: Normal nonlabored respirations  Skin: No erythema, No rash.   Abdomen: Soft, nontender, nondistended.  Musculoskeletal: Good range of motion in all major joints.   Neurologic: Alert, slurred speech otherwise cranial nerves II through XII are intact.  Normal and symmetric upper and lower extremity motor and sensory function bilaterally.  Psychiatric: Affect normal, Judgment normal.    DIAGNOSTIC STUDIES / PROCEDURES    LABS/EKGs  Results for orders placed or performed during the hospital encounter of 03/12/21   EKG (NOW)   Result Value Ref Range    Report       [facility] Emergency Dept.    Test Date:  2021-03-12  Pt Name:    " MAGALI TODD                  Department: ER  MRN:        [id number]                      Room:       BL 15  Gender:     Male                         Technician: [id number]  :        1945                   Requested By:ER TRIAGE PROTOCOL  Order #:    [id number]                    Reading MD: ANAM JACOBSEN MD    Measurements  Intervals                                Axis  Rate:       94                           P:          0  RI:         166                          QRS:        -20  QRSD:       86                           T:          -18  QT:         380  QTc:        476    Interpretive Statements  12 Lead EKG interpreted by me to show: -- Rate 94 -- Rhythm: Normal sinus  rhythm  -- Axis: Normal -- RI and QRS Intervals: Normal -- T waves: No acute changes  --  ST segments: No acute changes -- Ectopy: None. My impression of this EKG:  Does  not indicate acute ischemia at this time.  Electronically  Signed On 3- 19:42:39 PST by ANAM JACOBSEN MD     EKG   Result Value Ref Range    Report       [facility] Emergency Dept.    Test Date:  2021  Pt Name:    MAGALI TODD                  Department: ER  MRN:        [id number]                      Room:       BL 15  Gender:     Male                         Technician: [id number]  :        1945                   Requested By:ANAM JACOBSEN  Order #:    [id number]                    Reading MD: ANAM JACOBSEN MD    Measurements  Intervals                                Axis  Rate:       143                          P:  RI:                                      QRS:        -21  QRSD:       88                           T:          -11  QT:         320  QTc:        494    Interpretive Statements  12 Lead EKG interpreted by me to show: -- Rate 143 -- Rhythm: SVT-- Axis:  Normal  -- RI and QRS Intervals: Normal -- T waves: No acute changes -- ST segments:  No  acute changes -- Ectopy: None. My impression of this EKG: SVT with  no  indication  of acute ischemia  Electronically Signed On 3- 19:42:22 PST by  ANAM JACOBSEN MD          CT-HEAD W/O   Final Result      1.  The left posterior frontal/parietal skull fracture is again seen with overlying soft tissue injury.   2.  There is no acute intracranial hemorrhage.      OUTSIDE IMAGES-CT HEAD   Final Result      OUTSIDE IMAGES-CT CERVICAL SPINE   Final Result      OUTSIDE IMAGES-DX CHEST   Final Result      OUTSIDE IMAGES-DX UPPER EXTREMITY, LEFT   Final Result            COURSE & MEDICAL DECISION MAKING  Pertinent Labs & Imaging studies reviewed. (See chart for details)    Encounter Summary: This is a 75 y.o. male with ground-level fall that occurred this morning while intoxicated, went to [facility] where a CT scan of the head revealed a nondisplaced left-sided parietal skull fracture without underlying intracranial hematoma.  The patient also had a CT scan of his neck revealing no acute injury.  He had blood work revealing anemia of 7.7 which upon reviewing records here in our system is baseline for him.  He was intoxicated prior to transfer with alcohol level 0.1.  No focal neurologic complaints or findings on exam.  His laceration has been repaired at the outside facility.  Will obtain the head CT and consult neurosurgery ------- I discussed the case with Dr. Allen.  He recommended a repeat CT scan at 6 PM which corresponds to 12 hours since the injury.  The CT scan was obtained and is negative for intracranial hemorrhage, no changes from the prior.  During his stay in the emergency department he did go into an episode of tachycardia, spontaneously resolved, was treated with some Ativan and metoprolol.  We will put him on Keflex given the open nature of the skull fracture after discussion with Dr. Allen.  He will follow-up with Dr. Allen next week.  Have gone over strict return instructions and have educated him on alcohol cessation.      DISPOSITION:  Discharge Home      FINAL IMPRESSION  1. Open fracture of parietal bone, initial encounter (HCC)    2. Fall from ground level        This dictation was created using voice recognition software. The accuracy of the dictation is limited to the abilities of the software. I expect there may be some errors of grammar and possibly content. The nursing notes were reviewed and certain aspects of this information were incorporated into this note.    Electronically signed by: Baudilio Freedman M.D., 3/12/2021 4:00 PM

## 2021-03-13 NOTE — ED NOTES
Med rec completed per Pt at bedside and phone calls to Pt's pharmacies [facility] on [address] ([phone]) and [facility] on [address] ([phone]).  Pt states that he stopped taking most of his medications because he did not know what they were for. The only prescription medications Pt is currently taking are gabapentin and tramadol. Pt has prescriptions for sucralfate 1 g (1 tablet four times per day) and pantoprazole 40 mg (1 tablet twice per day) filled at [facility], however he has not picked these medications up.  Pt denies taking any over-the-counter medications or vitamins/supplements.  Allergies reviewed with Pt. No known drug allergies.  No oral antibiotics in last 14 days.

## 2021-03-13 NOTE — ED NOTES
Pt HR returned to 90's per monitor, ERP informed and reports to continue with medications.    Pt medicated per MAR.

## 2021-03-13 NOTE — ED NOTES
Pt HR again increasing to the 140's, repositioned pt and attempted vasovagal maneuvers, no change in pt HR.  ERP aware and coming to see pt.  Pt denies complaints, no report of feeling heart racing.

## 2021-12-13 NOTE — PROGRESS NOTES
Assumed care of pt at 0730. A/Ox4, discussed plan of care. Pt on room air, tolerating diet, up self with 1 person assist. Pt stating pain in left calf, red nodule noted. All needs met at this time. Bed in lowest position, treaded socks on, personal belongings and call light within reach, instructed to call for any assistance.      DM (diabetes mellitus)

## 2021-12-16 ENCOUNTER — HOSPITAL ENCOUNTER (EMERGENCY)
Facility: MEDICAL CENTER | Age: 76
End: 2021-12-16
Attending: EMERGENCY MEDICINE
Payer: MEDICARE

## 2021-12-16 VITALS
BODY MASS INDEX: 25.61 KG/M2 | DIASTOLIC BLOOD PRESSURE: 66 MMHG | SYSTOLIC BLOOD PRESSURE: 144 MMHG | HEART RATE: 83 BPM | RESPIRATION RATE: 16 BRPM | OXYGEN SATURATION: 98 % | HEIGHT: 64 IN | TEMPERATURE: 97 F | WEIGHT: 150 LBS

## 2021-12-16 DIAGNOSIS — M54.2 NECK PAIN: ICD-10-CM

## 2021-12-16 LAB
AMPHET UR QL SCN: NEGATIVE
BARBITURATES UR QL SCN: NEGATIVE
BENZODIAZ UR QL SCN: NEGATIVE
BZE UR QL SCN: NEGATIVE
CANNABINOIDS UR QL SCN: POSITIVE
METHADONE UR QL SCN: NEGATIVE
OPIATES UR QL SCN: NEGATIVE
OXYCODONE UR QL SCN: NEGATIVE
PCP UR QL SCN: NEGATIVE
POC BREATHALIZER: 0.07 PERCENT (ref 0–0.01)
POC BREATHALIZER: 0.1 PERCENT (ref 0–0.01)
PROPOXYPH UR QL SCN: NEGATIVE

## 2021-12-16 PROCEDURE — 90791 PSYCH DIAGNOSTIC EVALUATION: CPT

## 2021-12-16 PROCEDURE — 302970 POC BREATHALIZER: Performed by: EMERGENCY MEDICINE

## 2021-12-16 PROCEDURE — 302970 POC BREATHALIZER

## 2021-12-16 PROCEDURE — 99284 EMERGENCY DEPT VISIT MOD MDM: CPT

## 2021-12-16 PROCEDURE — 80307 DRUG TEST PRSMV CHEM ANLYZR: CPT

## 2021-12-16 RX ORDER — OXYCODONE HYDROCHLORIDE AND ACETAMINOPHEN 5; 325 MG/1; MG/1
1 TABLET ORAL EVERY 8 HOURS PRN
Qty: 15 TABLET | Refills: 0 | Status: SHIPPED | OUTPATIENT
Start: 2021-12-16 | End: 2021-12-21

## 2021-12-16 RX ORDER — GABAPENTIN 300 MG/1
300 CAPSULE ORAL 3 TIMES DAILY
Qty: 90 CAPSULE | Refills: 0 | Status: SHIPPED | OUTPATIENT
Start: 2021-12-16

## 2021-12-16 NOTE — ED TRIAGE NOTES
"Pt presents to ED via EMS with complaint of SI. EMS reports pt presented to [facility] with no scheduled appt and requested assistance with pain control of chronic neck pain. At time pt states he was denied pain meds he stated \"Suicide is next on my list if I can't get help\" to LOIS Jolley who completed Legal 2000 paperwork and called EMS. PT denies SI for this RN, States he has no plan and was saying this \"to get their attention\". Room cleared, pt placed in gown with clothes in belongings bag secured at nurse station.   "

## 2021-12-16 NOTE — ED PROVIDER NOTES
"ED Provider Note    Scribed for Keturah Millan M.D. by Sherwin Kemp. 12/16/2021, 2:16 PM.    Primary care provider: Pcp Pt States None  Means of arrival: EMS  History obtained from: Patient  History limited by: None    CHIEF COMPLAINT  Chief Complaint   Patient presents with    Suicidal Ideation     presented to spine clinic requesting pain medication and when they did not provide stated \"suicide is still on my list\"    Neck Pain     chronic neck pain hx previous fx and surgery.        HPI  Rohit Snow is a 76 y.o. male who presents to the Emergency Department via EMS for acute SI and chronic neck pain. Per EMS, patient presented to his spine clinic this morning requesting a refill of his oxycodone, and when he was initially denied pain medication he stated \"suicide is next of my list if I can't get help.\" A Legal 2000 was promptly completed and he was sent here for evaluation. Patient denies any history of SI or any current SI, he states \"I was just trying to get attention.\" His chronic neck pain was due to several neck fractures he sustained several years ago. Patient denies any chest pain, fevers, shortness of breath, or cough.     REVIEW OF SYSTEMS  Pertinent positives include SI and chronic neck pain. Pertinent negatives include no chest pain, fevers, shortness of breath, or cough.  All other systems reviewed and negative.     PAST MEDICAL HISTORY   has a past medical history of Actinic keratosis (1/3/2017), Back pain, CLL (chronic lymphocytic leukemia) (HCC) (3/23/2017), ESOPHAGITIS, Esophagitis, ETOH abuse, ETOH abuse, Fall, KYA (generalized anxiety disorder), GERD (gastroesophageal reflux disease), Gilbert's disease, Health care maintenance, Helicobacter pylori (H. pylori) (01/2010), Helicobacter pylori (H. pylori), Hepatitis, Hypertension (02/2004), Hypertension, Neck pain, Neoplasm of uncertain behavior of skin (12/28/2016), Neuropathy (05/2010), Neuropathy, Osteopenia, Osteopenia, Ringworm (5 " "years old), Vitamin D deficiency (2011), and Vitamin D deficiency.    SURGICAL HISTORY   has a past surgical history that includes egd w/esophageal dil. balloon (, ); appendectomy; colonoscopy (, ); egd esophagus with endoscopic us (); endoscopy procedure (2014); tonsillectomy; egd w/esophageal dil. balloon; other abdominal surgery; colonoscopy; egd esophagus with endoscopic us; cervical fusion posterior (2015); cervical laminectomy posterior (2015); and wrist orif (Left, 2015).    SOCIAL HISTORY  Social History     Tobacco Use    Smoking status: Former Smoker     Packs/day: 0.25     Years: 4.00     Pack years: 1.00     Types: Cigarettes     Quit date: 1965     Years since quittin.9    Smokeless tobacco: Never Used   Substance Use Topics    Alcohol use: Yes     Alcohol/week: 30.0 oz     Types: 50 Standard drinks or equivalent per week     Comment: 1/2 drinks daily (no more than a pint a day)    Drug use: Yes     Types: Marijuana, Inhaled     Comment: Marijuana once a week, helps with sleep and pain      Social History     Substance and Sexual Activity   Drug Use Yes    Types: Marijuana, Inhaled    Comment: Marijuana once a week, helps with sleep and pain       FAMILY HISTORY  Family History   Problem Relation Age of Onset    Other Mother         GERD    Heart Disease Mother         CAD    Diabetes Mother         DMII    Cancer Father         liver    Cancer Maternal Grandmother         Unknown type of cancer       CURRENT MEDICATIONS  Home Medications    **Home medications have not yet been reviewed for this encounter**         ALLERGIES  No Known Allergies    PHYSICAL EXAM  VITAL SIGNS: /65   Pulse 85   Temp 36.4 °C (97.6 °F) (Temporal)   Resp 18   Ht 1.626 m (5' 4\")   Wt 68 kg (150 lb)   SpO2 98%   BMI 25.75 kg/m²     Constitutional: Well developed, No acute distress, Non-toxic appearance.   HENT: Normocephalic, Atraumatic, Bilateral external ears " normal,  Nose normal.   Eyes: PERRL, EOMI, Conjunctiva normal.    Neck: Old midline scar. Normal range of motion, diffuse paraspinal tenderness, Supple.    Cardiovascular: Normal heart rate, Normal rhythm.    Thorax & Lungs: Normal breath sounds, No respiratory distress.    Abdomen: Benign abdominal exam, no tenderness, no distention, no guarding, no rebound.    Skin: Warm, Dry, No erythema, No rash.   Back: No tenderness, No CVA tenderness.   Extremities: Intact distal pulses, No edema, No tenderness   Neurologic: Alert & oriented x 3, Normal motor function, Normal sensory function, No focal deficits noted.   Psychiatric: Appropriate, no SI or HI.                                                     DIAGNOSTIC STUDIES / PROCEDURES\    LABS  Results for orders placed or performed during the hospital encounter of 12/16/21   URINE DRUG SCREEN   Result Value Ref Range    Amphetamines Urine Negative Negative    Barbiturates Negative Negative    Benzodiazepines Negative Negative    Cocaine Metabolite Negative Negative    Methadone Negative Negative    Opiates Negative Negative    Oxycodone Negative Negative    Phencyclidine -Pcp Negative Negative    Propoxyphene Negative Negative    Cannabinoid Metab Positive (A) Negative   POC BREATHALIZER   Result Value Ref Range    POC Breathalizer 0.098 (A) 0.00 - 0.01 Percent     All labs reviewed by me.    COURSE & MEDICAL DECISION MAKING  Nursing notes, VS, PMSFHx reviewed in chart.     Patient was brought to the emergency department after stating to [facility] [place] that he was having suicidal ideation.  Patient had a very difficult interaction at the spine clinic.  He has been out of his pain medication for about a month and when he tried to get an appointment at the pain management clinic they refused to give him pain medications.  Says he lost his temper and just wanted to get a reaction and therefore said he wanted to kill himself.  Patient has never had any previous suicidal  ideation or suicide attempts.  Patient is here in the ER and regrets his statements.  Overall he is appropriate and remorseful here.  I do believe he understands that this was the wrong way for him to get attention and follow-up.    2:16 PM Patient seen and examined at bedside. Ordered for POC Breathalizer and UDS to evaluate. Behavioral Health to consult.     4:06 PM Patient was evaluated by [facility]; they have set him up with a PCP appointment to have his medications refilled and believe he is safe for discharge at this time. Patient's Legal Hold was discontinued at this time. He was updated on the plan of care. Discussed discharge instructions and return precautions with the patient and they were cleared for discharge. Patient was given the opportunity to ask any further questions. Patient is comfortable with discharge at this time.      I reviewed prescription monitoring program for patient's narcotic use before prescribing a scheduled drug.The patient will not drink alcohol nor drive with prescribed medications. The patient will return for new or worsening symptoms and is stable at the time of discharge.    The patient is referred to a primary physician for blood pressure management, diabetic screening, and for all other preventative health concerns.    In prescribing controlled substances to this patient, I certify that I have obtained and reviewed the medical history of Rohit Snow. I have also made a good vasu effort to obtain applicable records from other providers who have treated the patient and records did not demonstrate any increased risk of substance abuse that would prevent me from prescribing controlled substances.     I have conducted a physical exam and documented it. I have reviewed Mr. Snow’s prescription history as maintained by the [place] Prescription Monitoring Program.     I have assessed the patient’s risk for abuse, dependency, and addiction using the validated Opioid Risk  Tool available at https://www.mdcalc.com/zcqppp-ugmm-uvuh-ort-narcotic-abuse.     Given the above, I believe the benefits of controlled substance therapy outweigh the risks. The reasons for prescribing controlled substances include non-narcotic, oral analgesic alternatives have been inadequate for pain control. Accordingly, I have discussed the risk and benefits, treatment plan, and alternative therapies with the patient.       DISPOSITION:  Patient will be discharged home in stable condition.    FOLLOW UP:  [facility] [id number]  [phone]    Schedule an appointment as soon as possible for a visit   If symptoms worsen, return to the er.    OUTPATIENT MEDICATIONS:  Discharge Medication List as of 12/16/2021  4:14 PM        START taking these medications    Details   oxyCODONE-acetaminophen (PERCOCET) 5-325 MG Tab Take 1 Tablet by mouth every 8 hours as needed for Severe Pain for up to 5 days., Disp-15 Tablet, R-0, Normal              FINAL IMPRESSION  1. Neck pain          ISherwin (Scribe), am scribing for, and in the presence of, Keturah Millan M.D..    Electronically signed by: Sherwin Kemp (Scribe), 12/16/2021    IKeturah M.D. personally performed the services described in this documentation, as scribed by Sherwin Kemp in my presence, and it is both accurate and complete.    The note accurately reflects work and decisions made by me.  Keturah Millan M.D.  12/16/2021  9:47 PM

## 2021-12-17 NOTE — ED NOTES
Assist RN note- Pt seen by alert team and ERP and pt is no longer on suicide precautions. Clothing returned to pt by Kellen MIXON

## 2021-12-17 NOTE — ED NOTES
Pt discharged home as ordered by erp. Pt instructed to follow up with his PCP and return here for any needs or concerns. Pt verbalized understanding. Pt instructed on where to  RXs. Pt instructed no driving/drinking on pain meds. A cab was called for pt. Pt left ambulating independently in NAD

## 2021-12-17 NOTE — DISCHARGE INSTRUCTIONS
You should receive a call from your primary care doctor to get follow-up and help with your pain control.  I have given you a very short supply of pain medication to help until you can get in and be seen.  If you start having thoughts of hurting yourself or have any other issues return to the ER for further evaluation.

## 2021-12-17 NOTE — CONSULTS
"[facility]   TRIAGE ASSESSMENT    Name: Rohit Snow  MRN: [id number]  : 1945  Age: 76 y.o.  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 Apparently  \"in order to get their attention\" he made statements threatening self harm and the APRN placed him on a hold as DTS & transported pt to [facility] ED. The pt complains of  chronic neck pain \"I have rods in my neck.  It hurts and I had no more pills. They were not listening to me. I do not want to die. I am getting cataract surgery \". Thought Processes are forward-focused and pt denies depressed mood/anergia/distractability  but he does admit to grief over loss of wife \"On my 69th BD  7 years ago-- I am finally coming to terms with it.\" The pt asserts that he functions adequately as self care provider, \"I'm a great . My Dad cooked. My mom cooked. I'm making a stuffed turkey for [place] with 3 side dishes\".  Mr Snow lives with a roommate in [place]  and shops for himself. He has no psych history, no history of suicide attempts or depression.  He did grieve the death of his wife of 30 years (whom he lost  7 years ago to \"brain\" CA).  He has limited contact with family members \"They just take & take\".   He denies SI. He states he does have a \"target pistol at home. I never use it. It has no bullets\". Denies desire to self harm.  Mr Snow has intact memory/Mood & Affect & Thought Process/Thought Content are calm, forward thinking & congruent with affect/mood. No AVH No delusions No Anxiety/Worry/Hx panic Attacks.  Speech is clear, articulate, organized. Thought Processes are goal oriented and complete. No HI. " "No SI. Appropriate regret that he made statements today as he explains above. Pt recalled 3 items at 5 minutes and almost completely penny a clock.   Pt did request help with coordination of care & Writer called [facility] where he sees Dr Lewis. An appointment was made for Mr Snow to see Dr Lewis on Tues 12/21 at 1045 am.  Pt did state he doesn't feel like \" Dr Lewis is interested\" in him but pt did not choose to have writer make a different appt.  Pt was given scripts/all belongings & referral for appt. He left via taxi which he paid for himself.  Chief Complaint   Patient presents with   • Suicidal Ideation     presented to spine clinic requesting pain medication and when they did not provide stated \"suicide is still on my list\"   • Neck Pain     chronic neck pain hx previous fx and surgery.         CURRENT LIVING SITUATION/SOCIAL SUPPORT: 75 yo [place]et with [employer]  lives with roommate in [place].    BEHAVIORAL HEALTH TREATMENT HISTORY  Does patient/parent report a history of prior behavioral health treatment for patient?   No:    SAFETY ASSESSMENT - SELF  Does patient acknowledge current or past symptoms of dangerousness to self? no  Does parent/significant other report patient has current or past symptoms of dangerousness to self? no  Does presenting problem suggest symptoms of dangerousness to self? No    SAFETY ASSESSMENT - OTHERS  Does patient acknowledge current or past symptoms of aggressive behavior or risk to others?no  Does parent/significant other report patient has current or past symptoms of aggressive behavior or risk to others?  no  Does presenting problem suggest symptoms of dangerousness to others? No    Crisis Safety Plan completed and copy given to patient?yes    ABUSE/NEGLECT SCREENING  Does patient report feeling “unsafe” in his/her home, or afraid of anyone? no  Does patient report any history of physical, sexual, or emotional abuse? no  Does parent or significant " "other report any of the above?no  Is there evidence of neglect by self? no  Is there evidence of neglect by a caregiver? no  Does the patient/parent report any history of CPS/APS/police involvement related to suspected abuse/neglect or domestic violence?no  Based on the information provided during the current assessment, is a mandated report of suspected abuse/neglect being made?  no  SUBSTANCE USE SCREENING  Yes:  Sree all substances used in the past 30 days:      Last Use Amount   [x]   Alcohol today 2-4 beers   [x]   Marijuana Today  2 joints qd   []   Heroin     []   Prescription Opioids  (used without prescription, for    recreation, or in excess of prescribed amount)     []   Other Prescription  (used without prescription, for    recreation, or in excess of prescribed amount)     []   Cocaine      []   Methamphetamine     []   \"\" drugs (ectasy, MDMA)     []   Other substances        UDS results: pending  Breathalyzer results: 0.078 at 1555    What consequences does the patient associate with any of the above substance use and or addictive behaviors? none  Risk factors for detox (check all that apply):  []  Seizures   []  Diaphoretic (sweating)   []  Tremors   []  Hallucinations   []  Increased blood pressure   []  Decreased blood pressure   []  Other   [x]  None      [x] Patient education on risk factors for detoxification and instructed to return to ER as needed.      MENTAL STATUS   Participation: Active verbal participation  Grooming: Casual  Orientation: Fully Oriented  Behavior: Calm  Eye contact: Good  Mood: Euthymic  Affect: Congruent with content  Thought process: Goal-directed  Thought content: Within normal limits  Speech: Rate within normal limits  Perception: Within normal limits  Memory:  No gross evidence of memory deficits  Pt recalled 3/3 at 5 minutes.   Insight: Adequate  Judgment:  Adequate  Other: Cognition     Collateral information: [facility] [phone]  Source:  [] " Significant other present in person:   [] Significant other by telephone  [] [facility]   [x] [facility] Nursing Staff  [x] [facility]  [x] Other: APRN at [facility] and Rosie at [facility] [phone]       CLINICAL IMPRESSIONS:  Primary:  Chronic pain  Secondary:  Health Coordiantion Deficits      IDENTIFIED NEEDS/PLAN:      []  Imminent safety risk - self [] Imminent safety risk - others   []  Acute substance withdrawal []  Psychosis/Impaired reality testing   []  Mood/anxiety []  Substance use/Addictive behavior   []  Maladaptive behaviro []  Parent/child conflict   []  Family/Couples conflict []  Biomedical   []  Housing []  Financial   []   Legal  Occupational/Educational   []  Domestic violence [x]  Other:  Made pt an appt with his PCP 12/21/1045 am for coordination of care for pt's chronic pain     Recommended Plan of Care:  Will see Dr Lewis at [place] 12/21 1045-- written confirmation handed to pt. Dr Millan gave pt prescriptions for German & pain  Med. Pt uses [facility]  Has the recommended plan of care and disposition been communicated with pt's assigned nurse? Carmel yes    Does patient express agreement with the above plan? yes    Referral appointment(s) scheduled? Yes 12/21/1045 [place] Dr Lewis    Alert team only:   I have discussed findings and recommendations with Dr. Keturah Millan who is in agreement with these recommendations.     Referral information sent to the following community providers :          Kellen Umana R.N.  12/16/2021